# Patient Record
Sex: MALE | Race: WHITE | NOT HISPANIC OR LATINO | Employment: OTHER | URBAN - METROPOLITAN AREA
[De-identification: names, ages, dates, MRNs, and addresses within clinical notes are randomized per-mention and may not be internally consistent; named-entity substitution may affect disease eponyms.]

---

## 2020-02-19 ENCOUNTER — HOSPITAL ENCOUNTER (INPATIENT)
Facility: HOSPITAL | Age: 75
LOS: 3 days | Discharge: HOME | End: 2020-02-22
Attending: EMERGENCY MEDICINE | Admitting: FAMILY MEDICINE

## 2020-02-19 ENCOUNTER — APPOINTMENT (OUTPATIENT)
Dept: CT IMAGING | Facility: HOSPITAL | Age: 75
End: 2020-02-19

## 2020-02-19 ENCOUNTER — APPOINTMENT (OUTPATIENT)
Dept: GENERAL RADIOLOGY | Facility: HOSPITAL | Age: 75
End: 2020-02-19

## 2020-02-19 DIAGNOSIS — R41.0 CONFUSION: ICD-10-CM

## 2020-02-19 DIAGNOSIS — Z79.4 TYPE 2 DIABETES MELLITUS WITH DIABETIC NEUROPATHY, WITH LONG-TERM CURRENT USE OF INSULIN (HCC): ICD-10-CM

## 2020-02-19 DIAGNOSIS — N18.6 STAGE 5 CHRONIC KIDNEY DISEASE ON CHRONIC DIALYSIS (HCC): ICD-10-CM

## 2020-02-19 DIAGNOSIS — E11.40 TYPE 2 DIABETES MELLITUS WITH DIABETIC NEUROPATHY, WITH LONG-TERM CURRENT USE OF INSULIN (HCC): ICD-10-CM

## 2020-02-19 DIAGNOSIS — Z99.2 DIALYSIS PATIENT (HCC): ICD-10-CM

## 2020-02-19 DIAGNOSIS — J96.21 ACUTE ON CHRONIC RESPIRATORY FAILURE WITH HYPOXIA (HCC): Primary | ICD-10-CM

## 2020-02-19 DIAGNOSIS — Z99.2 STAGE 5 CHRONIC KIDNEY DISEASE ON CHRONIC DIALYSIS (HCC): ICD-10-CM

## 2020-02-19 PROBLEM — I50.9 ACUTE ON CHRONIC CONGESTIVE HEART FAILURE: Status: ACTIVE | Noted: 2020-02-19

## 2020-02-19 PROBLEM — R09.02 HYPOXIA: Status: ACTIVE | Noted: 2020-02-19

## 2020-02-19 PROBLEM — G93.40 ENCEPHALOPATHY ACUTE: Status: ACTIVE | Noted: 2020-02-19

## 2020-02-19 LAB
ALBUMIN SERPL-MCNC: 3.2 G/DL (ref 3.5–5.2)
ALBUMIN/GLOB SERPL: 0.6 G/DL
ALP SERPL-CCNC: 151 U/L (ref 39–117)
ALT SERPL W P-5'-P-CCNC: 5 U/L (ref 1–41)
AMMONIA BLD-SCNC: 23 UMOL/L (ref 16–60)
ANION GAP SERPL CALCULATED.3IONS-SCNC: 13 MMOL/L (ref 5–15)
AST SERPL-CCNC: 8 U/L (ref 1–40)
BASOPHILS # BLD AUTO: 0.02 10*3/MM3 (ref 0–0.2)
BASOPHILS NFR BLD AUTO: 0.2 % (ref 0–1.5)
BILIRUB SERPL-MCNC: 0.9 MG/DL (ref 0.2–1.2)
BUN BLD-MCNC: 27 MG/DL (ref 8–23)
BUN/CREAT SERPL: 6.2 (ref 7–25)
CALCIUM SPEC-SCNC: 8.6 MG/DL (ref 8.6–10.5)
CHLORIDE SERPL-SCNC: 98 MMOL/L (ref 98–107)
CO2 SERPL-SCNC: 31 MMOL/L (ref 22–29)
CREAT BLD-MCNC: 4.37 MG/DL (ref 0.76–1.27)
D-LACTATE SERPL-SCNC: 2.2 MMOL/L (ref 0.5–2)
D-LACTATE SERPL-SCNC: 2.7 MMOL/L (ref 0.5–2)
DEPRECATED RDW RBC AUTO: 55.9 FL (ref 37–54)
EOSINOPHIL # BLD AUTO: 0.03 10*3/MM3 (ref 0–0.4)
EOSINOPHIL NFR BLD AUTO: 0.4 % (ref 0.3–6.2)
ERYTHROCYTE [DISTWIDTH] IN BLOOD BY AUTOMATED COUNT: 15.9 % (ref 12.3–15.4)
GFR SERPL CREATININE-BSD FRML MDRD: 13 ML/MIN/1.73
GFR SERPL CREATININE-BSD FRML MDRD: ABNORMAL ML/MIN/{1.73_M2}
GLOBULIN UR ELPH-MCNC: 5 GM/DL
GLUCOSE BLD-MCNC: 99 MG/DL (ref 65–99)
HCT VFR BLD AUTO: 39.4 % (ref 37.5–51)
HGB BLD-MCNC: 11.9 G/DL (ref 13–17.7)
HOLD SPECIMEN: NORMAL
HOLD SPECIMEN: NORMAL
IMM GRANULOCYTES # BLD AUTO: 0.03 10*3/MM3 (ref 0–0.05)
IMM GRANULOCYTES NFR BLD AUTO: 0.4 % (ref 0–0.5)
INR PPP: 3.06 (ref 0.85–1.16)
LACTATE HOLD SPECIMEN: NORMAL
LYMPHOCYTES # BLD AUTO: 1.37 10*3/MM3 (ref 0.7–3.1)
LYMPHOCYTES NFR BLD AUTO: 16.5 % (ref 19.6–45.3)
MCH RBC QN AUTO: 29 PG (ref 26.6–33)
MCHC RBC AUTO-ENTMCNC: 30.2 G/DL (ref 31.5–35.7)
MCV RBC AUTO: 95.9 FL (ref 79–97)
MONOCYTES # BLD AUTO: 0.68 10*3/MM3 (ref 0.1–0.9)
MONOCYTES NFR BLD AUTO: 8.2 % (ref 5–12)
NEUTROPHILS # BLD AUTO: 6.16 10*3/MM3 (ref 1.7–7)
NEUTROPHILS NFR BLD AUTO: 74.3 % (ref 42.7–76)
NRBC BLD AUTO-RTO: 0 /100 WBC (ref 0–0.2)
PLATELET # BLD AUTO: 171 10*3/MM3 (ref 140–450)
PMV BLD AUTO: 10.4 FL (ref 6–12)
POTASSIUM BLD-SCNC: 4.2 MMOL/L (ref 3.5–5.2)
PROT SERPL-MCNC: 8.2 G/DL (ref 6–8.5)
PROTHROMBIN TIME: 30.5 SECONDS (ref 11.2–14.3)
RBC # BLD AUTO: 4.11 10*6/MM3 (ref 4.14–5.8)
SODIUM BLD-SCNC: 142 MMOL/L (ref 136–145)
WBC NRBC COR # BLD: 8.29 10*3/MM3 (ref 3.4–10.8)
WHOLE BLOOD HOLD SPECIMEN: NORMAL
WHOLE BLOOD HOLD SPECIMEN: NORMAL

## 2020-02-19 PROCEDURE — 85610 PROTHROMBIN TIME: CPT | Performed by: EMERGENCY MEDICINE

## 2020-02-19 PROCEDURE — 99223 1ST HOSP IP/OBS HIGH 75: CPT | Performed by: INTERNAL MEDICINE

## 2020-02-19 PROCEDURE — 82140 ASSAY OF AMMONIA: CPT | Performed by: EMERGENCY MEDICINE

## 2020-02-19 PROCEDURE — 70450 CT HEAD/BRAIN W/O DYE: CPT

## 2020-02-19 PROCEDURE — 83605 ASSAY OF LACTIC ACID: CPT | Performed by: EMERGENCY MEDICINE

## 2020-02-19 PROCEDURE — 99285 EMERGENCY DEPT VISIT HI MDM: CPT

## 2020-02-19 PROCEDURE — 80053 COMPREHEN METABOLIC PANEL: CPT | Performed by: EMERGENCY MEDICINE

## 2020-02-19 PROCEDURE — 87040 BLOOD CULTURE FOR BACTERIA: CPT | Performed by: EMERGENCY MEDICINE

## 2020-02-19 PROCEDURE — 71045 X-RAY EXAM CHEST 1 VIEW: CPT

## 2020-02-19 PROCEDURE — 85025 COMPLETE CBC W/AUTO DIFF WBC: CPT | Performed by: EMERGENCY MEDICINE

## 2020-02-19 PROCEDURE — 94640 AIRWAY INHALATION TREATMENT: CPT

## 2020-02-19 RX ORDER — HYDROCODONE BITARTRATE AND ACETAMINOPHEN 10; 325 MG/1; MG/1
1 TABLET ORAL EVERY 6 HOURS PRN
COMMUNITY
End: 2020-04-22 | Stop reason: HOSPADM

## 2020-02-19 RX ORDER — POTASSIUM CHLORIDE 1500 MG/1
20 TABLET, FILM COATED, EXTENDED RELEASE ORAL DAILY
COMMUNITY
End: 2020-04-22 | Stop reason: HOSPADM

## 2020-02-19 RX ORDER — IPRATROPIUM BROMIDE AND ALBUTEROL SULFATE 2.5; .5 MG/3ML; MG/3ML
3 SOLUTION RESPIRATORY (INHALATION) ONCE
Status: COMPLETED | OUTPATIENT
Start: 2020-02-19 | End: 2020-02-19

## 2020-02-19 RX ORDER — RANITIDINE 150 MG/1
150 TABLET ORAL 2 TIMES DAILY
COMMUNITY
End: 2020-02-22 | Stop reason: HOSPADM

## 2020-02-19 RX ORDER — GLIPIZIDE 10 MG/1
10 TABLET ORAL
COMMUNITY
End: 2021-01-01

## 2020-02-19 RX ORDER — MIDODRINE HYDROCHLORIDE 10 MG/1
10 TABLET ORAL 2 TIMES DAILY PRN
COMMUNITY
End: 2021-01-01

## 2020-02-19 RX ORDER — BUMETANIDE 0.25 MG/ML
2 INJECTION INTRAMUSCULAR; INTRAVENOUS ONCE
Status: COMPLETED | OUTPATIENT
Start: 2020-02-19 | End: 2020-02-19

## 2020-02-19 RX ORDER — SODIUM CHLORIDE 0.9 % (FLUSH) 0.9 %
10 SYRINGE (ML) INJECTION AS NEEDED
Status: DISCONTINUED | OUTPATIENT
Start: 2020-02-19 | End: 2020-02-22 | Stop reason: HOSPADM

## 2020-02-19 RX ORDER — BUMETANIDE 2 MG/1
2 TABLET ORAL DAILY
COMMUNITY
End: 2020-04-22 | Stop reason: HOSPADM

## 2020-02-19 RX ADMIN — IPRATROPIUM BROMIDE AND ALBUTEROL SULFATE 3 ML: 2.5; .5 SOLUTION RESPIRATORY (INHALATION) at 17:31

## 2020-02-19 RX ADMIN — BUMETANIDE 2 MG: 0.25 INJECTION INTRAMUSCULAR; INTRAVENOUS at 23:46

## 2020-02-20 ENCOUNTER — APPOINTMENT (OUTPATIENT)
Dept: CARDIOLOGY | Facility: HOSPITAL | Age: 75
End: 2020-02-20

## 2020-02-20 LAB
ANION GAP SERPL CALCULATED.3IONS-SCNC: 14 MMOL/L (ref 5–15)
B PARAPERT DNA SPEC QL NAA+PROBE: NOT DETECTED
B PERT DNA SPEC QL NAA+PROBE: NOT DETECTED
BASOPHILS # BLD AUTO: 0.01 10*3/MM3 (ref 0–0.2)
BASOPHILS NFR BLD AUTO: 0.2 % (ref 0–1.5)
BH CV ECHO MEAS - AO MAX PG (FULL): 6.4 MMHG
BH CV ECHO MEAS - AO MAX PG: 8.9 MMHG
BH CV ECHO MEAS - AO MEAN PG (FULL): 3.2 MMHG
BH CV ECHO MEAS - AO MEAN PG: 4.6 MMHG
BH CV ECHO MEAS - AO ROOT AREA (BSA CORRECTED): 1.2
BH CV ECHO MEAS - AO ROOT AREA: 8.6 CM^2
BH CV ECHO MEAS - AO ROOT DIAM: 3.3 CM
BH CV ECHO MEAS - AO V2 MAX: 149.4 CM/SEC
BH CV ECHO MEAS - AO V2 MEAN: 97.1 CM/SEC
BH CV ECHO MEAS - AO V2 VTI: 34.2 CM
BH CV ECHO MEAS - ASC AORTA: 3.5 CM
BH CV ECHO MEAS - AVA(I,A): 2.2 CM^2
BH CV ECHO MEAS - AVA(I,D): 2.2 CM^2
BH CV ECHO MEAS - AVA(V,A): 2.4 CM^2
BH CV ECHO MEAS - AVA(V,D): 2.4 CM^2
BH CV ECHO MEAS - BSA(HAYCOCK): 3 M^2
BH CV ECHO MEAS - BSA: 2.8 M^2
BH CV ECHO MEAS - BZI_BMI: 42.4 KILOGRAMS/M^2
BH CV ECHO MEAS - BZI_METRIC_HEIGHT: 193 CM
BH CV ECHO MEAS - BZI_METRIC_WEIGHT: 157.9 KG
BH CV ECHO MEAS - EDV(CUBED): 85.1 ML
BH CV ECHO MEAS - EDV(MOD-SP4): 112 ML
BH CV ECHO MEAS - EDV(TEICH): 87.6 ML
BH CV ECHO MEAS - EF(CUBED): 66.2 %
BH CV ECHO MEAS - EF(MOD-SP4): 50 %
BH CV ECHO MEAS - EF(TEICH): 57.9 %
BH CV ECHO MEAS - ESV(CUBED): 28.8 ML
BH CV ECHO MEAS - ESV(MOD-SP4): 56 ML
BH CV ECHO MEAS - ESV(TEICH): 36.9 ML
BH CV ECHO MEAS - FS: 30.3 %
BH CV ECHO MEAS - IVS/LVPW: 0.88
BH CV ECHO MEAS - IVSD: 0.9 CM
BH CV ECHO MEAS - LA DIMENSION: 4.4 CM
BH CV ECHO MEAS - LA/AO: 1.3
BH CV ECHO MEAS - LAD MAJOR: 5.7 CM
BH CV ECHO MEAS - LAT PEAK E' VEL: 8.1 CM/SEC
BH CV ECHO MEAS - LATERAL E/E' RATIO: 14.2
BH CV ECHO MEAS - LV DIASTOLIC VOL/BSA (35-75): 39.9 ML/M^2
BH CV ECHO MEAS - LV MASS(C)D: 141.3 GRAMS
BH CV ECHO MEAS - LV MASS(C)DI: 50.4 GRAMS/M^2
BH CV ECHO MEAS - LV MAX PG: 2.6 MMHG
BH CV ECHO MEAS - LV MEAN PG: 1.4 MMHG
BH CV ECHO MEAS - LV SYSTOLIC VOL/BSA (12-30): 20 ML/M^2
BH CV ECHO MEAS - LV V1 MAX: 80.2 CM/SEC
BH CV ECHO MEAS - LV V1 MEAN: 54 CM/SEC
BH CV ECHO MEAS - LV V1 VTI: 17.5 CM
BH CV ECHO MEAS - LVIDD: 4.4 CM
BH CV ECHO MEAS - LVIDS: 3.1 CM
BH CV ECHO MEAS - LVLD AP4: 8.1 CM
BH CV ECHO MEAS - LVLS AP4: 7.6 CM
BH CV ECHO MEAS - LVOT AREA (M): 4.5 CM^2
BH CV ECHO MEAS - LVOT AREA: 4.4 CM^2
BH CV ECHO MEAS - LVOT DIAM: 2.4 CM
BH CV ECHO MEAS - LVPWD: 1 CM
BH CV ECHO MEAS - MED PEAK E' VEL: 8.8 CM/SEC
BH CV ECHO MEAS - MEDIAL E/E' RATIO: 13.2
BH CV ECHO MEAS - MV A MAX VEL: 126.2 CM/SEC
BH CV ECHO MEAS - MV DEC TIME: 0.12 SEC
BH CV ECHO MEAS - MV E MAX VEL: 117.9 CM/SEC
BH CV ECHO MEAS - MV E/A: 0.93
BH CV ECHO MEAS - MV MAX PG: 3.9 MMHG
BH CV ECHO MEAS - MV MEAN PG: 2.4 MMHG
BH CV ECHO MEAS - MV V2 MAX: 98.8 CM/SEC
BH CV ECHO MEAS - MV V2 MEAN: 75.1 CM/SEC
BH CV ECHO MEAS - MV V2 VTI: 29.9 CM
BH CV ECHO MEAS - MVA(VTI): 2.6 CM^2
BH CV ECHO MEAS - PA ACC SLOPE: 534.5 CM/SEC^2
BH CV ECHO MEAS - PA ACC TIME: 0.15 SEC
BH CV ECHO MEAS - PA MAX PG: 2.8 MMHG
BH CV ECHO MEAS - PA PR(ACCEL): 10.1 MMHG
BH CV ECHO MEAS - PA V2 MAX: 83.1 CM/SEC
BH CV ECHO MEAS - SI(AO): 105.3 ML/M^2
BH CV ECHO MEAS - SI(CUBED): 20.1 ML/M^2
BH CV ECHO MEAS - SI(LVOT): 27.4 ML/M^2
BH CV ECHO MEAS - SI(MOD-SP4): 20 ML/M^2
BH CV ECHO MEAS - SI(TEICH): 18.1 ML/M^2
BH CV ECHO MEAS - SV(AO): 295.2 ML
BH CV ECHO MEAS - SV(CUBED): 56.3 ML
BH CV ECHO MEAS - SV(LVOT): 76.8 ML
BH CV ECHO MEAS - SV(MOD-SP4): 56 ML
BH CV ECHO MEAS - SV(TEICH): 50.7 ML
BH CV ECHO MEAS - TV MAX PG: 1.2 MMHG
BH CV ECHO MEAS - TV V2 MAX: 54.8 CM/SEC
BH CV ECHO MEASUREMENTS AVERAGE E/E' RATIO: 13.95
BH CV VAS BP RIGHT ARM: NORMAL MMHG
BH CV XLRA - RV BASE: 3.6 CM
BH CV XLRA - RV LENGTH: 6.3 CM
BH CV XLRA - RV MID: 3.4 CM
BUN BLD-MCNC: 33 MG/DL (ref 8–23)
BUN/CREAT SERPL: 7 (ref 7–25)
C PNEUM DNA NPH QL NAA+NON-PROBE: NOT DETECTED
CALCIUM SPEC-SCNC: 8.4 MG/DL (ref 8.6–10.5)
CHLORIDE SERPL-SCNC: 99 MMOL/L (ref 98–107)
CHOLEST SERPL-MCNC: 88 MG/DL (ref 0–200)
CLUMPED PLATELETS: PRESENT
CO2 SERPL-SCNC: 26 MMOL/L (ref 22–29)
CREAT BLD-MCNC: 4.7 MG/DL (ref 0.76–1.27)
DEPRECATED RDW RBC AUTO: 55.8 FL (ref 37–54)
EOSINOPHIL # BLD AUTO: 0.08 10*3/MM3 (ref 0–0.4)
EOSINOPHIL NFR BLD AUTO: 1.5 % (ref 0.3–6.2)
ERYTHROCYTE [DISTWIDTH] IN BLOOD BY AUTOMATED COUNT: 15.9 % (ref 12.3–15.4)
FLUAV H1 2009 PAND RNA NPH QL NAA+PROBE: NOT DETECTED
FLUAV H1 HA GENE NPH QL NAA+PROBE: NOT DETECTED
FLUAV H3 RNA NPH QL NAA+PROBE: NOT DETECTED
FLUAV SUBTYP SPEC NAA+PROBE: NOT DETECTED
FLUBV RNA ISLT QL NAA+PROBE: NOT DETECTED
GFR SERPL CREATININE-BSD FRML MDRD: 12 ML/MIN/1.73
GFR SERPL CREATININE-BSD FRML MDRD: ABNORMAL ML/MIN/{1.73_M2}
GLUCOSE BLD-MCNC: 86 MG/DL (ref 65–99)
GLUCOSE BLDC GLUCOMTR-MCNC: 124 MG/DL (ref 70–130)
GLUCOSE BLDC GLUCOMTR-MCNC: 45 MG/DL (ref 70–130)
GLUCOSE BLDC GLUCOMTR-MCNC: 69 MG/DL (ref 70–130)
GLUCOSE BLDC GLUCOMTR-MCNC: 78 MG/DL (ref 70–130)
GLUCOSE BLDC GLUCOMTR-MCNC: 86 MG/DL (ref 70–130)
HADV DNA SPEC NAA+PROBE: NOT DETECTED
HBA1C MFR BLD: 6.4 % (ref 4.8–5.6)
HCOV 229E RNA SPEC QL NAA+PROBE: NOT DETECTED
HCOV HKU1 RNA SPEC QL NAA+PROBE: NOT DETECTED
HCOV NL63 RNA SPEC QL NAA+PROBE: NOT DETECTED
HCOV OC43 RNA SPEC QL NAA+PROBE: NOT DETECTED
HCT VFR BLD AUTO: 36.1 % (ref 37.5–51)
HDLC SERPL-MCNC: 26 MG/DL (ref 40–60)
HGB BLD-MCNC: 10.9 G/DL (ref 13–17.7)
HMPV RNA NPH QL NAA+NON-PROBE: NOT DETECTED
HPIV1 RNA SPEC QL NAA+PROBE: NOT DETECTED
HPIV2 RNA SPEC QL NAA+PROBE: NOT DETECTED
HPIV3 RNA NPH QL NAA+PROBE: NOT DETECTED
HPIV4 P GENE NPH QL NAA+PROBE: DETECTED
IMM GRANULOCYTES # BLD AUTO: 0.06 10*3/MM3 (ref 0–0.05)
IMM GRANULOCYTES NFR BLD AUTO: 1.1 % (ref 0–0.5)
INR PPP: 3.46 (ref 0.85–1.16)
LARGE PLATELETS: NORMAL
LDLC SERPL CALC-MCNC: 40 MG/DL (ref 0–100)
LDLC/HDLC SERPL: 1.53 {RATIO}
LEFT ATRIUM VOLUME INDEX: 21 ML/M^2
LEFT ATRIUM VOLUME: 59 ML
LV EF 2D ECHO EST: 55 %
LYMPHOCYTES # BLD AUTO: 0.87 10*3/MM3 (ref 0.7–3.1)
LYMPHOCYTES NFR BLD AUTO: 16.5 % (ref 19.6–45.3)
M PNEUMO IGG SER IA-ACNC: NOT DETECTED
MCH RBC QN AUTO: 29.1 PG (ref 26.6–33)
MCHC RBC AUTO-ENTMCNC: 30.2 G/DL (ref 31.5–35.7)
MCV RBC AUTO: 96.3 FL (ref 79–97)
MONOCYTES # BLD AUTO: 0.28 10*3/MM3 (ref 0.1–0.9)
MONOCYTES NFR BLD AUTO: 5.3 % (ref 5–12)
NEUTROPHILS # BLD AUTO: 3.97 10*3/MM3 (ref 1.7–7)
NEUTROPHILS NFR BLD AUTO: 75.4 % (ref 42.7–76)
NRBC BLD AUTO-RTO: 0.6 /100 WBC (ref 0–0.2)
PLATELET # BLD AUTO: 151 10*3/MM3 (ref 140–450)
PMV BLD AUTO: 11.3 FL (ref 6–12)
POTASSIUM BLD-SCNC: 3.5 MMOL/L (ref 3.5–5.2)
PROTHROMBIN TIME: 33.5 SECONDS (ref 11.2–14.3)
RBC # BLD AUTO: 3.75 10*6/MM3 (ref 4.14–5.8)
RBC MORPH BLD: NORMAL
RHINOVIRUS RNA SPEC NAA+PROBE: NOT DETECTED
RSV RNA NPH QL NAA+NON-PROBE: NOT DETECTED
SODIUM BLD-SCNC: 139 MMOL/L (ref 136–145)
TRIGL SERPL-MCNC: 111 MG/DL (ref 0–150)
VLDLC SERPL-MCNC: 22.2 MG/DL
WBC MORPH BLD: NORMAL
WBC NRBC COR # BLD: 5.27 10*3/MM3 (ref 3.4–10.8)

## 2020-02-20 PROCEDURE — 82962 GLUCOSE BLOOD TEST: CPT

## 2020-02-20 PROCEDURE — 99232 SBSQ HOSP IP/OBS MODERATE 35: CPT | Performed by: FAMILY MEDICINE

## 2020-02-20 PROCEDURE — 63710000001 INSULIN LISPRO (HUMAN) PER 5 UNITS: Performed by: NURSE PRACTITIONER

## 2020-02-20 PROCEDURE — 83036 HEMOGLOBIN GLYCOSYLATED A1C: CPT | Performed by: NURSE PRACTITIONER

## 2020-02-20 PROCEDURE — 63710000001 INSULIN LISPRO (HUMAN) PER 5 UNITS: Performed by: FAMILY MEDICINE

## 2020-02-20 PROCEDURE — 80061 LIPID PANEL: CPT | Performed by: NURSE PRACTITIONER

## 2020-02-20 PROCEDURE — 85025 COMPLETE CBC W/AUTO DIFF WBC: CPT | Performed by: NURSE PRACTITIONER

## 2020-02-20 PROCEDURE — 85610 PROTHROMBIN TIME: CPT | Performed by: NURSE PRACTITIONER

## 2020-02-20 PROCEDURE — 93306 TTE W/DOPPLER COMPLETE: CPT

## 2020-02-20 PROCEDURE — 80048 BASIC METABOLIC PNL TOTAL CA: CPT | Performed by: NURSE PRACTITIONER

## 2020-02-20 PROCEDURE — 93306 TTE W/DOPPLER COMPLETE: CPT | Performed by: INTERNAL MEDICINE

## 2020-02-20 PROCEDURE — 0100U HC BIOFIRE FILMARRAY RESP PANEL 2: CPT | Performed by: FAMILY MEDICINE

## 2020-02-20 PROCEDURE — 85007 BL SMEAR W/DIFF WBC COUNT: CPT | Performed by: NURSE PRACTITIONER

## 2020-02-20 RX ORDER — ATORVASTATIN CALCIUM 20 MG/1
20 TABLET, FILM COATED ORAL DAILY
Status: DISCONTINUED | OUTPATIENT
Start: 2020-02-20 | End: 2020-02-22 | Stop reason: HOSPADM

## 2020-02-20 RX ORDER — WARFARIN SODIUM 5 MG/1
2.5 TABLET ORAL SEE ADMIN INSTRUCTIONS
COMMUNITY
End: 2020-04-22 | Stop reason: HOSPADM

## 2020-02-20 RX ORDER — HYDROCODONE BITARTRATE AND ACETAMINOPHEN 10; 325 MG/1; MG/1
1 TABLET ORAL EVERY 6 HOURS PRN
Status: DISCONTINUED | OUTPATIENT
Start: 2020-02-20 | End: 2020-02-22 | Stop reason: HOSPADM

## 2020-02-20 RX ORDER — NICOTINE POLACRILEX 4 MG
15 LOZENGE BUCCAL
Status: DISCONTINUED | OUTPATIENT
Start: 2020-02-20 | End: 2020-02-22 | Stop reason: HOSPADM

## 2020-02-20 RX ORDER — ONDANSETRON 2 MG/ML
4 INJECTION INTRAMUSCULAR; INTRAVENOUS EVERY 6 HOURS PRN
Status: DISCONTINUED | OUTPATIENT
Start: 2020-02-20 | End: 2020-02-22 | Stop reason: HOSPADM

## 2020-02-20 RX ORDER — PANTOPRAZOLE SODIUM 40 MG/1
40 TABLET, DELAYED RELEASE ORAL EVERY MORNING
Status: DISCONTINUED | OUTPATIENT
Start: 2020-02-20 | End: 2020-02-22 | Stop reason: HOSPADM

## 2020-02-20 RX ORDER — SODIUM CHLORIDE 0.9 % (FLUSH) 0.9 %
10 SYRINGE (ML) INJECTION AS NEEDED
Status: DISCONTINUED | OUTPATIENT
Start: 2020-02-20 | End: 2020-02-22 | Stop reason: HOSPADM

## 2020-02-20 RX ORDER — GABAPENTIN 300 MG/1
600 CAPSULE ORAL 4 TIMES DAILY
COMMUNITY
End: 2020-02-22 | Stop reason: HOSPADM

## 2020-02-20 RX ORDER — GABAPENTIN 300 MG/1
300 CAPSULE ORAL 3 TIMES DAILY
Status: DISCONTINUED | OUTPATIENT
Start: 2020-02-20 | End: 2020-02-20

## 2020-02-20 RX ORDER — SODIUM CHLORIDE 0.9 % (FLUSH) 0.9 %
10 SYRINGE (ML) INJECTION EVERY 12 HOURS SCHEDULED
Status: DISCONTINUED | OUTPATIENT
Start: 2020-02-20 | End: 2020-02-22 | Stop reason: HOSPADM

## 2020-02-20 RX ORDER — LEVOTHYROXINE SODIUM 0.1 MG/1
200 TABLET ORAL DAILY
Status: DISCONTINUED | OUTPATIENT
Start: 2020-02-20 | End: 2020-02-22 | Stop reason: HOSPADM

## 2020-02-20 RX ORDER — POTASSIUM CHLORIDE 750 MG/1
20 CAPSULE, EXTENDED RELEASE ORAL 2 TIMES DAILY WITH MEALS
Status: DISCONTINUED | OUTPATIENT
Start: 2020-02-20 | End: 2020-02-22 | Stop reason: HOSPADM

## 2020-02-20 RX ORDER — WARFARIN SODIUM 5 MG/1
5 TABLET ORAL SEE ADMIN INSTRUCTIONS
COMMUNITY
End: 2020-04-22 | Stop reason: HOSPADM

## 2020-02-20 RX ORDER — LISINOPRIL 20 MG/1
20 TABLET ORAL DAILY
Status: DISCONTINUED | OUTPATIENT
Start: 2020-02-20 | End: 2020-02-20

## 2020-02-20 RX ORDER — GABAPENTIN 100 MG/1
100 CAPSULE ORAL 3 TIMES DAILY
Status: DISCONTINUED | OUTPATIENT
Start: 2020-02-20 | End: 2020-02-22 | Stop reason: HOSPADM

## 2020-02-20 RX ORDER — ALBUMIN (HUMAN) 12.5 G/50ML
12.5 SOLUTION INTRAVENOUS AS NEEDED
Status: ACTIVE | OUTPATIENT
Start: 2020-02-21 | End: 2020-02-21

## 2020-02-20 RX ORDER — FENOFIBRATE 145 MG/1
145 TABLET, COATED ORAL DAILY
Status: DISCONTINUED | OUTPATIENT
Start: 2020-02-20 | End: 2020-02-20

## 2020-02-20 RX ORDER — BUMETANIDE 1 MG/1
2 TABLET ORAL DAILY
Status: DISCONTINUED | OUTPATIENT
Start: 2020-02-20 | End: 2020-02-22 | Stop reason: HOSPADM

## 2020-02-20 RX ORDER — ONDANSETRON 4 MG/1
4 TABLET, FILM COATED ORAL EVERY 6 HOURS PRN
Status: DISCONTINUED | OUTPATIENT
Start: 2020-02-20 | End: 2020-02-22 | Stop reason: HOSPADM

## 2020-02-20 RX ORDER — TORSEMIDE 20 MG/1
40 TABLET ORAL DAILY
Status: DISCONTINUED | OUTPATIENT
Start: 2020-02-20 | End: 2020-02-20 | Stop reason: SDUPTHER

## 2020-02-20 RX ORDER — FAMOTIDINE 20 MG/1
20 TABLET, FILM COATED ORAL
Status: DISCONTINUED | OUTPATIENT
Start: 2020-02-20 | End: 2020-02-21

## 2020-02-20 RX ORDER — DEXTROSE MONOHYDRATE 25 G/50ML
25 INJECTION, SOLUTION INTRAVENOUS
Status: DISCONTINUED | OUTPATIENT
Start: 2020-02-20 | End: 2020-02-22 | Stop reason: HOSPADM

## 2020-02-20 RX ORDER — MIDODRINE HYDROCHLORIDE 5 MG/1
10 TABLET ORAL
Status: DISCONTINUED | OUTPATIENT
Start: 2020-02-20 | End: 2020-02-22 | Stop reason: HOSPADM

## 2020-02-20 RX ORDER — WARFARIN SODIUM 4 MG/1
4 TABLET ORAL
Status: DISCONTINUED | OUTPATIENT
Start: 2020-02-20 | End: 2020-02-20

## 2020-02-20 RX ADMIN — GABAPENTIN 300 MG: 300 CAPSULE ORAL at 09:14

## 2020-02-20 RX ADMIN — MIDODRINE HYDROCHLORIDE 10 MG: 5 TABLET ORAL at 09:11

## 2020-02-20 RX ADMIN — METOPROLOL TARTRATE 25 MG: 25 TABLET ORAL at 00:59

## 2020-02-20 RX ADMIN — MIDODRINE HYDROCHLORIDE 10 MG: 5 TABLET ORAL at 17:14

## 2020-02-20 RX ADMIN — ATORVASTATIN CALCIUM 20 MG: 20 TABLET, FILM COATED ORAL at 09:11

## 2020-02-20 RX ADMIN — FAMOTIDINE 20 MG: 20 TABLET ORAL at 06:01

## 2020-02-20 RX ADMIN — PANTOPRAZOLE SODIUM 40 MG: 40 TABLET, DELAYED RELEASE ORAL at 06:01

## 2020-02-20 RX ADMIN — GABAPENTIN 100 MG: 100 CAPSULE ORAL at 17:14

## 2020-02-20 RX ADMIN — GABAPENTIN 100 MG: 100 CAPSULE ORAL at 22:41

## 2020-02-20 RX ADMIN — DEXTROSE 50 % IN WATER (D50W) INTRAVENOUS SYRINGE 25 G: at 09:15

## 2020-02-20 RX ADMIN — SODIUM CHLORIDE, PRESERVATIVE FREE 10 ML: 5 INJECTION INTRAVENOUS at 09:11

## 2020-02-20 RX ADMIN — MIDODRINE HYDROCHLORIDE 10 MG: 5 TABLET ORAL at 11:03

## 2020-02-20 RX ADMIN — POTASSIUM CHLORIDE 20 MEQ: 750 CAPSULE, EXTENDED RELEASE ORAL at 09:11

## 2020-02-20 RX ADMIN — HYDROCODONE BITARTRATE AND ACETAMINOPHEN 1 TABLET: 10; 325 TABLET ORAL at 17:15

## 2020-02-20 RX ADMIN — HYDROCODONE BITARTRATE AND ACETAMINOPHEN 1 TABLET: 10; 325 TABLET ORAL at 11:16

## 2020-02-20 RX ADMIN — LEVOTHYROXINE SODIUM 200 MCG: 100 TABLET ORAL at 06:01

## 2020-02-20 RX ADMIN — BUMETANIDE 2 MG: 1 TABLET ORAL at 09:12

## 2020-02-20 RX ADMIN — FAMOTIDINE 20 MG: 20 TABLET ORAL at 17:14

## 2020-02-20 RX ADMIN — POTASSIUM CHLORIDE 20 MEQ: 750 CAPSULE, EXTENDED RELEASE ORAL at 17:14

## 2020-02-20 RX ADMIN — SODIUM CHLORIDE, PRESERVATIVE FREE 10 ML: 5 INJECTION INTRAVENOUS at 22:42

## 2020-02-21 ENCOUNTER — APPOINTMENT (OUTPATIENT)
Dept: NEPHROLOGY | Facility: HOSPITAL | Age: 75
End: 2020-02-21

## 2020-02-21 LAB
ANION GAP SERPL CALCULATED.3IONS-SCNC: 11 MMOL/L (ref 5–15)
BUN BLD-MCNC: 17 MG/DL (ref 8–23)
BUN/CREAT SERPL: 5.5 (ref 7–25)
CALCIUM SPEC-SCNC: 8.8 MG/DL (ref 8.6–10.5)
CHLORIDE SERPL-SCNC: 97 MMOL/L (ref 98–107)
CO2 SERPL-SCNC: 28 MMOL/L (ref 22–29)
CREAT BLD-MCNC: 3.09 MG/DL (ref 0.76–1.27)
DEPRECATED RDW RBC AUTO: 54.7 FL (ref 37–54)
ERYTHROCYTE [DISTWIDTH] IN BLOOD BY AUTOMATED COUNT: 15.6 % (ref 12.3–15.4)
GFR SERPL CREATININE-BSD FRML MDRD: 20 ML/MIN/1.73
GLUCOSE BLD-MCNC: 98 MG/DL (ref 65–99)
GLUCOSE BLDC GLUCOMTR-MCNC: 113 MG/DL (ref 70–130)
GLUCOSE BLDC GLUCOMTR-MCNC: 82 MG/DL (ref 70–130)
GLUCOSE BLDC GLUCOMTR-MCNC: 95 MG/DL (ref 70–130)
GLUCOSE BLDC GLUCOMTR-MCNC: 99 MG/DL (ref 70–130)
HAV IGM SERPL QL IA: NORMAL
HBV CORE IGM SERPL QL IA: NORMAL
HBV SURFACE AG SERPL QL IA: NORMAL
HCT VFR BLD AUTO: 34.4 % (ref 37.5–51)
HCV AB SER DONR QL: NORMAL
HGB BLD-MCNC: 10.7 G/DL (ref 13–17.7)
INR PPP: 3.29 (ref 0.85–1.16)
MCH RBC QN AUTO: 29.8 PG (ref 26.6–33)
MCHC RBC AUTO-ENTMCNC: 31.1 G/DL (ref 31.5–35.7)
MCV RBC AUTO: 95.8 FL (ref 79–97)
PLATELET # BLD AUTO: 183 10*3/MM3 (ref 140–450)
PMV BLD AUTO: 10.1 FL (ref 6–12)
POTASSIUM BLD-SCNC: 3.7 MMOL/L (ref 3.5–5.2)
PROTHROMBIN TIME: 32.2 SECONDS (ref 11.2–14.3)
RBC # BLD AUTO: 3.59 10*6/MM3 (ref 4.14–5.8)
SODIUM BLD-SCNC: 136 MMOL/L (ref 136–145)
WBC NRBC COR # BLD: 5.11 10*3/MM3 (ref 3.4–10.8)

## 2020-02-21 PROCEDURE — 5A1D70Z PERFORMANCE OF URINARY FILTRATION, INTERMITTENT, LESS THAN 6 HOURS PER DAY: ICD-10-PCS | Performed by: INTERNAL MEDICINE

## 2020-02-21 PROCEDURE — 80048 BASIC METABOLIC PNL TOTAL CA: CPT | Performed by: FAMILY MEDICINE

## 2020-02-21 PROCEDURE — P9047 ALBUMIN (HUMAN), 25%, 50ML: HCPCS | Performed by: INTERNAL MEDICINE

## 2020-02-21 PROCEDURE — 99232 SBSQ HOSP IP/OBS MODERATE 35: CPT | Performed by: FAMILY MEDICINE

## 2020-02-21 PROCEDURE — 85610 PROTHROMBIN TIME: CPT | Performed by: NURSE PRACTITIONER

## 2020-02-21 PROCEDURE — 82962 GLUCOSE BLOOD TEST: CPT

## 2020-02-21 PROCEDURE — 25010000002 ALBUMIN HUMAN 25% PER 50 ML: Performed by: INTERNAL MEDICINE

## 2020-02-21 PROCEDURE — 80074 ACUTE HEPATITIS PANEL: CPT | Performed by: INTERNAL MEDICINE

## 2020-02-21 PROCEDURE — 25010000002 EPOETIN ALFA-EPBX 10000 UNIT/ML SOLUTION: Performed by: INTERNAL MEDICINE

## 2020-02-21 PROCEDURE — 63710000001 INSULIN DETEMIR PER 5 UNITS: Performed by: FAMILY MEDICINE

## 2020-02-21 PROCEDURE — 85027 COMPLETE CBC AUTOMATED: CPT | Performed by: FAMILY MEDICINE

## 2020-02-21 RX ORDER — FAMOTIDINE 20 MG/1
20 TABLET, FILM COATED ORAL DAILY
Status: DISCONTINUED | OUTPATIENT
Start: 2020-02-22 | End: 2020-02-22 | Stop reason: HOSPADM

## 2020-02-21 RX ORDER — LIDOCAINE 40 MG/G
CREAM TOPICAL AS NEEDED
Status: DISCONTINUED | OUTPATIENT
Start: 2020-02-21 | End: 2020-02-22 | Stop reason: HOSPADM

## 2020-02-21 RX ADMIN — FAMOTIDINE 20 MG: 20 TABLET ORAL at 10:56

## 2020-02-21 RX ADMIN — MIDODRINE HYDROCHLORIDE 10 MG: 5 TABLET ORAL at 17:45

## 2020-02-21 RX ADMIN — HYDROCODONE BITARTRATE AND ACETAMINOPHEN 1 TABLET: 10; 325 TABLET ORAL at 17:45

## 2020-02-21 RX ADMIN — GABAPENTIN 100 MG: 100 CAPSULE ORAL at 17:44

## 2020-02-21 RX ADMIN — LEVOTHYROXINE SODIUM 200 MCG: 100 TABLET ORAL at 10:56

## 2020-02-21 RX ADMIN — POTASSIUM CHLORIDE 20 MEQ: 750 CAPSULE, EXTENDED RELEASE ORAL at 17:45

## 2020-02-21 RX ADMIN — ALBUMIN HUMAN 50 G: 0.25 SOLUTION INTRAVENOUS at 08:17

## 2020-02-21 RX ADMIN — ATORVASTATIN CALCIUM 20 MG: 20 TABLET, FILM COATED ORAL at 10:56

## 2020-02-21 RX ADMIN — MIDODRINE HYDROCHLORIDE 10 MG: 5 TABLET ORAL at 08:17

## 2020-02-21 RX ADMIN — GABAPENTIN 100 MG: 100 CAPSULE ORAL at 20:52

## 2020-02-21 RX ADMIN — GABAPENTIN 100 MG: 100 CAPSULE ORAL at 10:56

## 2020-02-21 RX ADMIN — PANTOPRAZOLE SODIUM 40 MG: 40 TABLET, DELAYED RELEASE ORAL at 10:56

## 2020-02-21 RX ADMIN — BUMETANIDE 2 MG: 1 TABLET ORAL at 10:55

## 2020-02-21 RX ADMIN — EPOETIN ALFA-EPBX 10000 UNITS: 10000 INJECTION, SOLUTION INTRAVENOUS; SUBCUTANEOUS at 10:59

## 2020-02-21 RX ADMIN — SODIUM CHLORIDE, PRESERVATIVE FREE 10 ML: 5 INJECTION INTRAVENOUS at 20:52

## 2020-02-22 VITALS
OXYGEN SATURATION: 87 % | SYSTOLIC BLOOD PRESSURE: 127 MMHG | BODY MASS INDEX: 28.88 KG/M2 | HEIGHT: 76 IN | RESPIRATION RATE: 16 BRPM | WEIGHT: 237.2 LBS | HEART RATE: 83 BPM | DIASTOLIC BLOOD PRESSURE: 79 MMHG | TEMPERATURE: 97.6 F

## 2020-02-22 PROBLEM — J96.21 ACUTE AND CHRONIC RESPIRATORY FAILURE WITH HYPOXIA (HCC): Status: RESOLVED | Noted: 2020-02-19 | Resolved: 2020-02-22

## 2020-02-22 PROBLEM — I50.9 ACUTE ON CHRONIC CONGESTIVE HEART FAILURE (HCC): Status: RESOLVED | Noted: 2020-02-19 | Resolved: 2020-02-22

## 2020-02-22 PROBLEM — G93.40 ENCEPHALOPATHY ACUTE: Status: RESOLVED | Noted: 2020-02-19 | Resolved: 2020-02-22

## 2020-02-22 LAB
ALBUMIN SERPL-MCNC: 3.7 G/DL (ref 3.5–5.2)
ANION GAP SERPL CALCULATED.3IONS-SCNC: 14 MMOL/L (ref 5–15)
BUN BLD-MCNC: 23 MG/DL (ref 8–23)
BUN/CREAT SERPL: 5.8 (ref 7–25)
CALCIUM SPEC-SCNC: 9 MG/DL (ref 8.6–10.5)
CHLORIDE SERPL-SCNC: 96 MMOL/L (ref 98–107)
CO2 SERPL-SCNC: 28 MMOL/L (ref 22–29)
CREAT BLD-MCNC: 3.96 MG/DL (ref 0.76–1.27)
GFR SERPL CREATININE-BSD FRML MDRD: 15 ML/MIN/1.73
GLUCOSE BLD-MCNC: 102 MG/DL (ref 65–99)
GLUCOSE BLDC GLUCOMTR-MCNC: 105 MG/DL (ref 70–130)
GLUCOSE BLDC GLUCOMTR-MCNC: 156 MG/DL (ref 70–130)
HOLD SPECIMEN: NORMAL
INR PPP: 2.91 (ref 0.85–1.16)
PHOSPHATE SERPL-MCNC: 3.5 MG/DL (ref 2.5–4.5)
POTASSIUM BLD-SCNC: 4 MMOL/L (ref 3.5–5.2)
PROTHROMBIN TIME: 29.2 SECONDS (ref 11.2–14.3)
SODIUM BLD-SCNC: 138 MMOL/L (ref 136–145)

## 2020-02-22 PROCEDURE — 85610 PROTHROMBIN TIME: CPT | Performed by: NURSE PRACTITIONER

## 2020-02-22 PROCEDURE — 82962 GLUCOSE BLOOD TEST: CPT

## 2020-02-22 PROCEDURE — 80069 RENAL FUNCTION PANEL: CPT | Performed by: INTERNAL MEDICINE

## 2020-02-22 PROCEDURE — 99239 HOSP IP/OBS DSCHRG MGMT >30: CPT | Performed by: FAMILY MEDICINE

## 2020-02-22 RX ORDER — GABAPENTIN 100 MG/1
100 CAPSULE ORAL 3 TIMES DAILY
Qty: 9 CAPSULE | Refills: 0 | OUTPATIENT
Start: 2020-02-22 | End: 2022-01-01 | Stop reason: HOSPADM

## 2020-02-22 RX ADMIN — ATORVASTATIN CALCIUM 20 MG: 20 TABLET, FILM COATED ORAL at 10:40

## 2020-02-22 RX ADMIN — MIDODRINE HYDROCHLORIDE 10 MG: 5 TABLET ORAL at 10:40

## 2020-02-22 RX ADMIN — LEVOTHYROXINE SODIUM 200 MCG: 100 TABLET ORAL at 06:55

## 2020-02-22 RX ADMIN — PANTOPRAZOLE SODIUM 40 MG: 40 TABLET, DELAYED RELEASE ORAL at 06:56

## 2020-02-22 RX ADMIN — BUMETANIDE 2 MG: 1 TABLET ORAL at 10:39

## 2020-02-22 RX ADMIN — FAMOTIDINE 20 MG: 20 TABLET ORAL at 10:40

## 2020-02-22 RX ADMIN — SODIUM CHLORIDE, PRESERVATIVE FREE 10 ML: 5 INJECTION INTRAVENOUS at 10:41

## 2020-02-22 RX ADMIN — GABAPENTIN 100 MG: 100 CAPSULE ORAL at 10:40

## 2020-02-22 RX ADMIN — POTASSIUM CHLORIDE 20 MEQ: 750 CAPSULE, EXTENDED RELEASE ORAL at 10:40

## 2020-02-22 RX ADMIN — HYDROCODONE BITARTRATE AND ACETAMINOPHEN 1 TABLET: 10; 325 TABLET ORAL at 10:47

## 2020-02-22 RX ADMIN — MIDODRINE HYDROCHLORIDE 10 MG: 5 TABLET ORAL at 06:56

## 2020-02-23 ENCOUNTER — READMISSION MANAGEMENT (OUTPATIENT)
Dept: CALL CENTER | Facility: HOSPITAL | Age: 75
End: 2020-02-23

## 2020-02-23 NOTE — OUTREACH NOTE
Prep Survey      Responses   Facility patient discharged from?  [place]   Is patient eligible?  Yes   Discharge diagnosis  Acute on chronic HF   Does the patient have one of the following disease processes/diagnoses(primary or secondary)?  CHF   Does the patient have Home health ordered?  No   Is there a DME ordered?  No   Prep survey completed?  Yes          Chelsea Orozco RN

## 2020-02-24 ENCOUNTER — READMISSION MANAGEMENT (OUTPATIENT)
Dept: CALL CENTER | Facility: HOSPITAL | Age: 75
End: 2020-02-24

## 2020-02-24 ENCOUNTER — TRANSITIONAL CARE MANAGEMENT TELEPHONE ENCOUNTER (OUTPATIENT)
Dept: CALL CENTER | Facility: HOSPITAL | Age: 75
End: 2020-02-24

## 2020-02-24 LAB
BACTERIA SPEC AEROBE CULT: NORMAL
BACTERIA SPEC AEROBE CULT: NORMAL

## 2020-02-24 NOTE — OUTREACH NOTE
CHF Week 1 Survey      Responses   Facility patient discharged from?  [place]   Does the patient have one of the following disease processes/diagnoses(primary or secondary)?  CHF   Is there a successful TCM telephone encounter documented?  Yes          Katherine Rangel RN

## 2020-02-24 NOTE — OUTREACH NOTE
TCM call completed.  Spoke with family member Hope, she says that patient is not doing any better and she is on her way to bring him back to the ER.  She states that patient is very weak and she says he should have never been discharged on Saturday.  Family member says that she is going to wait to make a follow up with Dr. Purcell because she is unsure what is going to happen with patient at this time.

## 2020-03-02 ENCOUNTER — READMISSION MANAGEMENT (OUTPATIENT)
Dept: CALL CENTER | Facility: HOSPITAL | Age: 75
End: 2020-03-02

## 2020-03-02 NOTE — OUTREACH NOTE
CHF Week 2 Survey      Responses   Facility patient discharged from?  [place]   Does the patient have one of the following disease processes/diagnoses(primary or secondary)?  CHF   Week 2 attempt successful?  Yes   Call start time  1201   Call end time  1204   General alerts for this patient  Pt has HD on MWF   Discharge diagnosis  Acute on chronic HF   Is patient permission given to speak with other caregiver?  Yes   List who call center can speak with  friend- Rohini   Person spoke with today (if not patient) and relationship  Rohini   Meds reviewed with patient/caregiver?  Yes   Is the patient having any side effects they believe may be caused by any medication additions or changes?  No   Does the patient have all medications ordered at discharge?  Yes   Is the patient taking all medications as directed (includes completed medication regime)?  Yes   Medication comments  PCP added Zantac and readjusted the Gabapentin dose.    Does the patient have a primary care provider?   Yes   Does the patient have an appointment with their PCP within 7 days of discharge?  Yes   Has the patient kept scheduled appointments due by today?  Yes   Psychosocial issues?  No   Comments  Pt still having weakness. SOA is better. Confusion is resolved.    Did the patient receive a copy of their discharge instructions?  Yes   Nursing interventions  Reviewed instructions with patient   What is the patient's perception of their health status since discharge?  Improving   Is the patient weighing daily?  No [Pt gets weighed 3x/wk at HD, does not weigh on off days per Rohini.]   Is the patient able to teach back Heart Failure diet management?  Yes   Is the patient able to teach back Heart Failure Zones?  Yes   Is the patient/caregiver able to teach back the hierarchy of who to call/visit for symptoms/problems? PCP, Specialist, Home health nurse, Urgent Care, ED, 911  Yes   CHF Week 2 call completed?  Yes          Katherine Rangel RN

## 2020-03-11 ENCOUNTER — READMISSION MANAGEMENT (OUTPATIENT)
Dept: CALL CENTER | Facility: HOSPITAL | Age: 75
End: 2020-03-11

## 2020-03-11 NOTE — OUTREACH NOTE
CHF Week 3 Survey      Responses   [facility] patient discharged from?  [place]   Does the patient have one of the following disease processes/diagnoses(primary or secondary)?  CHF   Week 3 attempt successful?  No   Unsuccessful attempts  Attempt 1          Coral Alfonso RN

## 2020-03-16 ENCOUNTER — READMISSION MANAGEMENT (OUTPATIENT)
Dept: CALL CENTER | Facility: HOSPITAL | Age: 75
End: 2020-03-16

## 2020-03-16 NOTE — OUTREACH NOTE
CHF Week 3 Survey      Responses   [facility] patient discharged from?  [place]   Does the patient have one of the following disease processes/diagnoses(primary or secondary)?  CHF   Week 3 attempt successful?  No   Unsuccessful attempts  Attempt 2          Katherine Rangel RN

## 2020-03-31 ENCOUNTER — HOSPITAL ENCOUNTER (INPATIENT)
Facility: HOSPITAL | Age: 75
LOS: 22 days | Discharge: HOME HEALTH | End: 2020-04-22
Attending: EMERGENCY MEDICINE | Admitting: FAMILY MEDICINE

## 2020-03-31 ENCOUNTER — APPOINTMENT (OUTPATIENT)
Dept: CT IMAGING | Facility: HOSPITAL | Age: 75
End: 2020-03-31

## 2020-03-31 DIAGNOSIS — J96.90 RESPIRATORY FAILURE (HCC): ICD-10-CM

## 2020-03-31 DIAGNOSIS — R10.84 DIFFUSE ABDOMINAL PAIN: ICD-10-CM

## 2020-03-31 DIAGNOSIS — K92.1 MELENA: ICD-10-CM

## 2020-03-31 DIAGNOSIS — K92.0 HEMATEMESIS, PRESENCE OF NAUSEA NOT SPECIFIED: ICD-10-CM

## 2020-03-31 DIAGNOSIS — D62 ACUTE BLOOD LOSS ANEMIA: ICD-10-CM

## 2020-03-31 DIAGNOSIS — J96.01 ACUTE HYPOXEMIC RESPIRATORY FAILURE (HCC): ICD-10-CM

## 2020-03-31 DIAGNOSIS — Z03.818 ENCNTR FOR OBS FOR SUSP EXPSR TO OTH BIOLG AGENTS RULED OUT: ICD-10-CM

## 2020-03-31 DIAGNOSIS — R13.13 PHARYNGEAL DYSPHAGIA: ICD-10-CM

## 2020-03-31 DIAGNOSIS — K92.2 UPPER GI BLEED: Primary | ICD-10-CM

## 2020-03-31 DIAGNOSIS — K92.2 GASTROINTESTINAL HEMORRHAGE, UNSPECIFIED GASTROINTESTINAL HEMORRHAGE TYPE: ICD-10-CM

## 2020-03-31 PROBLEM — J18.1 LOBAR PNEUMONIA: Status: ACTIVE | Noted: 2020-03-31

## 2020-03-31 PROBLEM — N18.6 ESRD (END STAGE RENAL DISEASE): Status: ACTIVE | Noted: 2020-03-31

## 2020-03-31 PROBLEM — K59.81 OGILVIE'S SYNDROME: Status: ACTIVE | Noted: 2020-03-31

## 2020-03-31 PROBLEM — A41.9 SEVERE SEPSIS: Status: ACTIVE | Noted: 2020-03-31

## 2020-03-31 PROBLEM — R65.20 SEVERE SEPSIS (HCC): Status: ACTIVE | Noted: 2020-03-31

## 2020-03-31 LAB
ABO GROUP BLD: NORMAL
ABO GROUP BLD: NORMAL
ALBUMIN SERPL-MCNC: 2.9 G/DL (ref 3.5–5.2)
ALBUMIN/GLOB SERPL: 0.6 G/DL
ALP SERPL-CCNC: 135 U/L (ref 39–117)
ALT SERPL W P-5'-P-CCNC: 8 U/L (ref 1–41)
ANION GAP SERPL CALCULATED.3IONS-SCNC: 23 MMOL/L (ref 5–15)
AST SERPL-CCNC: 19 U/L (ref 1–40)
B PARAPERT DNA SPEC QL NAA+PROBE: NOT DETECTED
B PERT DNA SPEC QL NAA+PROBE: NOT DETECTED
BASOPHILS # BLD AUTO: 0.02 10*3/MM3 (ref 0–0.2)
BASOPHILS NFR BLD AUTO: 0.2 % (ref 0–1.5)
BILIRUB SERPL-MCNC: 0.6 MG/DL (ref 0.2–1.2)
BLD GP AB SCN SERPL QL: NEGATIVE
BUN BLD-MCNC: 47 MG/DL (ref 8–23)
BUN/CREAT SERPL: 12.5 (ref 7–25)
C PNEUM DNA NPH QL NAA+NON-PROBE: NOT DETECTED
CALCIUM SPEC-SCNC: 8 MG/DL (ref 8.6–10.5)
CHLORIDE SERPL-SCNC: 92 MMOL/L (ref 98–107)
CO2 SERPL-SCNC: 25 MMOL/L (ref 22–29)
CREAT BLD-MCNC: 3.77 MG/DL (ref 0.76–1.27)
D-LACTATE SERPL-SCNC: 2.9 MMOL/L (ref 0.5–2)
DEPRECATED RDW RBC AUTO: 60.2 FL (ref 37–54)
DEVELOPER EXPIRATION DATE: ABNORMAL
DEVELOPER LOT NUMBER: 7
EOSINOPHIL # BLD AUTO: 0 10*3/MM3 (ref 0–0.4)
EOSINOPHIL NFR BLD AUTO: 0 % (ref 0.3–6.2)
ERYTHROCYTE [DISTWIDTH] IN BLOOD BY AUTOMATED COUNT: 15.6 % (ref 12.3–15.4)
EXPIRATION DATE: ABNORMAL
FECAL OCCULT BLOOD SCREEN, POC: POSITIVE
FLUAV H1 2009 PAND RNA NPH QL NAA+PROBE: NOT DETECTED
FLUAV H1 HA GENE NPH QL NAA+PROBE: NOT DETECTED
FLUAV H3 RNA NPH QL NAA+PROBE: NOT DETECTED
FLUAV SUBTYP SPEC NAA+PROBE: NOT DETECTED
FLUBV RNA ISLT QL NAA+PROBE: NOT DETECTED
GFR SERPL CREATININE-BSD FRML MDRD: 16 ML/MIN/1.73
GLOBULIN UR ELPH-MCNC: 4.5 GM/DL
GLUCOSE BLD-MCNC: 100 MG/DL (ref 65–99)
HADV DNA SPEC NAA+PROBE: NOT DETECTED
HCOV 229E RNA SPEC QL NAA+PROBE: NOT DETECTED
HCOV HKU1 RNA SPEC QL NAA+PROBE: NOT DETECTED
HCOV NL63 RNA SPEC QL NAA+PROBE: NOT DETECTED
HCOV OC43 RNA SPEC QL NAA+PROBE: NOT DETECTED
HCT VFR BLD AUTO: 38.3 % (ref 37.5–51)
HGB BLD-MCNC: 11.5 G/DL (ref 13–17.7)
HMPV RNA NPH QL NAA+NON-PROBE: NOT DETECTED
HOLD SPECIMEN: NORMAL
HOLD SPECIMEN: NORMAL
HPIV1 RNA SPEC QL NAA+PROBE: NOT DETECTED
HPIV2 RNA SPEC QL NAA+PROBE: NOT DETECTED
HPIV3 RNA NPH QL NAA+PROBE: NOT DETECTED
HPIV4 P GENE NPH QL NAA+PROBE: NOT DETECTED
IMM GRANULOCYTES # BLD AUTO: 0.06 10*3/MM3 (ref 0–0.05)
IMM GRANULOCYTES NFR BLD AUTO: 0.6 % (ref 0–0.5)
INR PPP: 2.42 (ref 0.85–1.16)
LYMPHOCYTES # BLD AUTO: 1.57 10*3/MM3 (ref 0.7–3.1)
LYMPHOCYTES NFR BLD AUTO: 17 % (ref 19.6–45.3)
Lab: ABNORMAL
M PNEUMO IGG SER IA-ACNC: NOT DETECTED
MCH RBC QN AUTO: 31.4 PG (ref 26.6–33)
MCHC RBC AUTO-ENTMCNC: 30 G/DL (ref 31.5–35.7)
MCV RBC AUTO: 104.6 FL (ref 79–97)
MONOCYTES # BLD AUTO: 0.49 10*3/MM3 (ref 0.1–0.9)
MONOCYTES NFR BLD AUTO: 5.3 % (ref 5–12)
NEGATIVE CONTROL: NEGATIVE
NEUTROPHILS # BLD AUTO: 7.1 10*3/MM3 (ref 1.7–7)
NEUTROPHILS NFR BLD AUTO: 76.9 % (ref 42.7–76)
NRBC BLD AUTO-RTO: 0 /100 WBC (ref 0–0.2)
NT-PROBNP SERPL-MCNC: 2395 PG/ML (ref 5–900)
PLATELET # BLD AUTO: 211 10*3/MM3 (ref 140–450)
PMV BLD AUTO: 10.6 FL (ref 6–12)
POSITIVE CONTROL: POSITIVE
POTASSIUM BLD-SCNC: 4.8 MMOL/L (ref 3.5–5.2)
PROT SERPL-MCNC: 7.4 G/DL (ref 6–8.5)
PROTHROMBIN TIME: 26 SECONDS (ref 11.5–14)
RBC # BLD AUTO: 3.66 10*6/MM3 (ref 4.14–5.8)
RH BLD: POSITIVE
RH BLD: POSITIVE
RHINOVIRUS RNA SPEC NAA+PROBE: DETECTED
RSV RNA NPH QL NAA+NON-PROBE: NOT DETECTED
SODIUM BLD-SCNC: 140 MMOL/L (ref 136–145)
T&S EXPIRATION DATE: NORMAL
TROPONIN T SERPL-MCNC: 0.1 NG/ML (ref 0–0.03)
WBC NRBC COR # BLD: 9.24 10*3/MM3 (ref 3.4–10.8)
WHOLE BLOOD HOLD SPECIMEN: NORMAL
WHOLE BLOOD HOLD SPECIMEN: NORMAL

## 2020-03-31 PROCEDURE — 82270 OCCULT BLOOD FECES: CPT | Performed by: EMERGENCY MEDICINE

## 2020-03-31 PROCEDURE — 99285 EMERGENCY DEPT VISIT HI MDM: CPT

## 2020-03-31 PROCEDURE — 71250 CT THORAX DX C-: CPT

## 2020-03-31 PROCEDURE — 86900 BLOOD TYPING SEROLOGIC ABO: CPT

## 2020-03-31 PROCEDURE — 86850 RBC ANTIBODY SCREEN: CPT

## 2020-03-31 PROCEDURE — 83880 ASSAY OF NATRIURETIC PEPTIDE: CPT | Performed by: EMERGENCY MEDICINE

## 2020-03-31 PROCEDURE — 84484 ASSAY OF TROPONIN QUANT: CPT | Performed by: EMERGENCY MEDICINE

## 2020-03-31 PROCEDURE — 0D9P7ZZ DRAINAGE OF RECTUM, VIA NATURAL OR ARTIFICIAL OPENING: ICD-10-PCS | Performed by: FAMILY MEDICINE

## 2020-03-31 PROCEDURE — 87150 DNA/RNA AMPLIFIED PROBE: CPT | Performed by: EMERGENCY MEDICINE

## 2020-03-31 PROCEDURE — 0100U HC BIOFIRE FILMARRAY RESP PANEL 2: CPT | Performed by: INTERNAL MEDICINE

## 2020-03-31 PROCEDURE — 86923 COMPATIBILITY TEST ELECTRIC: CPT

## 2020-03-31 PROCEDURE — 25010000002 MEROPENEM: Performed by: EMERGENCY MEDICINE

## 2020-03-31 PROCEDURE — 74176 CT ABD & PELVIS W/O CONTRAST: CPT

## 2020-03-31 PROCEDURE — 80053 COMPREHEN METABOLIC PANEL: CPT

## 2020-03-31 PROCEDURE — 87186 SC STD MICRODIL/AGAR DIL: CPT | Performed by: EMERGENCY MEDICINE

## 2020-03-31 PROCEDURE — 86901 BLOOD TYPING SEROLOGIC RH(D): CPT

## 2020-03-31 PROCEDURE — 83605 ASSAY OF LACTIC ACID: CPT | Performed by: EMERGENCY MEDICINE

## 2020-03-31 PROCEDURE — 25010000002 ONDANSETRON PER 1 MG: Performed by: EMERGENCY MEDICINE

## 2020-03-31 PROCEDURE — 99291 CRITICAL CARE FIRST HOUR: CPT | Performed by: INTERNAL MEDICINE

## 2020-03-31 PROCEDURE — 87147 CULTURE TYPE IMMUNOLOGIC: CPT | Performed by: EMERGENCY MEDICINE

## 2020-03-31 PROCEDURE — 99222 1ST HOSP IP/OBS MODERATE 55: CPT | Performed by: SURGERY

## 2020-03-31 PROCEDURE — 85610 PROTHROMBIN TIME: CPT | Performed by: EMERGENCY MEDICINE

## 2020-03-31 PROCEDURE — 87040 BLOOD CULTURE FOR BACTERIA: CPT | Performed by: EMERGENCY MEDICINE

## 2020-03-31 PROCEDURE — 93005 ELECTROCARDIOGRAM TRACING: CPT | Performed by: EMERGENCY MEDICINE

## 2020-03-31 PROCEDURE — 85025 COMPLETE CBC W/AUTO DIFF WBC: CPT

## 2020-03-31 RX ORDER — PANTOPRAZOLE SODIUM 40 MG/10ML
40 INJECTION, POWDER, LYOPHILIZED, FOR SOLUTION INTRAVENOUS EVERY 12 HOURS SCHEDULED
Status: DISCONTINUED | OUTPATIENT
Start: 2020-04-01 | End: 2020-04-06

## 2020-03-31 RX ORDER — SODIUM CHLORIDE 0.9 % (FLUSH) 0.9 %
10 SYRINGE (ML) INJECTION AS NEEDED
Status: DISCONTINUED | OUTPATIENT
Start: 2020-03-31 | End: 2020-04-02

## 2020-03-31 RX ORDER — PANTOPRAZOLE SODIUM 40 MG/10ML
80 INJECTION, POWDER, LYOPHILIZED, FOR SOLUTION INTRAVENOUS ONCE
Status: COMPLETED | OUTPATIENT
Start: 2020-03-31 | End: 2020-03-31

## 2020-03-31 RX ORDER — ONDANSETRON 2 MG/ML
4 INJECTION INTRAMUSCULAR; INTRAVENOUS ONCE
Status: COMPLETED | OUTPATIENT
Start: 2020-03-31 | End: 2020-03-31

## 2020-03-31 RX ADMIN — MEROPENEM 1 G: 1 INJECTION, POWDER, FOR SOLUTION INTRAVENOUS at 21:40

## 2020-03-31 RX ADMIN — ONDANSETRON 4 MG: 2 INJECTION INTRAMUSCULAR; INTRAVENOUS at 18:43

## 2020-03-31 RX ADMIN — SODIUM CHLORIDE 500 ML: 9 INJECTION, SOLUTION INTRAVENOUS at 18:40

## 2020-03-31 RX ADMIN — PANTOPRAZOLE SODIUM 80 MG: 40 INJECTION, POWDER, FOR SOLUTION INTRAVENOUS at 18:52

## 2020-04-01 ENCOUNTER — APPOINTMENT (OUTPATIENT)
Dept: CT IMAGING | Facility: HOSPITAL | Age: 75
End: 2020-04-01

## 2020-04-01 ENCOUNTER — APPOINTMENT (OUTPATIENT)
Dept: GENERAL RADIOLOGY | Facility: HOSPITAL | Age: 75
End: 2020-04-01

## 2020-04-01 PROBLEM — E72.20 HYPERAMMONEMIA (HCC): Status: ACTIVE | Noted: 2020-04-01

## 2020-04-01 PROBLEM — J18.9 LEFT LOWER LOBE PNEUMONIA: Status: ACTIVE | Noted: 2020-04-01

## 2020-04-01 PROBLEM — R41.89 DECREASED RESPONSIVENESS: Status: ACTIVE | Noted: 2020-04-01

## 2020-04-01 PROBLEM — Z91.199 PERSONAL HISTORY OF NONCOMPLIANCE WITH MEDICAL TREATMENT: Status: ACTIVE | Noted: 2020-04-01

## 2020-04-01 PROBLEM — Z20.822 SUSPECTED COVID-19 VIRUS INFECTION: Status: ACTIVE | Noted: 2020-04-01

## 2020-04-01 PROBLEM — D62 ACUTE BLOOD LOSS ANEMIA: Status: ACTIVE | Noted: 2020-04-01

## 2020-04-01 PROBLEM — I50.32 CHRONIC DIASTOLIC CHF (CONGESTIVE HEART FAILURE): Status: ACTIVE | Noted: 2020-04-01

## 2020-04-01 PROBLEM — B34.8 RHINOVIRUS INFECTION: Status: ACTIVE | Noted: 2020-04-01

## 2020-04-01 LAB
ALBUMIN SERPL-MCNC: 2.5 G/DL (ref 3.5–5.2)
ALBUMIN SERPL-MCNC: 3.2 G/DL (ref 3.5–5.2)
ALBUMIN/GLOB SERPL: 0.7 G/DL
ALBUMIN/GLOB SERPL: 0.7 G/DL
ALP SERPL-CCNC: 122 U/L (ref 39–117)
ALP SERPL-CCNC: 91 U/L (ref 39–117)
ALT SERPL W P-5'-P-CCNC: 6 U/L (ref 1–41)
ALT SERPL W P-5'-P-CCNC: 9 U/L (ref 1–41)
AMMONIA BLD-SCNC: 277 UMOL/L (ref 16–60)
ANION GAP SERPL CALCULATED.3IONS-SCNC: 21 MMOL/L (ref 5–15)
ANION GAP SERPL CALCULATED.3IONS-SCNC: 26 MMOL/L (ref 5–15)
APTT PPP: 32.7 SECONDS (ref 24–37)
ARTERIAL PATENCY WRIST A: ABNORMAL
AST SERPL-CCNC: 16 U/L (ref 1–40)
AST SERPL-CCNC: 17 U/L (ref 1–40)
ATMOSPHERIC PRESS: ABNORMAL MM[HG]
BACTERIA BLD CULT: ABNORMAL
BASE EXCESS BLDA CALC-SCNC: -6.5 MMOL/L (ref 0–2)
BASOPHILS # BLD AUTO: 0.02 10*3/MM3 (ref 0–0.2)
BASOPHILS NFR BLD AUTO: 0.2 % (ref 0–1.5)
BDY SITE: ABNORMAL
BH BB BLOOD EXPIRATION DATE: NORMAL
BH BB BLOOD EXPIRATION DATE: NORMAL
BH BB BLOOD TYPE BARCODE: 7300
BH BB BLOOD TYPE BARCODE: 7300
BH BB DISPENSE STATUS: NORMAL
BH BB DISPENSE STATUS: NORMAL
BH BB PRODUCT CODE: NORMAL
BH BB PRODUCT CODE: NORMAL
BH BB UNIT NUMBER: NORMAL
BH BB UNIT NUMBER: NORMAL
BILIRUB SERPL-MCNC: 0.6 MG/DL (ref 0.2–1.2)
BILIRUB SERPL-MCNC: 0.6 MG/DL (ref 0.2–1.2)
BODY TEMPERATURE: 37 C
BUN BLD-MCNC: 102 MG/DL (ref 8–23)
BUN BLD-MCNC: 71 MG/DL (ref 8–23)
BUN/CREAT SERPL: 16.8 (ref 7–25)
BUN/CREAT SERPL: 22.4 (ref 7–25)
CALCIUM SPEC-SCNC: 7.6 MG/DL (ref 8.6–10.5)
CALCIUM SPEC-SCNC: 8.2 MG/DL (ref 8.6–10.5)
CHLORIDE SERPL-SCNC: 95 MMOL/L (ref 98–107)
CHLORIDE SERPL-SCNC: 96 MMOL/L (ref 98–107)
CO2 BLDA-SCNC: 18.4 MMOL/L (ref 22–33)
CO2 SERPL-SCNC: 16 MMOL/L (ref 22–29)
CO2 SERPL-SCNC: 23 MMOL/L (ref 22–29)
COHGB MFR BLD: 2 % (ref 0–2)
CREAT BLD-MCNC: 4.23 MG/DL (ref 0.76–1.27)
CREAT BLD-MCNC: 4.56 MG/DL (ref 0.76–1.27)
CROSSMATCH INTERPRETATION: NORMAL
CROSSMATCH INTERPRETATION: NORMAL
D-LACTATE SERPL-SCNC: 2.4 MMOL/L (ref 0.5–2)
D-LACTATE SERPL-SCNC: 6.3 MMOL/L (ref 0.5–2)
DEPRECATED RDW RBC AUTO: 56.1 FL (ref 37–54)
EOSINOPHIL # BLD AUTO: 0 10*3/MM3 (ref 0–0.4)
EOSINOPHIL NFR BLD AUTO: 0 % (ref 0.3–6.2)
EPAP: 0
ERYTHROCYTE [DISTWIDTH] IN BLOOD BY AUTOMATED COUNT: 15.6 % (ref 12.3–15.4)
GFR SERPL CREATININE-BSD FRML MDRD: 13 ML/MIN/1.73
GFR SERPL CREATININE-BSD FRML MDRD: 14 ML/MIN/1.73
GFR SERPL CREATININE-BSD FRML MDRD: ABNORMAL ML/MIN/{1.73_M2}
GFR SERPL CREATININE-BSD FRML MDRD: ABNORMAL ML/MIN/{1.73_M2}
GLOBULIN UR ELPH-MCNC: 3.4 GM/DL
GLOBULIN UR ELPH-MCNC: 4.4 GM/DL
GLUCOSE BLD-MCNC: 166 MG/DL (ref 65–99)
GLUCOSE BLD-MCNC: 275 MG/DL (ref 65–99)
GLUCOSE BLDC GLUCOMTR-MCNC: 119 MG/DL (ref 70–130)
GLUCOSE BLDC GLUCOMTR-MCNC: 162 MG/DL (ref 70–130)
GLUCOSE BLDC GLUCOMTR-MCNC: 248 MG/DL (ref 70–130)
GLUCOSE BLDC GLUCOMTR-MCNC: 295 MG/DL (ref 70–130)
HCO3 BLDA-SCNC: 17.6 MMOL/L (ref 20–26)
HCT VFR BLD AUTO: 26.1 % (ref 37.5–51)
HCT VFR BLD AUTO: 26.3 % (ref 37.5–51)
HCT VFR BLD AUTO: 27.8 % (ref 37.5–51)
HCT VFR BLD AUTO: 28.1 % (ref 37.5–51)
HCT VFR BLD AUTO: 28.4 % (ref 37.5–51)
HCT VFR BLD AUTO: 30.2 % (ref 37.5–51)
HCT VFR BLD AUTO: 31.2 % (ref 37.5–51)
HCT VFR BLD AUTO: 31.2 % (ref 37.5–51)
HCT VFR BLD CALC: 17.8 %
HGB BLD-MCNC: 7.5 G/DL (ref 13–17.7)
HGB BLD-MCNC: 8.1 G/DL (ref 13–17.7)
HGB BLD-MCNC: 8.3 G/DL (ref 13–17.7)
HGB BLD-MCNC: 8.4 G/DL (ref 13–17.7)
HGB BLD-MCNC: 8.6 G/DL (ref 13–17.7)
HGB BLD-MCNC: 9.3 G/DL (ref 13–17.7)
HGB BLD-MCNC: 9.7 G/DL (ref 13–17.7)
HGB BLD-MCNC: 9.7 G/DL (ref 13–17.7)
HGB BLDA-MCNC: 5.8 G/DL (ref 13.5–17.5)
HOROWITZ INDEX BLD+IHG-RTO: 21 %
IMM GRANULOCYTES # BLD AUTO: 0.06 10*3/MM3 (ref 0–0.05)
IMM GRANULOCYTES NFR BLD AUTO: 0.6 % (ref 0–0.5)
INR PPP: 1.51 (ref 0.85–1.16)
INR PPP: 1.91 (ref 0.85–1.16)
IPAP: 0
LACTATE HOLD SPECIMEN: NORMAL
LYMPHOCYTES # BLD AUTO: 1.7 10*3/MM3 (ref 0.7–3.1)
LYMPHOCYTES NFR BLD AUTO: 17.6 % (ref 19.6–45.3)
Lab: ABNORMAL
MCH RBC QN AUTO: 31.1 PG (ref 26.6–33)
MCHC RBC AUTO-ENTMCNC: 31.1 G/DL (ref 31.5–35.7)
MCV RBC AUTO: 100 FL (ref 79–97)
METHGB BLD QL: 1 % (ref 0–1.5)
MODALITY: ABNORMAL
MONOCYTES # BLD AUTO: 0.77 10*3/MM3 (ref 0.1–0.9)
MONOCYTES NFR BLD AUTO: 8 % (ref 5–12)
NEUTROPHILS # BLD AUTO: 7.1 10*3/MM3 (ref 1.7–7)
NEUTROPHILS NFR BLD AUTO: 73.6 % (ref 42.7–76)
NOTE: ABNORMAL
NOTIFIED BY: ABNORMAL
NOTIFIED WHO: ABNORMAL
NRBC BLD AUTO-RTO: 0 /100 WBC (ref 0–0.2)
OXYHGB MFR BLDV: 92.7 % (ref 94–99)
PAW @ PEAK INSP FLOW SETTING VENT: 0 CMH2O
PCO2 BLDA: 27.7 MM HG (ref 35–45)
PCO2 TEMP ADJ BLD: 27.7 MM HG (ref 35–48)
PH BLDA: 7.41 PH UNITS (ref 7.35–7.45)
PH, TEMP CORRECTED: 7.41 PH UNITS
PLATELET # BLD AUTO: 229 10*3/MM3 (ref 140–450)
PMV BLD AUTO: 10.6 FL (ref 6–12)
PO2 BLDA: 75.4 MM HG (ref 83–108)
PO2 TEMP ADJ BLD: 75.4 MM HG (ref 83–108)
POTASSIUM BLD-SCNC: 3.3 MMOL/L (ref 3.5–5.2)
POTASSIUM BLD-SCNC: 3.6 MMOL/L (ref 3.5–5.2)
PROCALCITONIN SERPL-MCNC: 0.63 NG/ML (ref 0.1–0.25)
PROT SERPL-MCNC: 5.9 G/DL (ref 6–8.5)
PROT SERPL-MCNC: 7.6 G/DL (ref 6–8.5)
PROTHROMBIN TIME: 17.9 SECONDS (ref 11.5–14)
PROTHROMBIN TIME: 21.5 SECONDS (ref 11.5–14)
RBC # BLD AUTO: 3.12 10*6/MM3 (ref 4.14–5.8)
REF LAB TEST METHOD: NORMAL
SARS-COV-2 RNA RESP QL NAA+PROBE: NOT DETECTED
SODIUM BLD-SCNC: 138 MMOL/L (ref 136–145)
SODIUM BLD-SCNC: 139 MMOL/L (ref 136–145)
T4 FREE SERPL-MCNC: 1.03 NG/DL (ref 0.93–1.7)
TOTAL RATE: 0 BREATHS/MINUTE
TROPONIN T SERPL-MCNC: 0.11 NG/ML (ref 0–0.03)
TSH SERPL DL<=0.05 MIU/L-ACNC: 0.28 UIU/ML (ref 0.27–4.2)
UNIT  ABO: NORMAL
UNIT  ABO: NORMAL
UNIT  RH: NORMAL
UNIT  RH: NORMAL
WBC NRBC COR # BLD: 9.65 10*3/MM3 (ref 3.4–10.8)

## 2020-04-01 PROCEDURE — 99291 CRITICAL CARE FIRST HOUR: CPT | Performed by: INTERNAL MEDICINE

## 2020-04-01 PROCEDURE — U0001 2019-NCOV DIAGNOSTIC P: HCPCS | Performed by: INTERNAL MEDICINE

## 2020-04-01 PROCEDURE — 93005 ELECTROCARDIOGRAM TRACING: CPT | Performed by: INTERNAL MEDICINE

## 2020-04-01 PROCEDURE — 82805 BLOOD GASES W/O2 SATURATION: CPT

## 2020-04-01 PROCEDURE — 84484 ASSAY OF TROPONIN QUANT: CPT | Performed by: INTERNAL MEDICINE

## 2020-04-01 PROCEDURE — 82962 GLUCOSE BLOOD TEST: CPT

## 2020-04-01 PROCEDURE — 36556 INSERT NON-TUNNEL CV CATH: CPT | Performed by: NURSE PRACTITIONER

## 2020-04-01 PROCEDURE — 85018 HEMOGLOBIN: CPT | Performed by: INTERNAL MEDICINE

## 2020-04-01 PROCEDURE — 05HM33Z INSERTION OF INFUSION DEVICE INTO RIGHT INTERNAL JUGULAR VEIN, PERCUTANEOUS APPROACH: ICD-10-PCS | Performed by: NURSE PRACTITIONER

## 2020-04-01 PROCEDURE — 70450 CT HEAD/BRAIN W/O DYE: CPT

## 2020-04-01 PROCEDURE — 25010000002 THIAMINE PER 100 MG: Performed by: NURSE PRACTITIONER

## 2020-04-01 PROCEDURE — 25010000002 NEOSTIGMINE 10 MG/10ML SOLUTION 10 ML VIAL: Performed by: PHYSICIAN ASSISTANT

## 2020-04-01 PROCEDURE — P9016 RBC LEUKOCYTES REDUCED: HCPCS

## 2020-04-01 PROCEDURE — 85014 HEMATOCRIT: CPT | Performed by: INTERNAL MEDICINE

## 2020-04-01 PROCEDURE — 36430 TRANSFUSION BLD/BLD COMPNT: CPT

## 2020-04-01 PROCEDURE — P9035 PLATELET PHERES LEUKOREDUCED: HCPCS

## 2020-04-01 PROCEDURE — 85610 PROTHROMBIN TIME: CPT | Performed by: INTERNAL MEDICINE

## 2020-04-01 PROCEDURE — 84439 ASSAY OF FREE THYROXINE: CPT | Performed by: NURSE PRACTITIONER

## 2020-04-01 PROCEDURE — 86900 BLOOD TYPING SEROLOGIC ABO: CPT

## 2020-04-01 PROCEDURE — 25010000002 VANCOMYCIN 10 G RECONSTITUTED SOLUTION: Performed by: EMERGENCY MEDICINE

## 2020-04-01 PROCEDURE — 83605 ASSAY OF LACTIC ACID: CPT | Performed by: EMERGENCY MEDICINE

## 2020-04-01 PROCEDURE — 25010000002 VITAMIN K1 PER 1 MG: Performed by: INTERNAL MEDICINE

## 2020-04-01 PROCEDURE — 93010 ELECTROCARDIOGRAM REPORT: CPT | Performed by: INTERNAL MEDICINE

## 2020-04-01 PROCEDURE — 82140 ASSAY OF AMMONIA: CPT | Performed by: INTERNAL MEDICINE

## 2020-04-01 PROCEDURE — B543ZZA ULTRASONOGRAPHY OF RIGHT JUGULAR VEINS, GUIDANCE: ICD-10-PCS | Performed by: NURSE PRACTITIONER

## 2020-04-01 PROCEDURE — 84443 ASSAY THYROID STIM HORMONE: CPT | Performed by: NURSE PRACTITIONER

## 2020-04-01 PROCEDURE — C1751 CATH, INF, PER/CENT/MIDLINE: HCPCS

## 2020-04-01 PROCEDURE — 99221 1ST HOSP IP/OBS SF/LOW 40: CPT | Performed by: PHYSICIAN ASSISTANT

## 2020-04-01 PROCEDURE — 99233 SBSQ HOSP IP/OBS HIGH 50: CPT | Performed by: INTERNAL MEDICINE

## 2020-04-01 PROCEDURE — 36600 WITHDRAWAL OF ARTERIAL BLOOD: CPT

## 2020-04-01 PROCEDURE — 74176 CT ABD & PELVIS W/O CONTRAST: CPT

## 2020-04-01 PROCEDURE — 71045 X-RAY EXAM CHEST 1 VIEW: CPT

## 2020-04-01 PROCEDURE — 25010000003 ATROPINE SULFATE

## 2020-04-01 PROCEDURE — 80053 COMPREHEN METABOLIC PANEL: CPT | Performed by: INTERNAL MEDICINE

## 2020-04-01 PROCEDURE — 85025 COMPLETE CBC W/AUTO DIFF WBC: CPT | Performed by: INTERNAL MEDICINE

## 2020-04-01 PROCEDURE — 85730 THROMBOPLASTIN TIME PARTIAL: CPT | Performed by: INTERNAL MEDICINE

## 2020-04-01 PROCEDURE — 25010000002 ONDANSETRON PER 1 MG: Performed by: INTERNAL MEDICINE

## 2020-04-01 PROCEDURE — 83605 ASSAY OF LACTIC ACID: CPT | Performed by: INTERNAL MEDICINE

## 2020-04-01 PROCEDURE — 25010000002 MEROPENEM: Performed by: INTERNAL MEDICINE

## 2020-04-01 PROCEDURE — 84145 PROCALCITONIN (PCT): CPT

## 2020-04-01 RX ORDER — ACETAMINOPHEN 325 MG/1
650 TABLET ORAL EVERY 4 HOURS PRN
Status: DISCONTINUED | OUTPATIENT
Start: 2020-04-01 | End: 2020-04-22 | Stop reason: HOSPADM

## 2020-04-01 RX ORDER — NICOTINE POLACRILEX 4 MG
15 LOZENGE BUCCAL
Status: DISCONTINUED | OUTPATIENT
Start: 2020-04-01 | End: 2020-04-02

## 2020-04-01 RX ORDER — HYDROCODONE BITARTRATE AND ACETAMINOPHEN 10; 325 MG/1; MG/1
1 TABLET ORAL EVERY 6 HOURS PRN
Status: DISCONTINUED | OUTPATIENT
Start: 2020-04-01 | End: 2020-04-01

## 2020-04-01 RX ORDER — GABAPENTIN 400 MG/1
400 CAPSULE ORAL 2 TIMES DAILY
COMMUNITY
End: 2020-04-22 | Stop reason: HOSPADM

## 2020-04-01 RX ORDER — SODIUM CHLORIDE 9 MG/ML
50 INJECTION, SOLUTION INTRAVENOUS CONTINUOUS
Status: DISCONTINUED | OUTPATIENT
Start: 2020-04-01 | End: 2020-04-01

## 2020-04-01 RX ORDER — MIDODRINE HYDROCHLORIDE 5 MG/1
10 TABLET ORAL
Status: DISCONTINUED | OUTPATIENT
Start: 2020-04-01 | End: 2020-04-19

## 2020-04-01 RX ORDER — BUMETANIDE 1 MG/1
2 TABLET ORAL DAILY
Status: DISCONTINUED | OUTPATIENT
Start: 2020-04-01 | End: 2020-04-01

## 2020-04-01 RX ORDER — ACETAMINOPHEN 650 MG/1
650 SUPPOSITORY RECTAL EVERY 4 HOURS PRN
Status: DISCONTINUED | OUTPATIENT
Start: 2020-04-01 | End: 2020-04-02

## 2020-04-01 RX ORDER — LEVOTHYROXINE SODIUM ANHYDROUS 100 UG/5ML
150 INJECTION, POWDER, LYOPHILIZED, FOR SOLUTION INTRAVENOUS
Status: DISCONTINUED | OUTPATIENT
Start: 2020-04-02 | End: 2020-04-06

## 2020-04-01 RX ORDER — ATORVASTATIN CALCIUM 20 MG/1
20 TABLET, FILM COATED ORAL NIGHTLY
Status: DISCONTINUED | OUTPATIENT
Start: 2020-04-01 | End: 2020-04-02

## 2020-04-01 RX ORDER — ACETAMINOPHEN 160 MG/5ML
650 SOLUTION ORAL EVERY 4 HOURS PRN
Status: DISCONTINUED | OUTPATIENT
Start: 2020-04-01 | End: 2020-04-02

## 2020-04-01 RX ORDER — LACTULOSE 10 G/15ML
30 SOLUTION ORAL 3 TIMES DAILY
Status: DISCONTINUED | OUTPATIENT
Start: 2020-04-01 | End: 2020-04-06

## 2020-04-01 RX ORDER — ONDANSETRON 2 MG/ML
4 INJECTION INTRAMUSCULAR; INTRAVENOUS EVERY 6 HOURS PRN
Status: DISCONTINUED | OUTPATIENT
Start: 2020-04-01 | End: 2020-04-06

## 2020-04-01 RX ORDER — ASPIRIN 81 MG/1
81 TABLET, CHEWABLE ORAL DAILY
COMMUNITY

## 2020-04-01 RX ORDER — SODIUM CHLORIDE 0.9 % (FLUSH) 0.9 %
10 SYRINGE (ML) INJECTION EVERY 12 HOURS SCHEDULED
Status: DISCONTINUED | OUTPATIENT
Start: 2020-04-01 | End: 2020-04-22 | Stop reason: HOSPADM

## 2020-04-01 RX ORDER — LEVOTHYROXINE SODIUM 0.1 MG/1
200 TABLET ORAL DAILY
Status: DISCONTINUED | OUTPATIENT
Start: 2020-04-01 | End: 2020-04-01

## 2020-04-01 RX ORDER — DEXTROSE MONOHYDRATE 25 G/50ML
25 INJECTION, SOLUTION INTRAVENOUS
Status: DISCONTINUED | OUTPATIENT
Start: 2020-04-01 | End: 2020-04-02

## 2020-04-01 RX ORDER — SODIUM CHLORIDE, SODIUM LACTATE, POTASSIUM CHLORIDE, CALCIUM CHLORIDE 600; 310; 30; 20 MG/100ML; MG/100ML; MG/100ML; MG/100ML
75 INJECTION, SOLUTION INTRAVENOUS CONTINUOUS
Status: DISCONTINUED | OUTPATIENT
Start: 2020-04-01 | End: 2020-04-01

## 2020-04-01 RX ORDER — ONDANSETRON 2 MG/ML
4 INJECTION INTRAMUSCULAR; INTRAVENOUS ONCE
Status: DISCONTINUED | OUTPATIENT
Start: 2020-04-01 | End: 2020-04-02

## 2020-04-01 RX ORDER — NICOTINE 21 MG/24HR
1 PATCH, TRANSDERMAL 24 HOURS TRANSDERMAL
Status: DISCONTINUED | OUTPATIENT
Start: 2020-04-01 | End: 2020-04-06

## 2020-04-01 RX ORDER — SODIUM CHLORIDE 0.9 % (FLUSH) 0.9 %
10 SYRINGE (ML) INJECTION AS NEEDED
Status: DISCONTINUED | OUTPATIENT
Start: 2020-04-01 | End: 2020-04-22 | Stop reason: HOSPADM

## 2020-04-01 RX ADMIN — VANCOMYCIN HYDROCHLORIDE 2250 MG: 10 INJECTION, POWDER, LYOPHILIZED, FOR SOLUTION INTRAVENOUS at 02:29

## 2020-04-01 RX ADMIN — PANTOPRAZOLE SODIUM 40 MG: 40 INJECTION, POWDER, FOR SOLUTION INTRAVENOUS at 06:49

## 2020-04-01 RX ADMIN — ONDANSETRON 4 MG: 2 INJECTION INTRAMUSCULAR; INTRAVENOUS at 03:34

## 2020-04-01 RX ADMIN — MIDODRINE HYDROCHLORIDE 10 MG: 5 TABLET ORAL at 02:44

## 2020-04-01 RX ADMIN — SODIUM CHLORIDE, PRESERVATIVE FREE 10 ML: 5 INJECTION INTRAVENOUS at 08:38

## 2020-04-01 RX ADMIN — ATORVASTATIN CALCIUM 20 MG: 20 TABLET, FILM COATED ORAL at 02:44

## 2020-04-01 RX ADMIN — PHYTONADIONE 5 MG: 10 INJECTION, EMULSION INTRAMUSCULAR; INTRAVENOUS; SUBCUTANEOUS at 01:05

## 2020-04-01 RX ADMIN — SODIUM CHLORIDE, POTASSIUM CHLORIDE, SODIUM LACTATE AND CALCIUM CHLORIDE 75 ML/HR: 600; 310; 30; 20 INJECTION, SOLUTION INTRAVENOUS at 06:45

## 2020-04-01 RX ADMIN — MEROPENEM 500 MG: 500 INJECTION, POWDER, FOR SOLUTION INTRAVENOUS at 12:35

## 2020-04-01 RX ADMIN — NICOTINE 1 PATCH: 21 PATCH, EXTENDED RELEASE TRANSDERMAL at 08:27

## 2020-04-01 RX ADMIN — ATROPINE SULFATE: 0.1 INJECTION, SOLUTION ENDOTRACHEAL; INTRAMUSCULAR; INTRAVENOUS; SUBCUTANEOUS at 22:15

## 2020-04-01 RX ADMIN — NEOSTIGMINE METHYLSULFATE: 1 INJECTION, SOLUTION INTRAVENOUS at 14:55

## 2020-04-01 RX ADMIN — SODIUM CHLORIDE 50 ML/HR: 9 INJECTION, SOLUTION INTRAVENOUS at 11:37

## 2020-04-01 RX ADMIN — THIAMINE HYDROCHLORIDE 500 MG: 100 INJECTION, SOLUTION INTRAMUSCULAR; INTRAVENOUS at 23:31

## 2020-04-01 RX ADMIN — PHYTONADIONE 10 MG: 10 INJECTION, EMULSION INTRAMUSCULAR; INTRAVENOUS; SUBCUTANEOUS at 11:37

## 2020-04-02 ENCOUNTER — APPOINTMENT (OUTPATIENT)
Dept: NEPHROLOGY | Facility: HOSPITAL | Age: 75
End: 2020-04-02

## 2020-04-02 LAB
ALBUMIN SERPL-MCNC: 2.4 G/DL (ref 3.5–5.2)
ALBUMIN/GLOB SERPL: 0.8 G/DL
ALP SERPL-CCNC: 85 U/L (ref 39–117)
ALT SERPL W P-5'-P-CCNC: 11 U/L (ref 1–41)
ANION GAP SERPL CALCULATED.3IONS-SCNC: 22 MMOL/L (ref 5–15)
ANION GAP SERPL CALCULATED.3IONS-SCNC: 25 MMOL/L (ref 5–15)
AST SERPL-CCNC: 19 U/L (ref 1–40)
BASOPHILS # BLD AUTO: 0.01 10*3/MM3 (ref 0–0.2)
BASOPHILS NFR BLD AUTO: 0.1 % (ref 0–1.5)
BH BB BLOOD EXPIRATION DATE: NORMAL
BH BB BLOOD TYPE BARCODE: 7300
BH BB DISPENSE STATUS: NORMAL
BH BB PRODUCT CODE: NORMAL
BH BB UNIT NUMBER: NORMAL
BILIRUB SERPL-MCNC: 1 MG/DL (ref 0.2–1.2)
BUN BLD-MCNC: 108 MG/DL (ref 8–23)
BUN BLD-MCNC: 114 MG/DL (ref 8–23)
BUN/CREAT SERPL: 22 (ref 7–25)
BUN/CREAT SERPL: 22.5 (ref 7–25)
CA-I SERPL ISE-MCNC: 1.08 MMOL/L (ref 1.12–1.32)
CALCIUM SPEC-SCNC: 7.6 MG/DL (ref 8.6–10.5)
CALCIUM SPEC-SCNC: 7.8 MG/DL (ref 8.6–10.5)
CHLORIDE SERPL-SCNC: 97 MMOL/L (ref 98–107)
CHLORIDE SERPL-SCNC: 98 MMOL/L (ref 98–107)
CO2 SERPL-SCNC: 18 MMOL/L (ref 22–29)
CO2 SERPL-SCNC: 18 MMOL/L (ref 22–29)
CREAT BLD-MCNC: 4.91 MG/DL (ref 0.76–1.27)
CREAT BLD-MCNC: 5.06 MG/DL (ref 0.76–1.27)
CROSSMATCH INTERPRETATION: NORMAL
D-LACTATE SERPL-SCNC: 2.7 MMOL/L (ref 0.5–2)
D-LACTATE SERPL-SCNC: 7.1 MMOL/L (ref 0.5–2)
DEPRECATED RDW RBC AUTO: 53.4 FL (ref 37–54)
DEPRECATED RDW RBC AUTO: 64.1 FL (ref 37–54)
EOSINOPHIL # BLD AUTO: 0 10*3/MM3 (ref 0–0.4)
EOSINOPHIL NFR BLD AUTO: 0 % (ref 0.3–6.2)
ERYTHROCYTE [DISTWIDTH] IN BLOOD BY AUTOMATED COUNT: 16.3 % (ref 12.3–15.4)
ERYTHROCYTE [DISTWIDTH] IN BLOOD BY AUTOMATED COUNT: 17.8 % (ref 12.3–15.4)
GFR SERPL CREATININE-BSD FRML MDRD: 11 ML/MIN/1.73
GFR SERPL CREATININE-BSD FRML MDRD: 12 ML/MIN/1.73
GFR SERPL CREATININE-BSD FRML MDRD: ABNORMAL ML/MIN/{1.73_M2}
GFR SERPL CREATININE-BSD FRML MDRD: ABNORMAL ML/MIN/{1.73_M2}
GLOBULIN UR ELPH-MCNC: 3 GM/DL
GLUCOSE BLD-MCNC: 308 MG/DL (ref 65–99)
GLUCOSE BLD-MCNC: 310 MG/DL (ref 65–99)
GLUCOSE BLDC GLUCOMTR-MCNC: 190 MG/DL (ref 70–130)
GLUCOSE BLDC GLUCOMTR-MCNC: 191 MG/DL (ref 70–130)
GLUCOSE BLDC GLUCOMTR-MCNC: 270 MG/DL (ref 70–130)
GLUCOSE BLDC GLUCOMTR-MCNC: 296 MG/DL (ref 70–130)
GLUCOSE BLDC GLUCOMTR-MCNC: 313 MG/DL (ref 70–130)
HCT VFR BLD AUTO: 18.8 % (ref 37.5–51)
HCT VFR BLD AUTO: 26.1 % (ref 37.5–51)
HCT VFR BLD AUTO: 27 % (ref 37.5–51)
HCT VFR BLD AUTO: 28.7 % (ref 37.5–51)
HGB BLD-MCNC: 6.4 G/DL (ref 13–17.7)
HGB BLD-MCNC: 8.6 G/DL (ref 13–17.7)
HGB BLD-MCNC: 8.8 G/DL (ref 13–17.7)
HGB BLD-MCNC: 8.9 G/DL (ref 13–17.7)
IMM GRANULOCYTES # BLD AUTO: 0.11 10*3/MM3 (ref 0–0.05)
IMM GRANULOCYTES NFR BLD AUTO: 0.9 % (ref 0–0.5)
LYMPHOCYTES # BLD AUTO: 1.2 10*3/MM3 (ref 0.7–3.1)
LYMPHOCYTES NFR BLD AUTO: 9.7 % (ref 19.6–45.3)
MAGNESIUM SERPL-MCNC: 1.8 MG/DL (ref 1.6–2.4)
MCH RBC QN AUTO: 30.2 PG (ref 26.6–33)
MCH RBC QN AUTO: 30.3 PG (ref 26.6–33)
MCHC RBC AUTO-ENTMCNC: 30.7 G/DL (ref 31.5–35.7)
MCHC RBC AUTO-ENTMCNC: 33 G/DL (ref 31.5–35.7)
MCV RBC AUTO: 91.5 FL (ref 79–97)
MCV RBC AUTO: 99 FL (ref 79–97)
MONOCYTES # BLD AUTO: 0.93 10*3/MM3 (ref 0.1–0.9)
MONOCYTES NFR BLD AUTO: 7.5 % (ref 5–12)
MRSA DNA SPEC QL NAA+PROBE: NEGATIVE
NEUTROPHILS # BLD AUTO: 10.14 10*3/MM3 (ref 1.7–7)
NEUTROPHILS NFR BLD AUTO: 81.8 % (ref 42.7–76)
NRBC BLD AUTO-RTO: 0.2 /100 WBC (ref 0–0.2)
PHOSPHATE SERPL-MCNC: 4.9 MG/DL (ref 2.5–4.5)
PLATELET # BLD AUTO: 241 10*3/MM3 (ref 140–450)
PLATELET # BLD AUTO: 249 10*3/MM3 (ref 140–450)
PMV BLD AUTO: 10.9 FL (ref 6–12)
PMV BLD AUTO: 10.9 FL (ref 6–12)
POTASSIUM BLD-SCNC: 3.5 MMOL/L (ref 3.5–5.2)
POTASSIUM BLD-SCNC: 3.9 MMOL/L (ref 3.5–5.2)
PROT SERPL-MCNC: 5.4 G/DL (ref 6–8.5)
RBC # BLD AUTO: 2.9 10*6/MM3 (ref 4.14–5.8)
RBC # BLD AUTO: 2.95 10*6/MM3 (ref 4.14–5.8)
SODIUM BLD-SCNC: 138 MMOL/L (ref 136–145)
SODIUM BLD-SCNC: 140 MMOL/L (ref 136–145)
UNIT  ABO: NORMAL
UNIT  RH: NORMAL
WBC NRBC COR # BLD: 12.39 10*3/MM3 (ref 3.4–10.8)
WBC NRBC COR # BLD: 13.88 10*3/MM3 (ref 3.4–10.8)

## 2020-04-02 PROCEDURE — 86900 BLOOD TYPING SEROLOGIC ABO: CPT

## 2020-04-02 PROCEDURE — 25010000002 THIAMINE PER 100 MG: Performed by: NURSE PRACTITIONER

## 2020-04-02 PROCEDURE — 25010000002 THIAMINE PER 100 MG: Performed by: INTERNAL MEDICINE

## 2020-04-02 PROCEDURE — 80053 COMPREHEN METABOLIC PANEL: CPT | Performed by: INTERNAL MEDICINE

## 2020-04-02 PROCEDURE — 25010000002 MEROPENEM: Performed by: INTERNAL MEDICINE

## 2020-04-02 PROCEDURE — 85018 HEMOGLOBIN: CPT | Performed by: NURSE PRACTITIONER

## 2020-04-02 PROCEDURE — 85025 COMPLETE CBC W/AUTO DIFF WBC: CPT | Performed by: INTERNAL MEDICINE

## 2020-04-02 PROCEDURE — 85014 HEMATOCRIT: CPT | Performed by: NURSE PRACTITIONER

## 2020-04-02 PROCEDURE — 99233 SBSQ HOSP IP/OBS HIGH 50: CPT | Performed by: SURGERY

## 2020-04-02 PROCEDURE — 82962 GLUCOSE BLOOD TEST: CPT

## 2020-04-02 PROCEDURE — 25010000002 MIDAZOLAM PER 1 MG

## 2020-04-02 PROCEDURE — 83605 ASSAY OF LACTIC ACID: CPT | Performed by: NURSE PRACTITIONER

## 2020-04-02 PROCEDURE — 25010000002 NEOSTIGMINE 10 MG/10ML SOLUTION 10 ML VIAL: Performed by: INTERNAL MEDICINE

## 2020-04-02 PROCEDURE — 25010000002 ALBUMIN HUMAN 25% PER 50 ML: Performed by: INTERNAL MEDICINE

## 2020-04-02 PROCEDURE — 25010000002 MAGNESIUM SULFATE 2 GM/50ML SOLUTION: Performed by: NURSE PRACTITIONER

## 2020-04-02 PROCEDURE — 93010 ELECTROCARDIOGRAM REPORT: CPT | Performed by: INTERNAL MEDICINE

## 2020-04-02 PROCEDURE — 87641 MR-STAPH DNA AMP PROBE: CPT | Performed by: NURSE PRACTITIONER

## 2020-04-02 PROCEDURE — 99232 SBSQ HOSP IP/OBS MODERATE 35: CPT | Performed by: INTERNAL MEDICINE

## 2020-04-02 PROCEDURE — P9016 RBC LEUKOCYTES REDUCED: HCPCS

## 2020-04-02 PROCEDURE — P9047 ALBUMIN (HUMAN), 25%, 50ML: HCPCS | Performed by: INTERNAL MEDICINE

## 2020-04-02 PROCEDURE — 63710000001 INSULIN REGULAR HUMAN PER 5 UNITS: Performed by: NURSE PRACTITIONER

## 2020-04-02 PROCEDURE — 93005 ELECTROCARDIOGRAM TRACING: CPT | Performed by: INTERNAL MEDICINE

## 2020-04-02 PROCEDURE — 25010000002 FENTANYL CITRATE (PF) 100 MCG/2ML SOLUTION

## 2020-04-02 PROCEDURE — 84100 ASSAY OF PHOSPHORUS: CPT | Performed by: NURSE PRACTITIONER

## 2020-04-02 PROCEDURE — 25010000002 METOCLOPRAMIDE PER 10 MG: Performed by: INTERNAL MEDICINE

## 2020-04-02 PROCEDURE — 0DJ08ZZ INSPECTION OF UPPER INTESTINAL TRACT, VIA NATURAL OR ARTIFICIAL OPENING ENDOSCOPIC: ICD-10-PCS | Performed by: INTERNAL MEDICINE

## 2020-04-02 PROCEDURE — 25010000002 NEOSTIGMINE 10 MG/10ML SOLUTION 10 ML VIAL: Performed by: PHYSICIAN ASSISTANT

## 2020-04-02 PROCEDURE — 82330 ASSAY OF CALCIUM: CPT | Performed by: INTERNAL MEDICINE

## 2020-04-02 PROCEDURE — 83735 ASSAY OF MAGNESIUM: CPT | Performed by: NURSE PRACTITIONER

## 2020-04-02 PROCEDURE — 85027 COMPLETE CBC AUTOMATED: CPT | Performed by: NURSE PRACTITIONER

## 2020-04-02 PROCEDURE — 5A1D70Z PERFORMANCE OF URINARY FILTRATION, INTERMITTENT, LESS THAN 6 HOURS PER DAY: ICD-10-PCS | Performed by: FAMILY MEDICINE

## 2020-04-02 PROCEDURE — 83605 ASSAY OF LACTIC ACID: CPT | Performed by: INTERNAL MEDICINE

## 2020-04-02 DEVICE — DEV CLIP ENDO RESOLUTION360 CONTRL ROT 235CM: Type: IMPLANTABLE DEVICE | Site: STOMACH | Status: FUNCTIONAL

## 2020-04-02 RX ORDER — ATROPINE SULFATE 0.4 MG/ML
0.4 AMPUL (ML) INJECTION ONCE
Status: DISCONTINUED | OUTPATIENT
Start: 2020-04-01 | End: 2020-04-02

## 2020-04-02 RX ORDER — FENTANYL CITRATE 50 UG/ML
INJECTION, SOLUTION INTRAMUSCULAR; INTRAVENOUS
Status: COMPLETED
Start: 2020-04-02 | End: 2020-04-02

## 2020-04-02 RX ORDER — MAGNESIUM SULFATE HEPTAHYDRATE 40 MG/ML
2 INJECTION, SOLUTION INTRAVENOUS ONCE
Status: COMPLETED | OUTPATIENT
Start: 2020-04-02 | End: 2020-04-02

## 2020-04-02 RX ORDER — METOCLOPRAMIDE HYDROCHLORIDE 5 MG/ML
5 INJECTION INTRAMUSCULAR; INTRAVENOUS EVERY 6 HOURS
Status: DISCONTINUED | OUTPATIENT
Start: 2020-04-02 | End: 2020-04-04

## 2020-04-02 RX ORDER — MIDAZOLAM HYDROCHLORIDE 1 MG/ML
INJECTION INTRAMUSCULAR; INTRAVENOUS
Status: DISCONTINUED
Start: 2020-04-02 | End: 2020-04-02 | Stop reason: WASHOUT

## 2020-04-02 RX ORDER — ALBUMIN (HUMAN) 12.5 G/50ML
25 SOLUTION INTRAVENOUS AS NEEDED
Status: DISPENSED | OUTPATIENT
Start: 2020-04-02 | End: 2020-04-02

## 2020-04-02 RX ORDER — MIDAZOLAM HYDROCHLORIDE 1 MG/ML
INJECTION INTRAMUSCULAR; INTRAVENOUS
Status: COMPLETED
Start: 2020-04-02 | End: 2020-04-02

## 2020-04-02 RX ADMIN — INSULIN HUMAN 2 UNITS: 100 INJECTION, SOLUTION PARENTERAL at 12:13

## 2020-04-02 RX ADMIN — METOCLOPRAMIDE 5 MG: 5 INJECTION, SOLUTION INTRAMUSCULAR; INTRAVENOUS at 18:15

## 2020-04-02 RX ADMIN — LACTULOSE 30 G: 10 SOLUTION ORAL at 03:30

## 2020-04-02 RX ADMIN — FENTANYL CITRATE 50 MCG: 50 INJECTION INTRAMUSCULAR; INTRAVENOUS at 12:55

## 2020-04-02 RX ADMIN — MEROPENEM 500 MG: 500 INJECTION, POWDER, FOR SOLUTION INTRAVENOUS at 12:13

## 2020-04-02 RX ADMIN — LACTULOSE 30 G: 10 SOLUTION ORAL at 22:23

## 2020-04-02 RX ADMIN — MIDODRINE HYDROCHLORIDE 10 MG: 5 TABLET ORAL at 18:15

## 2020-04-02 RX ADMIN — ALBUMIN HUMAN 50 G: 0.25 SOLUTION INTRAVENOUS at 09:04

## 2020-04-02 RX ADMIN — MAGNESIUM SULFATE 2 G: 2 INJECTION INTRAVENOUS at 03:59

## 2020-04-02 RX ADMIN — PANTOPRAZOLE SODIUM 40 MG: 40 INJECTION, POWDER, FOR SOLUTION INTRAVENOUS at 22:23

## 2020-04-02 RX ADMIN — NEOSTIGMINE METHYLSULFATE: 1 INJECTION, SOLUTION INTRAVENOUS at 15:44

## 2020-04-02 RX ADMIN — LEVOTHYROXINE SODIUM 150 MCG: 20 INJECTION, SOLUTION INTRAVENOUS at 12:13

## 2020-04-02 RX ADMIN — SODIUM CHLORIDE, PRESERVATIVE FREE 10 ML: 5 INJECTION INTRAVENOUS at 08:30

## 2020-04-02 RX ADMIN — THIAMINE HYDROCHLORIDE 500 MG: 100 INJECTION, SOLUTION INTRAMUSCULAR; INTRAVENOUS at 10:44

## 2020-04-02 RX ADMIN — NICOTINE 1 PATCH: 21 PATCH, EXTENDED RELEASE TRANSDERMAL at 10:43

## 2020-04-02 RX ADMIN — INSULIN HUMAN 4 UNITS: 100 INJECTION, SOLUTION PARENTERAL at 08:28

## 2020-04-02 RX ADMIN — INSULIN HUMAN 2 UNITS: 100 INJECTION, SOLUTION PARENTERAL at 18:14

## 2020-04-02 RX ADMIN — THIAMINE HYDROCHLORIDE 500 MG: 100 INJECTION, SOLUTION INTRAMUSCULAR; INTRAVENOUS at 22:23

## 2020-04-02 RX ADMIN — PANTOPRAZOLE SODIUM 40 MG: 40 INJECTION, POWDER, FOR SOLUTION INTRAVENOUS at 10:44

## 2020-04-02 RX ADMIN — NEOSTIGMINE METHYLSULFATE: 1 INJECTION, SOLUTION INTRAVENOUS at 20:52

## 2020-04-02 RX ADMIN — LACTULOSE 30 G: 10 SOLUTION ORAL at 16:07

## 2020-04-02 RX ADMIN — INSULIN HUMAN 4 UNITS: 100 INJECTION, SOLUTION PARENTERAL at 01:55

## 2020-04-02 RX ADMIN — MIDAZOLAM 1 MG: 1 INJECTION INTRAMUSCULAR; INTRAVENOUS at 12:55

## 2020-04-02 RX ADMIN — ALBUMIN HUMAN 50 G: 0.25 SOLUTION INTRAVENOUS at 08:02

## 2020-04-03 ENCOUNTER — ANESTHESIA (OUTPATIENT)
Dept: ICU | Facility: HOSPITAL | Age: 75
End: 2020-04-03

## 2020-04-03 ENCOUNTER — ANESTHESIA EVENT (OUTPATIENT)
Dept: ICU | Facility: HOSPITAL | Age: 75
End: 2020-04-03

## 2020-04-03 ENCOUNTER — APPOINTMENT (OUTPATIENT)
Dept: GENERAL RADIOLOGY | Facility: HOSPITAL | Age: 75
End: 2020-04-03

## 2020-04-03 PROBLEM — K92.1 MELENA: Status: ACTIVE | Noted: 2020-03-31

## 2020-04-03 LAB
ALBUMIN SERPL-MCNC: 3.3 G/DL (ref 3.5–5.2)
ALBUMIN/GLOB SERPL: 1.6 G/DL
ALP SERPL-CCNC: 55 U/L (ref 39–117)
ALT SERPL W P-5'-P-CCNC: 7 U/L (ref 1–41)
ANION GAP SERPL CALCULATED.3IONS-SCNC: 16 MMOL/L (ref 5–15)
ANION GAP SERPL CALCULATED.3IONS-SCNC: 17 MMOL/L (ref 5–15)
AST SERPL-CCNC: 17 U/L (ref 1–40)
BASOPHILS # BLD AUTO: 0.01 10*3/MM3 (ref 0–0.2)
BASOPHILS NFR BLD AUTO: 0.1 % (ref 0–1.5)
BH BB BLOOD EXPIRATION DATE: NORMAL
BH BB BLOOD EXPIRATION DATE: NORMAL
BH BB BLOOD TYPE BARCODE: 7300
BH BB BLOOD TYPE BARCODE: 7300
BH BB DISPENSE STATUS: NORMAL
BH BB DISPENSE STATUS: NORMAL
BH BB PRODUCT CODE: NORMAL
BH BB PRODUCT CODE: NORMAL
BH BB UNIT NUMBER: NORMAL
BH BB UNIT NUMBER: NORMAL
BILIRUB SERPL-MCNC: 0.8 MG/DL (ref 0.2–1.2)
BUN BLD-MCNC: 77 MG/DL (ref 8–23)
BUN BLD-MCNC: 86 MG/DL (ref 8–23)
BUN/CREAT SERPL: 20.9 (ref 7–25)
BUN/CREAT SERPL: 22.2 (ref 7–25)
CALCIUM SPEC-SCNC: 8.2 MG/DL (ref 8.6–10.5)
CALCIUM SPEC-SCNC: 8.2 MG/DL (ref 8.6–10.5)
CHLORIDE SERPL-SCNC: 104 MMOL/L (ref 98–107)
CHLORIDE SERPL-SCNC: 105 MMOL/L (ref 98–107)
CO2 SERPL-SCNC: 22 MMOL/L (ref 22–29)
CO2 SERPL-SCNC: 24 MMOL/L (ref 22–29)
CREAT BLD-MCNC: 3.68 MG/DL (ref 0.76–1.27)
CREAT BLD-MCNC: 3.87 MG/DL (ref 0.76–1.27)
CROSSMATCH INTERPRETATION: NORMAL
CROSSMATCH INTERPRETATION: NORMAL
DEPRECATED RDW RBC AUTO: 53 FL (ref 37–54)
EOSINOPHIL # BLD AUTO: 0 10*3/MM3 (ref 0–0.4)
EOSINOPHIL NFR BLD AUTO: 0 % (ref 0.3–6.2)
ERYTHROCYTE [DISTWIDTH] IN BLOOD BY AUTOMATED COUNT: 16.3 % (ref 12.3–15.4)
GFR SERPL CREATININE-BSD FRML MDRD: 15 ML/MIN/1.73
GFR SERPL CREATININE-BSD FRML MDRD: 16 ML/MIN/1.73
GLOBULIN UR ELPH-MCNC: 2.1 GM/DL
GLUCOSE BLD-MCNC: 164 MG/DL (ref 65–99)
GLUCOSE BLD-MCNC: 173 MG/DL (ref 65–99)
GLUCOSE BLDC GLUCOMTR-MCNC: 144 MG/DL (ref 70–130)
GLUCOSE BLDC GLUCOMTR-MCNC: 180 MG/DL (ref 70–130)
GLUCOSE BLDC GLUCOMTR-MCNC: 182 MG/DL (ref 70–130)
GLUCOSE BLDC GLUCOMTR-MCNC: 258 MG/DL (ref 70–130)
HCT VFR BLD AUTO: 22.1 % (ref 37.5–51)
HCT VFR BLD AUTO: 24.1 % (ref 37.5–51)
HCT VFR BLD AUTO: 27 % (ref 37.5–51)
HGB BLD-MCNC: 7.3 G/DL (ref 13–17.7)
HGB BLD-MCNC: 8.2 G/DL (ref 13–17.7)
HGB BLD-MCNC: 9.4 G/DL (ref 13–17.7)
IMM GRANULOCYTES # BLD AUTO: 0.08 10*3/MM3 (ref 0–0.05)
IMM GRANULOCYTES NFR BLD AUTO: 0.8 % (ref 0–0.5)
LYMPHOCYTES # BLD AUTO: 0.92 10*3/MM3 (ref 0.7–3.1)
LYMPHOCYTES NFR BLD AUTO: 9.4 % (ref 19.6–45.3)
MAGNESIUM SERPL-MCNC: 2.2 MG/DL (ref 1.6–2.4)
MCH RBC QN AUTO: 30.4 PG (ref 26.6–33)
MCHC RBC AUTO-ENTMCNC: 33 G/DL (ref 31.5–35.7)
MCV RBC AUTO: 92.1 FL (ref 79–97)
MONOCYTES # BLD AUTO: 0.87 10*3/MM3 (ref 0.1–0.9)
MONOCYTES NFR BLD AUTO: 8.9 % (ref 5–12)
NEUTROPHILS # BLD AUTO: 7.95 10*3/MM3 (ref 1.7–7)
NEUTROPHILS NFR BLD AUTO: 80.8 % (ref 42.7–76)
NRBC BLD AUTO-RTO: 0.4 /100 WBC (ref 0–0.2)
PHOSPHATE SERPL-MCNC: 3.2 MG/DL (ref 2.5–4.5)
PLAT MORPH BLD: NORMAL
PLATELET # BLD AUTO: 129 10*3/MM3 (ref 140–450)
PMV BLD AUTO: 11.2 FL (ref 6–12)
POTASSIUM BLD-SCNC: 2.4 MMOL/L (ref 3.5–5.2)
POTASSIUM BLD-SCNC: 2.7 MMOL/L (ref 3.5–5.2)
POTASSIUM BLD-SCNC: 2.8 MMOL/L (ref 3.5–5.2)
POTASSIUM BLD-SCNC: 3.3 MMOL/L (ref 3.5–5.2)
PROCALCITONIN SERPL-MCNC: 0.85 NG/ML (ref 0.1–0.25)
PROT SERPL-MCNC: 5.4 G/DL (ref 6–8.5)
RBC # BLD AUTO: 2.4 10*6/MM3 (ref 4.14–5.8)
RBC MORPH BLD: NORMAL
SODIUM BLD-SCNC: 143 MMOL/L (ref 136–145)
SODIUM BLD-SCNC: 145 MMOL/L (ref 136–145)
UNIT  ABO: NORMAL
UNIT  ABO: NORMAL
UNIT  RH: NORMAL
UNIT  RH: NORMAL
WBC MORPH BLD: NORMAL
WBC NRBC COR # BLD: 9.83 10*3/MM3 (ref 3.4–10.8)

## 2020-04-03 PROCEDURE — 63710000001 INSULIN REGULAR HUMAN PER 5 UNITS: Performed by: INTERNAL MEDICINE

## 2020-04-03 PROCEDURE — 25010000003 POTASSIUM CHLORIDE PER 2 MEQ: Performed by: NURSE PRACTITIONER

## 2020-04-03 PROCEDURE — P9016 RBC LEUKOCYTES REDUCED: HCPCS

## 2020-04-03 PROCEDURE — 85018 HEMOGLOBIN: CPT | Performed by: INTERNAL MEDICINE

## 2020-04-03 PROCEDURE — 25010000002 METOCLOPRAMIDE PER 10 MG: Performed by: INTERNAL MEDICINE

## 2020-04-03 PROCEDURE — 99232 SBSQ HOSP IP/OBS MODERATE 35: CPT | Performed by: INTERNAL MEDICINE

## 2020-04-03 PROCEDURE — 85014 HEMATOCRIT: CPT | Performed by: INTERNAL MEDICINE

## 2020-04-03 PROCEDURE — 25010000002 MEROPENEM: Performed by: INTERNAL MEDICINE

## 2020-04-03 PROCEDURE — 85007 BL SMEAR W/DIFF WBC COUNT: CPT | Performed by: INTERNAL MEDICINE

## 2020-04-03 PROCEDURE — 80053 COMPREHEN METABOLIC PANEL: CPT | Performed by: INTERNAL MEDICINE

## 2020-04-03 PROCEDURE — 63710000001 INSULIN LISPRO (HUMAN) PER 5 UNITS

## 2020-04-03 PROCEDURE — 84132 ASSAY OF SERUM POTASSIUM: CPT | Performed by: INTERNAL MEDICINE

## 2020-04-03 PROCEDURE — 84100 ASSAY OF PHOSPHORUS: CPT | Performed by: INTERNAL MEDICINE

## 2020-04-03 PROCEDURE — 25010000002 THIAMINE PER 100 MG: Performed by: INTERNAL MEDICINE

## 2020-04-03 PROCEDURE — 86900 BLOOD TYPING SEROLOGIC ABO: CPT

## 2020-04-03 PROCEDURE — 25010000002 NEOSTIGMINE 10 MG/10ML SOLUTION 10 ML VIAL: Performed by: INTERNAL MEDICINE

## 2020-04-03 PROCEDURE — 71045 X-RAY EXAM CHEST 1 VIEW: CPT

## 2020-04-03 PROCEDURE — 36430 TRANSFUSION BLD/BLD COMPNT: CPT

## 2020-04-03 PROCEDURE — 82962 GLUCOSE BLOOD TEST: CPT

## 2020-04-03 PROCEDURE — 25010000003 POTASSIUM CHLORIDE PER 2 MEQ: Performed by: INTERNAL MEDICINE

## 2020-04-03 PROCEDURE — 85025 COMPLETE CBC W/AUTO DIFF WBC: CPT | Performed by: INTERNAL MEDICINE

## 2020-04-03 PROCEDURE — 84145 PROCALCITONIN (PCT): CPT | Performed by: INTERNAL MEDICINE

## 2020-04-03 RX ORDER — POTASSIUM CHLORIDE 29.8 MG/ML
20 INJECTION INTRAVENOUS ONCE
Status: COMPLETED | OUTPATIENT
Start: 2020-04-03 | End: 2020-04-03

## 2020-04-03 RX ORDER — POTASSIUM CHLORIDE 29.8 MG/ML
20 INJECTION INTRAVENOUS ONCE
Status: DISCONTINUED | OUTPATIENT
Start: 2020-04-03 | End: 2020-04-03

## 2020-04-03 RX ORDER — LIDOCAINE HYDROCHLORIDE 10 MG/ML
0.5 INJECTION, SOLUTION EPIDURAL; INFILTRATION; INTRACAUDAL; PERINEURAL ONCE AS NEEDED
Status: CANCELLED | OUTPATIENT
Start: 2020-04-03

## 2020-04-03 RX ORDER — POTASSIUM CHLORIDE 29.8 MG/ML
20 INJECTION INTRAVENOUS
Status: COMPLETED | OUTPATIENT
Start: 2020-04-03 | End: 2020-04-03

## 2020-04-03 RX ORDER — SODIUM CHLORIDE 0.9 % (FLUSH) 0.9 %
10 SYRINGE (ML) INJECTION EVERY 12 HOURS SCHEDULED
Status: CANCELLED | OUTPATIENT
Start: 2020-04-03

## 2020-04-03 RX ORDER — POTASSIUM CHLORIDE 29.8 MG/ML
20 INJECTION INTRAVENOUS
Status: DISCONTINUED | OUTPATIENT
Start: 2020-04-03 | End: 2020-04-17

## 2020-04-03 RX ORDER — SODIUM CHLORIDE, SODIUM LACTATE, POTASSIUM CHLORIDE, CALCIUM CHLORIDE 600; 310; 30; 20 MG/100ML; MG/100ML; MG/100ML; MG/100ML
9 INJECTION, SOLUTION INTRAVENOUS CONTINUOUS
Status: CANCELLED | OUTPATIENT
Start: 2020-04-03

## 2020-04-03 RX ORDER — SODIUM CHLORIDE 0.9 % (FLUSH) 0.9 %
10 SYRINGE (ML) INJECTION AS NEEDED
Status: CANCELLED | OUTPATIENT
Start: 2020-04-03

## 2020-04-03 RX ORDER — POTASSIUM CHLORIDE 7.45 MG/ML
10 INJECTION INTRAVENOUS
Status: DISCONTINUED | OUTPATIENT
Start: 2020-04-03 | End: 2020-04-03

## 2020-04-03 RX ADMIN — LACTULOSE 30 G: 10 SOLUTION ORAL at 22:14

## 2020-04-03 RX ADMIN — METOCLOPRAMIDE 5 MG: 5 INJECTION, SOLUTION INTRAMUSCULAR; INTRAVENOUS at 07:09

## 2020-04-03 RX ADMIN — POTASSIUM CHLORIDE 20 MEQ: 29.8 INJECTION, SOLUTION INTRAVENOUS at 15:12

## 2020-04-03 RX ADMIN — PANTOPRAZOLE SODIUM 40 MG: 40 INJECTION, POWDER, FOR SOLUTION INTRAVENOUS at 09:23

## 2020-04-03 RX ADMIN — METOCLOPRAMIDE 5 MG: 5 INJECTION, SOLUTION INTRAMUSCULAR; INTRAVENOUS at 13:04

## 2020-04-03 RX ADMIN — INSULIN LISPRO 4 UNITS: 100 INJECTION, SOLUTION INTRAVENOUS; SUBCUTANEOUS at 21:57

## 2020-04-03 RX ADMIN — SODIUM CHLORIDE, PRESERVATIVE FREE 10 ML: 5 INJECTION INTRAVENOUS at 22:15

## 2020-04-03 RX ADMIN — POTASSIUM CHLORIDE 20 MEQ: 29.8 INJECTION, SOLUTION INTRAVENOUS at 11:32

## 2020-04-03 RX ADMIN — POTASSIUM CHLORIDE 20 MEQ: 29.8 INJECTION, SOLUTION INTRAVENOUS at 01:38

## 2020-04-03 RX ADMIN — POTASSIUM CHLORIDE 20 MEQ: 29.8 INJECTION, SOLUTION INTRAVENOUS at 09:23

## 2020-04-03 RX ADMIN — POTASSIUM CHLORIDE 20 MEQ: 29.8 INJECTION, SOLUTION INTRAVENOUS at 07:07

## 2020-04-03 RX ADMIN — METOCLOPRAMIDE 5 MG: 5 INJECTION, SOLUTION INTRAMUSCULAR; INTRAVENOUS at 17:52

## 2020-04-03 RX ADMIN — MIDODRINE HYDROCHLORIDE 10 MG: 5 TABLET ORAL at 10:21

## 2020-04-03 RX ADMIN — MIDODRINE HYDROCHLORIDE 10 MG: 5 TABLET ORAL at 17:53

## 2020-04-03 RX ADMIN — THIAMINE HYDROCHLORIDE 500 MG: 100 INJECTION, SOLUTION INTRAMUSCULAR; INTRAVENOUS at 22:20

## 2020-04-03 RX ADMIN — THIAMINE HYDROCHLORIDE 500 MG: 100 INJECTION, SOLUTION INTRAMUSCULAR; INTRAVENOUS at 12:52

## 2020-04-03 RX ADMIN — NEOSTIGMINE METHYLSULFATE: 1 INJECTION, SOLUTION INTRAVENOUS at 13:00

## 2020-04-03 RX ADMIN — METOCLOPRAMIDE 5 MG: 5 INJECTION, SOLUTION INTRAMUSCULAR; INTRAVENOUS at 00:33

## 2020-04-03 RX ADMIN — POTASSIUM CHLORIDE 20 MEQ: 29.8 INJECTION, SOLUTION INTRAVENOUS at 22:59

## 2020-04-03 RX ADMIN — NEOSTIGMINE METHYLSULFATE: 1 INJECTION, SOLUTION INTRAVENOUS at 01:37

## 2020-04-03 RX ADMIN — INSULIN HUMAN 2 UNITS: 100 INJECTION, SOLUTION PARENTERAL at 06:58

## 2020-04-03 RX ADMIN — NEOSTIGMINE METHYLSULFATE: 1 INJECTION, SOLUTION INTRAVENOUS at 18:46

## 2020-04-03 RX ADMIN — NEOSTIGMINE METHYLSULFATE: 1 INJECTION, SOLUTION INTRAVENOUS at 06:45

## 2020-04-03 RX ADMIN — POTASSIUM CHLORIDE 20 MEQ: 29.8 INJECTION, SOLUTION INTRAVENOUS at 17:51

## 2020-04-03 RX ADMIN — POTASSIUM CHLORIDE 20 MEQ: 29.8 INJECTION, SOLUTION INTRAVENOUS at 17:08

## 2020-04-03 RX ADMIN — PANTOPRAZOLE SODIUM 40 MG: 40 INJECTION, POWDER, FOR SOLUTION INTRAVENOUS at 21:57

## 2020-04-03 RX ADMIN — POTASSIUM CHLORIDE 20 MEQ: 29.8 INJECTION, SOLUTION INTRAVENOUS at 00:32

## 2020-04-03 RX ADMIN — MEROPENEM 500 MG: 500 INJECTION, POWDER, FOR SOLUTION INTRAVENOUS at 13:33

## 2020-04-03 RX ADMIN — LEVOTHYROXINE SODIUM 150 MCG: 20 INJECTION, SOLUTION INTRAVENOUS at 13:33

## 2020-04-03 RX ADMIN — INSULIN HUMAN 2 UNITS: 100 INJECTION, SOLUTION PARENTERAL at 00:32

## 2020-04-04 ENCOUNTER — APPOINTMENT (OUTPATIENT)
Dept: GENERAL RADIOLOGY | Facility: HOSPITAL | Age: 75
End: 2020-04-04

## 2020-04-04 ENCOUNTER — APPOINTMENT (OUTPATIENT)
Dept: NEPHROLOGY | Facility: HOSPITAL | Age: 75
End: 2020-04-04

## 2020-04-04 LAB
ANION GAP SERPL CALCULATED.3IONS-SCNC: 15 MMOL/L (ref 5–15)
BACTERIA SPEC AEROBE CULT: ABNORMAL
BACTERIA SPEC AEROBE CULT: ABNORMAL
BASOPHILS # BLD AUTO: 0.01 10*3/MM3 (ref 0–0.2)
BASOPHILS NFR BLD AUTO: 0.1 % (ref 0–1.5)
BH BB BLOOD EXPIRATION DATE: NORMAL
BH BB BLOOD TYPE BARCODE: 7300
BH BB DISPENSE STATUS: NORMAL
BH BB PRODUCT CODE: NORMAL
BH BB UNIT NUMBER: NORMAL
BUN BLD-MCNC: 87 MG/DL (ref 8–23)
BUN/CREAT SERPL: 17.9 (ref 7–25)
CALCIUM SPEC-SCNC: 8.2 MG/DL (ref 8.6–10.5)
CHLORIDE SERPL-SCNC: 111 MMOL/L (ref 98–107)
CO2 SERPL-SCNC: 20 MMOL/L (ref 22–29)
CREAT BLD-MCNC: 4.85 MG/DL (ref 0.76–1.27)
CROSSMATCH INTERPRETATION: NORMAL
DEPRECATED RDW RBC AUTO: 55.9 FL (ref 37–54)
EOSINOPHIL # BLD AUTO: 0.01 10*3/MM3 (ref 0–0.4)
EOSINOPHIL NFR BLD AUTO: 0.1 % (ref 0.3–6.2)
ERYTHROCYTE [DISTWIDTH] IN BLOOD BY AUTOMATED COUNT: 17 % (ref 12.3–15.4)
GFR SERPL CREATININE-BSD FRML MDRD: 12 ML/MIN/1.73
GFR SERPL CREATININE-BSD FRML MDRD: ABNORMAL ML/MIN/{1.73_M2}
GLUCOSE BLD-MCNC: 217 MG/DL (ref 65–99)
GLUCOSE BLDC GLUCOMTR-MCNC: 153 MG/DL (ref 70–130)
GLUCOSE BLDC GLUCOMTR-MCNC: 202 MG/DL (ref 70–130)
GLUCOSE BLDC GLUCOMTR-MCNC: 206 MG/DL (ref 70–130)
GRAM STN SPEC: ABNORMAL
HCT VFR BLD AUTO: 27.8 % (ref 37.5–51)
HGB BLD-MCNC: 9 G/DL (ref 13–17.7)
IMM GRANULOCYTES # BLD AUTO: 0.17 10*3/MM3 (ref 0–0.05)
IMM GRANULOCYTES NFR BLD AUTO: 1.4 % (ref 0–0.5)
ISOLATED FROM: ABNORMAL
ISOLATED FROM: ABNORMAL
LYMPHOCYTES # BLD AUTO: 0.85 10*3/MM3 (ref 0.7–3.1)
LYMPHOCYTES NFR BLD AUTO: 6.9 % (ref 19.6–45.3)
MAGNESIUM SERPL-MCNC: 2.2 MG/DL (ref 1.6–2.4)
MCH RBC QN AUTO: 30.5 PG (ref 26.6–33)
MCHC RBC AUTO-ENTMCNC: 32.4 G/DL (ref 31.5–35.7)
MCV RBC AUTO: 94.2 FL (ref 79–97)
MONOCYTES # BLD AUTO: 0.78 10*3/MM3 (ref 0.1–0.9)
MONOCYTES NFR BLD AUTO: 6.4 % (ref 5–12)
NEUTROPHILS # BLD AUTO: 10.45 10*3/MM3 (ref 1.7–7)
NEUTROPHILS NFR BLD AUTO: 85.1 % (ref 42.7–76)
NRBC BLD AUTO-RTO: 0.8 /100 WBC (ref 0–0.2)
PLATELET # BLD AUTO: 149 10*3/MM3 (ref 140–450)
PMV BLD AUTO: 11.2 FL (ref 6–12)
POTASSIUM BLD-SCNC: 2.9 MMOL/L (ref 3.5–5.2)
RBC # BLD AUTO: 2.95 10*6/MM3 (ref 4.14–5.8)
SODIUM BLD-SCNC: 146 MMOL/L (ref 136–145)
UNIT  ABO: NORMAL
UNIT  RH: NORMAL
WBC NRBC COR # BLD: 12.27 10*3/MM3 (ref 3.4–10.8)

## 2020-04-04 PROCEDURE — 99232 SBSQ HOSP IP/OBS MODERATE 35: CPT | Performed by: INTERNAL MEDICINE

## 2020-04-04 PROCEDURE — 83735 ASSAY OF MAGNESIUM: CPT | Performed by: INTERNAL MEDICINE

## 2020-04-04 PROCEDURE — 63710000001 INSULIN DETEMIR PER 5 UNITS: Performed by: INTERNAL MEDICINE

## 2020-04-04 PROCEDURE — 85025 COMPLETE CBC W/AUTO DIFF WBC: CPT | Performed by: INTERNAL MEDICINE

## 2020-04-04 PROCEDURE — 25010000002 METOCLOPRAMIDE PER 10 MG: Performed by: INTERNAL MEDICINE

## 2020-04-04 PROCEDURE — 82962 GLUCOSE BLOOD TEST: CPT

## 2020-04-04 PROCEDURE — 25010000002 NEOSTIGMINE 10 MG/10ML SOLUTION 10 ML VIAL: Performed by: INTERNAL MEDICINE

## 2020-04-04 PROCEDURE — 25010000002 MEROPENEM PER 100 MG: Performed by: INTERNAL MEDICINE

## 2020-04-04 PROCEDURE — 63710000001 INSULIN LISPRO (HUMAN) PER 5 UNITS

## 2020-04-04 PROCEDURE — 25010000003 POTASSIUM CHLORIDE PER 2 MEQ: Performed by: INTERNAL MEDICINE

## 2020-04-04 PROCEDURE — 25010000002 THIAMINE PER 100 MG: Performed by: INTERNAL MEDICINE

## 2020-04-04 PROCEDURE — 5A1D70Z PERFORMANCE OF URINARY FILTRATION, INTERMITTENT, LESS THAN 6 HOURS PER DAY: ICD-10-PCS | Performed by: FAMILY MEDICINE

## 2020-04-04 PROCEDURE — 74018 RADEX ABDOMEN 1 VIEW: CPT

## 2020-04-04 PROCEDURE — 80048 BASIC METABOLIC PNL TOTAL CA: CPT | Performed by: INTERNAL MEDICINE

## 2020-04-04 PROCEDURE — 71045 X-RAY EXAM CHEST 1 VIEW: CPT

## 2020-04-04 PROCEDURE — 92610 EVALUATE SWALLOWING FUNCTION: CPT

## 2020-04-04 RX ORDER — LIDOCAINE 40 MG/G
CREAM TOPICAL AS NEEDED
Status: DISCONTINUED | OUTPATIENT
Start: 2020-04-04 | End: 2020-04-22 | Stop reason: HOSPADM

## 2020-04-04 RX ADMIN — LIDOCAINE 4% 1 APPLICATION: 4 CREAM TOPICAL at 10:38

## 2020-04-04 RX ADMIN — INSULIN LISPRO 3 UNITS: 100 INJECTION, SOLUTION INTRAVENOUS; SUBCUTANEOUS at 17:12

## 2020-04-04 RX ADMIN — PANTOPRAZOLE SODIUM 40 MG: 40 INJECTION, POWDER, FOR SOLUTION INTRAVENOUS at 08:09

## 2020-04-04 RX ADMIN — LACTULOSE 30 G: 10 SOLUTION ORAL at 17:13

## 2020-04-04 RX ADMIN — NEOSTIGMINE METHYLSULFATE: 1 INJECTION, SOLUTION INTRAVENOUS at 01:40

## 2020-04-04 RX ADMIN — LEVOTHYROXINE SODIUM 150 MCG: 20 INJECTION, SOLUTION INTRAVENOUS at 14:23

## 2020-04-04 RX ADMIN — INSULIN LISPRO 3 UNITS: 100 INJECTION, SOLUTION INTRAVENOUS; SUBCUTANEOUS at 08:09

## 2020-04-04 RX ADMIN — MEROPENEM 500 MG: 500 INJECTION, POWDER, FOR SOLUTION INTRAVENOUS at 14:27

## 2020-04-04 RX ADMIN — THIAMINE HYDROCHLORIDE 500 MG: 100 INJECTION, SOLUTION INTRAMUSCULAR; INTRAVENOUS at 08:09

## 2020-04-04 RX ADMIN — METOCLOPRAMIDE 5 MG: 5 INJECTION, SOLUTION INTRAMUSCULAR; INTRAVENOUS at 00:00

## 2020-04-04 RX ADMIN — INSULIN LISPRO 2 UNITS: 100 INJECTION, SOLUTION INTRAVENOUS; SUBCUTANEOUS at 21:23

## 2020-04-04 RX ADMIN — POTASSIUM CHLORIDE 20 MEQ: 29.8 INJECTION, SOLUTION INTRAVENOUS at 00:00

## 2020-04-04 RX ADMIN — NEOSTIGMINE METHYLSULFATE: 1 INJECTION, SOLUTION INTRAVENOUS at 08:09

## 2020-04-04 RX ADMIN — POTASSIUM CHLORIDE 20 MEQ: 29.8 INJECTION, SOLUTION INTRAVENOUS at 01:40

## 2020-04-04 RX ADMIN — POTASSIUM CHLORIDE 20 MEQ: 29.8 INJECTION, SOLUTION INTRAVENOUS at 08:54

## 2020-04-04 RX ADMIN — PANTOPRAZOLE SODIUM 40 MG: 40 INJECTION, POWDER, FOR SOLUTION INTRAVENOUS at 20:01

## 2020-04-04 RX ADMIN — FOLIC ACID 1 MG: 5 INJECTION, SOLUTION INTRAMUSCULAR; INTRAVENOUS; SUBCUTANEOUS at 14:24

## 2020-04-04 RX ADMIN — METOCLOPRAMIDE 5 MG: 5 INJECTION, SOLUTION INTRAMUSCULAR; INTRAVENOUS at 05:45

## 2020-04-04 RX ADMIN — MIDODRINE HYDROCHLORIDE 10 MG: 5 TABLET ORAL at 17:13

## 2020-04-04 RX ADMIN — THIAMINE HYDROCHLORIDE 200 MG: 100 INJECTION, SOLUTION INTRAMUSCULAR; INTRAVENOUS at 20:01

## 2020-04-04 RX ADMIN — LACTULOSE 30 G: 10 SOLUTION ORAL at 20:01

## 2020-04-04 RX ADMIN — INSULIN DETEMIR 12 UNITS: 100 INJECTION, SOLUTION SUBCUTANEOUS at 14:27

## 2020-04-04 RX ADMIN — SODIUM CHLORIDE, PRESERVATIVE FREE 10 ML: 5 INJECTION INTRAVENOUS at 20:01

## 2020-04-05 ENCOUNTER — APPOINTMENT (OUTPATIENT)
Dept: GENERAL RADIOLOGY | Facility: HOSPITAL | Age: 75
End: 2020-04-05

## 2020-04-05 LAB
ANION GAP SERPL CALCULATED.3IONS-SCNC: 14 MMOL/L (ref 5–15)
BASOPHILS # BLD AUTO: 0.01 10*3/MM3 (ref 0–0.2)
BASOPHILS NFR BLD AUTO: 0.1 % (ref 0–1.5)
BUN BLD-MCNC: 46 MG/DL (ref 8–23)
BUN/CREAT SERPL: 13.7 (ref 7–25)
CALCIUM SPEC-SCNC: 8.1 MG/DL (ref 8.6–10.5)
CHLORIDE SERPL-SCNC: 107 MMOL/L (ref 98–107)
CO2 SERPL-SCNC: 24 MMOL/L (ref 22–29)
CREAT BLD-MCNC: 3.36 MG/DL (ref 0.76–1.27)
DEPRECATED RDW RBC AUTO: 54.3 FL (ref 37–54)
EOSINOPHIL # BLD AUTO: 0.05 10*3/MM3 (ref 0–0.4)
EOSINOPHIL NFR BLD AUTO: 0.5 % (ref 0.3–6.2)
ERYTHROCYTE [DISTWIDTH] IN BLOOD BY AUTOMATED COUNT: 16.4 % (ref 12.3–15.4)
GFR SERPL CREATININE-BSD FRML MDRD: 18 ML/MIN/1.73
GLUCOSE BLD-MCNC: 169 MG/DL (ref 65–99)
GLUCOSE BLDC GLUCOMTR-MCNC: 123 MG/DL (ref 70–130)
GLUCOSE BLDC GLUCOMTR-MCNC: 162 MG/DL (ref 70–130)
GLUCOSE BLDC GLUCOMTR-MCNC: 173 MG/DL (ref 70–130)
GLUCOSE BLDC GLUCOMTR-MCNC: 179 MG/DL (ref 70–130)
HCT VFR BLD AUTO: 27 % (ref 37.5–51)
HGB BLD-MCNC: 8.9 G/DL (ref 13–17.7)
IMM GRANULOCYTES # BLD AUTO: 0.16 10*3/MM3 (ref 0–0.05)
IMM GRANULOCYTES NFR BLD AUTO: 1.4 % (ref 0–0.5)
LYMPHOCYTES # BLD AUTO: 0.79 10*3/MM3 (ref 0.7–3.1)
LYMPHOCYTES NFR BLD AUTO: 7.1 % (ref 19.6–45.3)
MAGNESIUM SERPL-MCNC: 2 MG/DL (ref 1.6–2.4)
MCH RBC QN AUTO: 30.4 PG (ref 26.6–33)
MCHC RBC AUTO-ENTMCNC: 33 G/DL (ref 31.5–35.7)
MCV RBC AUTO: 92.2 FL (ref 79–97)
MONOCYTES # BLD AUTO: 0.81 10*3/MM3 (ref 0.1–0.9)
MONOCYTES NFR BLD AUTO: 7.3 % (ref 5–12)
NEUTROPHILS # BLD AUTO: 9.27 10*3/MM3 (ref 1.7–7)
NEUTROPHILS NFR BLD AUTO: 83.6 % (ref 42.7–76)
NRBC BLD AUTO-RTO: 1.3 /100 WBC (ref 0–0.2)
PHOSPHATE SERPL-MCNC: 2.4 MG/DL (ref 2.5–4.5)
PLATELET # BLD AUTO: 151 10*3/MM3 (ref 140–450)
PMV BLD AUTO: 10.8 FL (ref 6–12)
POTASSIUM BLD-SCNC: 2.9 MMOL/L (ref 3.5–5.2)
POTASSIUM BLD-SCNC: 3.5 MMOL/L (ref 3.5–5.2)
PROCALCITONIN SERPL-MCNC: 0.34 NG/ML (ref 0.1–0.25)
RBC # BLD AUTO: 2.93 10*6/MM3 (ref 4.14–5.8)
SODIUM BLD-SCNC: 145 MMOL/L (ref 136–145)
WBC NRBC COR # BLD: 11.09 10*3/MM3 (ref 3.4–10.8)

## 2020-04-05 PROCEDURE — 25010000002 MEROPENEM PER 100 MG: Performed by: INTERNAL MEDICINE

## 2020-04-05 PROCEDURE — 84145 PROCALCITONIN (PCT): CPT

## 2020-04-05 PROCEDURE — 84132 ASSAY OF SERUM POTASSIUM: CPT | Performed by: INTERNAL MEDICINE

## 2020-04-05 PROCEDURE — 83735 ASSAY OF MAGNESIUM: CPT | Performed by: INTERNAL MEDICINE

## 2020-04-05 PROCEDURE — 99232 SBSQ HOSP IP/OBS MODERATE 35: CPT | Performed by: INTERNAL MEDICINE

## 2020-04-05 PROCEDURE — 63710000001 INSULIN LISPRO (HUMAN) PER 5 UNITS

## 2020-04-05 PROCEDURE — 84100 ASSAY OF PHOSPHORUS: CPT | Performed by: INTERNAL MEDICINE

## 2020-04-05 PROCEDURE — 25010000003 POTASSIUM CHLORIDE PER 2 MEQ: Performed by: INTERNAL MEDICINE

## 2020-04-05 PROCEDURE — 25010000002 THIAMINE PER 100 MG: Performed by: INTERNAL MEDICINE

## 2020-04-05 PROCEDURE — 71045 X-RAY EXAM CHEST 1 VIEW: CPT

## 2020-04-05 PROCEDURE — 63710000001 INSULIN DETEMIR PER 5 UNITS: Performed by: INTERNAL MEDICINE

## 2020-04-05 PROCEDURE — 82962 GLUCOSE BLOOD TEST: CPT

## 2020-04-05 PROCEDURE — 85025 COMPLETE CBC W/AUTO DIFF WBC: CPT | Performed by: INTERNAL MEDICINE

## 2020-04-05 PROCEDURE — 80048 BASIC METABOLIC PNL TOTAL CA: CPT | Performed by: INTERNAL MEDICINE

## 2020-04-05 RX ORDER — HYDROCODONE BITARTRATE AND ACETAMINOPHEN 5; 325 MG/1; MG/1
1 TABLET ORAL EVERY 6 HOURS PRN
Status: DISCONTINUED | OUTPATIENT
Start: 2020-04-05 | End: 2020-04-06

## 2020-04-05 RX ORDER — HYDROCODONE BITARTRATE AND ACETAMINOPHEN 10; 325 MG/1; MG/1
1 TABLET ORAL EVERY 6 HOURS PRN
Status: DISCONTINUED | OUTPATIENT
Start: 2020-04-05 | End: 2020-04-05

## 2020-04-05 RX ADMIN — MIDODRINE HYDROCHLORIDE 10 MG: 5 TABLET ORAL at 08:13

## 2020-04-05 RX ADMIN — LACTULOSE 30 G: 10 SOLUTION ORAL at 20:20

## 2020-04-05 RX ADMIN — PANTOPRAZOLE SODIUM 40 MG: 40 INJECTION, POWDER, FOR SOLUTION INTRAVENOUS at 20:20

## 2020-04-05 RX ADMIN — INSULIN LISPRO 2 UNITS: 100 INJECTION, SOLUTION INTRAVENOUS; SUBCUTANEOUS at 08:13

## 2020-04-05 RX ADMIN — INSULIN LISPRO 2 UNITS: 100 INJECTION, SOLUTION INTRAVENOUS; SUBCUTANEOUS at 20:42

## 2020-04-05 RX ADMIN — PANTOPRAZOLE SODIUM 40 MG: 40 INJECTION, POWDER, FOR SOLUTION INTRAVENOUS at 09:00

## 2020-04-05 RX ADMIN — POTASSIUM CHLORIDE 20 MEQ: 29.8 INJECTION, SOLUTION INTRAVENOUS at 10:53

## 2020-04-05 RX ADMIN — MIDODRINE HYDROCHLORIDE 10 MG: 5 TABLET ORAL at 17:24

## 2020-04-05 RX ADMIN — HYDROCODONE BITARTRATE AND ACETAMINOPHEN 1 TABLET: 10; 325 TABLET ORAL at 10:33

## 2020-04-05 RX ADMIN — THIAMINE HYDROCHLORIDE 200 MG: 100 INJECTION, SOLUTION INTRAMUSCULAR; INTRAVENOUS at 20:20

## 2020-04-05 RX ADMIN — LACTULOSE 30 G: 10 SOLUTION ORAL at 17:25

## 2020-04-05 RX ADMIN — MEROPENEM 500 MG: 500 INJECTION, POWDER, FOR SOLUTION INTRAVENOUS at 13:11

## 2020-04-05 RX ADMIN — LEVOTHYROXINE SODIUM 150 MCG: 20 INJECTION, SOLUTION INTRAVENOUS at 12:01

## 2020-04-05 RX ADMIN — POTASSIUM CHLORIDE 20 MEQ: 29.8 INJECTION, SOLUTION INTRAVENOUS at 09:41

## 2020-04-05 RX ADMIN — LACTULOSE 30 G: 10 SOLUTION ORAL at 08:13

## 2020-04-05 RX ADMIN — INSULIN LISPRO 2 UNITS: 100 INJECTION, SOLUTION INTRAVENOUS; SUBCUTANEOUS at 12:01

## 2020-04-05 RX ADMIN — FOLIC ACID 1 MG: 5 INJECTION, SOLUTION INTRAMUSCULAR; INTRAVENOUS; SUBCUTANEOUS at 08:14

## 2020-04-05 RX ADMIN — INSULIN DETEMIR 12 UNITS: 100 INJECTION, SOLUTION SUBCUTANEOUS at 08:13

## 2020-04-05 RX ADMIN — POTASSIUM CHLORIDE 20 MEQ: 29.8 INJECTION, SOLUTION INTRAVENOUS at 12:02

## 2020-04-05 RX ADMIN — THIAMINE HYDROCHLORIDE 200 MG: 100 INJECTION, SOLUTION INTRAMUSCULAR; INTRAVENOUS at 08:14

## 2020-04-05 RX ADMIN — SODIUM CHLORIDE, PRESERVATIVE FREE 10 ML: 5 INJECTION INTRAVENOUS at 20:20

## 2020-04-06 ENCOUNTER — APPOINTMENT (OUTPATIENT)
Dept: NEPHROLOGY | Facility: HOSPITAL | Age: 75
End: 2020-04-06

## 2020-04-06 LAB
ALBUMIN SERPL-MCNC: 3 G/DL (ref 3.5–5.2)
ALBUMIN/GLOB SERPL: 1.1 G/DL
ALP SERPL-CCNC: 80 U/L (ref 39–117)
ALT SERPL W P-5'-P-CCNC: 9 U/L (ref 1–41)
ANION GAP SERPL CALCULATED.3IONS-SCNC: 12 MMOL/L (ref 5–15)
ANION GAP SERPL CALCULATED.3IONS-SCNC: 13 MMOL/L (ref 5–15)
AST SERPL-CCNC: 19 U/L (ref 1–40)
BASOPHILS # BLD AUTO: 0.02 10*3/MM3 (ref 0–0.2)
BASOPHILS NFR BLD AUTO: 0.2 % (ref 0–1.5)
BILIRUB SERPL-MCNC: 0.5 MG/DL (ref 0.2–1.2)
BUN BLD-MCNC: 34 MG/DL (ref 8–23)
BUN BLD-MCNC: 50 MG/DL (ref 8–23)
BUN/CREAT SERPL: 11.7 (ref 7–25)
BUN/CREAT SERPL: 9.7 (ref 7–25)
CALCIUM SPEC-SCNC: 8.1 MG/DL (ref 8.6–10.5)
CALCIUM SPEC-SCNC: 8.5 MG/DL (ref 8.6–10.5)
CHLORIDE SERPL-SCNC: 105 MMOL/L (ref 98–107)
CHLORIDE SERPL-SCNC: 108 MMOL/L (ref 98–107)
CO2 SERPL-SCNC: 22 MMOL/L (ref 22–29)
CO2 SERPL-SCNC: 22 MMOL/L (ref 22–29)
CREAT BLD-MCNC: 3.5 MG/DL (ref 0.76–1.27)
CREAT BLD-MCNC: 4.28 MG/DL (ref 0.76–1.27)
DEPRECATED RDW RBC AUTO: 55.5 FL (ref 37–54)
EOSINOPHIL # BLD AUTO: 0.22 10*3/MM3 (ref 0–0.4)
EOSINOPHIL NFR BLD AUTO: 2.2 % (ref 0.3–6.2)
ERYTHROCYTE [DISTWIDTH] IN BLOOD BY AUTOMATED COUNT: 16.4 % (ref 12.3–15.4)
GFR SERPL CREATININE-BSD FRML MDRD: 14 ML/MIN/1.73
GFR SERPL CREATININE-BSD FRML MDRD: 17 ML/MIN/1.73
GFR SERPL CREATININE-BSD FRML MDRD: ABNORMAL ML/MIN/{1.73_M2}
GLOBULIN UR ELPH-MCNC: 2.7 GM/DL
GLUCOSE BLD-MCNC: 117 MG/DL (ref 65–99)
GLUCOSE BLD-MCNC: 132 MG/DL (ref 65–99)
GLUCOSE BLDC GLUCOMTR-MCNC: 114 MG/DL (ref 70–130)
GLUCOSE BLDC GLUCOMTR-MCNC: 128 MG/DL (ref 70–130)
GLUCOSE BLDC GLUCOMTR-MCNC: 140 MG/DL (ref 70–130)
GLUCOSE BLDC GLUCOMTR-MCNC: 170 MG/DL (ref 70–130)
HCT VFR BLD AUTO: 26.8 % (ref 37.5–51)
HGB BLD-MCNC: 8.5 G/DL (ref 13–17.7)
IMM GRANULOCYTES # BLD AUTO: 0.17 10*3/MM3 (ref 0–0.05)
IMM GRANULOCYTES NFR BLD AUTO: 1.7 % (ref 0–0.5)
LYMPHOCYTES # BLD AUTO: 1.35 10*3/MM3 (ref 0.7–3.1)
LYMPHOCYTES NFR BLD AUTO: 13.7 % (ref 19.6–45.3)
MAGNESIUM SERPL-MCNC: 1.9 MG/DL (ref 1.6–2.4)
MAGNESIUM SERPL-MCNC: 2 MG/DL (ref 1.6–2.4)
MCH RBC QN AUTO: 30.6 PG (ref 26.6–33)
MCHC RBC AUTO-ENTMCNC: 31.7 G/DL (ref 31.5–35.7)
MCV RBC AUTO: 96.4 FL (ref 79–97)
MONOCYTES # BLD AUTO: 0.76 10*3/MM3 (ref 0.1–0.9)
MONOCYTES NFR BLD AUTO: 7.7 % (ref 5–12)
NEUTROPHILS # BLD AUTO: 7.36 10*3/MM3 (ref 1.7–7)
NEUTROPHILS NFR BLD AUTO: 74.5 % (ref 42.7–76)
NRBC BLD AUTO-RTO: 1.6 /100 WBC (ref 0–0.2)
PHOSPHATE SERPL-MCNC: 2.3 MG/DL (ref 2.5–4.5)
PLATELET # BLD AUTO: 147 10*3/MM3 (ref 140–450)
PMV BLD AUTO: 10.6 FL (ref 6–12)
POTASSIUM BLD-SCNC: 2.9 MMOL/L (ref 3.5–5.2)
POTASSIUM BLD-SCNC: 3.2 MMOL/L (ref 3.5–5.2)
PROT SERPL-MCNC: 5.7 G/DL (ref 6–8.5)
RBC # BLD AUTO: 2.78 10*6/MM3 (ref 4.14–5.8)
SODIUM BLD-SCNC: 140 MMOL/L (ref 136–145)
SODIUM BLD-SCNC: 142 MMOL/L (ref 136–145)
WBC NRBC COR # BLD: 9.88 10*3/MM3 (ref 3.4–10.8)

## 2020-04-06 PROCEDURE — 84100 ASSAY OF PHOSPHORUS: CPT | Performed by: INTERNAL MEDICINE

## 2020-04-06 PROCEDURE — 25010000002 MEROPENEM: Performed by: INTERNAL MEDICINE

## 2020-04-06 PROCEDURE — P9047 ALBUMIN (HUMAN), 25%, 50ML: HCPCS | Performed by: INTERNAL MEDICINE

## 2020-04-06 PROCEDURE — 5A1D70Z PERFORMANCE OF URINARY FILTRATION, INTERMITTENT, LESS THAN 6 HOURS PER DAY: ICD-10-PCS | Performed by: FAMILY MEDICINE

## 2020-04-06 PROCEDURE — 25010000002 ALBUMIN HUMAN 25% PER 50 ML: Performed by: INTERNAL MEDICINE

## 2020-04-06 PROCEDURE — 97162 PT EVAL MOD COMPLEX 30 MIN: CPT

## 2020-04-06 PROCEDURE — 99233 SBSQ HOSP IP/OBS HIGH 50: CPT | Performed by: INTERNAL MEDICINE

## 2020-04-06 PROCEDURE — 25010000002 THIAMINE PER 100 MG: Performed by: INTERNAL MEDICINE

## 2020-04-06 PROCEDURE — 83735 ASSAY OF MAGNESIUM: CPT | Performed by: NURSE PRACTITIONER

## 2020-04-06 PROCEDURE — 99231 SBSQ HOSP IP/OBS SF/LOW 25: CPT | Performed by: INTERNAL MEDICINE

## 2020-04-06 PROCEDURE — 85025 COMPLETE CBC W/AUTO DIFF WBC: CPT | Performed by: INTERNAL MEDICINE

## 2020-04-06 PROCEDURE — 92526 ORAL FUNCTION THERAPY: CPT

## 2020-04-06 PROCEDURE — 63710000001 INSULIN DETEMIR PER 5 UNITS: Performed by: INTERNAL MEDICINE

## 2020-04-06 PROCEDURE — 83735 ASSAY OF MAGNESIUM: CPT | Performed by: INTERNAL MEDICINE

## 2020-04-06 PROCEDURE — 80053 COMPREHEN METABOLIC PANEL: CPT | Performed by: INTERNAL MEDICINE

## 2020-04-06 PROCEDURE — 82962 GLUCOSE BLOOD TEST: CPT

## 2020-04-06 PROCEDURE — 25010000002 MAGNESIUM SULFATE 2 GM/50ML SOLUTION: Performed by: NURSE PRACTITIONER

## 2020-04-06 RX ORDER — PANTOPRAZOLE SODIUM 40 MG/1
40 TABLET, DELAYED RELEASE ORAL
Status: DISCONTINUED | OUTPATIENT
Start: 2020-04-06 | End: 2020-04-09

## 2020-04-06 RX ORDER — GABAPENTIN 400 MG/1
400 CAPSULE ORAL EVERY 12 HOURS SCHEDULED
Status: DISCONTINUED | OUTPATIENT
Start: 2020-04-06 | End: 2020-04-06

## 2020-04-06 RX ORDER — ALBUMIN (HUMAN) 12.5 G/50ML
50 SOLUTION INTRAVENOUS AS NEEDED
Status: DISPENSED | OUTPATIENT
Start: 2020-04-06 | End: 2020-04-07

## 2020-04-06 RX ORDER — MAGNESIUM SULFATE HEPTAHYDRATE 40 MG/ML
2 INJECTION, SOLUTION INTRAVENOUS ONCE
Status: COMPLETED | OUTPATIENT
Start: 2020-04-06 | End: 2020-04-06

## 2020-04-06 RX ORDER — THIAMINE MONONITRATE (VIT B1) 100 MG
200 TABLET ORAL 2 TIMES DAILY
Status: DISCONTINUED | OUTPATIENT
Start: 2020-04-06 | End: 2020-04-09

## 2020-04-06 RX ORDER — LEVOTHYROXINE SODIUM 0.1 MG/1
200 TABLET ORAL
Status: DISCONTINUED | OUTPATIENT
Start: 2020-04-07 | End: 2020-04-09

## 2020-04-06 RX ORDER — ONDANSETRON 4 MG/1
4 TABLET, FILM COATED ORAL EVERY 6 HOURS PRN
Status: DISCONTINUED | OUTPATIENT
Start: 2020-04-06 | End: 2020-04-11 | Stop reason: ALTCHOICE

## 2020-04-06 RX ORDER — HYDROCODONE BITARTRATE AND ACETAMINOPHEN 10; 325 MG/1; MG/1
1 TABLET ORAL EVERY 6 HOURS PRN
Status: DISCONTINUED | OUTPATIENT
Start: 2020-04-06 | End: 2020-04-22 | Stop reason: HOSPADM

## 2020-04-06 RX ORDER — GABAPENTIN 400 MG/1
400 CAPSULE ORAL EVERY 12 HOURS SCHEDULED
Status: DISCONTINUED | OUTPATIENT
Start: 2020-04-06 | End: 2020-04-22 | Stop reason: HOSPADM

## 2020-04-06 RX ORDER — POTASSIUM CHLORIDE 750 MG/1
30 CAPSULE, EXTENDED RELEASE ORAL EVERY 4 HOURS
Status: COMPLETED | OUTPATIENT
Start: 2020-04-06 | End: 2020-04-07

## 2020-04-06 RX ORDER — FOLIC ACID 1 MG/1
1 TABLET ORAL DAILY
Status: DISCONTINUED | OUTPATIENT
Start: 2020-04-07 | End: 2020-04-22 | Stop reason: HOSPADM

## 2020-04-06 RX ADMIN — ALBUMIN HUMAN 50 G: 0.25 SOLUTION INTRAVENOUS at 08:26

## 2020-04-06 RX ADMIN — MIDODRINE HYDROCHLORIDE 10 MG: 5 TABLET ORAL at 17:45

## 2020-04-06 RX ADMIN — FOLIC ACID 1 MG: 5 INJECTION, SOLUTION INTRAMUSCULAR; INTRAVENOUS; SUBCUTANEOUS at 14:02

## 2020-04-06 RX ADMIN — MEROPENEM 500 MG: 500 INJECTION, POWDER, FOR SOLUTION INTRAVENOUS at 14:06

## 2020-04-06 RX ADMIN — HYDROCODONE BITARTRATE AND ACETAMINOPHEN 1 TABLET: 10; 325 TABLET ORAL at 15:34

## 2020-04-06 RX ADMIN — POTASSIUM PHOSPHATE, MONOBASIC 500 MG: 500 TABLET, SOLUBLE ORAL at 14:06

## 2020-04-06 RX ADMIN — INSULIN DETEMIR 12 UNITS: 100 INJECTION, SOLUTION SUBCUTANEOUS at 14:15

## 2020-04-06 RX ADMIN — MIDODRINE HYDROCHLORIDE 10 MG: 5 TABLET ORAL at 08:25

## 2020-04-06 RX ADMIN — LEVOTHYROXINE SODIUM 150 MCG: 20 INJECTION, SOLUTION INTRAVENOUS at 14:05

## 2020-04-06 RX ADMIN — PANTOPRAZOLE SODIUM 40 MG: 40 TABLET, DELAYED RELEASE ORAL at 17:45

## 2020-04-06 RX ADMIN — SODIUM CHLORIDE, PRESERVATIVE FREE 10 ML: 5 INJECTION INTRAVENOUS at 21:43

## 2020-04-06 RX ADMIN — LIDOCAINE 4%: 4 CREAM TOPICAL at 08:27

## 2020-04-06 RX ADMIN — Medication 200 MG: at 20:06

## 2020-04-06 RX ADMIN — THIAMINE HYDROCHLORIDE 200 MG: 100 INJECTION, SOLUTION INTRAMUSCULAR; INTRAVENOUS at 14:04

## 2020-04-06 RX ADMIN — MAGNESIUM SULFATE 2 G: 2 INJECTION INTRAVENOUS at 21:41

## 2020-04-06 RX ADMIN — HYDROCODONE BITARTRATE AND ACETAMINOPHEN 1 TABLET: 5; 325 TABLET ORAL at 09:59

## 2020-04-06 RX ADMIN — GABAPENTIN 400 MG: 400 CAPSULE ORAL at 15:36

## 2020-04-06 RX ADMIN — POTASSIUM CHLORIDE 30 MEQ: 10 CAPSULE, COATED, EXTENDED RELEASE ORAL at 21:42

## 2020-04-06 RX ADMIN — HYDROCODONE BITARTRATE AND ACETAMINOPHEN 1 TABLET: 10; 325 TABLET ORAL at 21:42

## 2020-04-07 LAB
GLUCOSE BLDC GLUCOMTR-MCNC: 111 MG/DL (ref 70–130)
GLUCOSE BLDC GLUCOMTR-MCNC: 130 MG/DL (ref 70–130)
GLUCOSE BLDC GLUCOMTR-MCNC: 190 MG/DL (ref 70–130)
GLUCOSE BLDC GLUCOMTR-MCNC: 197 MG/DL (ref 70–130)
MAGNESIUM SERPL-MCNC: 2.4 MG/DL (ref 1.6–2.4)
POTASSIUM BLD-SCNC: 3.2 MMOL/L (ref 3.5–5.2)
POTASSIUM BLD-SCNC: 4 MMOL/L (ref 3.5–5.2)

## 2020-04-07 PROCEDURE — 99232 SBSQ HOSP IP/OBS MODERATE 35: CPT | Performed by: INTERNAL MEDICINE

## 2020-04-07 PROCEDURE — 25010000003 POTASSIUM CHLORIDE PER 2 MEQ: Performed by: INTERNAL MEDICINE

## 2020-04-07 PROCEDURE — 97110 THERAPEUTIC EXERCISES: CPT

## 2020-04-07 PROCEDURE — 25010000002 MEROPENEM: Performed by: INTERNAL MEDICINE

## 2020-04-07 PROCEDURE — 84132 ASSAY OF SERUM POTASSIUM: CPT | Performed by: INTERNAL MEDICINE

## 2020-04-07 PROCEDURE — 63710000001 INSULIN DETEMIR PER 5 UNITS: Performed by: INTERNAL MEDICINE

## 2020-04-07 PROCEDURE — 82962 GLUCOSE BLOOD TEST: CPT

## 2020-04-07 PROCEDURE — 63710000001 INSULIN LISPRO (HUMAN) PER 5 UNITS

## 2020-04-07 PROCEDURE — 97530 THERAPEUTIC ACTIVITIES: CPT

## 2020-04-07 PROCEDURE — 84132 ASSAY OF SERUM POTASSIUM: CPT | Performed by: NURSE PRACTITIONER

## 2020-04-07 PROCEDURE — 83735 ASSAY OF MAGNESIUM: CPT | Performed by: NURSE PRACTITIONER

## 2020-04-07 RX ADMIN — GABAPENTIN 400 MG: 400 CAPSULE ORAL at 09:30

## 2020-04-07 RX ADMIN — MIDODRINE HYDROCHLORIDE 10 MG: 5 TABLET ORAL at 17:04

## 2020-04-07 RX ADMIN — INSULIN LISPRO 2 UNITS: 100 INJECTION, SOLUTION INTRAVENOUS; SUBCUTANEOUS at 17:05

## 2020-04-07 RX ADMIN — MIDODRINE HYDROCHLORIDE 10 MG: 5 TABLET ORAL at 06:43

## 2020-04-07 RX ADMIN — POTASSIUM CHLORIDE 20 MEQ: 29.8 INJECTION, SOLUTION INTRAVENOUS at 14:04

## 2020-04-07 RX ADMIN — Medication 200 MG: at 09:30

## 2020-04-07 RX ADMIN — INSULIN LISPRO 2 UNITS: 100 INJECTION, SOLUTION INTRAVENOUS; SUBCUTANEOUS at 12:54

## 2020-04-07 RX ADMIN — POTASSIUM PHOSPHATE, MONOBASIC 500 MG: 500 TABLET, SOLUBLE ORAL at 09:31

## 2020-04-07 RX ADMIN — POTASSIUM CHLORIDE 30 MEQ: 10 CAPSULE, COATED, EXTENDED RELEASE ORAL at 01:59

## 2020-04-07 RX ADMIN — PANTOPRAZOLE SODIUM 40 MG: 40 TABLET, DELAYED RELEASE ORAL at 06:43

## 2020-04-07 RX ADMIN — GABAPENTIN 400 MG: 400 CAPSULE ORAL at 20:51

## 2020-04-07 RX ADMIN — Medication 200 MG: at 20:51

## 2020-04-07 RX ADMIN — FOLIC ACID 1 MG: 1 TABLET ORAL at 09:30

## 2020-04-07 RX ADMIN — HYDROCODONE BITARTRATE AND ACETAMINOPHEN 1 TABLET: 10; 325 TABLET ORAL at 06:43

## 2020-04-07 RX ADMIN — HYDROCODONE BITARTRATE AND ACETAMINOPHEN 1 TABLET: 10; 325 TABLET ORAL at 13:00

## 2020-04-07 RX ADMIN — PANTOPRAZOLE SODIUM 40 MG: 40 TABLET, DELAYED RELEASE ORAL at 17:04

## 2020-04-07 RX ADMIN — MEROPENEM 500 MG: 500 INJECTION, POWDER, FOR SOLUTION INTRAVENOUS at 12:54

## 2020-04-07 RX ADMIN — INSULIN DETEMIR 12 UNITS: 100 INJECTION, SOLUTION SUBCUTANEOUS at 09:36

## 2020-04-07 RX ADMIN — SODIUM CHLORIDE, PRESERVATIVE FREE 10 ML: 5 INJECTION INTRAVENOUS at 09:38

## 2020-04-07 RX ADMIN — LEVOTHYROXINE SODIUM 200 MCG: 100 TABLET ORAL at 06:43

## 2020-04-07 RX ADMIN — POTASSIUM CHLORIDE 20 MEQ: 29.8 INJECTION, SOLUTION INTRAVENOUS at 17:03

## 2020-04-08 ENCOUNTER — APPOINTMENT (OUTPATIENT)
Dept: NEPHROLOGY | Facility: HOSPITAL | Age: 75
End: 2020-04-08

## 2020-04-08 ENCOUNTER — APPOINTMENT (OUTPATIENT)
Dept: GENERAL RADIOLOGY | Facility: HOSPITAL | Age: 75
End: 2020-04-08

## 2020-04-08 LAB
ABO GROUP BLD: NORMAL
ALBUMIN SERPL-MCNC: 3.8 G/DL (ref 3.5–5.2)
ALBUMIN/GLOB SERPL: 2.5 G/DL
ALP SERPL-CCNC: 51 U/L (ref 39–117)
ALT SERPL W P-5'-P-CCNC: 6 U/L (ref 1–41)
ANION GAP SERPL CALCULATED.3IONS-SCNC: 13 MMOL/L (ref 5–15)
ARTERIAL PATENCY WRIST A: ABNORMAL
AST SERPL-CCNC: 24 U/L (ref 1–40)
ATMOSPHERIC PRESS: ABNORMAL MM[HG]
BASE EXCESS BLDA CALC-SCNC: -4.2 MMOL/L (ref 0–2)
BASOPHILS # BLD AUTO: 0.01 10*3/MM3 (ref 0–0.2)
BASOPHILS NFR BLD AUTO: 0.1 % (ref 0–1.5)
BDY SITE: ABNORMAL
BILIRUB SERPL-MCNC: 0.7 MG/DL (ref 0.2–1.2)
BLD GP AB SCN SERPL QL: NEGATIVE
BODY TEMPERATURE: 37 C
BUN BLD-MCNC: 17 MG/DL (ref 8–23)
BUN/CREAT SERPL: 8.7 (ref 7–25)
CALCIUM SPEC-SCNC: 7.9 MG/DL (ref 8.6–10.5)
CHLORIDE SERPL-SCNC: 102 MMOL/L (ref 98–107)
CO2 BLDA-SCNC: 22.3 MMOL/L (ref 22–33)
CO2 SERPL-SCNC: 26 MMOL/L (ref 22–29)
COHGB MFR BLD: 1.8 % (ref 0–2)
CREAT BLD-MCNC: 1.96 MG/DL (ref 0.76–1.27)
D-LACTATE SERPL-SCNC: 3.2 MMOL/L (ref 0.5–2)
D-LACTATE SERPL-SCNC: 9.1 MMOL/L (ref 0.5–2)
DEPRECATED RDW RBC AUTO: 50.8 FL (ref 37–54)
EOSINOPHIL # BLD AUTO: 0.27 10*3/MM3 (ref 0–0.4)
EOSINOPHIL NFR BLD AUTO: 2.2 % (ref 0.3–6.2)
ERYTHROCYTE [DISTWIDTH] IN BLOOD BY AUTOMATED COUNT: 16 % (ref 12.3–15.4)
GFR SERPL CREATININE-BSD FRML MDRD: 34 ML/MIN/1.73
GLOBULIN UR ELPH-MCNC: 1.5 GM/DL
GLUCOSE BLD-MCNC: 165 MG/DL (ref 65–99)
GLUCOSE BLDC GLUCOMTR-MCNC: 141 MG/DL (ref 70–130)
GLUCOSE BLDC GLUCOMTR-MCNC: 206 MG/DL (ref 70–130)
GLUCOSE BLDC GLUCOMTR-MCNC: 277 MG/DL (ref 70–130)
HCO3 BLDA-SCNC: 21.1 MMOL/L (ref 20–26)
HCT VFR BLD AUTO: 13.3 % (ref 37.5–51)
HCT VFR BLD AUTO: 28.4 % (ref 37.5–51)
HCT VFR BLD CALC: 26.6 %
HGB BLD-MCNC: 4.2 G/DL (ref 13–17.7)
HGB BLD-MCNC: 9.4 G/DL (ref 13–17.7)
HGB BLDA-MCNC: 8.7 G/DL (ref 13.5–17.5)
HOROWITZ INDEX BLD+IHG-RTO: 40 %
IMM GRANULOCYTES # BLD AUTO: 0.63 10*3/MM3 (ref 0–0.05)
IMM GRANULOCYTES NFR BLD AUTO: 5.2 % (ref 0–0.5)
LYMPHOCYTES # BLD AUTO: 2.3 10*3/MM3 (ref 0.7–3.1)
LYMPHOCYTES NFR BLD AUTO: 19.1 % (ref 19.6–45.3)
MAGNESIUM SERPL-MCNC: 1.8 MG/DL (ref 1.6–2.4)
MCH RBC QN AUTO: 30 PG (ref 26.6–33)
MCHC RBC AUTO-ENTMCNC: 31.6 G/DL (ref 31.5–35.7)
MCV RBC AUTO: 95 FL (ref 79–97)
METHGB BLD QL: 0.8 % (ref 0–1.5)
MODALITY: ABNORMAL
MONOCYTES # BLD AUTO: 0.76 10*3/MM3 (ref 0.1–0.9)
MONOCYTES NFR BLD AUTO: 6.3 % (ref 5–12)
NEUTROPHILS # BLD AUTO: 8.09 10*3/MM3 (ref 1.7–7)
NEUTROPHILS NFR BLD AUTO: 67.1 % (ref 42.7–76)
NOTE: ABNORMAL
NRBC BLD AUTO-RTO: 1.1 /100 WBC (ref 0–0.2)
NT-PROBNP SERPL-MCNC: ABNORMAL PG/ML (ref 5–900)
OXYHGB MFR BLDV: 96.3 % (ref 94–99)
PCO2 BLDA: 39 MM HG (ref 35–45)
PCO2 TEMP ADJ BLD: 39 MM HG (ref 35–48)
PEEP RESPIRATORY: 5 CM[H2O]
PH BLDA: 7.34 PH UNITS (ref 7.35–7.45)
PH, TEMP CORRECTED: 7.34 PH UNITS
PHOSPHATE SERPL-MCNC: 1.3 MG/DL (ref 2.5–4.5)
PLATELET # BLD AUTO: 118 10*3/MM3 (ref 140–450)
PMV BLD AUTO: 11.3 FL (ref 6–12)
PO2 BLDA: 97 MM HG (ref 83–108)
PO2 TEMP ADJ BLD: 97 MM HG (ref 83–108)
POTASSIUM BLD-SCNC: 3.8 MMOL/L (ref 3.5–5.2)
PROCALCITONIN SERPL-MCNC: 0.17 NG/ML (ref 0.1–0.25)
PROT SERPL-MCNC: 5.3 G/DL (ref 6–8.5)
RBC # BLD AUTO: 1.4 10*6/MM3 (ref 4.14–5.8)
RH BLD: POSITIVE
SET MECH RESP RATE: 14
SODIUM BLD-SCNC: 141 MMOL/L (ref 136–145)
T&S EXPIRATION DATE: NORMAL
TOTAL RATE: 14 BREATHS/MINUTE
TROPONIN T SERPL-MCNC: 0.14 NG/ML (ref 0–0.03)
VENTILATOR MODE: ABNORMAL
VT ON VENT VENT: 0.61 ML
WBC NRBC COR # BLD: 12.06 10*3/MM3 (ref 3.4–10.8)

## 2020-04-08 PROCEDURE — 99291 CRITICAL CARE FIRST HOUR: CPT | Performed by: INTERNAL MEDICINE

## 2020-04-08 PROCEDURE — 36556 INSERT NON-TUNNEL CV CATH: CPT | Performed by: INTERNAL MEDICINE

## 2020-04-08 PROCEDURE — 83880 ASSAY OF NATRIURETIC PEPTIDE: CPT | Performed by: INTERNAL MEDICINE

## 2020-04-08 PROCEDURE — 86900 BLOOD TYPING SEROLOGIC ABO: CPT

## 2020-04-08 PROCEDURE — 86900 BLOOD TYPING SEROLOGIC ABO: CPT | Performed by: INTERNAL MEDICINE

## 2020-04-08 PROCEDURE — 80053 COMPREHEN METABOLIC PANEL: CPT | Performed by: INTERNAL MEDICINE

## 2020-04-08 PROCEDURE — 25010000002 METOCLOPRAMIDE PER 10 MG: Performed by: INTERNAL MEDICINE

## 2020-04-08 PROCEDURE — 71045 X-RAY EXAM CHEST 1 VIEW: CPT

## 2020-04-08 PROCEDURE — 0DJ08ZZ INSPECTION OF UPPER INTESTINAL TRACT, VIA NATURAL OR ARTIFICIAL OPENING ENDOSCOPIC: ICD-10-PCS | Performed by: INTERNAL MEDICINE

## 2020-04-08 PROCEDURE — 84100 ASSAY OF PHOSPHORUS: CPT | Performed by: INTERNAL MEDICINE

## 2020-04-08 PROCEDURE — 85025 COMPLETE CBC W/AUTO DIFF WBC: CPT | Performed by: INTERNAL MEDICINE

## 2020-04-08 PROCEDURE — P9016 RBC LEUKOCYTES REDUCED: HCPCS

## 2020-04-08 PROCEDURE — 94799 UNLISTED PULMONARY SVC/PX: CPT

## 2020-04-08 PROCEDURE — P9047 ALBUMIN (HUMAN), 25%, 50ML: HCPCS | Performed by: INTERNAL MEDICINE

## 2020-04-08 PROCEDURE — 83735 ASSAY OF MAGNESIUM: CPT | Performed by: INTERNAL MEDICINE

## 2020-04-08 PROCEDURE — 82962 GLUCOSE BLOOD TEST: CPT

## 2020-04-08 PROCEDURE — 86920 COMPATIBILITY TEST SPIN: CPT

## 2020-04-08 PROCEDURE — 25010000002 MIDAZOLAM PER 1 MG

## 2020-04-08 PROCEDURE — 0BH17EZ INSERTION OF ENDOTRACHEAL AIRWAY INTO TRACHEA, VIA NATURAL OR ARTIFICIAL OPENING: ICD-10-PCS | Performed by: FAMILY MEDICINE

## 2020-04-08 PROCEDURE — 92950 HEART/LUNG RESUSCITATION CPR: CPT

## 2020-04-08 PROCEDURE — 63710000001 INSULIN LISPRO (HUMAN) PER 5 UNITS: Performed by: INTERNAL MEDICINE

## 2020-04-08 PROCEDURE — 25010000002 HYDROMORPHONE HCL PF 500 MG/50ML SOLUTION: Performed by: INTERNAL MEDICINE

## 2020-04-08 PROCEDURE — 82805 BLOOD GASES W/O2 SATURATION: CPT

## 2020-04-08 PROCEDURE — 86923 COMPATIBILITY TEST ELECTRIC: CPT

## 2020-04-08 PROCEDURE — 5A1955Z RESPIRATORY VENTILATION, GREATER THAN 96 CONSECUTIVE HOURS: ICD-10-PCS | Performed by: FAMILY MEDICINE

## 2020-04-08 PROCEDURE — 25010000002 ALBUMIN HUMAN 25% PER 50 ML: Performed by: INTERNAL MEDICINE

## 2020-04-08 PROCEDURE — 36430 TRANSFUSION BLD/BLD COMPNT: CPT

## 2020-04-08 PROCEDURE — 94770: CPT

## 2020-04-08 PROCEDURE — C1751 CATH, INF, PER/CENT/MIDLINE: HCPCS

## 2020-04-08 PROCEDURE — 99292 CRITICAL CARE ADDL 30 MIN: CPT | Performed by: INTERNAL MEDICINE

## 2020-04-08 PROCEDURE — 84484 ASSAY OF TROPONIN QUANT: CPT | Performed by: INTERNAL MEDICINE

## 2020-04-08 PROCEDURE — 85014 HEMATOCRIT: CPT | Performed by: INTERNAL MEDICINE

## 2020-04-08 PROCEDURE — 86901 BLOOD TYPING SEROLOGIC RH(D): CPT | Performed by: INTERNAL MEDICINE

## 2020-04-08 PROCEDURE — 83605 ASSAY OF LACTIC ACID: CPT | Performed by: INTERNAL MEDICINE

## 2020-04-08 PROCEDURE — 36600 WITHDRAWAL OF ARTERIAL BLOOD: CPT

## 2020-04-08 PROCEDURE — 74018 RADEX ABDOMEN 1 VIEW: CPT

## 2020-04-08 PROCEDURE — 25010000002 EPOETIN ALFA-EPBX 10000 UNIT/ML SOLUTION: Performed by: INTERNAL MEDICINE

## 2020-04-08 PROCEDURE — 3E1G88Z IRRIGATION OF UPPER GI USING IRRIGATING SUBSTANCE, VIA NATURAL OR ARTIFICIAL OPENING ENDOSCOPIC: ICD-10-PCS | Performed by: INTERNAL MEDICINE

## 2020-04-08 PROCEDURE — 85018 HEMOGLOBIN: CPT | Performed by: INTERNAL MEDICINE

## 2020-04-08 PROCEDURE — 94002 VENT MGMT INPAT INIT DAY: CPT

## 2020-04-08 PROCEDURE — 05H633Z INSERTION OF INFUSION DEVICE INTO LEFT SUBCLAVIAN VEIN, PERCUTANEOUS APPROACH: ICD-10-PCS | Performed by: INTERNAL MEDICINE

## 2020-04-08 PROCEDURE — 86850 RBC ANTIBODY SCREEN: CPT | Performed by: INTERNAL MEDICINE

## 2020-04-08 PROCEDURE — 84145 PROCALCITONIN (PCT): CPT | Performed by: INTERNAL MEDICINE

## 2020-04-08 RX ORDER — MIDAZOLAM HYDROCHLORIDE 1 MG/ML
2 INJECTION INTRAMUSCULAR; INTRAVENOUS ONCE
Status: COMPLETED | OUTPATIENT
Start: 2020-04-08 | End: 2020-04-08

## 2020-04-08 RX ORDER — NOREPINEPHRINE BIT/0.9 % NACL 8 MG/250ML
.02-.3 INFUSION BOTTLE (ML) INTRAVENOUS
Status: DISCONTINUED | OUTPATIENT
Start: 2020-04-08 | End: 2020-04-14

## 2020-04-08 RX ORDER — METOCLOPRAMIDE HYDROCHLORIDE 5 MG/ML
10 INJECTION INTRAMUSCULAR; INTRAVENOUS ONCE
Status: COMPLETED | OUTPATIENT
Start: 2020-04-08 | End: 2020-04-08

## 2020-04-08 RX ORDER — ALBUMIN (HUMAN) 12.5 G/50ML
25 SOLUTION INTRAVENOUS AS NEEDED
Status: DISPENSED | OUTPATIENT
Start: 2020-04-08 | End: 2020-04-09

## 2020-04-08 RX ORDER — MIDAZOLAM HYDROCHLORIDE 1 MG/ML
INJECTION INTRAMUSCULAR; INTRAVENOUS
Status: COMPLETED
Start: 2020-04-08 | End: 2020-04-08

## 2020-04-08 RX ORDER — METOCLOPRAMIDE HYDROCHLORIDE 5 MG/ML
10 INJECTION INTRAMUSCULAR; INTRAVENOUS EVERY 6 HOURS
Status: COMPLETED | OUTPATIENT
Start: 2020-04-08 | End: 2020-04-09

## 2020-04-08 RX ORDER — ETOMIDATE 2 MG/ML
INJECTION INTRAVENOUS
Status: COMPLETED
Start: 2020-04-08 | End: 2020-04-08

## 2020-04-08 RX ORDER — ALBUMIN (HUMAN) 12.5 G/50ML
25 SOLUTION INTRAVENOUS ONCE
Status: COMPLETED | OUTPATIENT
Start: 2020-04-08 | End: 2020-04-08

## 2020-04-08 RX ADMIN — METOCLOPRAMIDE 10 MG: 5 INJECTION, SOLUTION INTRAMUSCULAR; INTRAVENOUS at 14:45

## 2020-04-08 RX ADMIN — PANTOPRAZOLE SODIUM 40 MG: 40 TABLET, DELAYED RELEASE ORAL at 21:34

## 2020-04-08 RX ADMIN — HYDROCODONE BITARTRATE AND ACETAMINOPHEN 1 TABLET: 10; 325 TABLET ORAL at 21:35

## 2020-04-08 RX ADMIN — MIDAZOLAM HYDROCHLORIDE 2 MG: 2 INJECTION, SOLUTION INTRAMUSCULAR; INTRAVENOUS at 12:23

## 2020-04-08 RX ADMIN — MIDODRINE HYDROCHLORIDE 10 MG: 5 TABLET ORAL at 08:27

## 2020-04-08 RX ADMIN — INSULIN LISPRO 4 UNITS: 100 INJECTION, SOLUTION INTRAVENOUS; SUBCUTANEOUS at 20:55

## 2020-04-08 RX ADMIN — GABAPENTIN 400 MG: 400 CAPSULE ORAL at 21:35

## 2020-04-08 RX ADMIN — LIDOCAINE 4%: 4 CREAM TOPICAL at 08:41

## 2020-04-08 RX ADMIN — Medication 200 MG: at 21:35

## 2020-04-08 RX ADMIN — MIDAZOLAM HYDROCHLORIDE 2 MG: 2 INJECTION, SOLUTION INTRAMUSCULAR; INTRAVENOUS at 14:16

## 2020-04-08 RX ADMIN — EPOETIN ALFA-EPBX 10000 UNITS: 10000 INJECTION, SOLUTION INTRAVENOUS; SUBCUTANEOUS at 08:27

## 2020-04-08 RX ADMIN — MIDAZOLAM HYDROCHLORIDE 2 MG: 2 INJECTION, SOLUTION INTRAMUSCULAR; INTRAVENOUS at 17:03

## 2020-04-08 RX ADMIN — SODIUM CHLORIDE 1000 ML: 9 INJECTION, SOLUTION INTRAVENOUS at 11:46

## 2020-04-08 RX ADMIN — ALBUMIN HUMAN 25 G: 0.25 SOLUTION INTRAVENOUS at 10:04

## 2020-04-08 RX ADMIN — METOCLOPRAMIDE 10 MG: 5 INJECTION, SOLUTION INTRAMUSCULAR; INTRAVENOUS at 21:34

## 2020-04-08 RX ADMIN — HYDROCODONE BITARTRATE AND ACETAMINOPHEN 1 TABLET: 10; 325 TABLET ORAL at 08:41

## 2020-04-08 RX ADMIN — ALBUMIN HUMAN 50 G: 0.25 SOLUTION INTRAVENOUS at 07:52

## 2020-04-08 RX ADMIN — MIDAZOLAM HYDROCHLORIDE 2 MG: 1 INJECTION INTRAMUSCULAR; INTRAVENOUS at 17:03

## 2020-04-08 RX ADMIN — METOCLOPRAMIDE 10 MG: 5 INJECTION, SOLUTION INTRAMUSCULAR; INTRAVENOUS at 11:43

## 2020-04-08 RX ADMIN — ALBUMIN HUMAN 50 G: 0.25 SOLUTION INTRAVENOUS at 08:38

## 2020-04-08 RX ADMIN — Medication 0.16 MCG/KG/MIN: at 17:55

## 2020-04-08 RX ADMIN — Medication 0.1 MCG/KG/MIN: at 11:52

## 2020-04-08 RX ADMIN — SODIUM CHLORIDE 1000 ML: 9 INJECTION, SOLUTION INTRAVENOUS at 10:46

## 2020-04-08 RX ADMIN — MIDODRINE HYDROCHLORIDE 10 MG: 5 TABLET ORAL at 21:35

## 2020-04-08 RX ADMIN — MIDAZOLAM HYDROCHLORIDE 4 MG: 2 INJECTION, SOLUTION INTRAMUSCULAR; INTRAVENOUS at 13:50

## 2020-04-08 RX ADMIN — HYDROMORPHONE HYDROCHLORIDE: 10 INJECTION INTRAMUSCULAR; INTRAVENOUS; SUBCUTANEOUS at 15:03

## 2020-04-08 RX ADMIN — ETOMIDATE 40 MG: 2 INJECTION, SOLUTION INTRAVENOUS at 12:23

## 2020-04-08 RX ADMIN — SODIUM CHLORIDE, PRESERVATIVE FREE 10 ML: 5 INJECTION INTRAVENOUS at 20:57

## 2020-04-08 RX ADMIN — MIDAZOLAM HYDROCHLORIDE 2 MG: 1 INJECTION INTRAMUSCULAR; INTRAVENOUS at 14:16

## 2020-04-09 LAB
ALBUMIN SERPL-MCNC: 3.3 G/DL (ref 3.5–5.2)
ALBUMIN/GLOB SERPL: 1.8 G/DL
ALP SERPL-CCNC: 66 U/L (ref 39–117)
ALT SERPL W P-5'-P-CCNC: 11 U/L (ref 1–41)
ANION GAP SERPL CALCULATED.3IONS-SCNC: 13 MMOL/L (ref 5–15)
AST SERPL-CCNC: 27 U/L (ref 1–40)
BASOPHILS # BLD AUTO: 0.11 10*3/MM3 (ref 0–0.2)
BASOPHILS NFR BLD AUTO: 0.7 % (ref 0–1.5)
BH BB BLOOD EXPIRATION DATE: NORMAL
BH BB BLOOD TYPE BARCODE: 7300
BH BB BLOOD TYPE BARCODE: 7300
BH BB BLOOD TYPE BARCODE: 9500
BH BB BLOOD TYPE BARCODE: 9500
BH BB DISPENSE STATUS: NORMAL
BH BB PRODUCT CODE: NORMAL
BH BB UNIT NUMBER: NORMAL
BILIRUB SERPL-MCNC: 1.1 MG/DL (ref 0.2–1.2)
BUN BLD-MCNC: 50 MG/DL (ref 8–23)
BUN/CREAT SERPL: 18.2 (ref 7–25)
CALCIUM SPEC-SCNC: 7.7 MG/DL (ref 8.6–10.5)
CHLORIDE SERPL-SCNC: 113 MMOL/L (ref 98–107)
CO2 SERPL-SCNC: 19 MMOL/L (ref 22–29)
CREAT BLD-MCNC: 2.75 MG/DL (ref 0.76–1.27)
CROSSMATCH INTERPRETATION: NORMAL
D-LACTATE SERPL-SCNC: 0.9 MMOL/L (ref 0.5–2)
D-LACTATE SERPL-SCNC: 1.1 MMOL/L (ref 0.5–2)
D-LACTATE SERPL-SCNC: 1.1 MMOL/L (ref 0.5–2)
D-LACTATE SERPL-SCNC: 1.3 MMOL/L (ref 0.5–2)
DEPRECATED RDW RBC AUTO: 50.1 FL (ref 37–54)
EOSINOPHIL # BLD AUTO: 0.1 10*3/MM3 (ref 0–0.4)
EOSINOPHIL NFR BLD AUTO: 0.6 % (ref 0.3–6.2)
ERYTHROCYTE [DISTWIDTH] IN BLOOD BY AUTOMATED COUNT: 16.2 % (ref 12.3–15.4)
GFR SERPL CREATININE-BSD FRML MDRD: 23 ML/MIN/1.73
GLOBULIN UR ELPH-MCNC: 1.8 GM/DL
GLUCOSE BLD-MCNC: 181 MG/DL (ref 65–99)
GLUCOSE BLDC GLUCOMTR-MCNC: 158 MG/DL (ref 70–130)
GLUCOSE BLDC GLUCOMTR-MCNC: 160 MG/DL (ref 70–130)
GLUCOSE BLDC GLUCOMTR-MCNC: 183 MG/DL (ref 70–130)
GLUCOSE BLDC GLUCOMTR-MCNC: 193 MG/DL (ref 70–130)
HCT VFR BLD AUTO: 22.4 % (ref 37.5–51)
HCT VFR BLD AUTO: 24 % (ref 37.5–51)
HCT VFR BLD AUTO: 26.5 % (ref 37.5–51)
HCT VFR BLD AUTO: 26.5 % (ref 37.5–51)
HCT VFR BLD AUTO: 27.5 % (ref 37.5–51)
HGB BLD-MCNC: 7.3 G/DL (ref 13–17.7)
HGB BLD-MCNC: 8 G/DL (ref 13–17.7)
HGB BLD-MCNC: 8.7 G/DL (ref 13–17.7)
HGB BLD-MCNC: 9 G/DL (ref 13–17.7)
HGB BLD-MCNC: 9 G/DL (ref 13–17.7)
IMM GRANULOCYTES # BLD AUTO: 1.28 10*3/MM3 (ref 0–0.05)
IMM GRANULOCYTES NFR BLD AUTO: 8.3 % (ref 0–0.5)
LYMPHOCYTES # BLD AUTO: 2.68 10*3/MM3 (ref 0.7–3.1)
LYMPHOCYTES NFR BLD AUTO: 17.3 % (ref 19.6–45.3)
MAGNESIUM SERPL-MCNC: 1.9 MG/DL (ref 1.6–2.4)
MCH RBC QN AUTO: 31 PG (ref 26.6–33)
MCHC RBC AUTO-ENTMCNC: 34 G/DL (ref 31.5–35.7)
MCV RBC AUTO: 91.4 FL (ref 79–97)
MONOCYTES # BLD AUTO: 1.37 10*3/MM3 (ref 0.1–0.9)
MONOCYTES NFR BLD AUTO: 8.8 % (ref 5–12)
NEUTROPHILS # BLD AUTO: 9.96 10*3/MM3 (ref 1.7–7)
NEUTROPHILS NFR BLD AUTO: 64.3 % (ref 42.7–76)
NRBC BLD AUTO-RTO: 2.4 /100 WBC (ref 0–0.2)
PHOSPHATE SERPL-MCNC: 1.7 MG/DL (ref 2.5–4.5)
PLAT MORPH BLD: NORMAL
PLATELET # BLD AUTO: 139 10*3/MM3 (ref 140–450)
PMV BLD AUTO: 11.3 FL (ref 6–12)
POTASSIUM BLD-SCNC: 3.5 MMOL/L (ref 3.5–5.2)
PROT SERPL-MCNC: 5.1 G/DL (ref 6–8.5)
RBC # BLD AUTO: 2.9 10*6/MM3 (ref 4.14–5.8)
RBC MORPH BLD: NORMAL
SODIUM BLD-SCNC: 145 MMOL/L (ref 136–145)
UNIT  ABO: NORMAL
UNIT  RH: NORMAL
WBC MORPH BLD: NORMAL
WBC NRBC COR # BLD: 15.5 10*3/MM3 (ref 3.4–10.8)

## 2020-04-09 PROCEDURE — 25010000002 MIDAZOLAM PER 1 MG: Performed by: INTERNAL MEDICINE

## 2020-04-09 PROCEDURE — 0DJ08ZZ INSPECTION OF UPPER INTESTINAL TRACT, VIA NATURAL OR ARTIFICIAL OPENING ENDOSCOPIC: ICD-10-PCS | Performed by: INTERNAL MEDICINE

## 2020-04-09 PROCEDURE — 85018 HEMOGLOBIN: CPT | Performed by: INTERNAL MEDICINE

## 2020-04-09 PROCEDURE — 25010000002 MAGNESIUM SULFATE 2 GM/50ML SOLUTION: Performed by: NURSE PRACTITIONER

## 2020-04-09 PROCEDURE — 82962 GLUCOSE BLOOD TEST: CPT

## 2020-04-09 PROCEDURE — 94770: CPT

## 2020-04-09 PROCEDURE — 85014 HEMATOCRIT: CPT | Performed by: INTERNAL MEDICINE

## 2020-04-09 PROCEDURE — 85025 COMPLETE CBC W/AUTO DIFF WBC: CPT | Performed by: INTERNAL MEDICINE

## 2020-04-09 PROCEDURE — 83605 ASSAY OF LACTIC ACID: CPT | Performed by: INTERNAL MEDICINE

## 2020-04-09 PROCEDURE — 63710000001 INSULIN DETEMIR PER 5 UNITS: Performed by: INTERNAL MEDICINE

## 2020-04-09 PROCEDURE — 25010000002 METOCLOPRAMIDE PER 10 MG: Performed by: INTERNAL MEDICINE

## 2020-04-09 PROCEDURE — 94799 UNLISTED PULMONARY SVC/PX: CPT

## 2020-04-09 PROCEDURE — 85007 BL SMEAR W/DIFF WBC COUNT: CPT | Performed by: INTERNAL MEDICINE

## 2020-04-09 PROCEDURE — 63710000001 INSULIN LISPRO (HUMAN) PER 5 UNITS: Performed by: INTERNAL MEDICINE

## 2020-04-09 PROCEDURE — 84100 ASSAY OF PHOSPHORUS: CPT | Performed by: INTERNAL MEDICINE

## 2020-04-09 PROCEDURE — 25010000002 THIAMINE PER 100 MG: Performed by: INTERNAL MEDICINE

## 2020-04-09 PROCEDURE — 83735 ASSAY OF MAGNESIUM: CPT | Performed by: INTERNAL MEDICINE

## 2020-04-09 PROCEDURE — 99291 CRITICAL CARE FIRST HOUR: CPT | Performed by: INTERNAL MEDICINE

## 2020-04-09 PROCEDURE — 25010000002 MIDAZOLAM PER 1 MG

## 2020-04-09 PROCEDURE — 80053 COMPREHEN METABOLIC PANEL: CPT | Performed by: INTERNAL MEDICINE

## 2020-04-09 RX ORDER — MIDAZOLAM HYDROCHLORIDE 1 MG/ML
INJECTION INTRAMUSCULAR; INTRAVENOUS
Status: COMPLETED
Start: 2020-04-09 | End: 2020-04-09

## 2020-04-09 RX ORDER — MIDAZOLAM HYDROCHLORIDE 1 MG/ML
2 INJECTION INTRAMUSCULAR; INTRAVENOUS ONCE
Status: COMPLETED | OUTPATIENT
Start: 2020-04-09 | End: 2020-04-09

## 2020-04-09 RX ORDER — CHLORHEXIDINE GLUCONATE 0.12 MG/ML
15 RINSE ORAL EVERY 12 HOURS SCHEDULED
Status: DISCONTINUED | OUTPATIENT
Start: 2020-04-09 | End: 2020-04-14

## 2020-04-09 RX ORDER — MAGNESIUM SULFATE HEPTAHYDRATE 40 MG/ML
2 INJECTION, SOLUTION INTRAVENOUS ONCE
Status: COMPLETED | OUTPATIENT
Start: 2020-04-09 | End: 2020-04-09

## 2020-04-09 RX ORDER — FENTANYL CITRATE 50 UG/ML
INJECTION, SOLUTION INTRAMUSCULAR; INTRAVENOUS
Status: DISCONTINUED
Start: 2020-04-09 | End: 2020-04-09 | Stop reason: WASHOUT

## 2020-04-09 RX ORDER — LEVOTHYROXINE SODIUM 20 UG/ML
150 INJECTION, SOLUTION INTRAVENOUS
Status: DISCONTINUED | OUTPATIENT
Start: 2020-04-10 | End: 2020-04-22

## 2020-04-09 RX ORDER — METOCLOPRAMIDE HYDROCHLORIDE 5 MG/ML
10 INJECTION INTRAMUSCULAR; INTRAVENOUS ONCE
Status: COMPLETED | OUTPATIENT
Start: 2020-04-09 | End: 2020-04-09

## 2020-04-09 RX ORDER — PANTOPRAZOLE SODIUM 40 MG/10ML
40 INJECTION, POWDER, LYOPHILIZED, FOR SOLUTION INTRAVENOUS
Status: DISCONTINUED | OUTPATIENT
Start: 2020-04-09 | End: 2020-04-22

## 2020-04-09 RX ORDER — LEVOTHYROXINE SODIUM 20 UG/ML
150 INJECTION, SOLUTION INTRAVENOUS
Status: DISCONTINUED | OUTPATIENT
Start: 2020-04-10 | End: 2020-04-09

## 2020-04-09 RX ORDER — PANTOPRAZOLE SODIUM 40 MG/10ML
40 INJECTION, POWDER, LYOPHILIZED, FOR SOLUTION INTRAVENOUS
Status: DISCONTINUED | OUTPATIENT
Start: 2020-04-10 | End: 2020-04-09

## 2020-04-09 RX ADMIN — MIDAZOLAM HYDROCHLORIDE 2 MG: 2 INJECTION, SOLUTION INTRAMUSCULAR; INTRAVENOUS at 08:37

## 2020-04-09 RX ADMIN — MIDAZOLAM HYDROCHLORIDE 2 MG: 2 INJECTION, SOLUTION INTRAMUSCULAR; INTRAVENOUS at 15:43

## 2020-04-09 RX ADMIN — SODIUM PHOSPHATE, MONOBASIC, MONOHYDRATE 20 MMOL: 276; 142 INJECTION, SOLUTION INTRAVENOUS at 11:09

## 2020-04-09 RX ADMIN — PANTOPRAZOLE SODIUM 40 MG: 40 INJECTION, POWDER, FOR SOLUTION INTRAVENOUS at 17:57

## 2020-04-09 RX ADMIN — Medication 0.16 MCG/KG/MIN: at 06:56

## 2020-04-09 RX ADMIN — PANTOPRAZOLE SODIUM 40 MG: 40 TABLET, DELAYED RELEASE ORAL at 06:51

## 2020-04-09 RX ADMIN — THIAMINE HYDROCHLORIDE 200 MG: 100 INJECTION, SOLUTION INTRAMUSCULAR; INTRAVENOUS at 21:43

## 2020-04-09 RX ADMIN — INSULIN LISPRO 2 UNITS: 100 INJECTION, SOLUTION INTRAVENOUS; SUBCUTANEOUS at 12:12

## 2020-04-09 RX ADMIN — LEVOTHYROXINE SODIUM 200 MCG: 100 TABLET ORAL at 05:05

## 2020-04-09 RX ADMIN — SODIUM CHLORIDE, PRESERVATIVE FREE 10 ML: 5 INJECTION INTRAVENOUS at 10:31

## 2020-04-09 RX ADMIN — MIDAZOLAM 2 MG: 1 INJECTION INTRAMUSCULAR; INTRAVENOUS at 15:54

## 2020-04-09 RX ADMIN — MIDAZOLAM HYDROCHLORIDE 2 MG: 2 INJECTION, SOLUTION INTRAMUSCULAR; INTRAVENOUS at 08:20

## 2020-04-09 RX ADMIN — Medication 0.14 MCG/KG/MIN: at 07:21

## 2020-04-09 RX ADMIN — SODIUM CHLORIDE, PRESERVATIVE FREE 10 ML: 5 INJECTION INTRAVENOUS at 20:29

## 2020-04-09 RX ADMIN — Medication 0.14 MCG/KG/MIN: at 00:12

## 2020-04-09 RX ADMIN — METOCLOPRAMIDE 10 MG: 5 INJECTION, SOLUTION INTRAMUSCULAR; INTRAVENOUS at 05:04

## 2020-04-09 RX ADMIN — MIDAZOLAM HYDROCHLORIDE 2 MG: 2 INJECTION, SOLUTION INTRAMUSCULAR; INTRAVENOUS at 16:32

## 2020-04-09 RX ADMIN — MIDODRINE HYDROCHLORIDE 10 MG: 5 TABLET ORAL at 06:51

## 2020-04-09 RX ADMIN — METOCLOPRAMIDE 10 MG: 5 INJECTION, SOLUTION INTRAMUSCULAR; INTRAVENOUS at 11:09

## 2020-04-09 RX ADMIN — CHLORHEXIDINE GLUCONATE 0.12% ORAL RINSE 15 ML: 1.2 LIQUID ORAL at 20:29

## 2020-04-09 RX ADMIN — MAGNESIUM SULFATE 2 G: 2 INJECTION INTRAVENOUS at 02:35

## 2020-04-09 RX ADMIN — HYDROCODONE BITARTRATE AND ACETAMINOPHEN 1 TABLET: 10; 325 TABLET ORAL at 05:05

## 2020-04-09 RX ADMIN — POTASSIUM PHOSPHATE, MONOBASIC AND POTASSIUM PHOSPHATE, DIBASIC 10 MMOL: 224; 236 INJECTION, SOLUTION INTRAVENOUS at 02:52

## 2020-04-09 RX ADMIN — MIDAZOLAM 2 MG: 1 INJECTION INTRAMUSCULAR; INTRAVENOUS at 16:14

## 2020-04-09 RX ADMIN — INSULIN DETEMIR 12 UNITS: 100 INJECTION, SOLUTION SUBCUTANEOUS at 10:30

## 2020-04-10 ENCOUNTER — APPOINTMENT (OUTPATIENT)
Dept: NEPHROLOGY | Facility: HOSPITAL | Age: 75
End: 2020-04-10

## 2020-04-10 ENCOUNTER — APPOINTMENT (OUTPATIENT)
Dept: GENERAL RADIOLOGY | Facility: HOSPITAL | Age: 75
End: 2020-04-10

## 2020-04-10 ENCOUNTER — APPOINTMENT (OUTPATIENT)
Dept: INTERVENTIONAL RADIOLOGY/VASCULAR | Facility: HOSPITAL | Age: 75
End: 2020-04-10

## 2020-04-10 LAB
ALBUMIN SERPL-MCNC: 2.7 G/DL (ref 3.5–5.2)
ALBUMIN/GLOB SERPL: 1.3 G/DL
ALP SERPL-CCNC: 69 U/L (ref 39–117)
ALT SERPL W P-5'-P-CCNC: 6 U/L (ref 1–41)
ANION GAP SERPL CALCULATED.3IONS-SCNC: 14 MMOL/L (ref 5–15)
ARTERIAL PATENCY WRIST A: ABNORMAL
AST SERPL-CCNC: 10 U/L (ref 1–40)
ATMOSPHERIC PRESS: ABNORMAL MM[HG]
BASE EXCESS BLDA CALC-SCNC: -4.6 MMOL/L (ref 0–2)
BASOPHILS # BLD AUTO: 0.02 10*3/MM3 (ref 0–0.2)
BASOPHILS NFR BLD AUTO: 0.3 % (ref 0–1.5)
BDY SITE: ABNORMAL
BILIRUB SERPL-MCNC: 0.4 MG/DL (ref 0.2–1.2)
BODY TEMPERATURE: 37 C
BUN BLD-MCNC: 90 MG/DL (ref 8–23)
BUN/CREAT SERPL: 24.8 (ref 7–25)
CALCIUM SPEC-SCNC: 7.6 MG/DL (ref 8.6–10.5)
CHLORIDE SERPL-SCNC: 111 MMOL/L (ref 98–107)
CO2 BLDA-SCNC: 20.4 MMOL/L (ref 22–33)
CO2 SERPL-SCNC: 18 MMOL/L (ref 22–29)
COHGB MFR BLD: 1.8 % (ref 0–2)
CREAT BLD-MCNC: 3.63 MG/DL (ref 0.76–1.27)
D-LACTATE SERPL-SCNC: 0.9 MMOL/L (ref 0.5–2)
D-LACTATE SERPL-SCNC: 0.9 MMOL/L (ref 0.5–2)
D-LACTATE SERPL-SCNC: 1.4 MMOL/L (ref 0.5–2)
DEPRECATED RDW RBC AUTO: 55.8 FL (ref 37–54)
EOSINOPHIL # BLD AUTO: 0.18 10*3/MM3 (ref 0–0.4)
EOSINOPHIL NFR BLD AUTO: 2.3 % (ref 0.3–6.2)
ERYTHROCYTE [DISTWIDTH] IN BLOOD BY AUTOMATED COUNT: 17.3 % (ref 12.3–15.4)
GFR SERPL CREATININE-BSD FRML MDRD: 17 ML/MIN/1.73
GLOBULIN UR ELPH-MCNC: 2.1 GM/DL
GLUCOSE BLD-MCNC: 121 MG/DL (ref 65–99)
GLUCOSE BLDC GLUCOMTR-MCNC: 108 MG/DL (ref 70–130)
GLUCOSE BLDC GLUCOMTR-MCNC: 123 MG/DL (ref 70–130)
GLUCOSE BLDC GLUCOMTR-MCNC: 124 MG/DL (ref 70–130)
GLUCOSE BLDC GLUCOMTR-MCNC: 95 MG/DL (ref 70–130)
HCO3 BLDA-SCNC: 19.4 MMOL/L (ref 20–26)
HCT VFR BLD AUTO: 21.4 % (ref 37.5–51)
HCT VFR BLD AUTO: 21.4 % (ref 37.5–51)
HCT VFR BLD AUTO: 22.9 % (ref 37.5–51)
HCT VFR BLD AUTO: 23.9 % (ref 37.5–51)
HCT VFR BLD CALC: 20.7 %
HGB BLD-MCNC: 7 G/DL (ref 13–17.7)
HGB BLD-MCNC: 7 G/DL (ref 13–17.7)
HGB BLD-MCNC: 7.5 G/DL (ref 13–17.7)
HGB BLD-MCNC: 7.5 G/DL (ref 13–17.7)
HGB BLDA-MCNC: 6.7 G/DL (ref 13.5–17.5)
HOROWITZ INDEX BLD+IHG-RTO: 35 %
IMM GRANULOCYTES # BLD AUTO: 0.18 10*3/MM3 (ref 0–0.05)
IMM GRANULOCYTES NFR BLD AUTO: 2.3 % (ref 0–0.5)
LYMPHOCYTES # BLD AUTO: 1.4 10*3/MM3 (ref 0.7–3.1)
LYMPHOCYTES NFR BLD AUTO: 18.2 % (ref 19.6–45.3)
MAGNESIUM SERPL-MCNC: 2 MG/DL (ref 1.6–2.4)
MCH RBC QN AUTO: 30.8 PG (ref 26.6–33)
MCHC RBC AUTO-ENTMCNC: 32.7 G/DL (ref 31.5–35.7)
MCV RBC AUTO: 94.3 FL (ref 79–97)
METHGB BLD QL: 1 % (ref 0–1.5)
MODALITY: ABNORMAL
MONOCYTES # BLD AUTO: 0.56 10*3/MM3 (ref 0.1–0.9)
MONOCYTES NFR BLD AUTO: 7.3 % (ref 5–12)
NEUTROPHILS # BLD AUTO: 5.34 10*3/MM3 (ref 1.7–7)
NEUTROPHILS NFR BLD AUTO: 69.6 % (ref 42.7–76)
NOTE: ABNORMAL
NRBC BLD AUTO-RTO: 1.2 /100 WBC (ref 0–0.2)
OXYHGB MFR BLDV: 96.4 % (ref 94–99)
PCO2 BLDA: 30.1 MM HG (ref 35–45)
PCO2 TEMP ADJ BLD: 30.1 MM HG (ref 35–48)
PEEP RESPIRATORY: 5 CM[H2O]
PH BLDA: 7.42 PH UNITS (ref 7.35–7.45)
PH, TEMP CORRECTED: 7.42 PH UNITS
PHOSPHATE SERPL-MCNC: 2.5 MG/DL (ref 2.5–4.5)
PLATELET # BLD AUTO: 114 10*3/MM3 (ref 140–450)
PMV BLD AUTO: 11.6 FL (ref 6–12)
PO2 BLDA: 100 MM HG (ref 83–108)
PO2 TEMP ADJ BLD: 100 MM HG (ref 83–108)
POTASSIUM BLD-SCNC: 3.9 MMOL/L (ref 3.5–5.2)
PROT SERPL-MCNC: 4.8 G/DL (ref 6–8.5)
RBC # BLD AUTO: 2.27 10*6/MM3 (ref 4.14–5.8)
SODIUM BLD-SCNC: 143 MMOL/L (ref 136–145)
VENTILATOR MODE: ABNORMAL
WBC NRBC COR # BLD: 7.68 10*3/MM3 (ref 3.4–10.8)

## 2020-04-10 PROCEDURE — 80053 COMPREHEN METABOLIC PANEL: CPT | Performed by: INTERNAL MEDICINE

## 2020-04-10 PROCEDURE — 83605 ASSAY OF LACTIC ACID: CPT | Performed by: INTERNAL MEDICINE

## 2020-04-10 PROCEDURE — 75726 ARTERY X-RAYS ABDOMEN: CPT

## 2020-04-10 PROCEDURE — 25010000002 IOPAMIDOL 61 % SOLUTION

## 2020-04-10 PROCEDURE — C1887 CATHETER, GUIDING: HCPCS

## 2020-04-10 PROCEDURE — C1769 GUIDE WIRE: HCPCS

## 2020-04-10 PROCEDURE — 25010000002 THIAMINE PER 100 MG: Performed by: INTERNAL MEDICINE

## 2020-04-10 PROCEDURE — 83735 ASSAY OF MAGNESIUM: CPT | Performed by: INTERNAL MEDICINE

## 2020-04-10 PROCEDURE — 94799 UNLISTED PULMONARY SVC/PX: CPT

## 2020-04-10 PROCEDURE — B41B1ZZ FLUOROSCOPY OF OTHER INTRA-ABDOMINAL ARTERIES USING LOW OSMOLAR CONTRAST: ICD-10-PCS | Performed by: RADIOLOGY

## 2020-04-10 PROCEDURE — 25010000002 MIDAZOLAM PER 1 MG: Performed by: RADIOLOGY

## 2020-04-10 PROCEDURE — 71045 X-RAY EXAM CHEST 1 VIEW: CPT

## 2020-04-10 PROCEDURE — 84100 ASSAY OF PHOSPHORUS: CPT | Performed by: INTERNAL MEDICINE

## 2020-04-10 PROCEDURE — 63710000001 INSULIN DETEMIR PER 5 UNITS: Performed by: INTERNAL MEDICINE

## 2020-04-10 PROCEDURE — 36600 WITHDRAWAL OF ARTERIAL BLOOD: CPT

## 2020-04-10 PROCEDURE — P9016 RBC LEUKOCYTES REDUCED: HCPCS

## 2020-04-10 PROCEDURE — 25010000002 FENTANYL CITRATE (PF) 100 MCG/2ML SOLUTION: Performed by: RADIOLOGY

## 2020-04-10 PROCEDURE — 85025 COMPLETE CBC W/AUTO DIFF WBC: CPT | Performed by: INTERNAL MEDICINE

## 2020-04-10 PROCEDURE — 63710000001 INSULIN LISPRO (HUMAN) PER 5 UNITS: Performed by: INTERNAL MEDICINE

## 2020-04-10 PROCEDURE — 75774 ARTERY X-RAY EACH VESSEL: CPT

## 2020-04-10 PROCEDURE — 82805 BLOOD GASES W/O2 SATURATION: CPT

## 2020-04-10 PROCEDURE — 25010000002 MIDAZOLAM PER 1 MG

## 2020-04-10 PROCEDURE — 04L23DZ OCCLUSION OF GASTRIC ARTERY WITH INTRALUMINAL DEVICE, PERCUTANEOUS APPROACH: ICD-10-PCS | Performed by: RADIOLOGY

## 2020-04-10 PROCEDURE — 85018 HEMOGLOBIN: CPT | Performed by: INTERNAL MEDICINE

## 2020-04-10 PROCEDURE — 94003 VENT MGMT INPAT SUBQ DAY: CPT

## 2020-04-10 PROCEDURE — C1894 INTRO/SHEATH, NON-LASER: HCPCS

## 2020-04-10 PROCEDURE — 25010000002 FENTANYL CITRATE (PF) 100 MCG/2ML SOLUTION

## 2020-04-10 PROCEDURE — 99291 CRITICAL CARE FIRST HOUR: CPT | Performed by: INTERNAL MEDICINE

## 2020-04-10 PROCEDURE — 85014 HEMATOCRIT: CPT | Performed by: INTERNAL MEDICINE

## 2020-04-10 PROCEDURE — 86900 BLOOD TYPING SEROLOGIC ABO: CPT

## 2020-04-10 PROCEDURE — B4141ZZ FLUOROSCOPY OF SUPERIOR MESENTERIC ARTERY USING LOW OSMOLAR CONTRAST: ICD-10-PCS | Performed by: RADIOLOGY

## 2020-04-10 PROCEDURE — 25010000002 ALBUMIN HUMAN 25% PER 50 ML: Performed by: INTERNAL MEDICINE

## 2020-04-10 PROCEDURE — 5A1D70Z PERFORMANCE OF URINARY FILTRATION, INTERMITTENT, LESS THAN 6 HOURS PER DAY: ICD-10-PCS | Performed by: INTERNAL MEDICINE

## 2020-04-10 PROCEDURE — 25010000002 HYDROMORPHONE HCL PF 500 MG/50ML SOLUTION: Performed by: INTERNAL MEDICINE

## 2020-04-10 PROCEDURE — P9047 ALBUMIN (HUMAN), 25%, 50ML: HCPCS | Performed by: INTERNAL MEDICINE

## 2020-04-10 PROCEDURE — 94770: CPT

## 2020-04-10 PROCEDURE — 82962 GLUCOSE BLOOD TEST: CPT

## 2020-04-10 RX ORDER — ALBUMIN (HUMAN) 12.5 G/50ML
25 SOLUTION INTRAVENOUS AS NEEDED
Status: DISPENSED | OUTPATIENT
Start: 2020-04-10 | End: 2020-04-10

## 2020-04-10 RX ORDER — MIDAZOLAM HYDROCHLORIDE 1 MG/ML
INJECTION INTRAMUSCULAR; INTRAVENOUS
Status: COMPLETED | OUTPATIENT
Start: 2020-04-10 | End: 2020-04-10

## 2020-04-10 RX ORDER — FENTANYL CITRATE 50 UG/ML
INJECTION, SOLUTION INTRAMUSCULAR; INTRAVENOUS
Status: COMPLETED | OUTPATIENT
Start: 2020-04-10 | End: 2020-04-10

## 2020-04-10 RX ORDER — MIDAZOLAM HYDROCHLORIDE 1 MG/ML
INJECTION INTRAMUSCULAR; INTRAVENOUS
Status: COMPLETED
Start: 2020-04-10 | End: 2020-04-10

## 2020-04-10 RX ORDER — LIDOCAINE HYDROCHLORIDE 10 MG/ML
INJECTION, SOLUTION EPIDURAL; INFILTRATION; INTRACAUDAL; PERINEURAL
Status: COMPLETED
Start: 2020-04-10 | End: 2020-04-10

## 2020-04-10 RX ORDER — MIDAZOLAM HYDROCHLORIDE 1 MG/ML
INJECTION INTRAMUSCULAR; INTRAVENOUS
Status: DISPENSED
Start: 2020-04-10 | End: 2020-04-11

## 2020-04-10 RX ORDER — FENTANYL CITRATE 50 UG/ML
INJECTION, SOLUTION INTRAMUSCULAR; INTRAVENOUS
Status: COMPLETED
Start: 2020-04-10 | End: 2020-04-10

## 2020-04-10 RX ADMIN — INSULIN DETEMIR 12 UNITS: 100 INJECTION, SOLUTION SUBCUTANEOUS at 08:34

## 2020-04-10 RX ADMIN — SODIUM CHLORIDE, PRESERVATIVE FREE 10 ML: 5 INJECTION INTRAVENOUS at 08:43

## 2020-04-10 RX ADMIN — PANTOPRAZOLE SODIUM 40 MG: 40 INJECTION, POWDER, FOR SOLUTION INTRAVENOUS at 06:51

## 2020-04-10 RX ADMIN — MIDAZOLAM 1 MG: 1 INJECTION INTRAMUSCULAR; INTRAVENOUS at 15:19

## 2020-04-10 RX ADMIN — MIDAZOLAM 1 MG: 1 INJECTION INTRAMUSCULAR; INTRAVENOUS at 14:16

## 2020-04-10 RX ADMIN — SODIUM CHLORIDE, PRESERVATIVE FREE 10 ML: 5 INJECTION INTRAVENOUS at 21:30

## 2020-04-10 RX ADMIN — CHLORHEXIDINE GLUCONATE 0.12% ORAL RINSE 15 ML: 1.2 LIQUID ORAL at 21:31

## 2020-04-10 RX ADMIN — FENTANYL CITRATE 25 MCG: 50 INJECTION, SOLUTION INTRAMUSCULAR; INTRAVENOUS at 16:33

## 2020-04-10 RX ADMIN — MIDAZOLAM 1 MG: 1 INJECTION INTRAMUSCULAR; INTRAVENOUS at 15:03

## 2020-04-10 RX ADMIN — IOPAMIDOL 20 ML: 612 INJECTION, SOLUTION INTRAVENOUS at 15:40

## 2020-04-10 RX ADMIN — CHLORHEXIDINE GLUCONATE 0.12% ORAL RINSE 15 ML: 1.2 LIQUID ORAL at 08:43

## 2020-04-10 RX ADMIN — FENTANYL CITRATE 25 MCG: 50 INJECTION, SOLUTION INTRAMUSCULAR; INTRAVENOUS at 15:34

## 2020-04-10 RX ADMIN — FENTANYL CITRATE 25 MCG: 50 INJECTION, SOLUTION INTRAMUSCULAR; INTRAVENOUS at 15:26

## 2020-04-10 RX ADMIN — MIDAZOLAM 1 MG: 1 INJECTION INTRAMUSCULAR; INTRAVENOUS at 13:52

## 2020-04-10 RX ADMIN — LIDOCAINE HYDROCHLORIDE 4 ML: 10 INJECTION, SOLUTION EPIDURAL; INFILTRATION; INTRACAUDAL; PERINEURAL at 12:45

## 2020-04-10 RX ADMIN — MIDAZOLAM 1 MG: 1 INJECTION INTRAMUSCULAR; INTRAVENOUS at 15:33

## 2020-04-10 RX ADMIN — HYDROMORPHONE HYDROCHLORIDE: 10 INJECTION INTRAMUSCULAR; INTRAVENOUS; SUBCUTANEOUS at 08:34

## 2020-04-10 RX ADMIN — THIAMINE HYDROCHLORIDE 200 MG: 100 INJECTION, SOLUTION INTRAMUSCULAR; INTRAVENOUS at 22:30

## 2020-04-10 RX ADMIN — MIDAZOLAM 1 MG: 1 INJECTION INTRAMUSCULAR; INTRAVENOUS at 15:54

## 2020-04-10 RX ADMIN — THIAMINE HYDROCHLORIDE 200 MG: 100 INJECTION, SOLUTION INTRAMUSCULAR; INTRAVENOUS at 08:43

## 2020-04-10 RX ADMIN — MIDAZOLAM 1 MG: 1 INJECTION INTRAMUSCULAR; INTRAVENOUS at 16:33

## 2020-04-10 RX ADMIN — IOPAMIDOL 100 ML: 612 INJECTION, SOLUTION INTRAVENOUS at 13:30

## 2020-04-10 RX ADMIN — ALBUMIN HUMAN 25 G: 0.25 SOLUTION INTRAVENOUS at 18:00

## 2020-04-10 RX ADMIN — ALBUMIN HUMAN 25 G: 0.25 SOLUTION INTRAVENOUS at 18:01

## 2020-04-10 RX ADMIN — PANTOPRAZOLE SODIUM 40 MG: 40 INJECTION, POWDER, FOR SOLUTION INTRAVENOUS at 21:30

## 2020-04-10 RX ADMIN — INSULIN LISPRO 2 UNITS: 100 INJECTION, SOLUTION INTRAVENOUS; SUBCUTANEOUS at 00:48

## 2020-04-10 RX ADMIN — FENTANYL CITRATE 25 MCG: 50 INJECTION, SOLUTION INTRAMUSCULAR; INTRAVENOUS at 15:55

## 2020-04-11 ENCOUNTER — APPOINTMENT (OUTPATIENT)
Dept: GENERAL RADIOLOGY | Facility: HOSPITAL | Age: 75
End: 2020-04-11

## 2020-04-11 LAB
ALBUMIN SERPL-MCNC: 3 G/DL (ref 3.5–5.2)
ALBUMIN/GLOB SERPL: 1.5 G/DL
ALP SERPL-CCNC: 65 U/L (ref 39–117)
ALT SERPL W P-5'-P-CCNC: <5 U/L (ref 1–41)
ANION GAP SERPL CALCULATED.3IONS-SCNC: 12 MMOL/L (ref 5–15)
ARTERIAL PATENCY WRIST A: ABNORMAL
AST SERPL-CCNC: 10 U/L (ref 1–40)
ATMOSPHERIC PRESS: ABNORMAL MM[HG]
BASE EXCESS BLDA CALC-SCNC: -0.1 MMOL/L (ref 0–2)
BASE EXCESS BLDA CALC-SCNC: 1.5 MMOL/L (ref 0–2)
BASE EXCESS BLDA CALC-SCNC: 2.9 MMOL/L (ref 0–2)
BASOPHILS # BLD AUTO: 0.02 10*3/MM3 (ref 0–0.2)
BASOPHILS NFR BLD AUTO: 0.3 % (ref 0–1.5)
BDY SITE: ABNORMAL
BDY SITE: ABNORMAL
BH BB BLOOD EXPIRATION DATE: NORMAL
BH BB BLOOD TYPE BARCODE: 1700
BH BB DISPENSE STATUS: NORMAL
BH BB PRODUCT CODE: NORMAL
BH BB UNIT NUMBER: NORMAL
BILIRUB SERPL-MCNC: 1.1 MG/DL (ref 0.2–1.2)
BODY TEMPERATURE: 37 C
BUN BLD-MCNC: 64 MG/DL (ref 8–23)
BUN/CREAT SERPL: 21.9 (ref 7–25)
CALCIUM SPEC-SCNC: 7.8 MG/DL (ref 8.6–10.5)
CHLORIDE SERPL-SCNC: 110 MMOL/L (ref 98–107)
CO2 BLDA-SCNC: 24.9 MMOL/L (ref 22–33)
CO2 BLDA-SCNC: 27.3 MMOL/L (ref 22–33)
CO2 BLDA-SCNC: 29.1 MMOL/L (ref 22–33)
CO2 SERPL-SCNC: 20 MMOL/L (ref 22–29)
COHGB MFR BLD: 1.8 % (ref 0–2)
COHGB MFR BLD: 1.8 % (ref 0–2)
COHGB MFR BLD: 2 % (ref 0–2)
CREAT BLD-MCNC: 2.92 MG/DL (ref 0.76–1.27)
CROSSMATCH INTERPRETATION: NORMAL
D-LACTATE SERPL-SCNC: 0.9 MMOL/L (ref 0.5–2)
D-LACTATE SERPL-SCNC: 1 MMOL/L (ref 0.5–2)
D-LACTATE SERPL-SCNC: 1 MMOL/L (ref 0.5–2)
D-LACTATE SERPL-SCNC: 1.2 MMOL/L (ref 0.5–2)
DEPRECATED RDW RBC AUTO: 57.1 FL (ref 37–54)
EOSINOPHIL # BLD AUTO: 0.16 10*3/MM3 (ref 0–0.4)
EOSINOPHIL NFR BLD AUTO: 2.7 % (ref 0.3–6.2)
EPAP: 0
ERYTHROCYTE [DISTWIDTH] IN BLOOD BY AUTOMATED COUNT: 17.7 % (ref 12.3–15.4)
GFR SERPL CREATININE-BSD FRML MDRD: 21 ML/MIN/1.73
GLOBULIN UR ELPH-MCNC: 2 GM/DL
GLUCOSE BLD-MCNC: 91 MG/DL (ref 65–99)
GLUCOSE BLDC GLUCOMTR-MCNC: 113 MG/DL (ref 70–130)
GLUCOSE BLDC GLUCOMTR-MCNC: 150 MG/DL (ref 70–130)
GLUCOSE BLDC GLUCOMTR-MCNC: 87 MG/DL (ref 70–130)
GLUCOSE BLDC GLUCOMTR-MCNC: 90 MG/DL (ref 70–130)
HCO3 BLDA-SCNC: 23.8 MMOL/L (ref 20–26)
HCO3 BLDA-SCNC: 26.1 MMOL/L (ref 20–26)
HCO3 BLDA-SCNC: 27.8 MMOL/L (ref 20–26)
HCT VFR BLD AUTO: 21.3 % (ref 37.5–51)
HCT VFR BLD AUTO: 23.5 % (ref 37.5–51)
HCT VFR BLD AUTO: 24.6 % (ref 37.5–51)
HCT VFR BLD AUTO: 31.4 % (ref 37.5–51)
HCT VFR BLD CALC: 21.5 %
HCT VFR BLD CALC: 23.1 %
HCT VFR BLD CALC: 27.5 %
HGB BLD-MCNC: 6.5 G/DL (ref 13–17.7)
HGB BLD-MCNC: 7.2 G/DL (ref 13–17.7)
HGB BLD-MCNC: 8 G/DL (ref 13–17.7)
HGB BLD-MCNC: 9.9 G/DL (ref 13–17.7)
HGB BLDA-MCNC: 7 G/DL (ref 13.5–17.5)
HGB BLDA-MCNC: 7.5 G/DL (ref 13.5–17.5)
HGB BLDA-MCNC: 9 G/DL (ref 13.5–17.5)
HOROWITZ INDEX BLD+IHG-RTO: 100 %
HOROWITZ INDEX BLD+IHG-RTO: 35 %
HOROWITZ INDEX BLD+IHG-RTO: 35 %
IMM GRANULOCYTES # BLD AUTO: 0.06 10*3/MM3 (ref 0–0.05)
IMM GRANULOCYTES NFR BLD AUTO: 1 % (ref 0–0.5)
IPAP: 0
LYMPHOCYTES # BLD AUTO: 0.79 10*3/MM3 (ref 0.7–3.1)
LYMPHOCYTES NFR BLD AUTO: 13.1 % (ref 19.6–45.3)
MAGNESIUM SERPL-MCNC: 2 MG/DL (ref 1.6–2.4)
MCH RBC QN AUTO: 30 PG (ref 26.6–33)
MCHC RBC AUTO-ENTMCNC: 30.6 G/DL (ref 31.5–35.7)
MCV RBC AUTO: 97.9 FL (ref 79–97)
METHGB BLD QL: 0.2 % (ref 0–1.5)
METHGB BLD QL: 1 % (ref 0–1.5)
METHGB BLD QL: 1 % (ref 0–1.5)
MODALITY: ABNORMAL
MONOCYTES # BLD AUTO: 0.44 10*3/MM3 (ref 0.1–0.9)
MONOCYTES NFR BLD AUTO: 7.3 % (ref 5–12)
NEUTROPHILS # BLD AUTO: 4.56 10*3/MM3 (ref 1.7–7)
NEUTROPHILS NFR BLD AUTO: 75.6 % (ref 42.7–76)
NOTE: ABNORMAL
NRBC BLD AUTO-RTO: 0.7 /100 WBC (ref 0–0.2)
OXYHGB MFR BLDV: 92.5 % (ref 94–99)
OXYHGB MFR BLDV: 94.6 % (ref 94–99)
OXYHGB MFR BLDV: 98.6 % (ref 94–99)
PAW @ PEAK INSP FLOW SETTING VENT: 0 CMH2O
PCO2 BLDA: 34.5 MM HG (ref 35–45)
PCO2 BLDA: 40.2 MM HG (ref 35–45)
PCO2 BLDA: 43.7 MM HG (ref 35–45)
PCO2 TEMP ADJ BLD: 34.5 MM HG (ref 35–48)
PCO2 TEMP ADJ BLD: 40.2 MM HG (ref 35–48)
PCO2 TEMP ADJ BLD: 43.7 MM HG (ref 35–48)
PEEP RESPIRATORY: 5 CM[H2O]
PH BLDA: 7.41 PH UNITS (ref 7.35–7.45)
PH BLDA: 7.42 PH UNITS (ref 7.35–7.45)
PH BLDA: 7.45 PH UNITS (ref 7.35–7.45)
PH, TEMP CORRECTED: 7.41 PH UNITS
PH, TEMP CORRECTED: 7.42 PH UNITS
PH, TEMP CORRECTED: 7.45 PH UNITS
PHOSPHATE SERPL-MCNC: 2.3 MG/DL (ref 2.5–4.5)
PLATELET # BLD AUTO: 103 10*3/MM3 (ref 140–450)
PMV BLD AUTO: 11.3 FL (ref 6–12)
PO2 BLDA: 203 MM HG (ref 83–108)
PO2 BLDA: 65.2 MM HG (ref 83–108)
PO2 BLDA: 81.5 MM HG (ref 83–108)
PO2 TEMP ADJ BLD: 203 MM HG (ref 83–108)
PO2 TEMP ADJ BLD: 65.2 MM HG (ref 83–108)
PO2 TEMP ADJ BLD: 81.5 MM HG (ref 83–108)
POTASSIUM BLD-SCNC: 3.7 MMOL/L (ref 3.5–5.2)
PROT SERPL-MCNC: 5 G/DL (ref 6–8.5)
RBC # BLD AUTO: 2.4 10*6/MM3 (ref 4.14–5.8)
SODIUM BLD-SCNC: 142 MMOL/L (ref 136–145)
TOTAL RATE: 0 BREATHS/MINUTE
UNIT  ABO: NORMAL
UNIT  RH: NORMAL
VENTILATOR MODE: ABNORMAL
VT ON VENT VENT: 0.61 ML
WBC NRBC COR # BLD: 6.03 10*3/MM3 (ref 3.4–10.8)

## 2020-04-11 PROCEDURE — 74018 RADEX ABDOMEN 1 VIEW: CPT

## 2020-04-11 PROCEDURE — 86900 BLOOD TYPING SEROLOGIC ABO: CPT

## 2020-04-11 PROCEDURE — 84100 ASSAY OF PHOSPHORUS: CPT | Performed by: INTERNAL MEDICINE

## 2020-04-11 PROCEDURE — 94003 VENT MGMT INPAT SUBQ DAY: CPT

## 2020-04-11 PROCEDURE — 94799 UNLISTED PULMONARY SVC/PX: CPT

## 2020-04-11 PROCEDURE — 85025 COMPLETE CBC W/AUTO DIFF WBC: CPT | Performed by: INTERNAL MEDICINE

## 2020-04-11 PROCEDURE — 82962 GLUCOSE BLOOD TEST: CPT

## 2020-04-11 PROCEDURE — 31500 INSERT EMERGENCY AIRWAY: CPT | Performed by: INTERNAL MEDICINE

## 2020-04-11 PROCEDURE — 36600 WITHDRAWAL OF ARTERIAL BLOOD: CPT

## 2020-04-11 PROCEDURE — 99232 SBSQ HOSP IP/OBS MODERATE 35: CPT | Performed by: INTERNAL MEDICINE

## 2020-04-11 PROCEDURE — 85018 HEMOGLOBIN: CPT | Performed by: INTERNAL MEDICINE

## 2020-04-11 PROCEDURE — 99291 CRITICAL CARE FIRST HOUR: CPT | Performed by: INTERNAL MEDICINE

## 2020-04-11 PROCEDURE — 83735 ASSAY OF MAGNESIUM: CPT | Performed by: INTERNAL MEDICINE

## 2020-04-11 PROCEDURE — 94002 VENT MGMT INPAT INIT DAY: CPT

## 2020-04-11 PROCEDURE — P9016 RBC LEUKOCYTES REDUCED: HCPCS

## 2020-04-11 PROCEDURE — 80053 COMPREHEN METABOLIC PANEL: CPT | Performed by: INTERNAL MEDICINE

## 2020-04-11 PROCEDURE — 83605 ASSAY OF LACTIC ACID: CPT | Performed by: INTERNAL MEDICINE

## 2020-04-11 PROCEDURE — 71045 X-RAY EXAM CHEST 1 VIEW: CPT

## 2020-04-11 PROCEDURE — 5A1D70Z PERFORMANCE OF URINARY FILTRATION, INTERMITTENT, LESS THAN 6 HOURS PER DAY: ICD-10-PCS | Performed by: INTERNAL MEDICINE

## 2020-04-11 PROCEDURE — 25010000002 THIAMINE PER 100 MG: Performed by: INTERNAL MEDICINE

## 2020-04-11 PROCEDURE — 25010000002 HYDROMORPHONE HCL PF 500 MG/50ML SOLUTION: Performed by: INTERNAL MEDICINE

## 2020-04-11 PROCEDURE — 25010000002 MIDAZOLAM PER 1MG: Performed by: INTERNAL MEDICINE

## 2020-04-11 PROCEDURE — 85014 HEMATOCRIT: CPT | Performed by: INTERNAL MEDICINE

## 2020-04-11 PROCEDURE — 0DH67UZ INSERTION OF FEEDING DEVICE INTO STOMACH, VIA NATURAL OR ARTIFICIAL OPENING: ICD-10-PCS | Performed by: FAMILY MEDICINE

## 2020-04-11 PROCEDURE — 82805 BLOOD GASES W/O2 SATURATION: CPT

## 2020-04-11 PROCEDURE — 63710000001 INSULIN DETEMIR PER 5 UNITS: Performed by: INTERNAL MEDICINE

## 2020-04-11 PROCEDURE — 3E0G76Z INTRODUCTION OF NUTRITIONAL SUBSTANCE INTO UPPER GI, VIA NATURAL OR ARTIFICIAL OPENING: ICD-10-PCS | Performed by: FAMILY MEDICINE

## 2020-04-11 RX ORDER — MIDAZOLAM HYDROCHLORIDE 1 MG/ML
2 INJECTION INTRAMUSCULAR; INTRAVENOUS ONCE
Status: COMPLETED | OUTPATIENT
Start: 2020-04-11 | End: 2020-04-12

## 2020-04-11 RX ORDER — ETOMIDATE 2 MG/ML
0.3 INJECTION INTRAVENOUS ONCE
Status: COMPLETED | OUTPATIENT
Start: 2020-04-11 | End: 2020-04-11

## 2020-04-11 RX ORDER — ALBUMIN (HUMAN) 12.5 G/50ML
25 SOLUTION INTRAVENOUS AS NEEDED
Status: DISCONTINUED | OUTPATIENT
Start: 2020-04-11 | End: 2020-04-17

## 2020-04-11 RX ORDER — ONDANSETRON 2 MG/ML
4 INJECTION INTRAMUSCULAR; INTRAVENOUS EVERY 6 HOURS PRN
Status: DISCONTINUED | OUTPATIENT
Start: 2020-04-11 | End: 2020-04-22 | Stop reason: HOSPADM

## 2020-04-11 RX ADMIN — HYDROMORPHONE HYDROCHLORIDE: 10 INJECTION INTRAMUSCULAR; INTRAVENOUS; SUBCUTANEOUS at 10:44

## 2020-04-11 RX ADMIN — INSULIN DETEMIR 12 UNITS: 100 INJECTION, SOLUTION SUBCUTANEOUS at 09:38

## 2020-04-11 RX ADMIN — SODIUM CHLORIDE, PRESERVATIVE FREE 10 ML: 5 INJECTION INTRAVENOUS at 20:53

## 2020-04-11 RX ADMIN — HYDROMORPHONE HYDROCHLORIDE: 10 INJECTION INTRAMUSCULAR; INTRAVENOUS; SUBCUTANEOUS at 19:35

## 2020-04-11 RX ADMIN — CHLORHEXIDINE GLUCONATE 0.12% ORAL RINSE 15 ML: 1.2 LIQUID ORAL at 09:38

## 2020-04-11 RX ADMIN — Medication 0.02 MCG/KG/MIN: at 19:25

## 2020-04-11 RX ADMIN — LEVOTHYROXINE SODIUM 150 MCG: 20 INJECTION, SOLUTION INTRAVENOUS at 10:44

## 2020-04-11 RX ADMIN — ETOMIDATE 40 MG: 2 INJECTION, SOLUTION INTRAVENOUS at 19:05

## 2020-04-11 RX ADMIN — CHLORHEXIDINE GLUCONATE 0.12% ORAL RINSE 15 ML: 1.2 LIQUID ORAL at 20:53

## 2020-04-11 RX ADMIN — PANTOPRAZOLE SODIUM 40 MG: 40 INJECTION, POWDER, FOR SOLUTION INTRAVENOUS at 09:38

## 2020-04-11 RX ADMIN — THIAMINE HYDROCHLORIDE 200 MG: 100 INJECTION, SOLUTION INTRAMUSCULAR; INTRAVENOUS at 09:38

## 2020-04-11 RX ADMIN — MIDAZOLAM HYDROCHLORIDE 2 MG: 1 INJECTION, SOLUTION INTRAMUSCULAR; INTRAVENOUS at 19:06

## 2020-04-11 RX ADMIN — PANTOPRAZOLE SODIUM 40 MG: 40 INJECTION, POWDER, FOR SOLUTION INTRAVENOUS at 18:05

## 2020-04-11 RX ADMIN — SODIUM CHLORIDE, PRESERVATIVE FREE 10 ML: 5 INJECTION INTRAVENOUS at 10:21

## 2020-04-11 RX ADMIN — THIAMINE HYDROCHLORIDE 200 MG: 100 INJECTION, SOLUTION INTRAMUSCULAR; INTRAVENOUS at 20:53

## 2020-04-12 ENCOUNTER — APPOINTMENT (OUTPATIENT)
Dept: GENERAL RADIOLOGY | Facility: HOSPITAL | Age: 75
End: 2020-04-12

## 2020-04-12 ENCOUNTER — APPOINTMENT (OUTPATIENT)
Dept: CT IMAGING | Facility: HOSPITAL | Age: 75
End: 2020-04-12

## 2020-04-12 LAB
ALBUMIN SERPL-MCNC: 2.8 G/DL (ref 3.5–5.2)
ALBUMIN SERPL-MCNC: 3 G/DL (ref 3.5–5.2)
ALBUMIN/GLOB SERPL: 1.4 G/DL
ALP SERPL-CCNC: 75 U/L (ref 39–117)
ALP SERPL-CCNC: 76 U/L (ref 39–117)
ALT SERPL W P-5'-P-CCNC: <5 U/L (ref 1–41)
ALT SERPL W P-5'-P-CCNC: <5 U/L (ref 1–41)
ANION GAP SERPL CALCULATED.3IONS-SCNC: 13 MMOL/L (ref 5–15)
ANION GAP SERPL CALCULATED.3IONS-SCNC: 13 MMOL/L (ref 5–15)
ARTERIAL PATENCY WRIST A: ABNORMAL
AST SERPL-CCNC: 11 U/L (ref 1–40)
AST SERPL-CCNC: 11 U/L (ref 1–40)
ATMOSPHERIC PRESS: ABNORMAL MM[HG]
BASE EXCESS BLDA CALC-SCNC: 3 MMOL/L (ref 0–2)
BASOPHILS # BLD AUTO: 0.03 10*3/MM3 (ref 0–0.2)
BASOPHILS NFR BLD AUTO: 0.4 % (ref 0–1.5)
BDY SITE: ABNORMAL
BH BB BLOOD EXPIRATION DATE: NORMAL
BH BB BLOOD EXPIRATION DATE: NORMAL
BH BB BLOOD TYPE BARCODE: 7300
BH BB BLOOD TYPE BARCODE: 7300
BH BB DISPENSE STATUS: NORMAL
BH BB DISPENSE STATUS: NORMAL
BH BB PRODUCT CODE: NORMAL
BH BB PRODUCT CODE: NORMAL
BH BB UNIT NUMBER: NORMAL
BH BB UNIT NUMBER: NORMAL
BILIRUB SERPL-MCNC: 0.9 MG/DL (ref 0.2–1.2)
BILIRUB SERPL-MCNC: 0.9 MG/DL (ref 0.2–1.2)
BODY TEMPERATURE: 37 C
BUN BLD-MCNC: 41 MG/DL (ref 8–23)
BUN BLD-MCNC: 43 MG/DL (ref 8–23)
BUN/CREAT SERPL: 15.5 (ref 7–25)
CALCIUM SPEC-SCNC: 7.9 MG/DL (ref 8.6–10.5)
CALCIUM SPEC-SCNC: 8 MG/DL (ref 8.6–10.5)
CHLORIDE SERPL-SCNC: 111 MMOL/L (ref 98–107)
CHLORIDE SERPL-SCNC: 112 MMOL/L (ref 98–107)
CHOLEST SERPL-MCNC: 71 MG/DL (ref 0–200)
CO2 BLDA-SCNC: 26.6 MMOL/L (ref 22–33)
CO2 SERPL-SCNC: 22 MMOL/L (ref 22–29)
CO2 SERPL-SCNC: 23 MMOL/L (ref 22–29)
COHGB MFR BLD: 1.6 % (ref 0–2)
CREAT BLD-MCNC: 2.77 MG/DL (ref 0.76–1.27)
CREAT BLD-MCNC: 2.83 MG/DL (ref 0.76–1.27)
CROSSMATCH INTERPRETATION: NORMAL
CROSSMATCH INTERPRETATION: NORMAL
CRP SERPL-MCNC: 5.66 MG/DL (ref 0–0.5)
D-LACTATE SERPL-SCNC: 1 MMOL/L (ref 0.5–2)
D-LACTATE SERPL-SCNC: 1.1 MMOL/L (ref 0.5–2)
DEPRECATED RDW RBC AUTO: 59 FL (ref 37–54)
EOSINOPHIL # BLD AUTO: 0.16 10*3/MM3 (ref 0–0.4)
EOSINOPHIL NFR BLD AUTO: 2 % (ref 0.3–6.2)
EPAP: 0
ERYTHROCYTE [DISTWIDTH] IN BLOOD BY AUTOMATED COUNT: 19.2 % (ref 12.3–15.4)
GFR SERPL CREATININE-BSD FRML MDRD: 23 ML/MIN/1.73
GLOBULIN UR ELPH-MCNC: 2.2 GM/DL
GLUCOSE BLD-MCNC: 78 MG/DL (ref 65–99)
GLUCOSE BLD-MCNC: 79 MG/DL (ref 65–99)
GLUCOSE BLDC GLUCOMTR-MCNC: 79 MG/DL (ref 70–130)
GLUCOSE BLDC GLUCOMTR-MCNC: 82 MG/DL (ref 70–130)
GLUCOSE BLDC GLUCOMTR-MCNC: 88 MG/DL (ref 70–130)
GLUCOSE BLDC GLUCOMTR-MCNC: 91 MG/DL (ref 70–130)
HCO3 BLDA-SCNC: 25.7 MMOL/L (ref 20–26)
HCT VFR BLD AUTO: 24.9 % (ref 37.5–51)
HCT VFR BLD AUTO: 27.5 % (ref 37.5–51)
HCT VFR BLD AUTO: 29.4 % (ref 37.5–51)
HCT VFR BLD CALC: 24.3 %
HGB BLD-MCNC: 7.9 G/DL (ref 13–17.7)
HGB BLD-MCNC: 8.4 G/DL (ref 13–17.7)
HGB BLD-MCNC: 8.7 G/DL (ref 13–17.7)
HGB BLDA-MCNC: 7.9 G/DL (ref 13.5–17.5)
HOROWITZ INDEX BLD+IHG-RTO: 65 %
IMM GRANULOCYTES # BLD AUTO: 0.05 10*3/MM3 (ref 0–0.05)
IMM GRANULOCYTES NFR BLD AUTO: 0.6 % (ref 0–0.5)
IPAP: 0
LYMPHOCYTES # BLD AUTO: 0.82 10*3/MM3 (ref 0.7–3.1)
LYMPHOCYTES NFR BLD AUTO: 10 % (ref 19.6–45.3)
MAGNESIUM SERPL-MCNC: 1.9 MG/DL (ref 1.6–2.4)
MAGNESIUM SERPL-MCNC: 2 MG/DL (ref 1.6–2.4)
MCH RBC QN AUTO: 30.9 PG (ref 26.6–33)
MCHC RBC AUTO-ENTMCNC: 31.7 G/DL (ref 31.5–35.7)
MCV RBC AUTO: 97.3 FL (ref 79–97)
METHGB BLD QL: 0.9 % (ref 0–1.5)
MODALITY: ABNORMAL
MONOCYTES # BLD AUTO: 0.56 10*3/MM3 (ref 0.1–0.9)
MONOCYTES NFR BLD AUTO: 6.9 % (ref 5–12)
NEUTROPHILS # BLD AUTO: 6.54 10*3/MM3 (ref 1.7–7)
NEUTROPHILS NFR BLD AUTO: 80.1 % (ref 42.7–76)
NOTE: ABNORMAL
NRBC BLD AUTO-RTO: 0 /100 WBC (ref 0–0.2)
OXYHGB MFR BLDV: 98 % (ref 94–99)
PAW @ PEAK INSP FLOW SETTING VENT: 0 CMH2O
PCO2 BLDA: 30.4 MM HG (ref 35–45)
PCO2 TEMP ADJ BLD: 30.4 MM HG (ref 35–48)
PEEP RESPIRATORY: 5 CM[H2O]
PH BLDA: 7.54 PH UNITS (ref 7.35–7.45)
PH, TEMP CORRECTED: 7.54 PH UNITS
PHOSPHATE SERPL-MCNC: 2.1 MG/DL (ref 2.5–4.5)
PHOSPHATE SERPL-MCNC: 2.1 MG/DL (ref 2.5–4.5)
PLATELET # BLD AUTO: 152 10*3/MM3 (ref 140–450)
PMV BLD AUTO: 11.5 FL (ref 6–12)
PO2 BLDA: 158 MM HG (ref 83–108)
PO2 TEMP ADJ BLD: 158 MM HG (ref 83–108)
POTASSIUM BLD-SCNC: 3.4 MMOL/L (ref 3.5–5.2)
POTASSIUM BLD-SCNC: 3.5 MMOL/L (ref 3.5–5.2)
PROT SERPL-MCNC: 5 G/DL (ref 6–8.5)
PROT SERPL-MCNC: 5.2 G/DL (ref 6–8.5)
RBC # BLD AUTO: 2.56 10*6/MM3 (ref 4.14–5.8)
SODIUM BLD-SCNC: 147 MMOL/L (ref 136–145)
SODIUM BLD-SCNC: 147 MMOL/L (ref 136–145)
TOTAL RATE: 0 BREATHS/MINUTE
TRIGL SERPL-MCNC: 119 MG/DL (ref 0–150)
UNIT  ABO: NORMAL
UNIT  ABO: NORMAL
UNIT  RH: NORMAL
UNIT  RH: NORMAL
VENTILATOR MODE: ABNORMAL
VT ON VENT VENT: 0.61 ML
WBC NRBC COR # BLD: 8.16 10*3/MM3 (ref 3.4–10.8)

## 2020-04-12 PROCEDURE — 25010000002 MIDAZOLAM PER 1 MG

## 2020-04-12 PROCEDURE — 25010000002 MIDAZOLAM PER 1 MG: Performed by: INTERNAL MEDICINE

## 2020-04-12 PROCEDURE — 94799 UNLISTED PULMONARY SVC/PX: CPT

## 2020-04-12 PROCEDURE — 31624 DX BRONCHOSCOPE/LAVAGE: CPT | Performed by: INTERNAL MEDICINE

## 2020-04-12 PROCEDURE — 87070 CULTURE OTHR SPECIMN AEROBIC: CPT | Performed by: NURSE PRACTITIONER

## 2020-04-12 PROCEDURE — 83605 ASSAY OF LACTIC ACID: CPT | Performed by: INTERNAL MEDICINE

## 2020-04-12 PROCEDURE — 82962 GLUCOSE BLOOD TEST: CPT

## 2020-04-12 PROCEDURE — 84478 ASSAY OF TRIGLYCERIDES: CPT | Performed by: INTERNAL MEDICINE

## 2020-04-12 PROCEDURE — 36600 WITHDRAWAL OF ARTERIAL BLOOD: CPT

## 2020-04-12 PROCEDURE — 25010000002 FENTANYL CITRATE (PF) 100 MCG/2ML SOLUTION: Performed by: INTERNAL MEDICINE

## 2020-04-12 PROCEDURE — 85018 HEMOGLOBIN: CPT | Performed by: INTERNAL MEDICINE

## 2020-04-12 PROCEDURE — 84100 ASSAY OF PHOSPHORUS: CPT | Performed by: INTERNAL MEDICINE

## 2020-04-12 PROCEDURE — 87116 MYCOBACTERIA CULTURE: CPT | Performed by: INTERNAL MEDICINE

## 2020-04-12 PROCEDURE — 86140 C-REACTIVE PROTEIN: CPT | Performed by: INTERNAL MEDICINE

## 2020-04-12 PROCEDURE — 87206 SMEAR FLUORESCENT/ACID STAI: CPT | Performed by: INTERNAL MEDICINE

## 2020-04-12 PROCEDURE — 94770: CPT

## 2020-04-12 PROCEDURE — 88112 CYTOPATH CELL ENHANCE TECH: CPT | Performed by: INTERNAL MEDICINE

## 2020-04-12 PROCEDURE — 94003 VENT MGMT INPAT SUBQ DAY: CPT

## 2020-04-12 PROCEDURE — 25010000002 THIAMINE PER 100 MG: Performed by: INTERNAL MEDICINE

## 2020-04-12 PROCEDURE — 71045 X-RAY EXAM CHEST 1 VIEW: CPT

## 2020-04-12 PROCEDURE — 85014 HEMATOCRIT: CPT | Performed by: INTERNAL MEDICINE

## 2020-04-12 PROCEDURE — 84134 ASSAY OF PREALBUMIN: CPT | Performed by: INTERNAL MEDICINE

## 2020-04-12 PROCEDURE — 99291 CRITICAL CARE FIRST HOUR: CPT | Performed by: INTERNAL MEDICINE

## 2020-04-12 PROCEDURE — 25010000003 POTASSIUM CHLORIDE PER 2 MEQ: Performed by: INTERNAL MEDICINE

## 2020-04-12 PROCEDURE — 82465 ASSAY BLD/SERUM CHOLESTEROL: CPT | Performed by: INTERNAL MEDICINE

## 2020-04-12 PROCEDURE — 25010000002 FENTANYL CITRATE (PF) 100 MCG/2ML SOLUTION

## 2020-04-12 PROCEDURE — 87102 FUNGUS ISOLATION CULTURE: CPT | Performed by: INTERNAL MEDICINE

## 2020-04-12 PROCEDURE — 70490 CT SOFT TISSUE NECK W/O DYE: CPT

## 2020-04-12 PROCEDURE — 80053 COMPREHEN METABOLIC PANEL: CPT | Performed by: INTERNAL MEDICINE

## 2020-04-12 PROCEDURE — 83735 ASSAY OF MAGNESIUM: CPT | Performed by: INTERNAL MEDICINE

## 2020-04-12 PROCEDURE — 82805 BLOOD GASES W/O2 SATURATION: CPT

## 2020-04-12 PROCEDURE — 85025 COMPLETE CBC W/AUTO DIFF WBC: CPT | Performed by: INTERNAL MEDICINE

## 2020-04-12 PROCEDURE — 87205 SMEAR GRAM STAIN: CPT | Performed by: NURSE PRACTITIONER

## 2020-04-12 PROCEDURE — 63710000001 INSULIN DETEMIR PER 5 UNITS: Performed by: INTERNAL MEDICINE

## 2020-04-12 PROCEDURE — 0BD78ZX EXTRACTION OF LEFT MAIN BRONCHUS, VIA NATURAL OR ARTIFICIAL OPENING ENDOSCOPIC, DIAGNOSTIC: ICD-10-PCS | Performed by: INTERNAL MEDICINE

## 2020-04-12 PROCEDURE — 88305 TISSUE EXAM BY PATHOLOGIST: CPT | Performed by: INTERNAL MEDICINE

## 2020-04-12 PROCEDURE — 63710000001 INSULIN LISPRO (HUMAN) PER 5 UNITS: Performed by: INTERNAL MEDICINE

## 2020-04-12 PROCEDURE — 0BH17EZ INSERTION OF ENDOTRACHEAL AIRWAY INTO TRACHEA, VIA NATURAL OR ARTIFICIAL OPENING: ICD-10-PCS | Performed by: INTERNAL MEDICINE

## 2020-04-12 RX ORDER — MIDAZOLAM HYDROCHLORIDE 1 MG/ML
INJECTION INTRAMUSCULAR; INTRAVENOUS
Status: COMPLETED
Start: 2020-04-12 | End: 2020-04-12

## 2020-04-12 RX ORDER — FENTANYL CITRATE 50 UG/ML
100 INJECTION, SOLUTION INTRAMUSCULAR; INTRAVENOUS ONCE
Status: COMPLETED | OUTPATIENT
Start: 2020-04-12 | End: 2020-04-12

## 2020-04-12 RX ORDER — FENTANYL CITRATE 50 UG/ML
INJECTION, SOLUTION INTRAMUSCULAR; INTRAVENOUS
Status: DISCONTINUED
Start: 2020-04-12 | End: 2020-04-12 | Stop reason: WASHOUT

## 2020-04-12 RX ORDER — FENTANYL CITRATE 50 UG/ML
100 INJECTION, SOLUTION INTRAMUSCULAR; INTRAVENOUS
Status: DISCONTINUED | OUTPATIENT
Start: 2020-04-12 | End: 2020-04-12

## 2020-04-12 RX ORDER — MIDAZOLAM HYDROCHLORIDE 1 MG/ML
2 INJECTION INTRAMUSCULAR; INTRAVENOUS ONCE
Status: COMPLETED | OUTPATIENT
Start: 2020-04-12 | End: 2020-04-12

## 2020-04-12 RX ORDER — FENTANYL CITRATE 50 UG/ML
INJECTION, SOLUTION INTRAMUSCULAR; INTRAVENOUS
Status: COMPLETED
Start: 2020-04-12 | End: 2020-04-12

## 2020-04-12 RX ADMIN — THIAMINE HYDROCHLORIDE 200 MG: 100 INJECTION, SOLUTION INTRAMUSCULAR; INTRAVENOUS at 08:34

## 2020-04-12 RX ADMIN — GABAPENTIN 400 MG: 400 CAPSULE ORAL at 08:36

## 2020-04-12 RX ADMIN — MIDAZOLAM HYDROCHLORIDE 2 MG: 1 INJECTION, SOLUTION INTRAMUSCULAR; INTRAVENOUS at 14:27

## 2020-04-12 RX ADMIN — FENTANYL CITRATE 100 MCG: 50 INJECTION INTRAMUSCULAR; INTRAVENOUS at 14:27

## 2020-04-12 RX ADMIN — FENTANYL CITRATE 100 MCG: 50 INJECTION INTRAMUSCULAR; INTRAVENOUS at 14:41

## 2020-04-12 RX ADMIN — MIDAZOLAM HYDROCHLORIDE 2 MG: 2 INJECTION, SOLUTION INTRAMUSCULAR; INTRAVENOUS at 14:31

## 2020-04-12 RX ADMIN — MIDAZOLAM HYDROCHLORIDE 2 MG: 2 INJECTION, SOLUTION INTRAMUSCULAR; INTRAVENOUS at 14:28

## 2020-04-12 RX ADMIN — PANTOPRAZOLE SODIUM 40 MG: 40 INJECTION, POWDER, FOR SOLUTION INTRAVENOUS at 17:17

## 2020-04-12 RX ADMIN — MIDAZOLAM 2 MG: 1 INJECTION INTRAMUSCULAR; INTRAVENOUS at 14:27

## 2020-04-12 RX ADMIN — GABAPENTIN 400 MG: 400 CAPSULE ORAL at 20:49

## 2020-04-12 RX ADMIN — FENTANYL CITRATE 100 MCG: 50 INJECTION INTRAMUSCULAR; INTRAVENOUS at 14:29

## 2020-04-12 RX ADMIN — MIDAZOLAM 2 MG: 1 INJECTION INTRAMUSCULAR; INTRAVENOUS at 14:47

## 2020-04-12 RX ADMIN — MIDAZOLAM HYDROCHLORIDE 2 MG: 1 INJECTION, SOLUTION INTRAMUSCULAR; INTRAVENOUS at 14:28

## 2020-04-12 RX ADMIN — HYDROCODONE BITARTRATE AND ACETAMINOPHEN 1 TABLET: 10; 325 TABLET ORAL at 08:35

## 2020-04-12 RX ADMIN — LEVOTHYROXINE SODIUM 150 MCG: 20 INJECTION, SOLUTION INTRAVENOUS at 10:04

## 2020-04-12 RX ADMIN — MIDAZOLAM HYDROCHLORIDE 2 MG: 2 INJECTION, SOLUTION INTRAMUSCULAR; INTRAVENOUS at 14:41

## 2020-04-12 RX ADMIN — Medication 0.02 MCG/KG/MIN: at 20:19

## 2020-04-12 RX ADMIN — MIDAZOLAM HYDROCHLORIDE 2 MG: 1 INJECTION, SOLUTION INTRAMUSCULAR; INTRAVENOUS at 14:31

## 2020-04-12 RX ADMIN — FOLIC ACID 1 MG: 1 TABLET ORAL at 08:35

## 2020-04-12 RX ADMIN — THIAMINE HYDROCHLORIDE 200 MG: 100 INJECTION, SOLUTION INTRAMUSCULAR; INTRAVENOUS at 20:48

## 2020-04-12 RX ADMIN — MIDODRINE HYDROCHLORIDE 10 MG: 5 TABLET ORAL at 08:34

## 2020-04-12 RX ADMIN — SODIUM CHLORIDE, PRESERVATIVE FREE 10 ML: 5 INJECTION INTRAVENOUS at 20:49

## 2020-04-12 RX ADMIN — MIDODRINE HYDROCHLORIDE 10 MG: 5 TABLET ORAL at 17:17

## 2020-04-12 RX ADMIN — CHLORHEXIDINE GLUCONATE 0.12% ORAL RINSE 15 ML: 1.2 LIQUID ORAL at 08:34

## 2020-04-12 RX ADMIN — POTASSIUM CHLORIDE 20 MEQ: 29.8 INJECTION, SOLUTION INTRAVENOUS at 16:08

## 2020-04-12 RX ADMIN — INSULIN LISPRO 2 UNITS: 100 INJECTION, SOLUTION INTRAVENOUS; SUBCUTANEOUS at 01:15

## 2020-04-12 RX ADMIN — PANTOPRAZOLE SODIUM 40 MG: 40 INJECTION, POWDER, FOR SOLUTION INTRAVENOUS at 08:34

## 2020-04-12 RX ADMIN — SODIUM CHLORIDE, PRESERVATIVE FREE 10 ML: 5 INJECTION INTRAVENOUS at 08:35

## 2020-04-12 RX ADMIN — INSULIN DETEMIR 12 UNITS: 100 INJECTION, SOLUTION SUBCUTANEOUS at 08:36

## 2020-04-12 RX ADMIN — MIDAZOLAM 2 MG: 1 INJECTION INTRAMUSCULAR; INTRAVENOUS at 14:46

## 2020-04-12 RX ADMIN — CHLORHEXIDINE GLUCONATE 0.12% ORAL RINSE 15 ML: 1.2 LIQUID ORAL at 20:48

## 2020-04-12 RX ADMIN — POTASSIUM CHLORIDE 20 MEQ: 29.8 INJECTION, SOLUTION INTRAVENOUS at 17:17

## 2020-04-13 LAB
ALBUMIN SERPL-MCNC: 2.9 G/DL (ref 3.5–5.2)
ALBUMIN/GLOB SERPL: 1.3 G/DL
ALP SERPL-CCNC: 81 U/L (ref 39–117)
ALT SERPL W P-5'-P-CCNC: <5 U/L (ref 1–41)
ANION GAP SERPL CALCULATED.3IONS-SCNC: 11 MMOL/L (ref 5–15)
AST SERPL-CCNC: 13 U/L (ref 1–40)
BASOPHILS # BLD AUTO: 0.04 10*3/MM3 (ref 0–0.2)
BASOPHILS NFR BLD AUTO: 0.4 % (ref 0–1.5)
BILIRUB SERPL-MCNC: 0.9 MG/DL (ref 0.2–1.2)
BUN BLD-MCNC: 45 MG/DL (ref 8–23)
BUN/CREAT SERPL: 14.3 (ref 7–25)
CALCIUM SPEC-SCNC: 8 MG/DL (ref 8.6–10.5)
CHLORIDE SERPL-SCNC: 113 MMOL/L (ref 98–107)
CO2 SERPL-SCNC: 22 MMOL/L (ref 22–29)
CREAT BLD-MCNC: 3.15 MG/DL (ref 0.76–1.27)
D-LACTATE SERPL-SCNC: 0.7 MMOL/L (ref 0.5–2)
D-LACTATE SERPL-SCNC: 0.8 MMOL/L (ref 0.5–2)
D-LACTATE SERPL-SCNC: 0.9 MMOL/L (ref 0.5–2)
D-LACTATE SERPL-SCNC: 1.4 MMOL/L (ref 0.5–2)
DEPRECATED RDW RBC AUTO: 62.3 FL (ref 37–54)
EOSINOPHIL # BLD AUTO: 0.19 10*3/MM3 (ref 0–0.4)
EOSINOPHIL NFR BLD AUTO: 1.9 % (ref 0.3–6.2)
ERYTHROCYTE [DISTWIDTH] IN BLOOD BY AUTOMATED COUNT: 19 % (ref 12.3–15.4)
GFR SERPL CREATININE-BSD FRML MDRD: 19 ML/MIN/1.73
GLOBULIN UR ELPH-MCNC: 2.3 GM/DL
GLUCOSE BLD-MCNC: 97 MG/DL (ref 65–99)
GLUCOSE BLDC GLUCOMTR-MCNC: 102 MG/DL (ref 70–130)
GLUCOSE BLDC GLUCOMTR-MCNC: 119 MG/DL (ref 70–130)
GLUCOSE BLDC GLUCOMTR-MCNC: 90 MG/DL (ref 70–130)
GLUCOSE BLDC GLUCOMTR-MCNC: 98 MG/DL (ref 70–130)
HCT VFR BLD AUTO: 24.6 % (ref 37.5–51)
HCT VFR BLD AUTO: 24.6 % (ref 37.5–51)
HCT VFR BLD AUTO: 27.1 % (ref 37.5–51)
HCT VFR BLD AUTO: 27.4 % (ref 37.5–51)
HCT VFR BLD AUTO: 28.9 % (ref 37.5–51)
HGB BLD-MCNC: 7.8 G/DL (ref 13–17.7)
HGB BLD-MCNC: 7.8 G/DL (ref 13–17.7)
HGB BLD-MCNC: 8.1 G/DL (ref 13–17.7)
HGB BLD-MCNC: 8.3 G/DL (ref 13–17.7)
HGB BLD-MCNC: 8.7 G/DL (ref 13–17.7)
IMM GRANULOCYTES # BLD AUTO: 0.05 10*3/MM3 (ref 0–0.05)
IMM GRANULOCYTES NFR BLD AUTO: 0.5 % (ref 0–0.5)
LYMPHOCYTES # BLD AUTO: 0.94 10*3/MM3 (ref 0.7–3.1)
LYMPHOCYTES NFR BLD AUTO: 9.4 % (ref 19.6–45.3)
MAGNESIUM SERPL-MCNC: 2 MG/DL (ref 1.6–2.4)
MCH RBC QN AUTO: 31 PG (ref 26.6–33)
MCHC RBC AUTO-ENTMCNC: 31.7 G/DL (ref 31.5–35.7)
MCV RBC AUTO: 97.6 FL (ref 79–97)
MONOCYTES # BLD AUTO: 0.68 10*3/MM3 (ref 0.1–0.9)
MONOCYTES NFR BLD AUTO: 6.8 % (ref 5–12)
NEUTROPHILS # BLD AUTO: 8.05 10*3/MM3 (ref 1.7–7)
NEUTROPHILS NFR BLD AUTO: 81 % (ref 42.7–76)
NRBC BLD AUTO-RTO: 0 /100 WBC (ref 0–0.2)
PHOSPHATE SERPL-MCNC: 2.4 MG/DL (ref 2.5–4.5)
PLATELET # BLD AUTO: 186 10*3/MM3 (ref 140–450)
PMV BLD AUTO: 11.5 FL (ref 6–12)
POTASSIUM BLD-SCNC: 4.2 MMOL/L (ref 3.5–5.2)
POTASSIUM BLD-SCNC: 4.2 MMOL/L (ref 3.5–5.2)
PREALB SERPL-MCNC: 9.6 MG/DL (ref 20–40)
PROT SERPL-MCNC: 5.2 G/DL (ref 6–8.5)
RBC # BLD AUTO: 2.52 10*6/MM3 (ref 4.14–5.8)
SODIUM BLD-SCNC: 146 MMOL/L (ref 136–145)
WBC NRBC COR # BLD: 9.95 10*3/MM3 (ref 3.4–10.8)

## 2020-04-13 PROCEDURE — 25010000002 EPOETIN ALFA-EPBX 10000 UNIT/ML SOLUTION: Performed by: INTERNAL MEDICINE

## 2020-04-13 PROCEDURE — 83735 ASSAY OF MAGNESIUM: CPT | Performed by: INTERNAL MEDICINE

## 2020-04-13 PROCEDURE — 99233 SBSQ HOSP IP/OBS HIGH 50: CPT | Performed by: INTERNAL MEDICINE

## 2020-04-13 PROCEDURE — 85025 COMPLETE CBC W/AUTO DIFF WBC: CPT | Performed by: INTERNAL MEDICINE

## 2020-04-13 PROCEDURE — 94003 VENT MGMT INPAT SUBQ DAY: CPT

## 2020-04-13 PROCEDURE — 85018 HEMOGLOBIN: CPT | Performed by: INTERNAL MEDICINE

## 2020-04-13 PROCEDURE — 85014 HEMATOCRIT: CPT | Performed by: INTERNAL MEDICINE

## 2020-04-13 PROCEDURE — 94799 UNLISTED PULMONARY SVC/PX: CPT

## 2020-04-13 PROCEDURE — 84132 ASSAY OF SERUM POTASSIUM: CPT | Performed by: INTERNAL MEDICINE

## 2020-04-13 PROCEDURE — 84100 ASSAY OF PHOSPHORUS: CPT | Performed by: INTERNAL MEDICINE

## 2020-04-13 PROCEDURE — 80053 COMPREHEN METABOLIC PANEL: CPT | Performed by: INTERNAL MEDICINE

## 2020-04-13 PROCEDURE — 83605 ASSAY OF LACTIC ACID: CPT | Performed by: INTERNAL MEDICINE

## 2020-04-13 PROCEDURE — 25010000002 THIAMINE PER 100 MG: Performed by: INTERNAL MEDICINE

## 2020-04-13 PROCEDURE — 82962 GLUCOSE BLOOD TEST: CPT

## 2020-04-13 RX ORDER — IPRATROPIUM BROMIDE AND ALBUTEROL SULFATE 2.5; .5 MG/3ML; MG/3ML
3 SOLUTION RESPIRATORY (INHALATION)
Status: DISCONTINUED | OUTPATIENT
Start: 2020-04-13 | End: 2020-04-14

## 2020-04-13 RX ORDER — ACETYLCYSTEINE 200 MG/ML
3 SOLUTION ORAL; RESPIRATORY (INHALATION)
Status: DISCONTINUED | OUTPATIENT
Start: 2020-04-13 | End: 2020-04-14

## 2020-04-13 RX ADMIN — MIDODRINE HYDROCHLORIDE 10 MG: 5 TABLET ORAL at 08:36

## 2020-04-13 RX ADMIN — GABAPENTIN 400 MG: 400 CAPSULE ORAL at 20:12

## 2020-04-13 RX ADMIN — GABAPENTIN 400 MG: 400 CAPSULE ORAL at 08:37

## 2020-04-13 RX ADMIN — ACETYLCYSTEINE 3 ML: 200 SOLUTION ORAL; RESPIRATORY (INHALATION) at 23:31

## 2020-04-13 RX ADMIN — EPOETIN ALFA-EPBX 20000 UNITS: 10000 INJECTION, SOLUTION INTRAVENOUS; SUBCUTANEOUS at 12:15

## 2020-04-13 RX ADMIN — THIAMINE HYDROCHLORIDE 200 MG: 100 INJECTION, SOLUTION INTRAMUSCULAR; INTRAVENOUS at 08:38

## 2020-04-13 RX ADMIN — FOLIC ACID 1 MG: 1 TABLET ORAL at 08:37

## 2020-04-13 RX ADMIN — GUAIFENESIN 400 MG: 200 SOLUTION ORAL at 21:58

## 2020-04-13 RX ADMIN — PANTOPRAZOLE SODIUM 40 MG: 40 INJECTION, POWDER, FOR SOLUTION INTRAVENOUS at 08:37

## 2020-04-13 RX ADMIN — CHLORHEXIDINE GLUCONATE 0.12% ORAL RINSE 15 ML: 1.2 LIQUID ORAL at 20:12

## 2020-04-13 RX ADMIN — PANTOPRAZOLE SODIUM 40 MG: 40 INJECTION, POWDER, FOR SOLUTION INTRAVENOUS at 18:04

## 2020-04-13 RX ADMIN — THIAMINE HYDROCHLORIDE 200 MG: 100 INJECTION, SOLUTION INTRAMUSCULAR; INTRAVENOUS at 20:12

## 2020-04-13 RX ADMIN — MIDODRINE HYDROCHLORIDE 10 MG: 5 TABLET ORAL at 18:04

## 2020-04-13 RX ADMIN — LEVOTHYROXINE SODIUM 150 MCG: 20 INJECTION, SOLUTION INTRAVENOUS at 10:03

## 2020-04-13 RX ADMIN — CHLORHEXIDINE GLUCONATE 0.12% ORAL RINSE 15 ML: 1.2 LIQUID ORAL at 08:37

## 2020-04-13 RX ADMIN — SODIUM CHLORIDE, PRESERVATIVE FREE 10 ML: 5 INJECTION INTRAVENOUS at 20:12

## 2020-04-13 RX ADMIN — SODIUM CHLORIDE, PRESERVATIVE FREE 10 ML: 5 INJECTION INTRAVENOUS at 08:41

## 2020-04-13 RX ADMIN — IPRATROPIUM BROMIDE AND ALBUTEROL SULFATE 3 ML: 2.5; .5 SOLUTION RESPIRATORY (INHALATION) at 23:31

## 2020-04-13 RX ADMIN — HYDROCODONE BITARTRATE AND ACETAMINOPHEN 1 TABLET: 10; 325 TABLET ORAL at 20:12

## 2020-04-14 ENCOUNTER — APPOINTMENT (OUTPATIENT)
Dept: NEPHROLOGY | Facility: HOSPITAL | Age: 75
End: 2020-04-14

## 2020-04-14 LAB
ALBUMIN SERPL-MCNC: 2.8 G/DL (ref 3.5–5.2)
ALBUMIN/GLOB SERPL: 1 G/DL
ALP SERPL-CCNC: 93 U/L (ref 39–117)
ALT SERPL W P-5'-P-CCNC: 5 U/L (ref 1–41)
ANION GAP SERPL CALCULATED.3IONS-SCNC: 10 MMOL/L (ref 5–15)
AST SERPL-CCNC: 13 U/L (ref 1–40)
BACTERIA SPEC RESP CULT: NORMAL
BASOPHILS # BLD AUTO: 0.03 10*3/MM3 (ref 0–0.2)
BASOPHILS NFR BLD AUTO: 0.4 % (ref 0–1.5)
BILIRUB SERPL-MCNC: 0.6 MG/DL (ref 0.2–1.2)
BUN BLD-MCNC: 48 MG/DL (ref 8–23)
BUN/CREAT SERPL: 15.8 (ref 7–25)
CALCIUM SPEC-SCNC: 8 MG/DL (ref 8.6–10.5)
CHLORIDE SERPL-SCNC: 115 MMOL/L (ref 98–107)
CO2 SERPL-SCNC: 22 MMOL/L (ref 22–29)
CREAT BLD-MCNC: 3.04 MG/DL (ref 0.76–1.27)
D-LACTATE SERPL-SCNC: 0.7 MMOL/L (ref 0.5–2)
D-LACTATE SERPL-SCNC: 0.8 MMOL/L (ref 0.5–2)
DEPRECATED RDW RBC AUTO: 68.2 FL (ref 37–54)
EOSINOPHIL # BLD AUTO: 0.11 10*3/MM3 (ref 0–0.4)
EOSINOPHIL NFR BLD AUTO: 1.4 % (ref 0.3–6.2)
ERYTHROCYTE [DISTWIDTH] IN BLOOD BY AUTOMATED COUNT: 18.7 % (ref 12.3–15.4)
GFR SERPL CREATININE-BSD FRML MDRD: 20 ML/MIN/1.73
GLOBULIN UR ELPH-MCNC: 2.7 GM/DL
GLUCOSE BLD-MCNC: 141 MG/DL (ref 65–99)
GLUCOSE BLDC GLUCOMTR-MCNC: 126 MG/DL (ref 70–130)
GLUCOSE BLDC GLUCOMTR-MCNC: 130 MG/DL (ref 70–130)
GLUCOSE BLDC GLUCOMTR-MCNC: 135 MG/DL (ref 70–130)
GLUCOSE BLDC GLUCOMTR-MCNC: 150 MG/DL (ref 70–130)
GRAM STN SPEC: NORMAL
HCT VFR BLD AUTO: 25.8 % (ref 37.5–51)
HCT VFR BLD AUTO: 25.8 % (ref 37.5–51)
HCT VFR BLD AUTO: 27.4 % (ref 37.5–51)
HGB BLD-MCNC: 7.4 G/DL (ref 13–17.7)
HGB BLD-MCNC: 7.4 G/DL (ref 13–17.7)
HGB BLD-MCNC: 7.9 G/DL (ref 13–17.7)
IMM GRANULOCYTES # BLD AUTO: 0.04 10*3/MM3 (ref 0–0.05)
IMM GRANULOCYTES NFR BLD AUTO: 0.5 % (ref 0–0.5)
LYMPHOCYTES # BLD AUTO: 0.74 10*3/MM3 (ref 0.7–3.1)
LYMPHOCYTES NFR BLD AUTO: 9.3 % (ref 19.6–45.3)
MAGNESIUM SERPL-MCNC: 2.1 MG/DL (ref 1.6–2.4)
MCH RBC QN AUTO: 30.1 PG (ref 26.6–33)
MCHC RBC AUTO-ENTMCNC: 28.7 G/DL (ref 31.5–35.7)
MCV RBC AUTO: 104.9 FL (ref 79–97)
MONOCYTES # BLD AUTO: 0.56 10*3/MM3 (ref 0.1–0.9)
MONOCYTES NFR BLD AUTO: 7 % (ref 5–12)
NEUTROPHILS # BLD AUTO: 6.51 10*3/MM3 (ref 1.7–7)
NEUTROPHILS NFR BLD AUTO: 81.4 % (ref 42.7–76)
NRBC BLD AUTO-RTO: 0 /100 WBC (ref 0–0.2)
PHOSPHATE SERPL-MCNC: 3.7 MG/DL (ref 2.5–4.5)
PLATELET # BLD AUTO: 162 10*3/MM3 (ref 140–450)
PMV BLD AUTO: 11.9 FL (ref 6–12)
POTASSIUM BLD-SCNC: 4.5 MMOL/L (ref 3.5–5.2)
PROT SERPL-MCNC: 5.5 G/DL (ref 6–8.5)
RBC # BLD AUTO: 2.46 10*6/MM3 (ref 4.14–5.8)
SODIUM BLD-SCNC: 147 MMOL/L (ref 136–145)
WBC NRBC COR # BLD: 7.99 10*3/MM3 (ref 3.4–10.8)

## 2020-04-14 PROCEDURE — 5A1D70Z PERFORMANCE OF URINARY FILTRATION, INTERMITTENT, LESS THAN 6 HOURS PER DAY: ICD-10-PCS | Performed by: INTERNAL MEDICINE

## 2020-04-14 PROCEDURE — 97530 THERAPEUTIC ACTIVITIES: CPT

## 2020-04-14 PROCEDURE — 80053 COMPREHEN METABOLIC PANEL: CPT | Performed by: INTERNAL MEDICINE

## 2020-04-14 PROCEDURE — 25010000002 THIAMINE PER 100 MG: Performed by: INTERNAL MEDICINE

## 2020-04-14 PROCEDURE — 97164 PT RE-EVAL EST PLAN CARE: CPT

## 2020-04-14 PROCEDURE — 84100 ASSAY OF PHOSPHORUS: CPT | Performed by: INTERNAL MEDICINE

## 2020-04-14 PROCEDURE — 83605 ASSAY OF LACTIC ACID: CPT | Performed by: INTERNAL MEDICINE

## 2020-04-14 PROCEDURE — 92610 EVALUATE SWALLOWING FUNCTION: CPT

## 2020-04-14 PROCEDURE — 99232 SBSQ HOSP IP/OBS MODERATE 35: CPT | Performed by: INTERNAL MEDICINE

## 2020-04-14 PROCEDURE — 85018 HEMOGLOBIN: CPT | Performed by: INTERNAL MEDICINE

## 2020-04-14 PROCEDURE — 83735 ASSAY OF MAGNESIUM: CPT | Performed by: INTERNAL MEDICINE

## 2020-04-14 PROCEDURE — 82962 GLUCOSE BLOOD TEST: CPT

## 2020-04-14 PROCEDURE — 97165 OT EVAL LOW COMPLEX 30 MIN: CPT

## 2020-04-14 PROCEDURE — 85014 HEMATOCRIT: CPT | Performed by: INTERNAL MEDICINE

## 2020-04-14 PROCEDURE — 85025 COMPLETE CBC W/AUTO DIFF WBC: CPT | Performed by: INTERNAL MEDICINE

## 2020-04-14 PROCEDURE — 63710000001 INSULIN LISPRO (HUMAN) PER 5 UNITS: Performed by: INTERNAL MEDICINE

## 2020-04-14 RX ORDER — AMINO ACIDS/PROTEIN HYDROLYS 15G-100/30
30 LIQUID (ML) ORAL 2 TIMES DAILY
Status: DISCONTINUED | OUTPATIENT
Start: 2020-04-14 | End: 2020-04-22 | Stop reason: HOSPADM

## 2020-04-14 RX ADMIN — HYDROCODONE BITARTRATE AND ACETAMINOPHEN 1 TABLET: 10; 325 TABLET ORAL at 22:08

## 2020-04-14 RX ADMIN — GABAPENTIN 400 MG: 400 CAPSULE ORAL at 21:38

## 2020-04-14 RX ADMIN — GABAPENTIN 400 MG: 400 CAPSULE ORAL at 08:21

## 2020-04-14 RX ADMIN — PANTOPRAZOLE SODIUM 40 MG: 40 INJECTION, POWDER, FOR SOLUTION INTRAVENOUS at 18:01

## 2020-04-14 RX ADMIN — FOLIC ACID 1 MG: 1 TABLET ORAL at 08:22

## 2020-04-14 RX ADMIN — GUAIFENESIN 400 MG: 200 SOLUTION ORAL at 06:57

## 2020-04-14 RX ADMIN — MIDODRINE HYDROCHLORIDE 10 MG: 5 TABLET ORAL at 08:22

## 2020-04-14 RX ADMIN — GUAIFENESIN 400 MG: 200 SOLUTION ORAL at 18:01

## 2020-04-14 RX ADMIN — INSULIN LISPRO 2 UNITS: 100 INJECTION, SOLUTION INTRAVENOUS; SUBCUTANEOUS at 06:57

## 2020-04-14 RX ADMIN — GUAIFENESIN 400 MG: 200 SOLUTION ORAL at 15:14

## 2020-04-14 RX ADMIN — CHLORHEXIDINE GLUCONATE 0.12% ORAL RINSE 15 ML: 1.2 LIQUID ORAL at 08:21

## 2020-04-14 RX ADMIN — Medication 30 ML: at 21:38

## 2020-04-14 RX ADMIN — LIDOCAINE 4%: 4 CREAM TOPICAL at 15:11

## 2020-04-14 RX ADMIN — GUAIFENESIN 400 MG: 200 SOLUTION ORAL at 10:24

## 2020-04-14 RX ADMIN — THIAMINE HYDROCHLORIDE 200 MG: 100 INJECTION, SOLUTION INTRAMUSCULAR; INTRAVENOUS at 21:38

## 2020-04-14 RX ADMIN — GUAIFENESIN 400 MG: 200 SOLUTION ORAL at 02:32

## 2020-04-14 RX ADMIN — MIDODRINE HYDROCHLORIDE 10 MG: 5 TABLET ORAL at 18:01

## 2020-04-14 RX ADMIN — SODIUM CHLORIDE, PRESERVATIVE FREE 10 ML: 5 INJECTION INTRAVENOUS at 22:00

## 2020-04-14 RX ADMIN — GUAIFENESIN 400 MG: 200 SOLUTION ORAL at 21:38

## 2020-04-14 RX ADMIN — PANTOPRAZOLE SODIUM 40 MG: 40 INJECTION, POWDER, FOR SOLUTION INTRAVENOUS at 08:21

## 2020-04-14 RX ADMIN — THIAMINE HYDROCHLORIDE 200 MG: 100 INJECTION, SOLUTION INTRAMUSCULAR; INTRAVENOUS at 08:21

## 2020-04-14 RX ADMIN — LEVOTHYROXINE SODIUM 150 MCG: 20 INJECTION, SOLUTION INTRAVENOUS at 10:24

## 2020-04-14 RX ADMIN — SODIUM CHLORIDE, PRESERVATIVE FREE 10 ML: 5 INJECTION INTRAVENOUS at 08:30

## 2020-04-14 RX ADMIN — POLYETHYLENE GLYCOL (3350) 17 G: 17 POWDER, FOR SOLUTION ORAL at 18:02

## 2020-04-15 LAB
ALBUMIN SERPL-MCNC: 2.9 G/DL (ref 3.5–5.2)
ALBUMIN/GLOB SERPL: 1 G/DL
ALP SERPL-CCNC: 98 U/L (ref 39–117)
ALT SERPL W P-5'-P-CCNC: 5 U/L (ref 1–41)
ANION GAP SERPL CALCULATED.3IONS-SCNC: 11 MMOL/L (ref 5–15)
AST SERPL-CCNC: 10 U/L (ref 1–40)
BASOPHILS # BLD AUTO: 0.05 10*3/MM3 (ref 0–0.2)
BASOPHILS NFR BLD AUTO: 0.6 % (ref 0–1.5)
BILIRUB SERPL-MCNC: 0.5 MG/DL (ref 0.2–1.2)
BUN BLD-MCNC: 30 MG/DL (ref 8–23)
BUN/CREAT SERPL: 12.9 (ref 7–25)
CALCIUM SPEC-SCNC: 8 MG/DL (ref 8.6–10.5)
CHLORIDE SERPL-SCNC: 107 MMOL/L (ref 98–107)
CO2 SERPL-SCNC: 26 MMOL/L (ref 22–29)
CREAT BLD-MCNC: 2.32 MG/DL (ref 0.76–1.27)
D-LACTATE SERPL-SCNC: 0.9 MMOL/L (ref 0.5–2)
D-LACTATE SERPL-SCNC: 0.9 MMOL/L (ref 0.5–2)
D-LACTATE SERPL-SCNC: 1 MMOL/L (ref 0.5–2)
DEPRECATED RDW RBC AUTO: 63.9 FL (ref 37–54)
EOSINOPHIL # BLD AUTO: 0.1 10*3/MM3 (ref 0–0.4)
EOSINOPHIL NFR BLD AUTO: 1.3 % (ref 0.3–6.2)
ERYTHROCYTE [DISTWIDTH] IN BLOOD BY AUTOMATED COUNT: 17.9 % (ref 12.3–15.4)
GFR SERPL CREATININE-BSD FRML MDRD: 28 ML/MIN/1.73
GIE STN SPEC: NORMAL
GIE STN SPEC: NORMAL
GLOBULIN UR ELPH-MCNC: 2.8 GM/DL
GLUCOSE BLD-MCNC: 147 MG/DL (ref 65–99)
GLUCOSE BLDC GLUCOMTR-MCNC: 150 MG/DL (ref 70–130)
GLUCOSE BLDC GLUCOMTR-MCNC: 159 MG/DL (ref 70–130)
GLUCOSE BLDC GLUCOMTR-MCNC: 161 MG/DL (ref 70–130)
GLUCOSE BLDC GLUCOMTR-MCNC: 164 MG/DL (ref 70–130)
GLUCOSE BLDC GLUCOMTR-MCNC: 174 MG/DL (ref 70–130)
HCT VFR BLD AUTO: 26 % (ref 37.5–51)
HGB BLD-MCNC: 7.8 G/DL (ref 13–17.7)
IMM GRANULOCYTES # BLD AUTO: 0.04 10*3/MM3 (ref 0–0.05)
IMM GRANULOCYTES NFR BLD AUTO: 0.5 % (ref 0–0.5)
LAB AP CASE REPORT: NORMAL
LYMPHOCYTES # BLD AUTO: 0.99 10*3/MM3 (ref 0.7–3.1)
LYMPHOCYTES NFR BLD AUTO: 12.6 % (ref 19.6–45.3)
MAGNESIUM SERPL-MCNC: 1.9 MG/DL (ref 1.6–2.4)
MCH RBC QN AUTO: 30.2 PG (ref 26.6–33)
MCHC RBC AUTO-ENTMCNC: 30 G/DL (ref 31.5–35.7)
MCV RBC AUTO: 100.8 FL (ref 79–97)
MONOCYTES # BLD AUTO: 0.61 10*3/MM3 (ref 0.1–0.9)
MONOCYTES NFR BLD AUTO: 7.8 % (ref 5–12)
NEUTROPHILS # BLD AUTO: 6.04 10*3/MM3 (ref 1.7–7)
NEUTROPHILS NFR BLD AUTO: 77.2 % (ref 42.7–76)
NRBC BLD AUTO-RTO: 0 /100 WBC (ref 0–0.2)
PATH REPORT.FINAL DX SPEC: NORMAL
PHOSPHATE SERPL-MCNC: 2.1 MG/DL (ref 2.5–4.5)
PLATELET # BLD AUTO: 198 10*3/MM3 (ref 140–450)
PMV BLD AUTO: 11.5 FL (ref 6–12)
POTASSIUM BLD-SCNC: 3.5 MMOL/L (ref 3.5–5.2)
PROT SERPL-MCNC: 5.7 G/DL (ref 6–8.5)
RBC # BLD AUTO: 2.58 10*6/MM3 (ref 4.14–5.8)
SODIUM BLD-SCNC: 144 MMOL/L (ref 136–145)
WBC NRBC COR # BLD: 7.83 10*3/MM3 (ref 3.4–10.8)

## 2020-04-15 PROCEDURE — 83735 ASSAY OF MAGNESIUM: CPT | Performed by: INTERNAL MEDICINE

## 2020-04-15 PROCEDURE — 80053 COMPREHEN METABOLIC PANEL: CPT | Performed by: INTERNAL MEDICINE

## 2020-04-15 PROCEDURE — 25010000002 THIAMINE PER 100 MG: Performed by: INTERNAL MEDICINE

## 2020-04-15 PROCEDURE — 82962 GLUCOSE BLOOD TEST: CPT

## 2020-04-15 PROCEDURE — 25010000002 EPOETIN ALFA-EPBX 10000 UNIT/ML SOLUTION: Performed by: INTERNAL MEDICINE

## 2020-04-15 PROCEDURE — 92610 EVALUATE SWALLOWING FUNCTION: CPT

## 2020-04-15 PROCEDURE — 99233 SBSQ HOSP IP/OBS HIGH 50: CPT | Performed by: HOSPITALIST

## 2020-04-15 PROCEDURE — 97530 THERAPEUTIC ACTIVITIES: CPT

## 2020-04-15 PROCEDURE — 84100 ASSAY OF PHOSPHORUS: CPT | Performed by: INTERNAL MEDICINE

## 2020-04-15 PROCEDURE — 63710000001 INSULIN LISPRO (HUMAN) PER 5 UNITS: Performed by: INTERNAL MEDICINE

## 2020-04-15 PROCEDURE — 85025 COMPLETE CBC W/AUTO DIFF WBC: CPT | Performed by: INTERNAL MEDICINE

## 2020-04-15 PROCEDURE — 83605 ASSAY OF LACTIC ACID: CPT | Performed by: INTERNAL MEDICINE

## 2020-04-15 PROCEDURE — 63710000001 INSULIN DETEMIR PER 5 UNITS: Performed by: INTERNAL MEDICINE

## 2020-04-15 RX ORDER — DOCUSATE SODIUM 50 MG/5 ML
100 LIQUID (ML) ORAL 2 TIMES DAILY
Status: DISCONTINUED | OUTPATIENT
Start: 2020-04-15 | End: 2020-04-21

## 2020-04-15 RX ORDER — DOCUSATE SODIUM 100 MG/1
100 CAPSULE, LIQUID FILLED ORAL 2 TIMES DAILY
Status: DISCONTINUED | OUTPATIENT
Start: 2020-04-15 | End: 2020-04-15

## 2020-04-15 RX ORDER — ALBUMIN (HUMAN) 12.5 G/50ML
12.5 SOLUTION INTRAVENOUS AS NEEDED
Status: ACTIVE | OUTPATIENT
Start: 2020-04-15 | End: 2020-04-15

## 2020-04-15 RX ADMIN — INSULIN LISPRO 2 UNITS: 100 INJECTION, SOLUTION INTRAVENOUS; SUBCUTANEOUS at 11:37

## 2020-04-15 RX ADMIN — LEVOTHYROXINE SODIUM 150 MCG: 20 INJECTION, SOLUTION INTRAVENOUS at 11:14

## 2020-04-15 RX ADMIN — INSULIN DETEMIR 5 UNITS: 100 INJECTION, SOLUTION SUBCUTANEOUS at 11:38

## 2020-04-15 RX ADMIN — INSULIN LISPRO 2 UNITS: 100 INJECTION, SOLUTION INTRAVENOUS; SUBCUTANEOUS at 01:15

## 2020-04-15 RX ADMIN — EPOETIN ALFA-EPBX 20000 UNITS: 10000 INJECTION, SOLUTION INTRAVENOUS; SUBCUTANEOUS at 11:12

## 2020-04-15 RX ADMIN — MIDODRINE HYDROCHLORIDE 10 MG: 5 TABLET ORAL at 18:31

## 2020-04-15 RX ADMIN — POLYETHYLENE GLYCOL (3350) 17 G: 17 POWDER, FOR SOLUTION ORAL at 11:15

## 2020-04-15 RX ADMIN — Medication 30 ML: at 21:09

## 2020-04-15 RX ADMIN — GUAIFENESIN 400 MG: 200 SOLUTION ORAL at 18:31

## 2020-04-15 RX ADMIN — THIAMINE HYDROCHLORIDE 200 MG: 100 INJECTION, SOLUTION INTRAMUSCULAR; INTRAVENOUS at 11:14

## 2020-04-15 RX ADMIN — GUAIFENESIN 400 MG: 200 SOLUTION ORAL at 06:08

## 2020-04-15 RX ADMIN — GABAPENTIN 400 MG: 400 CAPSULE ORAL at 11:13

## 2020-04-15 RX ADMIN — MIDODRINE HYDROCHLORIDE 10 MG: 5 TABLET ORAL at 11:13

## 2020-04-15 RX ADMIN — PANTOPRAZOLE SODIUM 40 MG: 40 INJECTION, POWDER, FOR SOLUTION INTRAVENOUS at 18:31

## 2020-04-15 RX ADMIN — HYDROCODONE BITARTRATE AND ACETAMINOPHEN 1 TABLET: 10; 325 TABLET ORAL at 18:31

## 2020-04-15 RX ADMIN — DOCUSATE SODIUM 100 MG: 50 LIQUID ORAL at 11:14

## 2020-04-15 RX ADMIN — FOLIC ACID 1 MG: 1 TABLET ORAL at 11:13

## 2020-04-15 RX ADMIN — PANTOPRAZOLE SODIUM 40 MG: 40 INJECTION, POWDER, FOR SOLUTION INTRAVENOUS at 11:14

## 2020-04-15 RX ADMIN — GUAIFENESIN 400 MG: 200 SOLUTION ORAL at 11:14

## 2020-04-15 RX ADMIN — GABAPENTIN 400 MG: 400 CAPSULE ORAL at 21:08

## 2020-04-15 RX ADMIN — SODIUM CHLORIDE, PRESERVATIVE FREE 10 ML: 5 INJECTION INTRAVENOUS at 21:09

## 2020-04-15 RX ADMIN — SODIUM CHLORIDE, PRESERVATIVE FREE 10 ML: 5 INJECTION INTRAVENOUS at 11:15

## 2020-04-15 RX ADMIN — DOCUSATE SODIUM 100 MG: 50 LIQUID ORAL at 21:08

## 2020-04-16 ENCOUNTER — APPOINTMENT (OUTPATIENT)
Dept: NEPHROLOGY | Facility: HOSPITAL | Age: 75
End: 2020-04-16

## 2020-04-16 ENCOUNTER — APPOINTMENT (OUTPATIENT)
Dept: GENERAL RADIOLOGY | Facility: HOSPITAL | Age: 75
End: 2020-04-16

## 2020-04-16 LAB
ANION GAP SERPL CALCULATED.3IONS-SCNC: 10 MMOL/L (ref 5–15)
BUN BLD-MCNC: 42 MG/DL (ref 8–23)
BUN/CREAT SERPL: 15.3 (ref 7–25)
CALCIUM SPEC-SCNC: 7.9 MG/DL (ref 8.6–10.5)
CHLORIDE SERPL-SCNC: 106 MMOL/L (ref 98–107)
CO2 SERPL-SCNC: 26 MMOL/L (ref 22–29)
CREAT BLD-MCNC: 2.75 MG/DL (ref 0.76–1.27)
DEPRECATED RDW RBC AUTO: 63.3 FL (ref 37–54)
ERYTHROCYTE [DISTWIDTH] IN BLOOD BY AUTOMATED COUNT: 17.5 % (ref 12.3–15.4)
GFR SERPL CREATININE-BSD FRML MDRD: 23 ML/MIN/1.73
GLUCOSE BLD-MCNC: 159 MG/DL (ref 65–99)
GLUCOSE BLDC GLUCOMTR-MCNC: 119 MG/DL (ref 70–130)
GLUCOSE BLDC GLUCOMTR-MCNC: 120 MG/DL (ref 70–130)
GLUCOSE BLDC GLUCOMTR-MCNC: 126 MG/DL (ref 70–130)
GLUCOSE BLDC GLUCOMTR-MCNC: 146 MG/DL (ref 70–130)
HCT VFR BLD AUTO: 25.5 % (ref 37.5–51)
HGB BLD-MCNC: 7.9 G/DL (ref 13–17.7)
MCH RBC QN AUTO: 31 PG (ref 26.6–33)
MCHC RBC AUTO-ENTMCNC: 31 G/DL (ref 31.5–35.7)
MCV RBC AUTO: 100 FL (ref 79–97)
PLATELET # BLD AUTO: 192 10*3/MM3 (ref 140–450)
PMV BLD AUTO: 11.8 FL (ref 6–12)
POTASSIUM BLD-SCNC: 3.5 MMOL/L (ref 3.5–5.2)
RBC # BLD AUTO: 2.55 10*6/MM3 (ref 4.14–5.8)
SODIUM BLD-SCNC: 142 MMOL/L (ref 136–145)
WBC NRBC COR # BLD: 7.78 10*3/MM3 (ref 3.4–10.8)

## 2020-04-16 PROCEDURE — 99232 SBSQ HOSP IP/OBS MODERATE 35: CPT | Performed by: HOSPITALIST

## 2020-04-16 PROCEDURE — 80048 BASIC METABOLIC PNL TOTAL CA: CPT | Performed by: HOSPITALIST

## 2020-04-16 PROCEDURE — 92611 MOTION FLUOROSCOPY/SWALLOW: CPT

## 2020-04-16 PROCEDURE — 85027 COMPLETE CBC AUTOMATED: CPT | Performed by: HOSPITALIST

## 2020-04-16 PROCEDURE — 63710000001 INSULIN DETEMIR PER 5 UNITS: Performed by: INTERNAL MEDICINE

## 2020-04-16 PROCEDURE — 5A1D70Z PERFORMANCE OF URINARY FILTRATION, INTERMITTENT, LESS THAN 6 HOURS PER DAY: ICD-10-PCS | Performed by: INTERNAL MEDICINE

## 2020-04-16 PROCEDURE — 97530 THERAPEUTIC ACTIVITIES: CPT

## 2020-04-16 PROCEDURE — 74230 X-RAY XM SWLNG FUNCJ C+: CPT

## 2020-04-16 PROCEDURE — 92610 EVALUATE SWALLOWING FUNCTION: CPT

## 2020-04-16 PROCEDURE — 82962 GLUCOSE BLOOD TEST: CPT

## 2020-04-16 RX ORDER — TETRAHYDROZOLINE HCL 0.05 %
2 DROPS OPHTHALMIC (EYE) 4 TIMES DAILY PRN
Status: DISCONTINUED | OUTPATIENT
Start: 2020-04-16 | End: 2020-04-22 | Stop reason: HOSPADM

## 2020-04-16 RX ORDER — BISACODYL 10 MG
10 SUPPOSITORY, RECTAL RECTAL DAILY PRN
Status: DISCONTINUED | OUTPATIENT
Start: 2020-04-16 | End: 2020-04-22 | Stop reason: HOSPADM

## 2020-04-16 RX ADMIN — Medication 30 ML: at 09:50

## 2020-04-16 RX ADMIN — SODIUM CHLORIDE, PRESERVATIVE FREE 10 ML: 5 INJECTION INTRAVENOUS at 09:50

## 2020-04-16 RX ADMIN — MIDODRINE HYDROCHLORIDE 10 MG: 5 TABLET ORAL at 17:04

## 2020-04-16 RX ADMIN — INSULIN DETEMIR 5 UNITS: 100 INJECTION, SOLUTION SUBCUTANEOUS at 09:50

## 2020-04-16 RX ADMIN — Medication 30 ML: at 21:11

## 2020-04-16 RX ADMIN — HYDROCODONE BITARTRATE AND ACETAMINOPHEN 1 TABLET: 10; 325 TABLET ORAL at 03:10

## 2020-04-16 RX ADMIN — LIDOCAINE 4%: 4 CREAM TOPICAL at 09:49

## 2020-04-16 RX ADMIN — FOLIC ACID 1 MG: 1 TABLET ORAL at 09:49

## 2020-04-16 RX ADMIN — SODIUM CHLORIDE, PRESERVATIVE FREE 10 ML: 5 INJECTION INTRAVENOUS at 21:11

## 2020-04-16 RX ADMIN — POLYETHYLENE GLYCOL (3350) 17 G: 17 POWDER, FOR SOLUTION ORAL at 09:50

## 2020-04-16 RX ADMIN — PANTOPRAZOLE SODIUM 40 MG: 40 INJECTION, POWDER, FOR SOLUTION INTRAVENOUS at 17:04

## 2020-04-16 RX ADMIN — GABAPENTIN 400 MG: 400 CAPSULE ORAL at 21:11

## 2020-04-16 RX ADMIN — DOCUSATE SODIUM 100 MG: 50 LIQUID ORAL at 10:12

## 2020-04-16 RX ADMIN — PANTOPRAZOLE SODIUM 40 MG: 40 INJECTION, POWDER, FOR SOLUTION INTRAVENOUS at 09:50

## 2020-04-16 RX ADMIN — MIDODRINE HYDROCHLORIDE 10 MG: 5 TABLET ORAL at 09:49

## 2020-04-16 RX ADMIN — GABAPENTIN 400 MG: 400 CAPSULE ORAL at 09:49

## 2020-04-16 RX ADMIN — LEVOTHYROXINE SODIUM 150 MCG: 20 INJECTION, SOLUTION INTRAVENOUS at 11:19

## 2020-04-16 RX ADMIN — DOCUSATE SODIUM 100 MG: 50 LIQUID ORAL at 21:11

## 2020-04-17 ENCOUNTER — APPOINTMENT (OUTPATIENT)
Dept: GENERAL RADIOLOGY | Facility: HOSPITAL | Age: 75
End: 2020-04-17

## 2020-04-17 LAB
ANION GAP SERPL CALCULATED.3IONS-SCNC: 9 MMOL/L (ref 5–15)
BUN BLD-MCNC: 27 MG/DL (ref 8–23)
BUN/CREAT SERPL: 12.6 (ref 7–25)
CALCIUM SPEC-SCNC: 8.6 MG/DL (ref 8.6–10.5)
CHLORIDE SERPL-SCNC: 108 MMOL/L (ref 98–107)
CO2 SERPL-SCNC: 28 MMOL/L (ref 22–29)
CREAT BLD-MCNC: 2.14 MG/DL (ref 0.76–1.27)
DEPRECATED RDW RBC AUTO: 65.4 FL (ref 37–54)
ERYTHROCYTE [DISTWIDTH] IN BLOOD BY AUTOMATED COUNT: 18.1 % (ref 12.3–15.4)
GFR SERPL CREATININE-BSD FRML MDRD: 30 ML/MIN/1.73
GLUCOSE BLD-MCNC: 117 MG/DL (ref 65–99)
GLUCOSE BLDC GLUCOMTR-MCNC: 115 MG/DL (ref 70–130)
GLUCOSE BLDC GLUCOMTR-MCNC: 139 MG/DL (ref 70–130)
GLUCOSE BLDC GLUCOMTR-MCNC: 169 MG/DL (ref 70–130)
GLUCOSE BLDC GLUCOMTR-MCNC: 181 MG/DL (ref 70–130)
HCT VFR BLD AUTO: 30.6 % (ref 37.5–51)
HGB BLD-MCNC: 9 G/DL (ref 13–17.7)
MCH RBC QN AUTO: 30.2 PG (ref 26.6–33)
MCHC RBC AUTO-ENTMCNC: 29.4 G/DL (ref 31.5–35.7)
MCV RBC AUTO: 102.7 FL (ref 79–97)
PLATELET # BLD AUTO: 234 10*3/MM3 (ref 140–450)
PMV BLD AUTO: 11.5 FL (ref 6–12)
POTASSIUM BLD-SCNC: 3 MMOL/L (ref 3.5–5.2)
RBC # BLD AUTO: 2.98 10*6/MM3 (ref 4.14–5.8)
SODIUM BLD-SCNC: 145 MMOL/L (ref 136–145)
WBC NRBC COR # BLD: 6.54 10*3/MM3 (ref 3.4–10.8)

## 2020-04-17 PROCEDURE — 25010000003 POTASSIUM CHLORIDE 10 MEQ/100ML SOLUTION: Performed by: HOSPITALIST

## 2020-04-17 PROCEDURE — 80048 BASIC METABOLIC PNL TOTAL CA: CPT | Performed by: HOSPITALIST

## 2020-04-17 PROCEDURE — 99233 SBSQ HOSP IP/OBS HIGH 50: CPT | Performed by: HOSPITALIST

## 2020-04-17 PROCEDURE — 71045 X-RAY EXAM CHEST 1 VIEW: CPT

## 2020-04-17 PROCEDURE — 63710000001 INSULIN LISPRO (HUMAN) PER 5 UNITS: Performed by: INTERNAL MEDICINE

## 2020-04-17 PROCEDURE — 92526 ORAL FUNCTION THERAPY: CPT

## 2020-04-17 PROCEDURE — 85027 COMPLETE CBC AUTOMATED: CPT | Performed by: HOSPITALIST

## 2020-04-17 PROCEDURE — 97535 SELF CARE MNGMENT TRAINING: CPT

## 2020-04-17 PROCEDURE — 82962 GLUCOSE BLOOD TEST: CPT

## 2020-04-17 RX ORDER — POTASSIUM CHLORIDE 7.45 MG/ML
10 INJECTION INTRAVENOUS
Status: DISCONTINUED | OUTPATIENT
Start: 2020-04-17 | End: 2020-04-17

## 2020-04-17 RX ORDER — GUAIFENESIN 600 MG/1
600 TABLET, EXTENDED RELEASE ORAL EVERY 12 HOURS SCHEDULED
Status: DISCONTINUED | OUTPATIENT
Start: 2020-04-17 | End: 2020-04-19

## 2020-04-17 RX ADMIN — Medication 30 ML: at 20:03

## 2020-04-17 RX ADMIN — PANTOPRAZOLE SODIUM 40 MG: 40 INJECTION, POWDER, FOR SOLUTION INTRAVENOUS at 17:27

## 2020-04-17 RX ADMIN — DOCUSATE SODIUM 100 MG: 50 LIQUID ORAL at 09:09

## 2020-04-17 RX ADMIN — INSULIN LISPRO 2 UNITS: 100 INJECTION, SOLUTION INTRAVENOUS; SUBCUTANEOUS at 17:52

## 2020-04-17 RX ADMIN — GUAIFENESIN 600 MG: 600 TABLET, EXTENDED RELEASE ORAL at 14:05

## 2020-04-17 RX ADMIN — MIDODRINE HYDROCHLORIDE 10 MG: 5 TABLET ORAL at 09:09

## 2020-04-17 RX ADMIN — GABAPENTIN 400 MG: 400 CAPSULE ORAL at 20:03

## 2020-04-17 RX ADMIN — FOLIC ACID 1 MG: 1 TABLET ORAL at 09:09

## 2020-04-17 RX ADMIN — DOCUSATE SODIUM 100 MG: 50 LIQUID ORAL at 20:03

## 2020-04-17 RX ADMIN — MIDODRINE HYDROCHLORIDE 10 MG: 5 TABLET ORAL at 17:27

## 2020-04-17 RX ADMIN — TETRAHYDROZOLINE HYDROCHLORIDE 2 DROP: 0.5 SOLUTION/ DROPS OPHTHALMIC at 09:13

## 2020-04-17 RX ADMIN — Medication 30 ML: at 09:12

## 2020-04-17 RX ADMIN — PANTOPRAZOLE SODIUM 40 MG: 40 INJECTION, POWDER, FOR SOLUTION INTRAVENOUS at 09:09

## 2020-04-17 RX ADMIN — GABAPENTIN 400 MG: 400 CAPSULE ORAL at 09:10

## 2020-04-17 RX ADMIN — LEVOTHYROXINE SODIUM 150 MCG: 20 INJECTION, SOLUTION INTRAVENOUS at 10:21

## 2020-04-17 RX ADMIN — POTASSIUM PHOSPHATE, MONOBASIC 1000 MG: 500 TABLET, SOLUBLE ORAL at 10:21

## 2020-04-17 RX ADMIN — HYDROCODONE BITARTRATE AND ACETAMINOPHEN 1 TABLET: 10; 325 TABLET ORAL at 09:14

## 2020-04-17 RX ADMIN — GUAIFENESIN 600 MG: 600 TABLET, EXTENDED RELEASE ORAL at 20:03

## 2020-04-17 RX ADMIN — SODIUM CHLORIDE, PRESERVATIVE FREE 10 ML: 5 INJECTION INTRAVENOUS at 09:09

## 2020-04-18 ENCOUNTER — APPOINTMENT (OUTPATIENT)
Dept: NEPHROLOGY | Facility: HOSPITAL | Age: 75
End: 2020-04-18

## 2020-04-18 LAB
ANION GAP SERPL CALCULATED.3IONS-SCNC: 11 MMOL/L (ref 5–15)
BUN BLD-MCNC: 40 MG/DL (ref 8–23)
BUN/CREAT SERPL: 15.8 (ref 7–25)
CALCIUM SPEC-SCNC: 8.4 MG/DL (ref 8.6–10.5)
CHLORIDE SERPL-SCNC: 106 MMOL/L (ref 98–107)
CO2 SERPL-SCNC: 24 MMOL/L (ref 22–29)
CREAT BLD-MCNC: 2.53 MG/DL (ref 0.76–1.27)
DEPRECATED RDW RBC AUTO: 69.4 FL (ref 37–54)
ERYTHROCYTE [DISTWIDTH] IN BLOOD BY AUTOMATED COUNT: 18.4 % (ref 12.3–15.4)
GFR SERPL CREATININE-BSD FRML MDRD: 25 ML/MIN/1.73
GLUCOSE BLD-MCNC: 170 MG/DL (ref 65–99)
GLUCOSE BLDC GLUCOMTR-MCNC: 134 MG/DL (ref 70–130)
GLUCOSE BLDC GLUCOMTR-MCNC: 138 MG/DL (ref 70–130)
GLUCOSE BLDC GLUCOMTR-MCNC: 162 MG/DL (ref 70–130)
GLUCOSE BLDC GLUCOMTR-MCNC: 174 MG/DL (ref 70–130)
HCT VFR BLD AUTO: 32.2 % (ref 37.5–51)
HGB BLD-MCNC: 9.3 G/DL (ref 13–17.7)
MCH RBC QN AUTO: 30.7 PG (ref 26.6–33)
MCHC RBC AUTO-ENTMCNC: 28.9 G/DL (ref 31.5–35.7)
MCV RBC AUTO: 106.3 FL (ref 79–97)
PLATELET # BLD AUTO: 184 10*3/MM3 (ref 140–450)
PMV BLD AUTO: 11.9 FL (ref 6–12)
POTASSIUM BLD-SCNC: 4 MMOL/L (ref 3.5–5.2)
RBC # BLD AUTO: 3.03 10*6/MM3 (ref 4.14–5.8)
SODIUM BLD-SCNC: 141 MMOL/L (ref 136–145)
WBC NRBC COR # BLD: 6.35 10*3/MM3 (ref 3.4–10.8)

## 2020-04-18 PROCEDURE — 5A1D70Z PERFORMANCE OF URINARY FILTRATION, INTERMITTENT, LESS THAN 6 HOURS PER DAY: ICD-10-PCS | Performed by: INTERNAL MEDICINE

## 2020-04-18 PROCEDURE — 82962 GLUCOSE BLOOD TEST: CPT

## 2020-04-18 PROCEDURE — 80048 BASIC METABOLIC PNL TOTAL CA: CPT | Performed by: HOSPITALIST

## 2020-04-18 PROCEDURE — 85027 COMPLETE CBC AUTOMATED: CPT | Performed by: HOSPITALIST

## 2020-04-18 PROCEDURE — 63710000001 INSULIN LISPRO (HUMAN) PER 5 UNITS: Performed by: INTERNAL MEDICINE

## 2020-04-18 PROCEDURE — 99232 SBSQ HOSP IP/OBS MODERATE 35: CPT | Performed by: HOSPITALIST

## 2020-04-18 PROCEDURE — 25010000002 EPOETIN ALFA-EPBX 10000 UNIT/ML SOLUTION: Performed by: INTERNAL MEDICINE

## 2020-04-18 PROCEDURE — 63710000001 INSULIN DETEMIR PER 5 UNITS: Performed by: INTERNAL MEDICINE

## 2020-04-18 PROCEDURE — 92526 ORAL FUNCTION THERAPY: CPT

## 2020-04-18 PROCEDURE — 25010000002 ONDANSETRON PER 1 MG: Performed by: INTERNAL MEDICINE

## 2020-04-18 RX ORDER — ALBUMIN (HUMAN) 12.5 G/50ML
12.5 SOLUTION INTRAVENOUS AS NEEDED
Status: ACTIVE | OUTPATIENT
Start: 2020-04-18 | End: 2020-04-18

## 2020-04-18 RX ADMIN — ONDANSETRON 4 MG: 2 INJECTION INTRAMUSCULAR; INTRAVENOUS at 12:29

## 2020-04-18 RX ADMIN — FOLIC ACID 1 MG: 1 TABLET ORAL at 08:23

## 2020-04-18 RX ADMIN — GUAIFENESIN 600 MG: 600 TABLET, EXTENDED RELEASE ORAL at 20:25

## 2020-04-18 RX ADMIN — INSULIN LISPRO 2 UNITS: 100 INJECTION, SOLUTION INTRAVENOUS; SUBCUTANEOUS at 12:24

## 2020-04-18 RX ADMIN — Medication 30 ML: at 20:25

## 2020-04-18 RX ADMIN — GABAPENTIN 400 MG: 400 CAPSULE ORAL at 08:23

## 2020-04-18 RX ADMIN — MIDODRINE HYDROCHLORIDE 10 MG: 5 TABLET ORAL at 08:23

## 2020-04-18 RX ADMIN — PANTOPRAZOLE SODIUM 40 MG: 40 INJECTION, POWDER, FOR SOLUTION INTRAVENOUS at 08:23

## 2020-04-18 RX ADMIN — DOCUSATE SODIUM 100 MG: 50 LIQUID ORAL at 20:25

## 2020-04-18 RX ADMIN — PANTOPRAZOLE SODIUM 40 MG: 40 INJECTION, POWDER, FOR SOLUTION INTRAVENOUS at 17:34

## 2020-04-18 RX ADMIN — HYDROCODONE BITARTRATE AND ACETAMINOPHEN 1 TABLET: 10; 325 TABLET ORAL at 08:41

## 2020-04-18 RX ADMIN — INSULIN DETEMIR 5 UNITS: 100 INJECTION, SOLUTION SUBCUTANEOUS at 09:46

## 2020-04-18 RX ADMIN — MIDODRINE HYDROCHLORIDE 10 MG: 5 TABLET ORAL at 17:34

## 2020-04-18 RX ADMIN — EPOETIN ALFA-EPBX 20000 UNITS: 10000 INJECTION, SOLUTION INTRAVENOUS; SUBCUTANEOUS at 08:23

## 2020-04-18 RX ADMIN — LEVOTHYROXINE SODIUM 150 MCG: 20 INJECTION, SOLUTION INTRAVENOUS at 12:24

## 2020-04-18 RX ADMIN — HYDROCODONE BITARTRATE AND ACETAMINOPHEN 1 TABLET: 10; 325 TABLET ORAL at 15:27

## 2020-04-18 RX ADMIN — SODIUM CHLORIDE, PRESERVATIVE FREE 10 ML: 5 INJECTION INTRAVENOUS at 09:48

## 2020-04-18 RX ADMIN — Medication 30 ML: at 09:47

## 2020-04-18 RX ADMIN — GUAIFENESIN 600 MG: 600 TABLET, EXTENDED RELEASE ORAL at 08:23

## 2020-04-18 RX ADMIN — GABAPENTIN 400 MG: 400 CAPSULE ORAL at 20:25

## 2020-04-19 LAB
GLUCOSE BLDC GLUCOMTR-MCNC: 184 MG/DL (ref 70–130)
GLUCOSE BLDC GLUCOMTR-MCNC: 185 MG/DL (ref 70–130)
GLUCOSE BLDC GLUCOMTR-MCNC: 206 MG/DL (ref 70–130)
GLUCOSE BLDC GLUCOMTR-MCNC: 226 MG/DL (ref 70–130)
GLUCOSE BLDC GLUCOMTR-MCNC: 78 MG/DL (ref 70–130)

## 2020-04-19 PROCEDURE — 99232 SBSQ HOSP IP/OBS MODERATE 35: CPT | Performed by: HOSPITALIST

## 2020-04-19 PROCEDURE — 82962 GLUCOSE BLOOD TEST: CPT

## 2020-04-19 PROCEDURE — 92526 ORAL FUNCTION THERAPY: CPT

## 2020-04-19 PROCEDURE — 63710000001 INSULIN LISPRO (HUMAN) PER 5 UNITS: Performed by: INTERNAL MEDICINE

## 2020-04-19 PROCEDURE — 63710000001 INSULIN DETEMIR PER 5 UNITS: Performed by: INTERNAL MEDICINE

## 2020-04-19 RX ORDER — MIDODRINE HYDROCHLORIDE 5 MG/1
5 TABLET ORAL
Status: DISCONTINUED | OUTPATIENT
Start: 2020-04-19 | End: 2020-04-21

## 2020-04-19 RX ADMIN — GUAIFENESIN 400 MG: 100 SOLUTION ORAL at 20:08

## 2020-04-19 RX ADMIN — INSULIN DETEMIR 5 UNITS: 100 INJECTION, SOLUTION SUBCUTANEOUS at 09:17

## 2020-04-19 RX ADMIN — FOLIC ACID 1 MG: 1 TABLET ORAL at 09:16

## 2020-04-19 RX ADMIN — POLYETHYLENE GLYCOL (3350) 17 G: 17 POWDER, FOR SOLUTION ORAL at 09:17

## 2020-04-19 RX ADMIN — MIDODRINE HYDROCHLORIDE 5 MG: 5 TABLET ORAL at 16:56

## 2020-04-19 RX ADMIN — GABAPENTIN 400 MG: 400 CAPSULE ORAL at 20:08

## 2020-04-19 RX ADMIN — GABAPENTIN 400 MG: 400 CAPSULE ORAL at 09:16

## 2020-04-19 RX ADMIN — MIDODRINE HYDROCHLORIDE 10 MG: 5 TABLET ORAL at 09:16

## 2020-04-19 RX ADMIN — INSULIN LISPRO 2 UNITS: 100 INJECTION, SOLUTION INTRAVENOUS; SUBCUTANEOUS at 16:55

## 2020-04-19 RX ADMIN — DOCUSATE SODIUM 100 MG: 50 LIQUID ORAL at 20:08

## 2020-04-19 RX ADMIN — INSULIN LISPRO 3 UNITS: 100 INJECTION, SOLUTION INTRAVENOUS; SUBCUTANEOUS at 06:28

## 2020-04-19 RX ADMIN — INSULIN LISPRO 3 UNITS: 100 INJECTION, SOLUTION INTRAVENOUS; SUBCUTANEOUS at 11:24

## 2020-04-19 RX ADMIN — PANTOPRAZOLE SODIUM 40 MG: 40 INJECTION, POWDER, FOR SOLUTION INTRAVENOUS at 09:16

## 2020-04-19 RX ADMIN — GUAIFENESIN 400 MG: 100 SOLUTION ORAL at 16:55

## 2020-04-19 RX ADMIN — PANTOPRAZOLE SODIUM 40 MG: 40 INJECTION, POWDER, FOR SOLUTION INTRAVENOUS at 16:55

## 2020-04-19 RX ADMIN — GUAIFENESIN 400 MG: 100 SOLUTION ORAL at 11:12

## 2020-04-19 RX ADMIN — SODIUM CHLORIDE, PRESERVATIVE FREE 10 ML: 5 INJECTION INTRAVENOUS at 20:10

## 2020-04-19 RX ADMIN — LEVOTHYROXINE SODIUM 150 MCG: 20 INJECTION, SOLUTION INTRAVENOUS at 11:12

## 2020-04-19 RX ADMIN — DOCUSATE SODIUM 100 MG: 50 LIQUID ORAL at 09:17

## 2020-04-19 RX ADMIN — Medication 30 ML: at 09:15

## 2020-04-20 ENCOUNTER — APPOINTMENT (OUTPATIENT)
Dept: NEPHROLOGY | Facility: HOSPITAL | Age: 75
End: 2020-04-20

## 2020-04-20 LAB
ANION GAP SERPL CALCULATED.3IONS-SCNC: 12 MMOL/L (ref 5–15)
BUN BLD-MCNC: 48 MG/DL (ref 8–23)
BUN/CREAT SERPL: 17.8 (ref 7–25)
CALCIUM SPEC-SCNC: 8.5 MG/DL (ref 8.6–10.5)
CHLORIDE SERPL-SCNC: 98 MMOL/L (ref 98–107)
CO2 SERPL-SCNC: 26 MMOL/L (ref 22–29)
CREAT BLD-MCNC: 2.69 MG/DL (ref 0.76–1.27)
DEPRECATED RDW RBC AUTO: 68.8 FL (ref 37–54)
ERYTHROCYTE [DISTWIDTH] IN BLOOD BY AUTOMATED COUNT: 18.1 % (ref 12.3–15.4)
GFR SERPL CREATININE-BSD FRML MDRD: 23 ML/MIN/1.73
GLUCOSE BLD-MCNC: 198 MG/DL (ref 65–99)
GLUCOSE BLDC GLUCOMTR-MCNC: 139 MG/DL (ref 70–130)
GLUCOSE BLDC GLUCOMTR-MCNC: 153 MG/DL (ref 70–130)
GLUCOSE BLDC GLUCOMTR-MCNC: 177 MG/DL (ref 70–130)
GLUCOSE BLDC GLUCOMTR-MCNC: 177 MG/DL (ref 70–130)
HCT VFR BLD AUTO: 31.1 % (ref 37.5–51)
HGB BLD-MCNC: 9 G/DL (ref 13–17.7)
MCH RBC QN AUTO: 30.4 PG (ref 26.6–33)
MCHC RBC AUTO-ENTMCNC: 28.9 G/DL (ref 31.5–35.7)
MCV RBC AUTO: 105.1 FL (ref 79–97)
PLATELET # BLD AUTO: 229 10*3/MM3 (ref 140–450)
PMV BLD AUTO: 12 FL (ref 6–12)
POTASSIUM BLD-SCNC: 3.8 MMOL/L (ref 3.5–5.2)
RBC # BLD AUTO: 2.96 10*6/MM3 (ref 4.14–5.8)
SODIUM BLD-SCNC: 136 MMOL/L (ref 136–145)
WBC NRBC COR # BLD: 7.6 10*3/MM3 (ref 3.4–10.8)

## 2020-04-20 PROCEDURE — 85027 COMPLETE CBC AUTOMATED: CPT | Performed by: HOSPITALIST

## 2020-04-20 PROCEDURE — 80048 BASIC METABOLIC PNL TOTAL CA: CPT | Performed by: HOSPITALIST

## 2020-04-20 PROCEDURE — 82962 GLUCOSE BLOOD TEST: CPT

## 2020-04-20 PROCEDURE — 63710000001 INSULIN DETEMIR PER 5 UNITS: Performed by: INTERNAL MEDICINE

## 2020-04-20 PROCEDURE — 5A1D70Z PERFORMANCE OF URINARY FILTRATION, INTERMITTENT, LESS THAN 6 HOURS PER DAY: ICD-10-PCS | Performed by: INTERNAL MEDICINE

## 2020-04-20 PROCEDURE — 63710000001 INSULIN LISPRO (HUMAN) PER 5 UNITS: Performed by: INTERNAL MEDICINE

## 2020-04-20 PROCEDURE — 97530 THERAPEUTIC ACTIVITIES: CPT

## 2020-04-20 PROCEDURE — 92526 ORAL FUNCTION THERAPY: CPT

## 2020-04-20 PROCEDURE — 99233 SBSQ HOSP IP/OBS HIGH 50: CPT | Performed by: FAMILY MEDICINE

## 2020-04-20 RX ADMIN — DOCUSATE SODIUM 100 MG: 50 LIQUID ORAL at 09:28

## 2020-04-20 RX ADMIN — PANTOPRAZOLE SODIUM 40 MG: 40 INJECTION, POWDER, FOR SOLUTION INTRAVENOUS at 09:28

## 2020-04-20 RX ADMIN — SODIUM CHLORIDE, PRESERVATIVE FREE 10 ML: 5 INJECTION INTRAVENOUS at 20:32

## 2020-04-20 RX ADMIN — Medication 30 ML: at 20:33

## 2020-04-20 RX ADMIN — INSULIN LISPRO 2 UNITS: 100 INJECTION, SOLUTION INTRAVENOUS; SUBCUTANEOUS at 00:13

## 2020-04-20 RX ADMIN — POLYETHYLENE GLYCOL (3350) 17 G: 17 POWDER, FOR SOLUTION ORAL at 09:28

## 2020-04-20 RX ADMIN — GABAPENTIN 400 MG: 400 CAPSULE ORAL at 09:29

## 2020-04-20 RX ADMIN — GABAPENTIN 400 MG: 400 CAPSULE ORAL at 20:32

## 2020-04-20 RX ADMIN — LIDOCAINE 4%: 4 CREAM TOPICAL at 09:29

## 2020-04-20 RX ADMIN — PANTOPRAZOLE SODIUM 40 MG: 40 INJECTION, POWDER, FOR SOLUTION INTRAVENOUS at 16:25

## 2020-04-20 RX ADMIN — GUAIFENESIN 400 MG: 100 SOLUTION ORAL at 09:28

## 2020-04-20 RX ADMIN — INSULIN LISPRO 2 UNITS: 100 INJECTION, SOLUTION INTRAVENOUS; SUBCUTANEOUS at 16:27

## 2020-04-20 RX ADMIN — GUAIFENESIN 400 MG: 100 SOLUTION ORAL at 20:32

## 2020-04-20 RX ADMIN — HYDROCODONE BITARTRATE AND ACETAMINOPHEN 1 TABLET: 10; 325 TABLET ORAL at 07:37

## 2020-04-20 RX ADMIN — LEVOTHYROXINE SODIUM 150 MCG: 20 INJECTION, SOLUTION INTRAVENOUS at 11:58

## 2020-04-20 RX ADMIN — DOCUSATE SODIUM 100 MG: 50 LIQUID ORAL at 20:32

## 2020-04-20 RX ADMIN — HYDROCODONE BITARTRATE AND ACETAMINOPHEN 1 TABLET: 10; 325 TABLET ORAL at 16:26

## 2020-04-20 RX ADMIN — FOLIC ACID 1 MG: 1 TABLET ORAL at 09:28

## 2020-04-20 RX ADMIN — Medication 30 ML: at 09:28

## 2020-04-20 RX ADMIN — MIDODRINE HYDROCHLORIDE 5 MG: 5 TABLET ORAL at 16:25

## 2020-04-20 RX ADMIN — INSULIN DETEMIR 5 UNITS: 100 INJECTION, SOLUTION SUBCUTANEOUS at 12:00

## 2020-04-20 RX ADMIN — MIDODRINE HYDROCHLORIDE 5 MG: 5 TABLET ORAL at 09:29

## 2020-04-20 RX ADMIN — GUAIFENESIN 400 MG: 100 SOLUTION ORAL at 16:26

## 2020-04-21 ENCOUNTER — APPOINTMENT (OUTPATIENT)
Dept: GENERAL RADIOLOGY | Facility: HOSPITAL | Age: 75
End: 2020-04-21

## 2020-04-21 LAB
ANION GAP SERPL CALCULATED.3IONS-SCNC: 12 MMOL/L (ref 5–15)
BASOPHILS # BLD AUTO: 0.06 10*3/MM3 (ref 0–0.2)
BASOPHILS NFR BLD AUTO: 0.6 % (ref 0–1.5)
BUN BLD-MCNC: 35 MG/DL (ref 8–23)
BUN/CREAT SERPL: 16.8 (ref 7–25)
CALCIUM SPEC-SCNC: 9.2 MG/DL (ref 8.6–10.5)
CHLORIDE SERPL-SCNC: 104 MMOL/L (ref 98–107)
CO2 SERPL-SCNC: 26 MMOL/L (ref 22–29)
CREAT BLD-MCNC: 2.08 MG/DL (ref 0.76–1.27)
DEPRECATED RDW RBC AUTO: 68.7 FL (ref 37–54)
EOSINOPHIL # BLD AUTO: 0.14 10*3/MM3 (ref 0–0.4)
EOSINOPHIL NFR BLD AUTO: 1.5 % (ref 0.3–6.2)
ERYTHROCYTE [DISTWIDTH] IN BLOOD BY AUTOMATED COUNT: 18.9 % (ref 12.3–15.4)
GFR SERPL CREATININE-BSD FRML MDRD: 31 ML/MIN/1.73
GLUCOSE BLD-MCNC: 134 MG/DL (ref 65–99)
GLUCOSE BLDC GLUCOMTR-MCNC: 133 MG/DL (ref 70–130)
GLUCOSE BLDC GLUCOMTR-MCNC: 136 MG/DL (ref 70–130)
GLUCOSE BLDC GLUCOMTR-MCNC: 144 MG/DL (ref 70–130)
GLUCOSE BLDC GLUCOMTR-MCNC: 183 MG/DL (ref 70–130)
GLUCOSE BLDC GLUCOMTR-MCNC: 198 MG/DL (ref 70–130)
HCT VFR BLD AUTO: 35.8 % (ref 37.5–51)
HGB BLD-MCNC: 10.5 G/DL (ref 13–17.7)
IMM GRANULOCYTES # BLD AUTO: 0.06 10*3/MM3 (ref 0–0.05)
IMM GRANULOCYTES NFR BLD AUTO: 0.6 % (ref 0–0.5)
LYMPHOCYTES # BLD AUTO: 1.99 10*3/MM3 (ref 0.7–3.1)
LYMPHOCYTES NFR BLD AUTO: 20.9 % (ref 19.6–45.3)
MCH RBC QN AUTO: 30.5 PG (ref 26.6–33)
MCHC RBC AUTO-ENTMCNC: 29.3 G/DL (ref 31.5–35.7)
MCV RBC AUTO: 104.1 FL (ref 79–97)
MONOCYTES # BLD AUTO: 0.65 10*3/MM3 (ref 0.1–0.9)
MONOCYTES NFR BLD AUTO: 6.8 % (ref 5–12)
NEUTROPHILS # BLD AUTO: 6.61 10*3/MM3 (ref 1.7–7)
NEUTROPHILS NFR BLD AUTO: 69.6 % (ref 42.7–76)
NRBC BLD AUTO-RTO: 0 /100 WBC (ref 0–0.2)
PLATELET # BLD AUTO: 300 10*3/MM3 (ref 140–450)
PMV BLD AUTO: 12 FL (ref 6–12)
POTASSIUM BLD-SCNC: 4 MMOL/L (ref 3.5–5.2)
RBC # BLD AUTO: 3.44 10*6/MM3 (ref 4.14–5.8)
SODIUM BLD-SCNC: 142 MMOL/L (ref 136–145)
URATE SERPL-MCNC: 3.2 MG/DL (ref 3.4–7)
WBC NRBC COR # BLD: 9.51 10*3/MM3 (ref 3.4–10.8)

## 2020-04-21 PROCEDURE — 63710000001 PREDNISONE PER 1 MG: Performed by: INTERNAL MEDICINE

## 2020-04-21 PROCEDURE — 97530 THERAPEUTIC ACTIVITIES: CPT

## 2020-04-21 PROCEDURE — 74230 X-RAY XM SWLNG FUNCJ C+: CPT

## 2020-04-21 PROCEDURE — 92611 MOTION FLUOROSCOPY/SWALLOW: CPT

## 2020-04-21 PROCEDURE — 99232 SBSQ HOSP IP/OBS MODERATE 35: CPT | Performed by: FAMILY MEDICINE

## 2020-04-21 PROCEDURE — 85025 COMPLETE CBC W/AUTO DIFF WBC: CPT | Performed by: FAMILY MEDICINE

## 2020-04-21 PROCEDURE — 97110 THERAPEUTIC EXERCISES: CPT

## 2020-04-21 PROCEDURE — 84550 ASSAY OF BLOOD/URIC ACID: CPT | Performed by: INTERNAL MEDICINE

## 2020-04-21 PROCEDURE — 63710000001 INSULIN DETEMIR PER 5 UNITS: Performed by: INTERNAL MEDICINE

## 2020-04-21 PROCEDURE — 80048 BASIC METABOLIC PNL TOTAL CA: CPT | Performed by: FAMILY MEDICINE

## 2020-04-21 PROCEDURE — 82962 GLUCOSE BLOOD TEST: CPT

## 2020-04-21 RX ORDER — GUAIFENESIN 200 MG/1
400 TABLET ORAL 3 TIMES DAILY
Status: DISCONTINUED | OUTPATIENT
Start: 2020-04-21 | End: 2020-04-22 | Stop reason: HOSPADM

## 2020-04-21 RX ORDER — DOCUSATE SODIUM 100 MG/1
100 CAPSULE, LIQUID FILLED ORAL 2 TIMES DAILY
Status: DISCONTINUED | OUTPATIENT
Start: 2020-04-21 | End: 2020-04-22 | Stop reason: HOSPADM

## 2020-04-21 RX ORDER — ALLOPURINOL 100 MG/1
100 TABLET ORAL DAILY
Status: DISCONTINUED | OUTPATIENT
Start: 2020-04-21 | End: 2020-04-22

## 2020-04-21 RX ORDER — PREDNISONE 20 MG/1
20 TABLET ORAL DAILY
Status: DISCONTINUED | OUTPATIENT
Start: 2020-04-21 | End: 2020-04-22

## 2020-04-21 RX ADMIN — FOLIC ACID 1 MG: 1 TABLET ORAL at 09:29

## 2020-04-21 RX ADMIN — Medication 30 ML: at 09:39

## 2020-04-21 RX ADMIN — GABAPENTIN 400 MG: 400 CAPSULE ORAL at 09:29

## 2020-04-21 RX ADMIN — INSULIN LISPRO 2 UNITS: 100 INJECTION, SOLUTION INTRAVENOUS; SUBCUTANEOUS at 18:24

## 2020-04-21 RX ADMIN — HYDROCODONE BITARTRATE AND ACETAMINOPHEN 1 TABLET: 10; 325 TABLET ORAL at 05:09

## 2020-04-21 RX ADMIN — HYDROCODONE BITARTRATE AND ACETAMINOPHEN 1 TABLET: 10; 325 TABLET ORAL at 16:55

## 2020-04-21 RX ADMIN — MIDODRINE HYDROCHLORIDE 5 MG: 5 TABLET ORAL at 09:29

## 2020-04-21 RX ADMIN — INSULIN LISPRO 2 UNITS: 100 INJECTION, SOLUTION INTRAVENOUS; SUBCUTANEOUS at 21:46

## 2020-04-21 RX ADMIN — MIDODRINE HYDROCHLORIDE 5 MG: 5 TABLET ORAL at 16:56

## 2020-04-21 RX ADMIN — DOCUSATE SODIUM 100 MG: 50 LIQUID ORAL at 09:29

## 2020-04-21 RX ADMIN — PANTOPRAZOLE SODIUM 40 MG: 40 INJECTION, POWDER, FOR SOLUTION INTRAVENOUS at 16:50

## 2020-04-21 RX ADMIN — BARIUM SULFATE 20 ML: 400 PASTE ORAL at 14:45

## 2020-04-21 RX ADMIN — GUAIFENESIN 400 MG: 100 SOLUTION ORAL at 09:29

## 2020-04-21 RX ADMIN — SODIUM CHLORIDE, PRESERVATIVE FREE 10 ML: 5 INJECTION INTRAVENOUS at 09:29

## 2020-04-21 RX ADMIN — GUAIFENESIN 400 MG: 200 TABLET ORAL at 16:50

## 2020-04-21 RX ADMIN — BARIUM SULFATE 100 ML: 0.81 POWDER, FOR SUSPENSION ORAL at 14:45

## 2020-04-21 RX ADMIN — PANTOPRAZOLE SODIUM 40 MG: 40 INJECTION, POWDER, FOR SOLUTION INTRAVENOUS at 09:28

## 2020-04-21 RX ADMIN — PREDNISONE 20 MG: 20 TABLET ORAL at 14:47

## 2020-04-21 RX ADMIN — POLYETHYLENE GLYCOL (3350) 17 G: 17 POWDER, FOR SOLUTION ORAL at 09:29

## 2020-04-21 RX ADMIN — LEVOTHYROXINE SODIUM 150 MCG: 20 INJECTION, SOLUTION INTRAVENOUS at 11:41

## 2020-04-21 RX ADMIN — ALLOPURINOL 100 MG: 100 TABLET ORAL at 14:46

## 2020-04-21 RX ADMIN — INSULIN DETEMIR 5 UNITS: 100 INJECTION, SOLUTION SUBCUTANEOUS at 09:35

## 2020-04-22 ENCOUNTER — APPOINTMENT (OUTPATIENT)
Dept: NEPHROLOGY | Facility: HOSPITAL | Age: 75
End: 2020-04-22

## 2020-04-22 ENCOUNTER — READMISSION MANAGEMENT (OUTPATIENT)
Dept: CALL CENTER | Facility: HOSPITAL | Age: 75
End: 2020-04-22

## 2020-04-22 VITALS
DIASTOLIC BLOOD PRESSURE: 66 MMHG | RESPIRATION RATE: 16 BRPM | HEART RATE: 76 BPM | SYSTOLIC BLOOD PRESSURE: 125 MMHG | WEIGHT: 200.1 LBS | BODY MASS INDEX: 24.37 KG/M2 | TEMPERATURE: 97.6 F | HEIGHT: 76 IN | OXYGEN SATURATION: 98 %

## 2020-04-22 PROBLEM — G93.40 ENCEPHALOPATHY ACUTE: Status: RESOLVED | Noted: 2020-02-19 | Resolved: 2020-04-22

## 2020-04-22 PROBLEM — R10.84 DIFFUSE ABDOMINAL PAIN: Status: RESOLVED | Noted: 2020-03-31 | Resolved: 2020-04-22

## 2020-04-22 PROBLEM — R65.20 SEVERE SEPSIS: Status: RESOLVED | Noted: 2020-03-31 | Resolved: 2020-04-22

## 2020-04-22 PROBLEM — Z20.822 SUSPECTED COVID-19 VIRUS INFECTION: Status: RESOLVED | Noted: 2020-04-01 | Resolved: 2020-04-22

## 2020-04-22 PROBLEM — K92.2 UPPER GI BLEED: Status: RESOLVED | Noted: 2020-03-31 | Resolved: 2020-04-22

## 2020-04-22 PROBLEM — E72.20 HYPERAMMONEMIA: Status: RESOLVED | Noted: 2020-04-01 | Resolved: 2020-04-22

## 2020-04-22 PROBLEM — J18.9 LEFT LOWER LOBE PNEUMONIA: Status: RESOLVED | Noted: 2020-04-01 | Resolved: 2020-04-22

## 2020-04-22 PROBLEM — B34.8 RHINOVIRUS INFECTION: Status: RESOLVED | Noted: 2020-04-01 | Resolved: 2020-04-22

## 2020-04-22 PROBLEM — A41.9 SEVERE SEPSIS (HCC): Status: RESOLVED | Noted: 2020-03-31 | Resolved: 2020-04-22

## 2020-04-22 PROBLEM — R41.89 DECREASED RESPONSIVENESS: Status: RESOLVED | Noted: 2020-04-01 | Resolved: 2020-04-22

## 2020-04-22 PROBLEM — K92.1 MELENA: Status: RESOLVED | Noted: 2020-03-31 | Resolved: 2020-04-22

## 2020-04-22 PROBLEM — R09.02 HYPOXIA: Status: RESOLVED | Noted: 2020-02-19 | Resolved: 2020-04-22

## 2020-04-22 LAB
BASOPHILS # BLD AUTO: 0.03 10*3/MM3 (ref 0–0.2)
BASOPHILS NFR BLD AUTO: 0.4 % (ref 0–1.5)
DEPRECATED RDW RBC AUTO: 71.3 FL (ref 37–54)
EOSINOPHIL # BLD AUTO: 0 10*3/MM3 (ref 0–0.4)
EOSINOPHIL NFR BLD AUTO: 0 % (ref 0.3–6.2)
ERYTHROCYTE [DISTWIDTH] IN BLOOD BY AUTOMATED COUNT: 18.8 % (ref 12.3–15.4)
GLUCOSE BLDC GLUCOMTR-MCNC: 151 MG/DL (ref 70–130)
GLUCOSE BLDC GLUCOMTR-MCNC: 157 MG/DL (ref 70–130)
HCT VFR BLD AUTO: 32.8 % (ref 37.5–51)
HGB BLD-MCNC: 9.7 G/DL (ref 13–17.7)
IMM GRANULOCYTES # BLD AUTO: 0.04 10*3/MM3 (ref 0–0.05)
IMM GRANULOCYTES NFR BLD AUTO: 0.5 % (ref 0–0.5)
LYMPHOCYTES # BLD AUTO: 0.95 10*3/MM3 (ref 0.7–3.1)
LYMPHOCYTES NFR BLD AUTO: 11.3 % (ref 19.6–45.3)
MCH RBC QN AUTO: 31.1 PG (ref 26.6–33)
MCHC RBC AUTO-ENTMCNC: 29.6 G/DL (ref 31.5–35.7)
MCV RBC AUTO: 105.1 FL (ref 79–97)
MONOCYTES # BLD AUTO: 0.44 10*3/MM3 (ref 0.1–0.9)
MONOCYTES NFR BLD AUTO: 5.2 % (ref 5–12)
NEUTROPHILS # BLD AUTO: 6.97 10*3/MM3 (ref 1.7–7)
NEUTROPHILS NFR BLD AUTO: 82.6 % (ref 42.7–76)
NRBC BLD AUTO-RTO: 0 /100 WBC (ref 0–0.2)
PLAT MORPH BLD: NORMAL
PLATELET # BLD AUTO: 239 10*3/MM3 (ref 140–450)
PMV BLD AUTO: 12.2 FL (ref 6–12)
POLYCHROMASIA BLD QL SMEAR: NORMAL
RBC # BLD AUTO: 3.12 10*6/MM3 (ref 4.14–5.8)
WBC MORPH BLD: NORMAL
WBC NRBC COR # BLD: 8.43 10*3/MM3 (ref 3.4–10.8)

## 2020-04-22 PROCEDURE — 63710000001 PREDNISONE PER 1 MG: Performed by: INTERNAL MEDICINE

## 2020-04-22 PROCEDURE — 25010000002 EPOETIN ALFA-EPBX 10000 UNIT/ML SOLUTION: Performed by: INTERNAL MEDICINE

## 2020-04-22 PROCEDURE — 63710000001 INSULIN LISPRO (HUMAN) PER 5 UNITS: Performed by: FAMILY MEDICINE

## 2020-04-22 PROCEDURE — 5A1D70Z PERFORMANCE OF URINARY FILTRATION, INTERMITTENT, LESS THAN 6 HOURS PER DAY: ICD-10-PCS | Performed by: INTERNAL MEDICINE

## 2020-04-22 PROCEDURE — 97110 THERAPEUTIC EXERCISES: CPT

## 2020-04-22 PROCEDURE — 85007 BL SMEAR W/DIFF WBC COUNT: CPT | Performed by: FAMILY MEDICINE

## 2020-04-22 PROCEDURE — 92526 ORAL FUNCTION THERAPY: CPT

## 2020-04-22 PROCEDURE — 99239 HOSP IP/OBS DSCHRG MGMT >30: CPT | Performed by: PHYSICIAN ASSISTANT

## 2020-04-22 PROCEDURE — 85025 COMPLETE CBC W/AUTO DIFF WBC: CPT | Performed by: FAMILY MEDICINE

## 2020-04-22 PROCEDURE — 82962 GLUCOSE BLOOD TEST: CPT

## 2020-04-22 RX ORDER — GABAPENTIN 400 MG/1
400 CAPSULE ORAL EVERY 12 HOURS SCHEDULED
Qty: 6 CAPSULE | Refills: 0 | OUTPATIENT
Start: 2020-04-22 | End: 2022-01-01 | Stop reason: HOSPADM

## 2020-04-22 RX ORDER — PANTOPRAZOLE SODIUM 40 MG/1
40 TABLET, DELAYED RELEASE ORAL
Status: DISCONTINUED | OUTPATIENT
Start: 2020-04-22 | End: 2020-04-22 | Stop reason: HOSPADM

## 2020-04-22 RX ORDER — LEVOTHYROXINE SODIUM 0.1 MG/1
200 TABLET ORAL
Status: DISCONTINUED | OUTPATIENT
Start: 2020-04-22 | End: 2020-04-22 | Stop reason: HOSPADM

## 2020-04-22 RX ORDER — PANTOPRAZOLE SODIUM 40 MG/1
40 TABLET, DELAYED RELEASE ORAL
Qty: 60 TABLET | Refills: 0 | Status: SHIPPED | OUTPATIENT
Start: 2020-04-22 | End: 2020-05-22

## 2020-04-22 RX ORDER — ALBUMIN (HUMAN) 12.5 G/50ML
12.5 SOLUTION INTRAVENOUS AS NEEDED
Status: DISCONTINUED | OUTPATIENT
Start: 2020-04-22 | End: 2020-04-22 | Stop reason: HOSPADM

## 2020-04-22 RX ORDER — BISACODYL 10 MG
10 SUPPOSITORY, RECTAL RECTAL DAILY PRN
Start: 2020-04-22

## 2020-04-22 RX ORDER — ACETAMINOPHEN 325 MG/1
650 TABLET ORAL EVERY 4 HOURS PRN
Start: 2020-04-22

## 2020-04-22 RX ORDER — ALBUMIN (HUMAN) 12.5 G/50ML
25 SOLUTION INTRAVENOUS AS NEEDED
Status: DISCONTINUED | OUTPATIENT
Start: 2020-04-22 | End: 2020-04-22 | Stop reason: HOSPADM

## 2020-04-22 RX ORDER — GUAIFENESIN 200 MG/1
400 TABLET ORAL EVERY 8 HOURS PRN
Qty: 30 TABLET | Refills: 0 | Status: SHIPPED | OUTPATIENT
Start: 2020-04-22 | End: 2020-04-27

## 2020-04-22 RX ORDER — PSEUDOEPHEDRINE HCL 30 MG
100 TABLET ORAL 2 TIMES DAILY
Start: 2020-04-22

## 2020-04-22 RX ORDER — FOLIC ACID 1 MG/1
1 TABLET ORAL DAILY
Qty: 30 TABLET | Refills: 0 | Status: SHIPPED | OUTPATIENT
Start: 2020-04-23 | End: 2020-05-23

## 2020-04-22 RX ORDER — HYDROCODONE BITARTRATE AND ACETAMINOPHEN 10; 325 MG/1; MG/1
1 TABLET ORAL EVERY 8 HOURS PRN
Qty: 9 TABLET | Refills: 0 | Status: SHIPPED | OUTPATIENT
Start: 2020-04-22 | End: 2020-04-25

## 2020-04-22 RX ADMIN — GUAIFENESIN 400 MG: 200 TABLET ORAL at 08:48

## 2020-04-22 RX ADMIN — PREDNISONE 20 MG: 20 TABLET ORAL at 08:48

## 2020-04-22 RX ADMIN — FOLIC ACID 1 MG: 1 TABLET ORAL at 08:48

## 2020-04-22 RX ADMIN — INSULIN LISPRO 2 UNITS: 100 INJECTION, SOLUTION INTRAVENOUS; SUBCUTANEOUS at 08:57

## 2020-04-22 RX ADMIN — HYDROCODONE BITARTRATE AND ACETAMINOPHEN 1 TABLET: 10; 325 TABLET ORAL at 05:52

## 2020-04-22 RX ADMIN — ALLOPURINOL 100 MG: 100 TABLET ORAL at 08:49

## 2020-04-22 RX ADMIN — GABAPENTIN 400 MG: 400 CAPSULE ORAL at 08:48

## 2020-04-22 RX ADMIN — DOCUSATE SODIUM 100 MG: 100 CAPSULE, LIQUID FILLED ORAL at 08:48

## 2020-04-22 RX ADMIN — EPOETIN ALFA-EPBX 20000 UNITS: 10000 INJECTION, SOLUTION INTRAVENOUS; SUBCUTANEOUS at 08:57

## 2020-04-22 RX ADMIN — PANTOPRAZOLE SODIUM 40 MG: 40 INJECTION, POWDER, FOR SOLUTION INTRAVENOUS at 08:48

## 2020-04-22 RX ADMIN — LIDOCAINE 4%: 4 CREAM TOPICAL at 08:54

## 2020-04-23 ENCOUNTER — READMISSION MANAGEMENT (OUTPATIENT)
Dept: CALL CENTER | Facility: HOSPITAL | Age: 75
End: 2020-04-23

## 2020-04-23 NOTE — OUTREACH NOTE
Prep Survey      Responses   [facility] facility patient discharged from?  [place]   Is LACE score < 7 ?  No   Eligibility  Readm Mgmt   Discharge diagnosis  Upper GI bleed Left lower lobe pneumonia    COVID-19 Test Status  Rule out   Does the patient have one of the following disease processes/diagnoses(primary or secondary)?  COPD/Pneumonia   Does the patient have Home health ordered?  Yes   What is the [facility] health agency?   [facility]    Prep survey completed?  Yes          Sonali Sin RN

## 2020-04-24 ENCOUNTER — READMISSION MANAGEMENT (OUTPATIENT)
Dept: CALL CENTER | Facility: HOSPITAL | Age: 75
End: 2020-04-24

## 2020-04-24 NOTE — OUTREACH NOTE
COPD/PN Week 1 Survey      Responses   [facility] patient discharged from?  [place]   COVID-19 Test Status  Negative   Does the patient have one of the following disease processes/diagnoses(primary or secondary)?  COPD/Pneumonia   Is there a successful TCM telephone encounter documented?  No   Was the primary reason for admission:  Pneumonia   Week 1 attempt successful?  Yes   Call start time  1500   Call end time  1509   Discharge diagnosis  Upper GI bleed Left lower lobe pneumonia    Is patient permission given to speak with other caregiver?  Yes   List who call center can speak with  Rohini, sig other, friend   Person spoke with today (if not patient) and relationship  Rohini   Meds reviewed with patient/caregiver?  Yes   Does the patient have all medications ordered at discharge?  No   What is keeping the patient from filling the prescriptions?  -- [Friend states that she is picking up prescriptions today. ]   Nursing Interventions  No intervention needed   Medication comments  Friend states that she has contacted physician regarding the stopped med, viberzi. She states that  has resumed that medication.    Does the patient have a primary care provider?   Yes   Does the patient have an appointment with their PCP or pulmonologist within 7 days of discharge?  Yes   Comments regarding PCP  EBONY MADDEN PCP. Appt scheduled for 4/29/20 1015am   Has the patient kept scheduled appointments due by today?  N/A   What is the Home health agency?   [facility]    Has home health visited the patient within 72 hours of discharge?  Call prior to 72 hours   Home health comments  HH scheduled to come tomorrow 4/25/20   DME comments  Friend states that they have all needed equipment except in need of a shower chair. I advised her to check with HH tomorrow regarding a shower chair.    Psychosocial issues?  No   Did the patient receive a copy of their discharge instructions?  Yes   Nursing interventions  Reviewed  instructions with patient [sig other, Rohini]   What is the patient's perception of their health status since discharge?  Improving   Is the patient/caregiver able to teach back the hierarchy of who to call/visit for symptoms/problems? PCP, Specialist, Home health nurse, Urgent Care, ED, 911  Yes   Is the patient/caregiver able to teach back signs and symptoms of worsening condition:  Fever/chills, Shortness of breath, Chest pain   Is the patient/caregiver able to teach back importance of completing antibiotic course of treatment?  Yes   Week 1 call completed?  Yes          Laura Chi RN

## 2020-04-25 ENCOUNTER — READMISSION MANAGEMENT (OUTPATIENT)
Dept: CALL CENTER | Facility: HOSPITAL | Age: 75
End: 2020-04-25

## 2020-04-25 NOTE — OUTREACH NOTE
COPD/PN Week 1 Survey      Responses   [facility] patient discharged from?  [place]   COVID-19 Test Status  Negative   Does the patient have one of the following disease processes/diagnoses(primary or secondary)?  COPD/Pneumonia   Is there a successful TCM telephone encounter documented?  No   Was the primary reason for admission:  Pneumonia   Week 1 attempt successful?  Yes   Call start time  1329   Call end time  1334   Discharge diagnosis  Upper GI bleed Left lower lobe pneumonia    Is patient permission given to speak with other caregiver?  Yes   List who call center can speak with  Rohini, sig other, friend   Person spoke with today (if not patient) and relationship  Rohini   Meds reviewed with patient/caregiver?  Yes   Is the patient having any side effects they believe may be caused by any medication additions or changes?  No   Is the patient taking all medications as directed (includes completed medication regime)?  Yes   Medication comments  Friend states that she has contacted physician regarding the stopped med, viberzi. She states that  has resumed that medication.    Does the patient have a primary care provider?   Yes   Does the patient have an appointment with their PCP or pulmonologist within 7 days of discharge?  Yes   Comments regarding PCP  EBONY MADDEN PCP. Appt scheduled for 4/29/20 1015am   Has the patient kept scheduled appointments due by today?  N/A   What is the Home health agency?   [facility]    Has home health visited the patient within 72 hours of discharge?  No   Home health comments   has contacted and will start visits on Monday 4/27/20   Psychosocial issues?  No   Did the patient receive a copy of their discharge instructions?  Yes   Nursing interventions  Reviewed instructions with patient [sig other, Rohini]   What is the patient's perception of their health status since discharge?  Improving   Is the patient/caregiver able to teach back the hierarchy of who to  call/visit for symptoms/problems? PCP, Specialist, Home health nurse, Urgent Care, ED, 911  Yes   Is the patient/caregiver able to teach back signs and symptoms of worsening condition:  Fever/chills, Shortness of breath, Chest pain   Is the patient/caregiver able to teach back importance of completing antibiotic course of treatment?  Yes   Week 1 call completed?  Yes          Laura Chi RN

## 2020-04-28 ENCOUNTER — READMISSION MANAGEMENT (OUTPATIENT)
Dept: CALL CENTER | Facility: HOSPITAL | Age: 75
End: 2020-04-28

## 2020-04-28 NOTE — OUTREACH NOTE
COPD/PN Week 1 Survey      Responses   [facility] patient discharged from?  [place]   COVID-19 Test Status  Negative   Does the patient have one of the following disease processes/diagnoses(primary or secondary)?  COPD/Pneumonia   Is there a successful TCM telephone encounter documented?  No   Was the primary reason for admission:  Pneumonia   Week 1 attempt successful?  Yes   Call start time  1502   Call end time  1508   General alerts for this patient  HD on MWF   Discharge diagnosis  Upper GI bleed Left lower lobe pneumonia    List who call center can speak with  Rohini, mani other, friend   Person spoke with today (if not patient) and relationship  Rohini Oliva reviewed with patient/caregiver?  Yes   Is the patient having any side effects they believe may be caused by any medication additions or changes?  No   Is the patient taking all medications as directed (includes completed medication regime)?  Yes   Does the patient have a primary care provider?   Yes   Does the patient have an appointment with their PCP or pulmonologist within 7 days of discharge?  Yes   Comments regarding PCP  EBONY MADDEN PCP. Appt scheduled for 4/29/20 1015am   Has the patient kept scheduled appointments due by today?  N/A   What is the [facility] health agency[facility]   Psychosocial issues?  No   Comments  Rohini reports pt's shingles are spreading.  Pt left hospital with shingles and they have worsened.  PCP notified and his recommendation was to go to ED if pt unable to tolerate pain.     Did the patient receive a copy of their discharge instructions?  Yes   Nursing interventions  Reviewed instructions with patient   What is the patient's perception of their health status since discharge?  Improving   Is the patient/caregiver able to teach back the hierarchy of who to call/visit for symptoms/problems? PCP, Specialist, Home health nurse, Urgent Care, ED, 911  Yes   Week 1 call completed?  Yes   Wrap up additional comments   Rohini reports pt is having trouble with shingles.  She denies any other needs during this call.          Autumn Garcia RN

## 2020-05-01 ENCOUNTER — READMISSION MANAGEMENT (OUTPATIENT)
Dept: CALL CENTER | Facility: HOSPITAL | Age: 75
End: 2020-05-01

## 2020-05-01 NOTE — OUTREACH NOTE
COPD/PN Week 2 Survey      Responses   [facility] patient discharged from?  [place]   COVID-19 Test Status  Negative   Does the patient have one of the following disease processes/diagnoses(primary or secondary)?  COPD/Pneumonia   Was the primary reason for admission:  Pneumonia   Week 2 attempt successful?  Yes   Call start time  1328   Call end time  1337   General alerts for this patient  HD on MWF   Discharge diagnosis  Upper GI bleed Left lower lobe pneumonia    Is patient permission given to speak with other caregiver?  Yes   List who call center can speak with  Rohini, sig other, friend   Person spoke with today (if not patient) and relationship  Rohini Oliva reviewed with patient/caregiver?  Yes   Is the patient having any side effects they believe may be caused by any medication additions or changes?  No   Does the patient have all medications ordered at discharge?  Yes   Is the patient taking all medications as directed (includes completed medication regime)?  Yes   Does the patient have a primary care provider?   Yes   Does the patient have an appointment with their PCP or pulmonologist within 7 days of discharge?  Yes   Has the patient kept scheduled appointments due by today?  Yes   Did the patient receive a copy of their discharge instructions?  Yes   Nursing interventions  Reviewed instructions with patient   What is the patient's perception of their health status since discharge?  Improving   Is the patient/caregiver able to teach back the hierarchy of who to call/visit for symptoms/problems? PCP, Specialist, Home health nurse, Urgent Care, ED, 911  Yes   Additional teach back comments  Rohini/fabio was upset on the fact when he came home he had singles and was not given anything for it or addressed it.  Advised to contact hospital and speak with the patient relations and voice her concerns to them.  States he was in alot of pain and was in tears the next day from this.  His doctor told him to  increase his neurontin and they also contacted his pain management doctor who was also ok with that.  States that this has helped.     Is the patient/caregiver able to teach back signs and symptoms of worsening condition:  Fever/chills, Shortness of breath, Chest pain   Is the patient/caregiver able to teach back importance of completing antibiotic course of treatment?  Yes   Week 2 call completed?  Yes   Wrap up additional comments  They will call and speak with patient relations regarding discharge.          Lavinia Robles LPN

## 2020-05-04 ENCOUNTER — READMISSION MANAGEMENT (OUTPATIENT)
Dept: CALL CENTER | Facility: HOSPITAL | Age: 75
End: 2020-05-04

## 2020-05-04 NOTE — OUTREACH NOTE
COPD/PN Week 2 Survey      Responses   [facility] patient discharged from?  [place]   COVID-19 Test Status  Negative   Does the patient have one of the following disease processes/diagnoses(primary or secondary)?  COPD/Pneumonia   Was the primary reason for admission:  Pneumonia   Week 2 attempt successful?  No   Unsuccessful attempts  Attempt 1          Laura Chi RN

## 2020-05-11 ENCOUNTER — READMISSION MANAGEMENT (OUTPATIENT)
Dept: CALL CENTER | Facility: HOSPITAL | Age: 75
End: 2020-05-11

## 2020-05-11 LAB — FUNGUS WND CULT: ABNORMAL

## 2020-05-11 NOTE — OUTREACH NOTE
COPD/PN Week 3 Survey      Responses   [facility] patient discharged from?  [place]   COVID-19 Test Status  Negative   Does the patient have one of the following disease processes/diagnoses(primary or secondary)?  COPD/Pneumonia   Was the primary reason for admission:  Pneumonia   Week 3 attempt successful?  No   Unsuccessful attempts  Attempt 1          Earnestine Pacheco RN

## 2020-05-19 ENCOUNTER — READMISSION MANAGEMENT (OUTPATIENT)
Dept: CALL CENTER | Facility: HOSPITAL | Age: 75
End: 2020-05-19

## 2020-05-24 LAB
MYCOBACTERIUM SPEC CULT: NORMAL
NIGHT BLUE STAIN TISS: NORMAL

## 2020-07-23 NOTE — OUTREACH NOTE
COPD/PN Week 4 Survey      Responses   [facility] patient discharged from?  [place]   COVID-19 Test Status  Negative   Does the patient have one of the following disease processes/diagnoses(primary or secondary)?  COPD/Pneumonia   Was the primary reason for admission:  Pneumonia   Week 4 attempt successful?  No          Laura Chi RN   Subjective   Patient ID: Arnol is a 72 year old female.    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 Considering moving back to [facility].Has 6 siblings in [place]-immigrated -  at age 31 y/o-   Feeling depressed lately- crying a lot-  3/24/18-  Sleep ok- energy level is decreased-concentration is ok- appetite ok- pos anhedonia-willing to see a counselor.Feeling depressed between death of , isloation from covid- inability to go out with friends.  htn- checks bp daily- 120-130- 70s- stopped bp meds and bp went up to 190- now wnl-forgot bp med this am-forgets to take lisinopril 3 days/week;  aodm-checks bs daily <140;- occ low bs- accuchec 79 shakiness-    Current Outpatient Medications   Medication Sig Dispense Refill   • Multiple Vitamins-Minerals (VITAMIN - THERAPEUTIC MULTIVITAMINS W/MINERALS) tablet Take by mouth daily.     • lisinopril (ZESTRIL) 10 MG tablet Take 10 mg by mouth daily.     • estradiol (ESTRACE) 0.1 MG/GM vaginal cream INSERT MEDIUM AMOUNT WITH FINGER INTO VAGINA DAILY  3   • glyBURIDE (DIABETA) 2.5 MG tablet Take 1 tablet by mouth daily (with breakfast). 90 tablet 3   • blood glucose (ACCU-CHEK CANDIDA PLUS) test strip Test blood sugar 1 times daily as directed. Diagnosis: aodm. Meter: accu-chek candida plus 100 strip 3   • escitalopram (LEXAPRO) 10 MG tablet Take 1 tablet by mouth daily. 90 tablet 3   • metFORMIN (GLUCOPHAGE-XR) 500 MG 24 hr tablet Take 1 tablet by mouth daily (with breakfast). 30 tablet 11   • Coenzyme Q10 (CO Q-10) 100 MG Cap Take by mouth daily.       No current facility-administered medications for this visit.      Patient's allergies, past medical, surgical, social and family histories were reviewed and updated as appropriate.    Review of Systems   All other systems reviewed and  are negative.      Objective   Vitals:    07/23/20 1054   BP: (!) 148/76   Pulse:    Temp:      Physical Exam  Constitutional:       Appearance: She is well-developed.   HENT:      Head: Normocephalic and atraumatic.      Right Ear: External ear normal.      Left Ear: External ear normal.      Nose: Nose normal.   Eyes:      Conjunctiva/sclera: Conjunctivae normal.      Pupils: Pupils are equal, round, and reactive to light.   Neck:      Musculoskeletal: Normal range of motion and neck supple.   Cardiovascular:      Rate and Rhythm: Normal rate and regular rhythm.      Heart sounds: Normal heart sounds.   Pulmonary:      Effort: Pulmonary effort is normal.      Breath sounds: Normal breath sounds.   Abdominal:      General: Bowel sounds are normal.      Palpations: Abdomen is soft.   Musculoskeletal: Normal range of motion.   Skin:     General: Skin is warm and dry.   Neurological:      Mental Status: She is alert and oriented to person, place, and time.   Psychiatric:         Behavior: Behavior normal.         Thought Content: Thought content normal.         Patient Active Problem List   Diagnosis   • Type 2 diabetes mellitus with hypoglycemia, without long-term current use of insulin (CMS/HCC)   • Fatty liver   • Abnormal mammogram   • Atopic dermatitis   • Chronic antral gastritis   • H/O colonoscopy   • Encounter for general health examination   • Esophageal reflux   • Herpes simplex   • Hypercholesterolemia   • Hypertension   • Need for influenza vaccination   • Osteoarthritis of glenohumeral joint, right   • Postmenopausal atrophic vaginitis   • Psoriasis   • Visit for screening mammogram   • Benign neoplasm of colon   • Stricture or kinking of ureter   • Encounter for examination following treatment at hospital   • 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 Recheck bp in 2 months.    Type 2 diabetes mellitus with hypoglycemia, without long-term current use of insulin (CMS/HCC)  D/c glyburide and start metformin  mg q day- sedre- recheck hba1c in 3 months    Hypercholesterolemia  Defers statin use at this time.    Benign neoplasm of colon  Dr Rome-Repeated cscopy 11/2015- needs repeat 11/ 2025      Esophageal reflux  No recurrence of sx- observe    Gallstones  No sx- observe    Fatty liver  [facility] wnl 7/19- recheck at next blood draw-- wt stable- observe      Schedule follow up: 3 month

## 2021-01-01 ENCOUNTER — APPOINTMENT (OUTPATIENT)
Dept: NEPHROLOGY | Facility: HOSPITAL | Age: 76
End: 2021-01-01

## 2021-01-01 ENCOUNTER — APPOINTMENT (OUTPATIENT)
Dept: GENERAL RADIOLOGY | Facility: HOSPITAL | Age: 76
End: 2021-01-01

## 2021-01-01 ENCOUNTER — ANESTHESIA EVENT CONVERTED (OUTPATIENT)
Dept: ANESTHESIOLOGY | Facility: HOSPITAL | Age: 76
End: 2021-01-01

## 2021-01-01 ENCOUNTER — ANESTHESIA (OUTPATIENT)
Dept: PERIOP | Facility: HOSPITAL | Age: 76
End: 2021-01-01

## 2021-01-01 ENCOUNTER — HOSPITAL ENCOUNTER (INPATIENT)
Facility: HOSPITAL | Age: 76
LOS: 28 days | Discharge: NURSING, REHAB OR LONG-TERM CARE | End: 2022-01-11
Attending: EMERGENCY MEDICINE | Admitting: STUDENT IN AN ORGANIZED HEALTH CARE EDUCATION/TRAINING PROGRAM

## 2021-01-01 ENCOUNTER — APPOINTMENT (OUTPATIENT)
Dept: CARDIOLOGY | Facility: HOSPITAL | Age: 76
End: 2021-01-01

## 2021-01-01 ENCOUNTER — APPOINTMENT (OUTPATIENT)
Dept: MRI IMAGING | Facility: HOSPITAL | Age: 76
End: 2021-01-01

## 2021-01-01 ENCOUNTER — APPOINTMENT (OUTPATIENT)
Dept: CT IMAGING | Facility: HOSPITAL | Age: 76
End: 2021-01-01

## 2021-01-01 ENCOUNTER — ANESTHESIA EVENT (OUTPATIENT)
Dept: PERIOP | Facility: HOSPITAL | Age: 76
End: 2021-01-01

## 2021-01-01 DIAGNOSIS — Z85.51 HISTORY OF BLADDER CANCER: ICD-10-CM

## 2021-01-01 DIAGNOSIS — N18.5 ANEMIA OF CHRONIC RENAL FAILURE, STAGE 5: ICD-10-CM

## 2021-01-01 DIAGNOSIS — I48.0 PAF (PAROXYSMAL ATRIAL FIBRILLATION): ICD-10-CM

## 2021-01-01 DIAGNOSIS — Z87.19 HISTORY OF GI BLEED: ICD-10-CM

## 2021-01-01 DIAGNOSIS — I96 GANGRENE OF TOE OF LEFT FOOT: ICD-10-CM

## 2021-01-01 DIAGNOSIS — N18.6 ESRD ON HEMODIALYSIS: ICD-10-CM

## 2021-01-01 DIAGNOSIS — Z99.2 ESRD ON HEMODIALYSIS: ICD-10-CM

## 2021-01-01 DIAGNOSIS — D63.1 ANEMIA OF CHRONIC RENAL FAILURE, STAGE 5: ICD-10-CM

## 2021-01-01 DIAGNOSIS — Z86.39 HISTORY OF DIABETES MELLITUS: ICD-10-CM

## 2021-01-01 DIAGNOSIS — Z85.46 HISTORY OF PROSTATE CANCER: ICD-10-CM

## 2021-01-01 DIAGNOSIS — L03.116 CELLULITIS OF LEFT FOOT: Primary | ICD-10-CM

## 2021-01-01 DIAGNOSIS — R79.89 ELEVATED LACTIC ACID LEVEL: ICD-10-CM

## 2021-01-01 DIAGNOSIS — N39.0 RECURRENT UTI: ICD-10-CM

## 2021-01-01 DIAGNOSIS — Z86.79 HISTORY OF CHF (CONGESTIVE HEART FAILURE): ICD-10-CM

## 2021-01-01 DIAGNOSIS — I96 GANGRENE OF LEFT FOOT: ICD-10-CM

## 2021-01-01 LAB
ABO GROUP BLD: NORMAL
ALBUMIN SERPL-MCNC: 2.2 G/DL (ref 3.5–5.2)
ALBUMIN SERPL-MCNC: 2.2 G/DL (ref 3.5–5.2)
ALBUMIN SERPL-MCNC: 2.3 G/DL (ref 3.5–5.2)
ALBUMIN SERPL-MCNC: 2.7 G/DL (ref 3.5–5.2)
ALBUMIN SERPL-MCNC: 2.8 G/DL (ref 3.5–5.2)
ALBUMIN SERPL-MCNC: 2.8 G/DL (ref 3.5–5.2)
ALBUMIN SERPL-MCNC: 2.9 G/DL (ref 3.5–5.2)
ALBUMIN SERPL-MCNC: 3.4 G/DL (ref 3.5–5.2)
ALBUMIN/GLOB SERPL: 0.5 G/DL
ALBUMIN/GLOB SERPL: 0.6 G/DL
ALBUMIN/GLOB SERPL: 0.6 G/DL
ALBUMIN/GLOB SERPL: 0.7 G/DL
ALBUMIN/GLOB SERPL: 1 G/DL
ALP SERPL-CCNC: 125 U/L (ref 39–117)
ALP SERPL-CCNC: 125 U/L (ref 39–117)
ALP SERPL-CCNC: 126 U/L (ref 39–117)
ALP SERPL-CCNC: 138 U/L (ref 39–117)
ALP SERPL-CCNC: 85 U/L (ref 39–117)
ALT SERPL W P-5'-P-CCNC: 7 U/L (ref 1–41)
ALT SERPL W P-5'-P-CCNC: 8 U/L (ref 1–41)
ALT SERPL W P-5'-P-CCNC: 8 U/L (ref 1–41)
ALT SERPL W P-5'-P-CCNC: 9 U/L (ref 1–41)
ALT SERPL W P-5'-P-CCNC: <5 U/L (ref 1–41)
ANION GAP SERPL CALCULATED.3IONS-SCNC: 11 MMOL/L (ref 5–15)
ANION GAP SERPL CALCULATED.3IONS-SCNC: 12 MMOL/L (ref 5–15)
ANION GAP SERPL CALCULATED.3IONS-SCNC: 13 MMOL/L (ref 5–15)
ANION GAP SERPL CALCULATED.3IONS-SCNC: 13 MMOL/L (ref 5–15)
ANION GAP SERPL CALCULATED.3IONS-SCNC: 14 MMOL/L (ref 5–15)
ANION GAP SERPL CALCULATED.3IONS-SCNC: 17 MMOL/L (ref 5–15)
APTT PPP: 37.9 SECONDS (ref 22–39)
AST SERPL-CCNC: 10 U/L (ref 1–40)
AST SERPL-CCNC: 13 U/L (ref 1–40)
AST SERPL-CCNC: 16 U/L (ref 1–40)
BACTERIA SPEC AEROBE CULT: ABNORMAL
BACTERIA SPEC AEROBE CULT: NORMAL
BACTERIA UR QL AUTO: ABNORMAL /HPF
BASOPHILS # BLD AUTO: 0 10*3/MM3 (ref 0–0.2)
BASOPHILS # BLD AUTO: 0.02 10*3/MM3 (ref 0–0.2)
BASOPHILS # BLD AUTO: 0.03 10*3/MM3 (ref 0–0.2)
BASOPHILS # BLD AUTO: 0.03 10*3/MM3 (ref 0–0.2)
BASOPHILS # BLD AUTO: 0.04 10*3/MM3 (ref 0–0.2)
BASOPHILS # BLD AUTO: 0.05 10*3/MM3 (ref 0–0.2)
BASOPHILS NFR BLD AUTO: 0 % (ref 0–1.5)
BASOPHILS NFR BLD AUTO: 0.3 % (ref 0–1.5)
BASOPHILS NFR BLD AUTO: 0.4 % (ref 0–1.5)
BASOPHILS NFR BLD AUTO: 0.5 % (ref 0–1.5)
BASOPHILS NFR BLD AUTO: 0.6 % (ref 0–1.5)
BASOPHILS NFR BLD AUTO: 0.6 % (ref 0–1.5)
BASOPHILS NFR BLD AUTO: 0.7 % (ref 0–1.5)
BASOPHILS NFR BLD AUTO: 0.7 % (ref 0–1.5)
BH CV LOWER ARTERIAL LEFT GREAT TOE SYS MAX: 0 MMHG
BH CV LOWER ARTERIAL LEFT TBI RATIO: 0
BH CV LOWER ARTERIAL RIGHT GREAT TOE SYS MAX: 13 MMHG
BH CV LOWER ARTERIAL RIGHT TBI RATIO: 0.11
BILIRUB SERPL-MCNC: 0.2 MG/DL (ref 0–1.2)
BILIRUB SERPL-MCNC: 0.3 MG/DL (ref 0–1.2)
BILIRUB SERPL-MCNC: 0.3 MG/DL (ref 0–1.2)
BILIRUB UR QL STRIP: NEGATIVE
BLD GP AB SCN SERPL QL: NEGATIVE
BUN SERPL-MCNC: 20 MG/DL (ref 8–23)
BUN SERPL-MCNC: 21 MG/DL (ref 8–23)
BUN SERPL-MCNC: 21 MG/DL (ref 8–23)
BUN SERPL-MCNC: 22 MG/DL (ref 8–23)
BUN SERPL-MCNC: 22 MG/DL (ref 8–23)
BUN SERPL-MCNC: 24 MG/DL (ref 8–23)
BUN SERPL-MCNC: 26 MG/DL (ref 8–23)
BUN SERPL-MCNC: 26 MG/DL (ref 8–23)
BUN SERPL-MCNC: 27 MG/DL (ref 8–23)
BUN SERPL-MCNC: 27 MG/DL (ref 8–23)
BUN SERPL-MCNC: 30 MG/DL (ref 8–23)
BUN SERPL-MCNC: 33 MG/DL (ref 8–23)
BUN/CREAT SERPL: 4.9 (ref 7–25)
BUN/CREAT SERPL: 4.9 (ref 7–25)
BUN/CREAT SERPL: 5 (ref 7–25)
BUN/CREAT SERPL: 5.2 (ref 7–25)
BUN/CREAT SERPL: 5.2 (ref 7–25)
BUN/CREAT SERPL: 5.4 (ref 7–25)
BUN/CREAT SERPL: 5.5 (ref 7–25)
BUN/CREAT SERPL: 5.9 (ref 7–25)
BUN/CREAT SERPL: 6 (ref 7–25)
BUN/CREAT SERPL: 6.7 (ref 7–25)
CALCIUM SPEC-SCNC: 7.4 MG/DL (ref 8.6–10.5)
CALCIUM SPEC-SCNC: 7.5 MG/DL (ref 8.6–10.5)
CALCIUM SPEC-SCNC: 7.9 MG/DL (ref 8.6–10.5)
CALCIUM SPEC-SCNC: 8 MG/DL (ref 8.6–10.5)
CALCIUM SPEC-SCNC: 8.1 MG/DL (ref 8.6–10.5)
CALCIUM SPEC-SCNC: 8.3 MG/DL (ref 8.6–10.5)
CHLORIDE SERPL-SCNC: 100 MMOL/L (ref 98–107)
CHLORIDE SERPL-SCNC: 101 MMOL/L (ref 98–107)
CHLORIDE SERPL-SCNC: 101 MMOL/L (ref 98–107)
CHLORIDE SERPL-SCNC: 102 MMOL/L (ref 98–107)
CHLORIDE SERPL-SCNC: 103 MMOL/L (ref 98–107)
CHLORIDE SERPL-SCNC: 104 MMOL/L (ref 98–107)
CHLORIDE SERPL-SCNC: 105 MMOL/L (ref 98–107)
CHLORIDE SERPL-SCNC: 97 MMOL/L (ref 98–107)
CHLORIDE SERPL-SCNC: 99 MMOL/L (ref 98–107)
CK SERPL-CCNC: 31 U/L (ref 20–200)
CLARITY UR: ABNORMAL
CO2 SERPL-SCNC: 20 MMOL/L (ref 22–29)
CO2 SERPL-SCNC: 20 MMOL/L (ref 22–29)
CO2 SERPL-SCNC: 22 MMOL/L (ref 22–29)
CO2 SERPL-SCNC: 22 MMOL/L (ref 22–29)
CO2 SERPL-SCNC: 23 MMOL/L (ref 22–29)
CO2 SERPL-SCNC: 24 MMOL/L (ref 22–29)
CO2 SERPL-SCNC: 25 MMOL/L (ref 22–29)
CO2 SERPL-SCNC: 25 MMOL/L (ref 22–29)
CO2 SERPL-SCNC: 26 MMOL/L (ref 22–29)
CO2 SERPL-SCNC: 28 MMOL/L (ref 22–29)
COLOR UR: YELLOW
CREAT SERPL-MCNC: 3.51 MG/DL (ref 0.76–1.27)
CREAT SERPL-MCNC: 4.07 MG/DL (ref 0.76–1.27)
CREAT SERPL-MCNC: 4.18 MG/DL (ref 0.76–1.27)
CREAT SERPL-MCNC: 4.2 MG/DL (ref 0.76–1.27)
CREAT SERPL-MCNC: 4.26 MG/DL (ref 0.76–1.27)
CREAT SERPL-MCNC: 4.38 MG/DL (ref 0.76–1.27)
CREAT SERPL-MCNC: 4.48 MG/DL (ref 0.76–1.27)
CREAT SERPL-MCNC: 4.49 MG/DL (ref 0.76–1.27)
CREAT SERPL-MCNC: 4.56 MG/DL (ref 0.76–1.27)
CREAT SERPL-MCNC: 4.57 MG/DL (ref 0.76–1.27)
CREAT SERPL-MCNC: 5.33 MG/DL (ref 0.76–1.27)
CREAT SERPL-MCNC: 5.95 MG/DL (ref 0.76–1.27)
D-LACTATE SERPL-SCNC: 2.1 MMOL/L (ref 0.5–2)
D-LACTATE SERPL-SCNC: 2.2 MMOL/L (ref 0.5–2)
D-LACTATE SERPL-SCNC: 2.7 MMOL/L (ref 0.5–2)
DEPRECATED RDW RBC AUTO: 57.4 FL (ref 37–54)
DEPRECATED RDW RBC AUTO: 59.2 FL (ref 37–54)
DEPRECATED RDW RBC AUTO: 59.6 FL (ref 37–54)
DEPRECATED RDW RBC AUTO: 59.8 FL (ref 37–54)
DEPRECATED RDW RBC AUTO: 60 FL (ref 37–54)
DEPRECATED RDW RBC AUTO: 60.8 FL (ref 37–54)
DEPRECATED RDW RBC AUTO: 62 FL (ref 37–54)
DEPRECATED RDW RBC AUTO: 62.1 FL (ref 37–54)
DEPRECATED RDW RBC AUTO: 62.2 FL (ref 37–54)
DEPRECATED RDW RBC AUTO: 62.3 FL (ref 37–54)
EOSINOPHIL # BLD AUTO: 0 10*3/MM3 (ref 0–0.4)
EOSINOPHIL # BLD AUTO: 0.08 10*3/MM3 (ref 0–0.4)
EOSINOPHIL # BLD AUTO: 0.11 10*3/MM3 (ref 0–0.4)
EOSINOPHIL # BLD AUTO: 0.13 10*3/MM3 (ref 0–0.4)
EOSINOPHIL # BLD AUTO: 0.13 10*3/MM3 (ref 0–0.4)
EOSINOPHIL # BLD AUTO: 0.15 10*3/MM3 (ref 0–0.4)
EOSINOPHIL # BLD AUTO: 0.15 10*3/MM3 (ref 0–0.4)
EOSINOPHIL # BLD AUTO: 0.16 10*3/MM3 (ref 0–0.4)
EOSINOPHIL NFR BLD AUTO: 0 % (ref 0.3–6.2)
EOSINOPHIL NFR BLD AUTO: 1.4 % (ref 0.3–6.2)
EOSINOPHIL NFR BLD AUTO: 1.6 % (ref 0.3–6.2)
EOSINOPHIL NFR BLD AUTO: 1.6 % (ref 0.3–6.2)
EOSINOPHIL NFR BLD AUTO: 1.8 % (ref 0.3–6.2)
EOSINOPHIL NFR BLD AUTO: 2.1 % (ref 0.3–6.2)
EOSINOPHIL NFR BLD AUTO: 2.3 % (ref 0.3–6.2)
EOSINOPHIL NFR BLD AUTO: 3.4 % (ref 0.3–6.2)
ERYTHROCYTE [DISTWIDTH] IN BLOOD BY AUTOMATED COUNT: 16.4 % (ref 12.3–15.4)
ERYTHROCYTE [DISTWIDTH] IN BLOOD BY AUTOMATED COUNT: 16.4 % (ref 12.3–15.4)
ERYTHROCYTE [DISTWIDTH] IN BLOOD BY AUTOMATED COUNT: 16.5 % (ref 12.3–15.4)
ERYTHROCYTE [DISTWIDTH] IN BLOOD BY AUTOMATED COUNT: 16.5 % (ref 12.3–15.4)
ERYTHROCYTE [DISTWIDTH] IN BLOOD BY AUTOMATED COUNT: 16.7 % (ref 12.3–15.4)
ERYTHROCYTE [DISTWIDTH] IN BLOOD BY AUTOMATED COUNT: 16.7 % (ref 12.3–15.4)
ERYTHROCYTE [DISTWIDTH] IN BLOOD BY AUTOMATED COUNT: 16.9 % (ref 12.3–15.4)
ERYTHROCYTE [DISTWIDTH] IN BLOOD BY AUTOMATED COUNT: 17.1 % (ref 12.3–15.4)
ERYTHROCYTE [DISTWIDTH] IN BLOOD BY AUTOMATED COUNT: 17.3 % (ref 12.3–15.4)
ERYTHROCYTE [DISTWIDTH] IN BLOOD BY AUTOMATED COUNT: 17.4 % (ref 12.3–15.4)
FERRITIN SERPL-MCNC: 775.9 NG/ML (ref 30–400)
FLUAV SUBTYP SPEC NAA+PROBE: NOT DETECTED
FLUBV RNA ISLT QL NAA+PROBE: NOT DETECTED
FOLATE SERPL-MCNC: 6.79 NG/ML (ref 4.78–24.2)
GFR SERPL CREATININE-BSD FRML MDRD: 11 ML/MIN/1.73
GFR SERPL CREATININE-BSD FRML MDRD: 13 ML/MIN/1.73
GFR SERPL CREATININE-BSD FRML MDRD: 14 ML/MIN/1.73
GFR SERPL CREATININE-BSD FRML MDRD: 17 ML/MIN/1.73
GFR SERPL CREATININE-BSD FRML MDRD: 9 ML/MIN/1.73
GFR SERPL CREATININE-BSD FRML MDRD: ABNORMAL ML/MIN/{1.73_M2}
GLOBULIN UR ELPH-MCNC: 3.4 GM/DL
GLOBULIN UR ELPH-MCNC: 3.8 GM/DL
GLOBULIN UR ELPH-MCNC: 3.9 GM/DL
GLOBULIN UR ELPH-MCNC: 4.2 GM/DL
GLOBULIN UR ELPH-MCNC: 4.3 GM/DL
GLUCOSE BLDC GLUCOMTR-MCNC: 100 MG/DL (ref 70–130)
GLUCOSE BLDC GLUCOMTR-MCNC: 103 MG/DL (ref 70–130)
GLUCOSE BLDC GLUCOMTR-MCNC: 104 MG/DL (ref 70–130)
GLUCOSE BLDC GLUCOMTR-MCNC: 112 MG/DL (ref 70–130)
GLUCOSE BLDC GLUCOMTR-MCNC: 117 MG/DL (ref 70–130)
GLUCOSE BLDC GLUCOMTR-MCNC: 118 MG/DL (ref 70–130)
GLUCOSE BLDC GLUCOMTR-MCNC: 121 MG/DL (ref 70–130)
GLUCOSE BLDC GLUCOMTR-MCNC: 122 MG/DL (ref 70–130)
GLUCOSE BLDC GLUCOMTR-MCNC: 123 MG/DL (ref 70–130)
GLUCOSE BLDC GLUCOMTR-MCNC: 124 MG/DL (ref 70–130)
GLUCOSE BLDC GLUCOMTR-MCNC: 125 MG/DL (ref 70–130)
GLUCOSE BLDC GLUCOMTR-MCNC: 126 MG/DL (ref 70–130)
GLUCOSE BLDC GLUCOMTR-MCNC: 127 MG/DL (ref 70–130)
GLUCOSE BLDC GLUCOMTR-MCNC: 128 MG/DL (ref 70–130)
GLUCOSE BLDC GLUCOMTR-MCNC: 133 MG/DL (ref 70–130)
GLUCOSE BLDC GLUCOMTR-MCNC: 134 MG/DL (ref 70–130)
GLUCOSE BLDC GLUCOMTR-MCNC: 135 MG/DL (ref 70–130)
GLUCOSE BLDC GLUCOMTR-MCNC: 137 MG/DL (ref 70–130)
GLUCOSE BLDC GLUCOMTR-MCNC: 138 MG/DL (ref 70–130)
GLUCOSE BLDC GLUCOMTR-MCNC: 138 MG/DL (ref 70–130)
GLUCOSE BLDC GLUCOMTR-MCNC: 140 MG/DL (ref 70–130)
GLUCOSE BLDC GLUCOMTR-MCNC: 141 MG/DL (ref 70–130)
GLUCOSE BLDC GLUCOMTR-MCNC: 144 MG/DL (ref 70–130)
GLUCOSE BLDC GLUCOMTR-MCNC: 147 MG/DL (ref 70–130)
GLUCOSE BLDC GLUCOMTR-MCNC: 152 MG/DL (ref 70–130)
GLUCOSE BLDC GLUCOMTR-MCNC: 152 MG/DL (ref 70–130)
GLUCOSE BLDC GLUCOMTR-MCNC: 154 MG/DL (ref 70–130)
GLUCOSE BLDC GLUCOMTR-MCNC: 157 MG/DL (ref 70–130)
GLUCOSE BLDC GLUCOMTR-MCNC: 159 MG/DL (ref 70–130)
GLUCOSE BLDC GLUCOMTR-MCNC: 161 MG/DL (ref 70–130)
GLUCOSE BLDC GLUCOMTR-MCNC: 162 MG/DL (ref 70–130)
GLUCOSE BLDC GLUCOMTR-MCNC: 165 MG/DL (ref 70–130)
GLUCOSE BLDC GLUCOMTR-MCNC: 165 MG/DL (ref 70–130)
GLUCOSE BLDC GLUCOMTR-MCNC: 169 MG/DL (ref 70–130)
GLUCOSE BLDC GLUCOMTR-MCNC: 169 MG/DL (ref 70–130)
GLUCOSE BLDC GLUCOMTR-MCNC: 172 MG/DL (ref 70–130)
GLUCOSE BLDC GLUCOMTR-MCNC: 177 MG/DL (ref 70–130)
GLUCOSE BLDC GLUCOMTR-MCNC: 178 MG/DL (ref 70–130)
GLUCOSE BLDC GLUCOMTR-MCNC: 178 MG/DL (ref 70–130)
GLUCOSE BLDC GLUCOMTR-MCNC: 179 MG/DL (ref 70–130)
GLUCOSE BLDC GLUCOMTR-MCNC: 185 MG/DL (ref 70–130)
GLUCOSE BLDC GLUCOMTR-MCNC: 185 MG/DL (ref 70–130)
GLUCOSE BLDC GLUCOMTR-MCNC: 188 MG/DL (ref 70–130)
GLUCOSE BLDC GLUCOMTR-MCNC: 189 MG/DL (ref 70–130)
GLUCOSE BLDC GLUCOMTR-MCNC: 190 MG/DL (ref 70–130)
GLUCOSE BLDC GLUCOMTR-MCNC: 191 MG/DL (ref 70–130)
GLUCOSE BLDC GLUCOMTR-MCNC: 192 MG/DL (ref 70–130)
GLUCOSE BLDC GLUCOMTR-MCNC: 192 MG/DL (ref 70–130)
GLUCOSE BLDC GLUCOMTR-MCNC: 194 MG/DL (ref 70–130)
GLUCOSE BLDC GLUCOMTR-MCNC: 199 MG/DL (ref 70–130)
GLUCOSE BLDC GLUCOMTR-MCNC: 200 MG/DL (ref 70–130)
GLUCOSE BLDC GLUCOMTR-MCNC: 208 MG/DL (ref 70–130)
GLUCOSE BLDC GLUCOMTR-MCNC: 210 MG/DL (ref 70–130)
GLUCOSE BLDC GLUCOMTR-MCNC: 210 MG/DL (ref 70–130)
GLUCOSE BLDC GLUCOMTR-MCNC: 213 MG/DL (ref 70–130)
GLUCOSE BLDC GLUCOMTR-MCNC: 217 MG/DL (ref 70–130)
GLUCOSE BLDC GLUCOMTR-MCNC: 220 MG/DL (ref 70–130)
GLUCOSE BLDC GLUCOMTR-MCNC: 220 MG/DL (ref 70–130)
GLUCOSE BLDC GLUCOMTR-MCNC: 229 MG/DL (ref 70–130)
GLUCOSE BLDC GLUCOMTR-MCNC: 230 MG/DL (ref 70–130)
GLUCOSE BLDC GLUCOMTR-MCNC: 237 MG/DL (ref 70–130)
GLUCOSE BLDC GLUCOMTR-MCNC: 293 MG/DL (ref 70–130)
GLUCOSE BLDC GLUCOMTR-MCNC: 311 MG/DL (ref 70–130)
GLUCOSE BLDC GLUCOMTR-MCNC: 98 MG/DL (ref 70–130)
GLUCOSE SERPL-MCNC: 105 MG/DL (ref 65–99)
GLUCOSE SERPL-MCNC: 110 MG/DL (ref 65–99)
GLUCOSE SERPL-MCNC: 113 MG/DL (ref 65–99)
GLUCOSE SERPL-MCNC: 119 MG/DL (ref 65–99)
GLUCOSE SERPL-MCNC: 122 MG/DL (ref 65–99)
GLUCOSE SERPL-MCNC: 129 MG/DL (ref 65–99)
GLUCOSE SERPL-MCNC: 130 MG/DL (ref 65–99)
GLUCOSE SERPL-MCNC: 132 MG/DL (ref 65–99)
GLUCOSE SERPL-MCNC: 132 MG/DL (ref 65–99)
GLUCOSE SERPL-MCNC: 186 MG/DL (ref 65–99)
GLUCOSE SERPL-MCNC: 197 MG/DL (ref 65–99)
GLUCOSE SERPL-MCNC: 286 MG/DL (ref 65–99)
GLUCOSE UR STRIP-MCNC: NEGATIVE MG/DL
GRAM STN SPEC: ABNORMAL
GRAM STN SPEC: ABNORMAL
HBA1C MFR BLD: 5.5 % (ref 4.8–5.6)
HCT VFR BLD AUTO: 24.2 % (ref 37.5–51)
HCT VFR BLD AUTO: 26.7 % (ref 37.5–51)
HCT VFR BLD AUTO: 27.4 % (ref 37.5–51)
HCT VFR BLD AUTO: 28.4 % (ref 37.5–51)
HCT VFR BLD AUTO: 28.4 % (ref 37.5–51)
HCT VFR BLD AUTO: 28.7 % (ref 37.5–51)
HCT VFR BLD AUTO: 28.9 % (ref 37.5–51)
HCT VFR BLD AUTO: 29 % (ref 37.5–51)
HCT VFR BLD AUTO: 29.1 % (ref 37.5–51)
HCT VFR BLD AUTO: 29.2 % (ref 37.5–51)
HCT VFR BLD AUTO: 30.2 % (ref 37.5–51)
HCT VFR BLD AUTO: 30.6 % (ref 37.5–51)
HCT VFR BLD AUTO: 32.7 % (ref 37.5–51)
HGB BLD-MCNC: 7.3 G/DL (ref 13–17.7)
HGB BLD-MCNC: 8 G/DL (ref 13–17.7)
HGB BLD-MCNC: 8.2 G/DL (ref 13–17.7)
HGB BLD-MCNC: 8.5 G/DL (ref 13–17.7)
HGB BLD-MCNC: 8.6 G/DL (ref 13–17.7)
HGB BLD-MCNC: 8.6 G/DL (ref 13–17.7)
HGB BLD-MCNC: 8.7 G/DL (ref 13–17.7)
HGB BLD-MCNC: 8.7 G/DL (ref 13–17.7)
HGB BLD-MCNC: 8.8 G/DL (ref 13–17.7)
HGB BLD-MCNC: 9.1 G/DL (ref 13–17.7)
HGB BLD-MCNC: 9.4 G/DL (ref 13–17.7)
HGB UR QL STRIP.AUTO: ABNORMAL
HYALINE CASTS UR QL AUTO: ABNORMAL /LPF
IMM GRANULOCYTES # BLD AUTO: 0.04 10*3/MM3 (ref 0–0.05)
IMM GRANULOCYTES # BLD AUTO: 0.05 10*3/MM3 (ref 0–0.05)
IMM GRANULOCYTES # BLD AUTO: 0.05 10*3/MM3 (ref 0–0.05)
IMM GRANULOCYTES # BLD AUTO: 0.06 10*3/MM3 (ref 0–0.05)
IMM GRANULOCYTES # BLD AUTO: 0.07 10*3/MM3 (ref 0–0.05)
IMM GRANULOCYTES # BLD AUTO: 0.08 10*3/MM3 (ref 0–0.05)
IMM GRANULOCYTES # BLD AUTO: 0.08 10*3/MM3 (ref 0–0.05)
IMM GRANULOCYTES # BLD AUTO: 0.1 10*3/MM3 (ref 0–0.05)
IMM GRANULOCYTES NFR BLD AUTO: 0.6 % (ref 0–0.5)
IMM GRANULOCYTES NFR BLD AUTO: 0.9 % (ref 0–0.5)
IMM GRANULOCYTES NFR BLD AUTO: 1 % (ref 0–0.5)
IMM GRANULOCYTES NFR BLD AUTO: 1.1 % (ref 0–0.5)
IMM GRANULOCYTES NFR BLD AUTO: 1.1 % (ref 0–0.5)
INR PPP: 1.33 (ref 0.85–1.16)
INR PPP: 1.37 (ref 0.85–1.16)
INR PPP: 1.37 (ref 0.85–1.16)
INR PPP: 1.39 (ref 0.85–1.16)
INR PPP: 1.4 (ref 0.85–1.16)
INR PPP: 1.43 (ref 0.85–1.16)
INR PPP: 1.44 (ref 0.85–1.16)
INR PPP: 1.51 (ref 0.85–1.16)
INR PPP: 1.53 (ref 0.85–1.16)
INR PPP: 1.54 (ref 0.85–1.16)
INR PPP: 1.59 (ref 0.85–1.16)
INR PPP: 1.71 (ref 0.85–1.16)
INR PPP: 1.81 (ref 0.85–1.16)
INR PPP: 1.87 (ref 0.85–1.16)
INR PPP: 2.1 (ref 0.85–1.16)
INR PPP: 2.17 (ref 0.85–1.16)
INR PPP: 2.49 (ref 0.85–1.16)
INR PPP: 2.51 (ref 0.85–1.16)
IRON 24H UR-MRATE: 33 MCG/DL (ref 59–158)
IRON 24H UR-MRATE: 33 MCG/DL (ref 59–158)
IRON SATN MFR SERPL: 22 % (ref 20–50)
KETONES UR QL STRIP: NEGATIVE
LEUKOCYTE ESTERASE UR QL STRIP.AUTO: ABNORMAL
LYMPHOCYTES # BLD AUTO: 0.56 10*3/MM3 (ref 0.7–3.1)
LYMPHOCYTES # BLD AUTO: 0.81 10*3/MM3 (ref 0.7–3.1)
LYMPHOCYTES # BLD AUTO: 0.91 10*3/MM3 (ref 0.7–3.1)
LYMPHOCYTES # BLD AUTO: 1.33 10*3/MM3 (ref 0.7–3.1)
LYMPHOCYTES # BLD AUTO: 1.34 10*3/MM3 (ref 0.7–3.1)
LYMPHOCYTES # BLD AUTO: 1.35 10*3/MM3 (ref 0.7–3.1)
LYMPHOCYTES # BLD AUTO: 1.47 10*3/MM3 (ref 0.7–3.1)
LYMPHOCYTES # BLD AUTO: 1.5 10*3/MM3 (ref 0.7–3.1)
LYMPHOCYTES NFR BLD AUTO: 15.8 % (ref 19.6–45.3)
LYMPHOCYTES NFR BLD AUTO: 18.3 % (ref 19.6–45.3)
LYMPHOCYTES NFR BLD AUTO: 18.3 % (ref 19.6–45.3)
LYMPHOCYTES NFR BLD AUTO: 18.4 % (ref 19.6–45.3)
LYMPHOCYTES NFR BLD AUTO: 19.5 % (ref 19.6–45.3)
LYMPHOCYTES NFR BLD AUTO: 19.7 % (ref 19.6–45.3)
LYMPHOCYTES NFR BLD AUTO: 21 % (ref 19.6–45.3)
LYMPHOCYTES NFR BLD AUTO: 5.2 % (ref 19.6–45.3)
MAGNESIUM SERPL-MCNC: 1.9 MG/DL (ref 1.6–2.4)
MAXIMAL PREDICTED HEART RATE: 146 BPM
MCH RBC QN AUTO: 29.2 PG (ref 26.6–33)
MCH RBC QN AUTO: 29.6 PG (ref 26.6–33)
MCH RBC QN AUTO: 29.7 PG (ref 26.6–33)
MCH RBC QN AUTO: 29.8 PG (ref 26.6–33)
MCH RBC QN AUTO: 30 PG (ref 26.6–33)
MCH RBC QN AUTO: 30 PG (ref 26.6–33)
MCH RBC QN AUTO: 30.2 PG (ref 26.6–33)
MCHC RBC AUTO-ENTMCNC: 28.7 G/DL (ref 31.5–35.7)
MCHC RBC AUTO-ENTMCNC: 29.7 G/DL (ref 31.5–35.7)
MCHC RBC AUTO-ENTMCNC: 29.8 G/DL (ref 31.5–35.7)
MCHC RBC AUTO-ENTMCNC: 29.9 G/DL (ref 31.5–35.7)
MCHC RBC AUTO-ENTMCNC: 30 G/DL (ref 31.5–35.7)
MCHC RBC AUTO-ENTMCNC: 30 G/DL (ref 31.5–35.7)
MCHC RBC AUTO-ENTMCNC: 30.2 G/DL (ref 31.5–35.7)
MCHC RBC AUTO-ENTMCNC: 30.7 G/DL (ref 31.5–35.7)
MCV RBC AUTO: 100 FL (ref 79–97)
MCV RBC AUTO: 101.7 FL (ref 79–97)
MCV RBC AUTO: 103.8 FL (ref 79–97)
MCV RBC AUTO: 97.3 FL (ref 79–97)
MCV RBC AUTO: 97.6 FL (ref 79–97)
MCV RBC AUTO: 98.9 FL (ref 79–97)
MCV RBC AUTO: 99.3 FL (ref 79–97)
MCV RBC AUTO: 99.6 FL (ref 79–97)
MCV RBC AUTO: 99.6 FL (ref 79–97)
MCV RBC AUTO: 99.7 FL (ref 79–97)
MONOCYTES # BLD AUTO: 0.47 10*3/MM3 (ref 0.1–0.9)
MONOCYTES # BLD AUTO: 0.56 10*3/MM3 (ref 0.1–0.9)
MONOCYTES # BLD AUTO: 0.63 10*3/MM3 (ref 0.1–0.9)
MONOCYTES # BLD AUTO: 0.64 10*3/MM3 (ref 0.1–0.9)
MONOCYTES # BLD AUTO: 0.67 10*3/MM3 (ref 0.1–0.9)
MONOCYTES # BLD AUTO: 0.73 10*3/MM3 (ref 0.1–0.9)
MONOCYTES # BLD AUTO: 0.76 10*3/MM3 (ref 0.1–0.9)
MONOCYTES # BLD AUTO: 0.77 10*3/MM3 (ref 0.1–0.9)
MONOCYTES NFR BLD AUTO: 10 % (ref 5–12)
MONOCYTES NFR BLD AUTO: 10.7 % (ref 5–12)
MONOCYTES NFR BLD AUTO: 5.8 % (ref 5–12)
MONOCYTES NFR BLD AUTO: 9.6 % (ref 5–12)
MONOCYTES NFR BLD AUTO: 9.7 % (ref 5–12)
MONOCYTES NFR BLD AUTO: 9.8 % (ref 5–12)
NEUTROPHILS NFR BLD AUTO: 2.89 10*3/MM3 (ref 1.7–7)
NEUTROPHILS NFR BLD AUTO: 4.14 10*3/MM3 (ref 1.7–7)
NEUTROPHILS NFR BLD AUTO: 4.18 10*3/MM3 (ref 1.7–7)
NEUTROPHILS NFR BLD AUTO: 4.64 10*3/MM3 (ref 1.7–7)
NEUTROPHILS NFR BLD AUTO: 4.98 10*3/MM3 (ref 1.7–7)
NEUTROPHILS NFR BLD AUTO: 5.1 10*3/MM3 (ref 1.7–7)
NEUTROPHILS NFR BLD AUTO: 5.54 10*3/MM3 (ref 1.7–7)
NEUTROPHILS NFR BLD AUTO: 65.2 % (ref 42.7–76)
NEUTROPHILS NFR BLD AUTO: 65.7 % (ref 42.7–76)
NEUTROPHILS NFR BLD AUTO: 66.9 % (ref 42.7–76)
NEUTROPHILS NFR BLD AUTO: 67.9 % (ref 42.7–76)
NEUTROPHILS NFR BLD AUTO: 68.1 % (ref 42.7–76)
NEUTROPHILS NFR BLD AUTO: 69 % (ref 42.7–76)
NEUTROPHILS NFR BLD AUTO: 71.9 % (ref 42.7–76)
NEUTROPHILS NFR BLD AUTO: 88.1 % (ref 42.7–76)
NEUTROPHILS NFR BLD AUTO: 9.55 10*3/MM3 (ref 1.7–7)
NITRITE UR QL STRIP: NEGATIVE
NRBC BLD AUTO-RTO: 0 /100 WBC (ref 0–0.2)
NT-PROBNP SERPL-MCNC: 3352 PG/ML (ref 0–900)
PH UR STRIP.AUTO: 7.5 [PH] (ref 5–8)
PHOSPHATE SERPL-MCNC: 2.8 MG/DL (ref 2.5–4.5)
PHOSPHATE SERPL-MCNC: 3.3 MG/DL (ref 2.5–4.5)
PHOSPHATE SERPL-MCNC: 3.6 MG/DL (ref 2.5–4.5)
PHOSPHATE SERPL-MCNC: 3.7 MG/DL (ref 2.5–4.5)
PLATELET # BLD AUTO: 161 10*3/MM3 (ref 140–450)
PLATELET # BLD AUTO: 188 10*3/MM3 (ref 140–450)
PLATELET # BLD AUTO: 197 10*3/MM3 (ref 140–450)
PLATELET # BLD AUTO: 197 10*3/MM3 (ref 140–450)
PLATELET # BLD AUTO: 203 10*3/MM3 (ref 140–450)
PLATELET # BLD AUTO: 225 10*3/MM3 (ref 140–450)
PLATELET # BLD AUTO: 245 10*3/MM3 (ref 140–450)
PLATELET # BLD AUTO: 253 10*3/MM3 (ref 140–450)
PLATELET # BLD AUTO: 264 10*3/MM3 (ref 140–450)
PLATELET # BLD AUTO: 267 10*3/MM3 (ref 140–450)
PMV BLD AUTO: 10.2 FL (ref 6–12)
PMV BLD AUTO: 10.2 FL (ref 6–12)
PMV BLD AUTO: 10.3 FL (ref 6–12)
PMV BLD AUTO: 10.3 FL (ref 6–12)
PMV BLD AUTO: 10.4 FL (ref 6–12)
PMV BLD AUTO: 10.5 FL (ref 6–12)
PMV BLD AUTO: 10.7 FL (ref 6–12)
PMV BLD AUTO: 11.1 FL (ref 6–12)
POTASSIUM SERPL-SCNC: 3.4 MMOL/L (ref 3.5–5.2)
POTASSIUM SERPL-SCNC: 3.5 MMOL/L (ref 3.5–5.2)
POTASSIUM SERPL-SCNC: 3.6 MMOL/L (ref 3.5–5.2)
POTASSIUM SERPL-SCNC: 3.6 MMOL/L (ref 3.5–5.2)
POTASSIUM SERPL-SCNC: 3.7 MMOL/L (ref 3.5–5.2)
POTASSIUM SERPL-SCNC: 3.9 MMOL/L (ref 3.5–5.2)
POTASSIUM SERPL-SCNC: 3.9 MMOL/L (ref 3.5–5.2)
POTASSIUM SERPL-SCNC: 4 MMOL/L (ref 3.5–5.2)
POTASSIUM SERPL-SCNC: 4.3 MMOL/L (ref 3.5–5.2)
POTASSIUM SERPL-SCNC: 4.3 MMOL/L (ref 3.5–5.2)
POTASSIUM SERPL-SCNC: 4.5 MMOL/L (ref 3.5–5.2)
POTASSIUM SERPL-SCNC: 4.5 MMOL/L (ref 3.5–5.2)
PROCALCITONIN SERPL-MCNC: 0.14 NG/ML (ref 0–0.25)
PROCALCITONIN SERPL-MCNC: 0.19 NG/ML (ref 0–0.25)
PROT SERPL-MCNC: 6 G/DL (ref 6–8.5)
PROT SERPL-MCNC: 6.2 G/DL (ref 6–8.5)
PROT SERPL-MCNC: 6.4 G/DL (ref 6–8.5)
PROT SERPL-MCNC: 6.8 G/DL (ref 6–8.5)
PROT SERPL-MCNC: 7.1 G/DL (ref 6–8.5)
PROT UR QL STRIP: ABNORMAL
PROTHROMBIN TIME: 16 SECONDS (ref 11.4–14.4)
PROTHROMBIN TIME: 16.4 SECONDS (ref 11.4–14.4)
PROTHROMBIN TIME: 16.4 SECONDS (ref 11.4–14.4)
PROTHROMBIN TIME: 16.6 SECONDS (ref 11.4–14.4)
PROTHROMBIN TIME: 16.7 SECONDS (ref 11.4–14.4)
PROTHROMBIN TIME: 16.9 SECONDS (ref 11.4–14.4)
PROTHROMBIN TIME: 17.1 SECONDS (ref 11.4–14.4)
PROTHROMBIN TIME: 17.7 SECONDS (ref 11.4–14.4)
PROTHROMBIN TIME: 17.8 SECONDS (ref 11.4–14.4)
PROTHROMBIN TIME: 17.9 SECONDS (ref 11.4–14.4)
PROTHROMBIN TIME: 18.4 SECONDS (ref 11.4–14.4)
PROTHROMBIN TIME: 19.5 SECONDS (ref 11.4–14.4)
PROTHROMBIN TIME: 20.3 SECONDS (ref 11.4–14.4)
PROTHROMBIN TIME: 20.8 SECONDS (ref 11.4–14.4)
PROTHROMBIN TIME: 22.7 SECONDS (ref 11.4–14.4)
PROTHROMBIN TIME: 23.3 SECONDS (ref 11.4–14.4)
PROTHROMBIN TIME: 25.9 SECONDS (ref 11.4–14.4)
PROTHROMBIN TIME: 26 SECONDS (ref 11.4–14.4)
QT INTERVAL: 450 MS
QTC INTERVAL: 525 MS
RBC # BLD AUTO: 2.43 10*6/MM3 (ref 4.14–5.8)
RBC # BLD AUTO: 2.67 10*6/MM3 (ref 4.14–5.8)
RBC # BLD AUTO: 2.77 10*6/MM3 (ref 4.14–5.8)
RBC # BLD AUTO: 2.85 10*6/MM3 (ref 4.14–5.8)
RBC # BLD AUTO: 2.9 10*6/MM3 (ref 4.14–5.8)
RBC # BLD AUTO: 2.91 10*6/MM3 (ref 4.14–5.8)
RBC # BLD AUTO: 2.92 10*6/MM3 (ref 4.14–5.8)
RBC # BLD AUTO: 2.95 10*6/MM3 (ref 4.14–5.8)
RBC # BLD AUTO: 3.01 10*6/MM3 (ref 4.14–5.8)
RBC # BLD AUTO: 3.15 10*6/MM3 (ref 4.14–5.8)
RBC # UR STRIP: ABNORMAL /HPF
REF LAB TEST METHOD: ABNORMAL
RETICS # AUTO: 0.08 10*6/MM3 (ref 0.02–0.13)
RETICS/RBC NFR AUTO: 3.07 % (ref 0.7–1.9)
RH BLD: POSITIVE
SARS-COV-2 RNA PNL SPEC NAA+PROBE: NOT DETECTED
SODIUM SERPL-SCNC: 134 MMOL/L (ref 136–145)
SODIUM SERPL-SCNC: 135 MMOL/L (ref 136–145)
SODIUM SERPL-SCNC: 136 MMOL/L (ref 136–145)
SODIUM SERPL-SCNC: 137 MMOL/L (ref 136–145)
SODIUM SERPL-SCNC: 137 MMOL/L (ref 136–145)
SODIUM SERPL-SCNC: 138 MMOL/L (ref 136–145)
SODIUM SERPL-SCNC: 138 MMOL/L (ref 136–145)
SODIUM SERPL-SCNC: 139 MMOL/L (ref 136–145)
SODIUM SERPL-SCNC: 142 MMOL/L (ref 136–145)
SP GR UR STRIP: 1.02 (ref 1–1.03)
SQUAMOUS #/AREA URNS HPF: ABNORMAL /HPF
STRESS TARGET HR: 124 BPM
T&S EXPIRATION DATE: NORMAL
TIBC SERPL-MCNC: 152 MCG/DL (ref 298–536)
TRANSFERRIN SERPL-MCNC: 102 MG/DL (ref 200–360)
UPPER ARTERIAL LEFT ARM BRACHIAL SYS MAX: 114 MMHG
UROBILINOGEN UR QL STRIP: ABNORMAL
VANCOMYCIN SERPL-MCNC: 20.4 MCG/ML (ref 5–40)
VANCOMYCIN SERPL-MCNC: 22.2 MCG/ML (ref 5–40)
VANCOMYCIN SERPL-MCNC: 23.3 MCG/ML (ref 5–40)
VIT B12 BLD-MCNC: 548 PG/ML (ref 211–946)
WBC # UR STRIP: ABNORMAL /HPF
WBC NRBC COR # BLD: 10.84 10*3/MM3 (ref 3.4–10.8)
WBC NRBC COR # BLD: 4.4 10*3/MM3 (ref 3.4–10.8)
WBC NRBC COR # BLD: 5.76 10*3/MM3 (ref 3.4–10.8)
WBC NRBC COR # BLD: 5.76 10*3/MM3 (ref 3.4–10.8)
WBC NRBC COR # BLD: 6.42 10*3/MM3 (ref 3.4–10.8)
WBC NRBC COR # BLD: 6.73 10*3/MM3 (ref 3.4–10.8)
WBC NRBC COR # BLD: 6.83 10*3/MM3 (ref 3.4–10.8)
WBC NRBC COR # BLD: 7.31 10*3/MM3 (ref 3.4–10.8)
WBC NRBC COR # BLD: 7.62 10*3/MM3 (ref 3.4–10.8)
WBC NRBC COR # BLD: 8.03 10*3/MM3 (ref 3.4–10.8)

## 2021-01-01 PROCEDURE — 85610 PROTHROMBIN TIME: CPT

## 2021-01-01 PROCEDURE — 99232 SBSQ HOSP IP/OBS MODERATE 35: CPT | Performed by: NURSE PRACTITIONER

## 2021-01-01 PROCEDURE — 85027 COMPLETE CBC AUTOMATED: CPT | Performed by: INTERNAL MEDICINE

## 2021-01-01 PROCEDURE — 63710000001 INSULIN LISPRO (HUMAN) PER 5 UNITS: Performed by: STUDENT IN AN ORGANIZED HEALTH CARE EDUCATION/TRAINING PROGRAM

## 2021-01-01 PROCEDURE — 99232 SBSQ HOSP IP/OBS MODERATE 35: CPT | Performed by: INTERNAL MEDICINE

## 2021-01-01 PROCEDURE — 5A1D70Z PERFORMANCE OF URINARY FILTRATION, INTERMITTENT, LESS THAN 6 HOURS PER DAY: ICD-10-PCS | Performed by: INTERNAL MEDICINE

## 2021-01-01 PROCEDURE — 25010000002 ROPIVACAINE PER 1 MG

## 2021-01-01 PROCEDURE — 82962 GLUCOSE BLOOD TEST: CPT

## 2021-01-01 PROCEDURE — 93005 ELECTROCARDIOGRAM TRACING: CPT | Performed by: PHYSICIAN ASSISTANT

## 2021-01-01 PROCEDURE — 80069 RENAL FUNCTION PANEL: CPT | Performed by: INTERNAL MEDICINE

## 2021-01-01 PROCEDURE — 25010000002 HEPARIN (PORCINE) PER 1000 UNITS: Performed by: STUDENT IN AN ORGANIZED HEALTH CARE EDUCATION/TRAINING PROGRAM

## 2021-01-01 PROCEDURE — 85610 PROTHROMBIN TIME: CPT | Performed by: INTERNAL MEDICINE

## 2021-01-01 PROCEDURE — 97530 THERAPEUTIC ACTIVITIES: CPT

## 2021-01-01 PROCEDURE — 25010000002 VANCOMYCIN PER 500 MG: Performed by: THORACIC SURGERY (CARDIOTHORACIC VASCULAR SURGERY)

## 2021-01-01 PROCEDURE — 25010000002 CEFTAZIDIME PER 500 MG: Performed by: INTERNAL MEDICINE

## 2021-01-01 PROCEDURE — 97110 THERAPEUTIC EXERCISES: CPT

## 2021-01-01 PROCEDURE — 25010000002 FENTANYL CITRATE (PF) 50 MCG/ML SOLUTION: Performed by: NURSE ANESTHETIST, CERTIFIED REGISTERED

## 2021-01-01 PROCEDURE — 82746 ASSAY OF FOLIC ACID SERUM: CPT | Performed by: NURSE PRACTITIONER

## 2021-01-01 PROCEDURE — 99024 POSTOP FOLLOW-UP VISIT: CPT | Performed by: THORACIC SURGERY (CARDIOTHORACIC VASCULAR SURGERY)

## 2021-01-01 PROCEDURE — 99233 SBSQ HOSP IP/OBS HIGH 50: CPT | Performed by: HOSPITALIST

## 2021-01-01 PROCEDURE — 85025 COMPLETE CBC W/AUTO DIFF WBC: CPT | Performed by: NURSE PRACTITIONER

## 2021-01-01 PROCEDURE — 85730 THROMBOPLASTIN TIME PARTIAL: CPT | Performed by: NURSE PRACTITIONER

## 2021-01-01 PROCEDURE — 83880 ASSAY OF NATRIURETIC PEPTIDE: CPT | Performed by: PHYSICIAN ASSISTANT

## 2021-01-01 PROCEDURE — 99233 SBSQ HOSP IP/OBS HIGH 50: CPT | Performed by: NURSE PRACTITIONER

## 2021-01-01 PROCEDURE — 80048 BASIC METABOLIC PNL TOTAL CA: CPT | Performed by: INTERNAL MEDICINE

## 2021-01-01 PROCEDURE — 80202 ASSAY OF VANCOMYCIN: CPT

## 2021-01-01 PROCEDURE — 99231 SBSQ HOSP IP/OBS SF/LOW 25: CPT | Performed by: THORACIC SURGERY (CARDIOTHORACIC VASCULAR SURGERY)

## 2021-01-01 PROCEDURE — 25010000002 ALBUMIN HUMAN 25% PER 50 ML

## 2021-01-01 PROCEDURE — 80053 COMPREHEN METABOLIC PANEL: CPT | Performed by: PHYSICIAN ASSISTANT

## 2021-01-01 PROCEDURE — 80053 COMPREHEN METABOLIC PANEL: CPT | Performed by: STUDENT IN AN ORGANIZED HEALTH CARE EDUCATION/TRAINING PROGRAM

## 2021-01-01 PROCEDURE — 99233 SBSQ HOSP IP/OBS HIGH 50: CPT | Performed by: INTERNAL MEDICINE

## 2021-01-01 PROCEDURE — 80053 COMPREHEN METABOLIC PANEL: CPT | Performed by: HOSPITALIST

## 2021-01-01 PROCEDURE — 25010000002 MIDAZOLAM PER 1 MG: Performed by: NURSE ANESTHETIST, CERTIFIED REGISTERED

## 2021-01-01 PROCEDURE — 86900 BLOOD TYPING SEROLOGIC ABO: CPT | Performed by: PHYSICIAN ASSISTANT

## 2021-01-01 PROCEDURE — 85045 AUTOMATED RETICULOCYTE COUNT: CPT | Performed by: NURSE PRACTITIONER

## 2021-01-01 PROCEDURE — 85018 HEMOGLOBIN: CPT | Performed by: NURSE PRACTITIONER

## 2021-01-01 PROCEDURE — 0Y6D0Z3 DETACHMENT AT LEFT UPPER LEG, LOW, OPEN APPROACH: ICD-10-PCS | Performed by: THORACIC SURGERY (CARDIOTHORACIC VASCULAR SURGERY)

## 2021-01-01 PROCEDURE — 86901 BLOOD TYPING SEROLOGIC RH(D): CPT | Performed by: PHYSICIAN ASSISTANT

## 2021-01-01 PROCEDURE — P9047 ALBUMIN (HUMAN), 25%, 50ML: HCPCS | Performed by: NURSE PRACTITIONER

## 2021-01-01 PROCEDURE — 25010000002 MORPHINE PER 10 MG: Performed by: NURSE PRACTITIONER

## 2021-01-01 PROCEDURE — 80048 BASIC METABOLIC PNL TOTAL CA: CPT | Performed by: NURSE PRACTITIONER

## 2021-01-01 PROCEDURE — 25010000002 NA FERRIC GLUC CPLX PER 12.5 MG: Performed by: HOSPITALIST

## 2021-01-01 PROCEDURE — 82728 ASSAY OF FERRITIN: CPT | Performed by: NURSE PRACTITIONER

## 2021-01-01 PROCEDURE — 25010000002 VANCOMYCIN 10 G RECONSTITUTED SOLUTION: Performed by: PHYSICIAN ASSISTANT

## 2021-01-01 PROCEDURE — P9047 ALBUMIN (HUMAN), 25%, 50ML: HCPCS

## 2021-01-01 PROCEDURE — 82550 ASSAY OF CK (CPK): CPT | Performed by: INTERNAL MEDICINE

## 2021-01-01 PROCEDURE — 80048 BASIC METABOLIC PNL TOTAL CA: CPT | Performed by: STUDENT IN AN ORGANIZED HEALTH CARE EDUCATION/TRAINING PROGRAM

## 2021-01-01 PROCEDURE — 87070 CULTURE OTHR SPECIMN AEROBIC: CPT | Performed by: INTERNAL MEDICINE

## 2021-01-01 PROCEDURE — 25010000002 PROPOFOL 10 MG/ML EMULSION: Performed by: NURSE ANESTHETIST, CERTIFIED REGISTERED

## 2021-01-01 PROCEDURE — 27590 AMPUTATE LEG AT THIGH: CPT | Performed by: STUDENT IN AN ORGANIZED HEALTH CARE EDUCATION/TRAINING PROGRAM

## 2021-01-01 PROCEDURE — 85014 HEMATOCRIT: CPT | Performed by: NURSE PRACTITIONER

## 2021-01-01 PROCEDURE — 99232 SBSQ HOSP IP/OBS MODERATE 35: CPT | Performed by: FAMILY MEDICINE

## 2021-01-01 PROCEDURE — 87147 CULTURE TYPE IMMUNOLOGIC: CPT | Performed by: INTERNAL MEDICINE

## 2021-01-01 PROCEDURE — 85610 PROTHROMBIN TIME: CPT | Performed by: STUDENT IN AN ORGANIZED HEALTH CARE EDUCATION/TRAINING PROGRAM

## 2021-01-01 PROCEDURE — 86923 COMPATIBILITY TEST ELECTRIC: CPT

## 2021-01-01 PROCEDURE — 73718 MRI LOWER EXTREMITY W/O DYE: CPT

## 2021-01-01 PROCEDURE — 97535 SELF CARE MNGMENT TRAINING: CPT

## 2021-01-01 PROCEDURE — 99285 EMERGENCY DEPT VISIT HI MDM: CPT

## 2021-01-01 PROCEDURE — 63710000001 ONDANSETRON PER 8 MG: Performed by: STUDENT IN AN ORGANIZED HEALTH CARE EDUCATION/TRAINING PROGRAM

## 2021-01-01 PROCEDURE — 80053 COMPREHEN METABOLIC PANEL: CPT | Performed by: NURSE PRACTITIONER

## 2021-01-01 PROCEDURE — 85610 PROTHROMBIN TIME: CPT | Performed by: HOSPITALIST

## 2021-01-01 PROCEDURE — 85027 COMPLETE CBC AUTOMATED: CPT | Performed by: HOSPITALIST

## 2021-01-01 PROCEDURE — 99222 1ST HOSP IP/OBS MODERATE 55: CPT | Performed by: PHYSICIAN ASSISTANT

## 2021-01-01 PROCEDURE — 25010000002 ONDANSETRON PER 1 MG: Performed by: NURSE PRACTITIONER

## 2021-01-01 PROCEDURE — 86850 RBC ANTIBODY SCREEN: CPT | Performed by: PHYSICIAN ASSISTANT

## 2021-01-01 PROCEDURE — C1889 IMPLANT/INSERT DEVICE, NOC: HCPCS | Performed by: THORACIC SURGERY (CARDIOTHORACIC VASCULAR SURGERY)

## 2021-01-01 PROCEDURE — 83735 ASSAY OF MAGNESIUM: CPT | Performed by: NURSE PRACTITIONER

## 2021-01-01 PROCEDURE — 25010000002 CEFTRIAXONE PER 250 MG: Performed by: PHYSICIAN ASSISTANT

## 2021-01-01 PROCEDURE — 83036 HEMOGLOBIN GLYCOSYLATED A1C: CPT | Performed by: NURSE PRACTITIONER

## 2021-01-01 PROCEDURE — 83605 ASSAY OF LACTIC ACID: CPT | Performed by: INTERNAL MEDICINE

## 2021-01-01 PROCEDURE — 87040 BLOOD CULTURE FOR BACTERIA: CPT | Performed by: PHYSICIAN ASSISTANT

## 2021-01-01 PROCEDURE — 85610 PROTHROMBIN TIME: CPT | Performed by: NURSE PRACTITIONER

## 2021-01-01 PROCEDURE — 84466 ASSAY OF TRANSFERRIN: CPT | Performed by: NURSE PRACTITIONER

## 2021-01-01 PROCEDURE — 71045 X-RAY EXAM CHEST 1 VIEW: CPT

## 2021-01-01 PROCEDURE — 87205 SMEAR GRAM STAIN: CPT | Performed by: INTERNAL MEDICINE

## 2021-01-01 PROCEDURE — 85025 COMPLETE CBC W/AUTO DIFF WBC: CPT | Performed by: INTERNAL MEDICINE

## 2021-01-01 PROCEDURE — 84145 PROCALCITONIN (PCT): CPT | Performed by: INTERNAL MEDICINE

## 2021-01-01 PROCEDURE — 88311 DECALCIFY TISSUE: CPT | Performed by: THORACIC SURGERY (CARDIOTHORACIC VASCULAR SURGERY)

## 2021-01-01 PROCEDURE — 25010000002 ONDANSETRON PER 1 MG: Performed by: NURSE ANESTHETIST, CERTIFIED REGISTERED

## 2021-01-01 PROCEDURE — 25010000002 ALBUMIN HUMAN 25% PER 50 ML: Performed by: NURSE PRACTITIONER

## 2021-01-01 PROCEDURE — 25010000002 CEFTAZIDIME PER 500 MG

## 2021-01-01 PROCEDURE — 25010000002 VANCOMYCIN 10 G RECONSTITUTED SOLUTION

## 2021-01-01 PROCEDURE — 25010000002 NEOSTIGMINE 10 MG/10ML SOLUTION: Performed by: NURSE ANESTHETIST, CERTIFIED REGISTERED

## 2021-01-01 PROCEDURE — 87186 SC STD MICRODIL/AGAR DIL: CPT | Performed by: INTERNAL MEDICINE

## 2021-01-01 PROCEDURE — 83540 ASSAY OF IRON: CPT | Performed by: NURSE PRACTITIONER

## 2021-01-01 PROCEDURE — 27590 AMPUTATE LEG AT THIGH: CPT | Performed by: THORACIC SURGERY (CARDIOTHORACIC VASCULAR SURGERY)

## 2021-01-01 PROCEDURE — 88307 TISSUE EXAM BY PATHOLOGIST: CPT | Performed by: THORACIC SURGERY (CARDIOTHORACIC VASCULAR SURGERY)

## 2021-01-01 PROCEDURE — 93923 UPR/LXTR ART STDY 3+ LVLS: CPT | Performed by: INTERNAL MEDICINE

## 2021-01-01 PROCEDURE — 25010000002 HEPARIN (PORCINE) PER 1000 UNITS: Performed by: INTERNAL MEDICINE

## 2021-01-01 PROCEDURE — 81001 URINALYSIS AUTO W/SCOPE: CPT | Performed by: PHYSICIAN ASSISTANT

## 2021-01-01 PROCEDURE — 87636 SARSCOV2 & INF A&B AMP PRB: CPT | Performed by: PHYSICIAN ASSISTANT

## 2021-01-01 PROCEDURE — 82607 VITAMIN B-12: CPT | Performed by: NURSE PRACTITIONER

## 2021-01-01 PROCEDURE — 84100 ASSAY OF PHOSPHORUS: CPT | Performed by: STUDENT IN AN ORGANIZED HEALTH CARE EDUCATION/TRAINING PROGRAM

## 2021-01-01 PROCEDURE — 25010000002 VANCOMYCIN 10 G RECONSTITUTED SOLUTION: Performed by: INTERNAL MEDICINE

## 2021-01-01 PROCEDURE — 25010000002 DEXAMETHASONE PER 1 MG: Performed by: NURSE ANESTHETIST, CERTIFIED REGISTERED

## 2021-01-01 PROCEDURE — 99232 SBSQ HOSP IP/OBS MODERATE 35: CPT | Performed by: HOSPITALIST

## 2021-01-01 PROCEDURE — 99222 1ST HOSP IP/OBS MODERATE 55: CPT | Performed by: FAMILY MEDICINE

## 2021-01-01 PROCEDURE — 93923 UPR/LXTR ART STDY 3+ LVLS: CPT

## 2021-01-01 PROCEDURE — 87086 URINE CULTURE/COLONY COUNT: CPT | Performed by: PHYSICIAN ASSISTANT

## 2021-01-01 PROCEDURE — 25010000002 VANCOMYCIN

## 2021-01-01 PROCEDURE — 84145 PROCALCITONIN (PCT): CPT | Performed by: NURSE PRACTITIONER

## 2021-01-01 PROCEDURE — 85025 COMPLETE CBC W/AUTO DIFF WBC: CPT | Performed by: PHYSICIAN ASSISTANT

## 2021-01-01 PROCEDURE — 25010000002 CEFTRIAXONE PER 250 MG: Performed by: NURSE PRACTITIONER

## 2021-01-01 PROCEDURE — 36415 COLL VENOUS BLD VENIPUNCTURE: CPT

## 2021-01-01 PROCEDURE — 63710000001 INSULIN LISPRO (HUMAN) PER 5 UNITS: Performed by: NURSE PRACTITIONER

## 2021-01-01 PROCEDURE — 97166 OT EVAL MOD COMPLEX 45 MIN: CPT

## 2021-01-01 PROCEDURE — 83605 ASSAY OF LACTIC ACID: CPT | Performed by: PHYSICIAN ASSISTANT

## 2021-01-01 PROCEDURE — P9612 CATHETERIZE FOR URINE SPEC: HCPCS

## 2021-01-01 PROCEDURE — 97162 PT EVAL MOD COMPLEX 30 MIN: CPT

## 2021-01-01 DEVICE — CLIP LIGAT VASC HORIZON TI SM YEL 6CT: Type: IMPLANTABLE DEVICE | Site: LEG | Status: FUNCTIONAL

## 2021-01-01 DEVICE — CLIP LIGAT VASC HORIZON TI MD BLU 6CT: Type: IMPLANTABLE DEVICE | Site: LEG | Status: FUNCTIONAL

## 2021-01-01 RX ORDER — GLYCOPYRROLATE 0.2 MG/ML
INJECTION INTRAMUSCULAR; INTRAVENOUS AS NEEDED
Status: DISCONTINUED | OUTPATIENT
Start: 2021-01-01 | End: 2021-01-01 | Stop reason: SURG

## 2021-01-01 RX ORDER — HYDROMORPHONE HYDROCHLORIDE 1 MG/ML
0.5 INJECTION, SOLUTION INTRAMUSCULAR; INTRAVENOUS; SUBCUTANEOUS
Status: ACTIVE | OUTPATIENT
Start: 2021-01-01 | End: 2021-01-01

## 2021-01-01 RX ORDER — ALBUMIN (HUMAN) 12.5 G/50ML
SOLUTION INTRAVENOUS
Status: DISCONTINUED
Start: 2021-01-01 | End: 2022-01-01 | Stop reason: HOSPADM

## 2021-01-01 RX ORDER — ACETAMINOPHEN 325 MG/1
650 TABLET ORAL EVERY 4 HOURS PRN
Status: DISCONTINUED | OUTPATIENT
Start: 2021-01-01 | End: 2022-01-01 | Stop reason: HOSPADM

## 2021-01-01 RX ORDER — ALBUMIN (HUMAN) 12.5 G/50ML
12.5 SOLUTION INTRAVENOUS AS NEEDED
Status: ACTIVE | OUTPATIENT
Start: 2021-01-01 | End: 2021-01-01

## 2021-01-01 RX ORDER — VANCOMYCIN HYDROCHLORIDE 1 G/200ML
1000 INJECTION, SOLUTION INTRAVENOUS
Status: DISCONTINUED | OUTPATIENT
Start: 2021-01-01 | End: 2021-01-01

## 2021-01-01 RX ORDER — NALOXONE HCL 0.4 MG/ML
0.4 VIAL (ML) INJECTION
Status: DISCONTINUED | OUTPATIENT
Start: 2021-01-01 | End: 2022-01-01 | Stop reason: HOSPADM

## 2021-01-01 RX ORDER — ALBUMIN (HUMAN) 12.5 G/50ML
SOLUTION INTRAVENOUS
Status: COMPLETED
Start: 2021-01-01 | End: 2021-01-01

## 2021-01-01 RX ORDER — ONDANSETRON 2 MG/ML
INJECTION INTRAMUSCULAR; INTRAVENOUS AS NEEDED
Status: DISCONTINUED | OUTPATIENT
Start: 2021-01-01 | End: 2021-01-01 | Stop reason: SURG

## 2021-01-01 RX ORDER — HYDROCODONE BITARTRATE AND ACETAMINOPHEN 7.5; 325 MG/1; MG/1
1 TABLET ORAL EVERY 6 HOURS PRN
Status: DISCONTINUED | OUTPATIENT
Start: 2021-01-01 | End: 2021-01-01

## 2021-01-01 RX ORDER — PROPOFOL 10 MG/ML
VIAL (ML) INTRAVENOUS AS NEEDED
Status: DISCONTINUED | OUTPATIENT
Start: 2021-01-01 | End: 2021-01-01 | Stop reason: SURG

## 2021-01-01 RX ORDER — SODIUM CHLORIDE 0.9 % (FLUSH) 0.9 %
3 SYRINGE (ML) INJECTION EVERY 12 HOURS SCHEDULED
Status: DISCONTINUED | OUTPATIENT
Start: 2021-01-01 | End: 2021-01-01

## 2021-01-01 RX ORDER — DOCUSATE SODIUM 100 MG/1
100 CAPSULE, LIQUID FILLED ORAL 2 TIMES DAILY PRN
Status: DISCONTINUED | OUTPATIENT
Start: 2021-01-01 | End: 2022-01-01

## 2021-01-01 RX ORDER — MIDAZOLAM HYDROCHLORIDE 1 MG/ML
INJECTION INTRAMUSCULAR; INTRAVENOUS AS NEEDED
Status: DISCONTINUED | OUTPATIENT
Start: 2021-01-01 | End: 2021-01-01 | Stop reason: SURG

## 2021-01-01 RX ORDER — DOCUSATE SODIUM 100 MG/1
100 CAPSULE, LIQUID FILLED ORAL 2 TIMES DAILY
Status: DISCONTINUED | OUTPATIENT
Start: 2021-01-01 | End: 2021-01-01

## 2021-01-01 RX ORDER — GABAPENTIN 300 MG/1
300 CAPSULE ORAL NIGHTLY
Status: DISCONTINUED | OUTPATIENT
Start: 2021-01-01 | End: 2022-01-01 | Stop reason: HOSPADM

## 2021-01-01 RX ORDER — ONDANSETRON 2 MG/ML
4 INJECTION INTRAMUSCULAR; INTRAVENOUS ONCE
Status: COMPLETED | OUTPATIENT
Start: 2021-01-01 | End: 2021-01-01

## 2021-01-01 RX ORDER — ATORVASTATIN CALCIUM 20 MG/1
20 TABLET, FILM COATED ORAL NIGHTLY
Status: DISCONTINUED | OUTPATIENT
Start: 2021-01-01 | End: 2022-01-01 | Stop reason: HOSPADM

## 2021-01-01 RX ORDER — POLYETHYLENE GLYCOL 3350 17 G/17G
17 POWDER, FOR SOLUTION ORAL DAILY
COMMUNITY
End: 2022-01-01 | Stop reason: HOSPADM

## 2021-01-01 RX ORDER — SODIUM CHLORIDE 0.9 % (FLUSH) 0.9 %
10 SYRINGE (ML) INJECTION AS NEEDED
Status: DISCONTINUED | OUTPATIENT
Start: 2021-01-01 | End: 2022-01-01 | Stop reason: HOSPADM

## 2021-01-01 RX ORDER — WARFARIN SODIUM 2.5 MG/1
2.5 TABLET ORAL
Status: COMPLETED | OUTPATIENT
Start: 2021-01-01 | End: 2021-01-01

## 2021-01-01 RX ORDER — NICOTINE POLACRILEX 4 MG
15 LOZENGE BUCCAL
Status: DISCONTINUED | OUTPATIENT
Start: 2021-01-01 | End: 2022-01-01 | Stop reason: HOSPADM

## 2021-01-01 RX ORDER — BUPIVACAINE HYDROCHLORIDE 2.5 MG/ML
INJECTION, SOLUTION EPIDURAL; INFILTRATION; INTRACAUDAL
Status: COMPLETED | OUTPATIENT
Start: 2021-01-01 | End: 2021-01-01

## 2021-01-01 RX ORDER — LIDOCAINE HYDROCHLORIDE 10 MG/ML
0.5 INJECTION, SOLUTION EPIDURAL; INFILTRATION; INTRACAUDAL; PERINEURAL ONCE AS NEEDED
Status: DISCONTINUED | OUTPATIENT
Start: 2021-01-01 | End: 2021-01-01

## 2021-01-01 RX ORDER — PROMETHAZINE HYDROCHLORIDE 25 MG/1
25 TABLET ORAL ONCE AS NEEDED
Status: DISCONTINUED | OUTPATIENT
Start: 2021-01-01 | End: 2021-01-01

## 2021-01-01 RX ORDER — GABAPENTIN 300 MG/1
600 CAPSULE ORAL EVERY 8 HOURS SCHEDULED
Status: DISCONTINUED | OUTPATIENT
Start: 2021-01-01 | End: 2021-01-01

## 2021-01-01 RX ORDER — ONDANSETRON 4 MG/1
4 TABLET, FILM COATED ORAL EVERY 6 HOURS PRN
COMMUNITY

## 2021-01-01 RX ORDER — LIDOCAINE HYDROCHLORIDE 10 MG/ML
INJECTION, SOLUTION EPIDURAL; INFILTRATION; INTRACAUDAL; PERINEURAL AS NEEDED
Status: DISCONTINUED | OUTPATIENT
Start: 2021-01-01 | End: 2021-01-01 | Stop reason: SURG

## 2021-01-01 RX ORDER — ONDANSETRON 2 MG/ML
4 INJECTION INTRAMUSCULAR; INTRAVENOUS ONCE AS NEEDED
Status: DISCONTINUED | OUTPATIENT
Start: 2021-01-01 | End: 2021-01-01

## 2021-01-01 RX ORDER — SODIUM CHLORIDE 0.9 % (FLUSH) 0.9 %
10 SYRINGE (ML) INJECTION EVERY 12 HOURS SCHEDULED
Status: DISCONTINUED | OUTPATIENT
Start: 2021-01-01 | End: 2021-01-01

## 2021-01-01 RX ORDER — SODIUM CHLORIDE 9 MG/ML
5 INJECTION, SOLUTION INTRAVENOUS CONTINUOUS
Status: DISCONTINUED | OUTPATIENT
Start: 2021-01-01 | End: 2022-01-01 | Stop reason: HOSPADM

## 2021-01-01 RX ORDER — POTASSIUM CHLORIDE 20 MEQ/1
20 TABLET, EXTENDED RELEASE ORAL DAILY
COMMUNITY
End: 2022-01-01 | Stop reason: HOSPADM

## 2021-01-01 RX ORDER — LEVOTHYROXINE SODIUM 175 UG/1
175 TABLET ORAL DAILY
COMMUNITY
End: 2022-01-01 | Stop reason: HOSPADM

## 2021-01-01 RX ORDER — WARFARIN SODIUM 5 MG/1
5 TABLET ORAL
Status: DISCONTINUED | OUTPATIENT
Start: 2021-01-01 | End: 2021-01-01

## 2021-01-01 RX ORDER — LIDOCAINE HYDROCHLORIDE 10 MG/ML
3 INJECTION, SOLUTION INFILTRATION; PERINEURAL
Status: DISCONTINUED | OUTPATIENT
Start: 2021-01-01 | End: 2021-01-01

## 2021-01-01 RX ORDER — LEVOTHYROXINE SODIUM 0.1 MG/1
200 TABLET ORAL
Status: DISCONTINUED | OUTPATIENT
Start: 2021-01-01 | End: 2022-01-01 | Stop reason: HOSPADM

## 2021-01-01 RX ORDER — SODIUM CHLORIDE 0.9 % (FLUSH) 0.9 %
10 SYRINGE (ML) INJECTION EVERY 12 HOURS SCHEDULED
Status: DISCONTINUED | OUTPATIENT
Start: 2021-01-01 | End: 2022-01-01 | Stop reason: HOSPADM

## 2021-01-01 RX ORDER — MIDODRINE HYDROCHLORIDE 10 MG/1
10 TABLET ORAL
Status: DISCONTINUED | OUTPATIENT
Start: 2021-01-01 | End: 2021-01-01

## 2021-01-01 RX ORDER — DIMETHICONE 5 G/100ML
1 CREAM TOPICAL 2 TIMES DAILY
COMMUNITY
End: 2022-01-01 | Stop reason: HOSPADM

## 2021-01-01 RX ORDER — ROPIVACAINE HYDROCHLORIDE 2 MG/ML
INJECTION, SOLUTION EPIDURAL; INFILTRATION; PERINEURAL
Status: COMPLETED
Start: 2021-01-01 | End: 2021-01-01

## 2021-01-01 RX ORDER — SODIUM CHLORIDE 0.9 % (FLUSH) 0.9 %
10 SYRINGE (ML) INJECTION AS NEEDED
Status: DISCONTINUED | OUTPATIENT
Start: 2021-01-01 | End: 2021-01-01

## 2021-01-01 RX ORDER — IPRATROPIUM BROMIDE AND ALBUTEROL SULFATE 2.5; .5 MG/3ML; MG/3ML
3 SOLUTION RESPIRATORY (INHALATION) ONCE AS NEEDED
Status: DISCONTINUED | OUTPATIENT
Start: 2021-01-01 | End: 2021-01-01

## 2021-01-01 RX ORDER — VANCOMYCIN HYDROCHLORIDE 1 G/200ML
1000 INJECTION, SOLUTION INTRAVENOUS ONCE
Status: COMPLETED | OUTPATIENT
Start: 2021-01-01 | End: 2021-01-01

## 2021-01-01 RX ORDER — NEOSTIGMINE METHYLSULFATE 1 MG/ML
INJECTION, SOLUTION INTRAVENOUS AS NEEDED
Status: DISCONTINUED | OUTPATIENT
Start: 2021-01-01 | End: 2021-01-01 | Stop reason: SURG

## 2021-01-01 RX ORDER — MIDAZOLAM HYDROCHLORIDE 1 MG/ML
0.5 INJECTION INTRAMUSCULAR; INTRAVENOUS
Status: DISCONTINUED | OUTPATIENT
Start: 2021-01-01 | End: 2021-01-01

## 2021-01-01 RX ORDER — CASTOR OIL AND BALSAM, PERU 788; 87 MG/G; MG/G
1 OINTMENT TOPICAL EVERY 12 HOURS SCHEDULED
Status: DISCONTINUED | OUTPATIENT
Start: 2021-01-01 | End: 2021-01-01

## 2021-01-01 RX ORDER — HEPARIN SODIUM 1000 [USP'U]/ML
5000 INJECTION, SOLUTION INTRAVENOUS; SUBCUTANEOUS ONCE
Status: COMPLETED | OUTPATIENT
Start: 2021-01-01 | End: 2021-01-01

## 2021-01-01 RX ORDER — KETAMINE HCL IN NACL, ISO-OSM 100MG/10ML
SYRINGE (ML) INJECTION AS NEEDED
Status: DISCONTINUED | OUTPATIENT
Start: 2021-01-01 | End: 2021-01-01 | Stop reason: SURG

## 2021-01-01 RX ORDER — HYDRALAZINE HYDROCHLORIDE 20 MG/ML
5 INJECTION INTRAMUSCULAR; INTRAVENOUS
Status: DISCONTINUED | OUTPATIENT
Start: 2021-01-01 | End: 2021-01-01

## 2021-01-01 RX ORDER — ROPIVACAINE HYDROCHLORIDE 2 MG/ML
10 INJECTION, SOLUTION EPIDURAL; INFILTRATION CONTINUOUS
Status: DISPENSED | OUTPATIENT
Start: 2021-01-01 | End: 2022-01-01

## 2021-01-01 RX ORDER — ALBUMIN (HUMAN) 12.5 G/50ML
12.5 SOLUTION INTRAVENOUS AS NEEDED
Status: CANCELLED | OUTPATIENT
Start: 2021-01-01 | End: 2021-01-01

## 2021-01-01 RX ORDER — LIDOCAINE 40 MG/G
1 CREAM TOPICAL AS NEEDED
Status: DISCONTINUED | OUTPATIENT
Start: 2021-01-01 | End: 2022-01-01 | Stop reason: HOSPADM

## 2021-01-01 RX ORDER — FENTANYL CITRATE 50 UG/ML
INJECTION, SOLUTION INTRAMUSCULAR; INTRAVENOUS AS NEEDED
Status: DISCONTINUED | OUTPATIENT
Start: 2021-01-01 | End: 2021-01-01 | Stop reason: SURG

## 2021-01-01 RX ORDER — SODIUM CHLORIDE, SODIUM LACTATE, POTASSIUM CHLORIDE, CALCIUM CHLORIDE 600; 310; 30; 20 MG/100ML; MG/100ML; MG/100ML; MG/100ML
9 INJECTION, SOLUTION INTRAVENOUS CONTINUOUS
Status: CANCELLED | OUTPATIENT
Start: 2021-01-01

## 2021-01-01 RX ORDER — HEPARIN SODIUM 5000 [USP'U]/ML
5000 INJECTION, SOLUTION INTRAVENOUS; SUBCUTANEOUS EVERY 12 HOURS SCHEDULED
Status: DISCONTINUED | OUTPATIENT
Start: 2021-01-01 | End: 2022-01-01

## 2021-01-01 RX ORDER — GABAPENTIN 800 MG/1
1 TABLET ORAL 3 TIMES DAILY
COMMUNITY
Start: 2021-01-01 | End: 2022-01-01 | Stop reason: HOSPADM

## 2021-01-01 RX ORDER — FAMOTIDINE 10 MG/ML
20 INJECTION, SOLUTION INTRAVENOUS ONCE
Status: CANCELLED | OUTPATIENT
Start: 2021-01-01 | End: 2021-01-01

## 2021-01-01 RX ORDER — MORPHINE SULFATE 2 MG/ML
1 INJECTION, SOLUTION INTRAMUSCULAR; INTRAVENOUS EVERY 4 HOURS PRN
Status: DISPENSED | OUTPATIENT
Start: 2021-01-01 | End: 2021-01-01

## 2021-01-01 RX ORDER — HYDROCODONE BITARTRATE AND ACETAMINOPHEN 10; 325 MG/1; MG/1
1 TABLET ORAL EVERY 6 HOURS PRN
Status: DISPENSED | OUTPATIENT
Start: 2021-01-01 | End: 2021-01-01

## 2021-01-01 RX ORDER — HYDROCODONE BITARTRATE AND ACETAMINOPHEN 5; 325 MG/1; MG/1
1 TABLET ORAL ONCE AS NEEDED
Status: DISCONTINUED | OUTPATIENT
Start: 2021-01-01 | End: 2021-01-01

## 2021-01-01 RX ORDER — ACETAMINOPHEN 650 MG
1 TABLET, EXTENDED RELEASE ORAL DAILY
COMMUNITY
End: 2022-01-01 | Stop reason: HOSPADM

## 2021-01-01 RX ORDER — LACTULOSE 10 G/15ML
20 SOLUTION ORAL DAILY
COMMUNITY
End: 2022-01-01 | Stop reason: HOSPADM

## 2021-01-01 RX ORDER — HYDROCODONE BITARTRATE AND ACETAMINOPHEN 10; 325 MG/1; MG/1
1 TABLET ORAL EVERY 4 HOURS PRN
Status: DISCONTINUED | OUTPATIENT
Start: 2021-01-01 | End: 2022-01-01 | Stop reason: HOSPADM

## 2021-01-01 RX ORDER — HYDROMORPHONE HYDROCHLORIDE 1 MG/ML
0.5 INJECTION, SOLUTION INTRAMUSCULAR; INTRAVENOUS; SUBCUTANEOUS
Status: DISCONTINUED | OUTPATIENT
Start: 2021-01-01 | End: 2021-01-01

## 2021-01-01 RX ORDER — BISACODYL 10 MG
10 SUPPOSITORY, RECTAL RECTAL DAILY PRN
Status: DISCONTINUED | OUTPATIENT
Start: 2021-01-01 | End: 2022-01-01 | Stop reason: HOSPADM

## 2021-01-01 RX ORDER — WARFARIN SODIUM 2.5 MG/1
2.5 TABLET ORAL
Status: DISCONTINUED | OUTPATIENT
Start: 2021-01-01 | End: 2021-01-01

## 2021-01-01 RX ORDER — LINEZOLID 600 MG/1
600 TABLET, FILM COATED ORAL EVERY 12 HOURS SCHEDULED
Status: DISCONTINUED | OUTPATIENT
Start: 2021-01-01 | End: 2022-01-01

## 2021-01-01 RX ORDER — GABAPENTIN 400 MG/1
800 CAPSULE ORAL EVERY 8 HOURS SCHEDULED
Status: DISCONTINUED | OUTPATIENT
Start: 2021-01-01 | End: 2021-01-01

## 2021-01-01 RX ORDER — METRONIDAZOLE 500 MG/1
500 TABLET ORAL EVERY 12 HOURS SCHEDULED
Status: DISCONTINUED | OUTPATIENT
Start: 2021-01-01 | End: 2022-01-01

## 2021-01-01 RX ORDER — EPHEDRINE SULFATE 50 MG/ML
INJECTION, SOLUTION INTRAVENOUS AS NEEDED
Status: DISCONTINUED | OUTPATIENT
Start: 2021-01-01 | End: 2021-01-01 | Stop reason: SURG

## 2021-01-01 RX ORDER — CHOLECALCIFEROL (VITAMIN D3) 125 MCG
5 CAPSULE ORAL NIGHTLY PRN
Status: DISCONTINUED | OUTPATIENT
Start: 2021-01-01 | End: 2022-01-01 | Stop reason: HOSPADM

## 2021-01-01 RX ORDER — MAGNESIUM HYDROXIDE 1200 MG/15ML
LIQUID ORAL AS NEEDED
Status: DISCONTINUED | OUTPATIENT
Start: 2021-01-01 | End: 2021-01-01 | Stop reason: HOSPADM

## 2021-01-01 RX ORDER — METRONIDAZOLE 500 MG/1
500 TABLET ORAL EVERY 12 HOURS SCHEDULED
Status: DISCONTINUED | OUTPATIENT
Start: 2021-01-01 | End: 2021-01-01

## 2021-01-01 RX ORDER — PROMETHAZINE HYDROCHLORIDE 25 MG/1
25 SUPPOSITORY RECTAL ONCE AS NEEDED
Status: DISCONTINUED | OUTPATIENT
Start: 2021-01-01 | End: 2021-01-01

## 2021-01-01 RX ORDER — DROPERIDOL 2.5 MG/ML
0.62 INJECTION, SOLUTION INTRAMUSCULAR; INTRAVENOUS AS NEEDED
Status: DISCONTINUED | OUTPATIENT
Start: 2021-01-01 | End: 2021-01-01

## 2021-01-01 RX ORDER — CEFTAZIDIME 1 G/1
1 INJECTION, POWDER, FOR SOLUTION INTRAMUSCULAR; INTRAVENOUS
Status: DISCONTINUED | OUTPATIENT
Start: 2021-01-01 | End: 2021-01-01

## 2021-01-01 RX ORDER — SODIUM CHLORIDE 0.9 % (FLUSH) 0.9 %
3-10 SYRINGE (ML) INJECTION AS NEEDED
Status: DISCONTINUED | OUTPATIENT
Start: 2021-01-01 | End: 2021-01-01

## 2021-01-01 RX ORDER — ONDANSETRON 2 MG/ML
4 INJECTION INTRAMUSCULAR; INTRAVENOUS EVERY 6 HOURS PRN
Status: DISCONTINUED | OUTPATIENT
Start: 2021-01-01 | End: 2022-01-01 | Stop reason: HOSPADM

## 2021-01-01 RX ORDER — HYDROCODONE BITARTRATE AND ACETAMINOPHEN 10; 325 MG/1; MG/1
1 TABLET ORAL EVERY 6 HOURS
COMMUNITY
Start: 2021-01-01 | End: 2022-01-01 | Stop reason: HOSPADM

## 2021-01-01 RX ORDER — ONDANSETRON 4 MG/1
4 TABLET, FILM COATED ORAL EVERY 6 HOURS PRN
Status: DISCONTINUED | OUTPATIENT
Start: 2021-01-01 | End: 2022-01-01 | Stop reason: HOSPADM

## 2021-01-01 RX ORDER — ROCURONIUM BROMIDE 10 MG/ML
INJECTION, SOLUTION INTRAVENOUS AS NEEDED
Status: DISCONTINUED | OUTPATIENT
Start: 2021-01-01 | End: 2021-01-01 | Stop reason: SURG

## 2021-01-01 RX ORDER — HYDROCODONE BITARTRATE AND ACETAMINOPHEN 10; 325 MG/1; MG/1
1 TABLET ORAL EVERY 6 HOURS PRN
Status: DISCONTINUED | OUTPATIENT
Start: 2021-01-01 | End: 2021-01-01

## 2021-01-01 RX ORDER — FENTANYL CITRATE 50 UG/ML
50 INJECTION, SOLUTION INTRAMUSCULAR; INTRAVENOUS
Status: DISCONTINUED | OUTPATIENT
Start: 2021-01-01 | End: 2021-01-01

## 2021-01-01 RX ORDER — FAMOTIDINE 20 MG/1
20 TABLET, FILM COATED ORAL ONCE
Status: COMPLETED | OUTPATIENT
Start: 2021-01-01 | End: 2021-01-01

## 2021-01-01 RX ORDER — LIDOCAINE HYDROCHLORIDE 30 MG/G
1 CREAM TOPICAL 3 TIMES WEEKLY
COMMUNITY
End: 2022-01-01 | Stop reason: HOSPADM

## 2021-01-01 RX ORDER — ACETAMINOPHEN 325 MG/1
650 TABLET ORAL EVERY 4 HOURS PRN
Status: DISCONTINUED | OUTPATIENT
Start: 2021-01-01 | End: 2021-01-01 | Stop reason: SDUPTHER

## 2021-01-01 RX ORDER — DEXTROSE MONOHYDRATE 25 G/50ML
25 INJECTION, SOLUTION INTRAVENOUS
Status: DISCONTINUED | OUTPATIENT
Start: 2021-01-01 | End: 2022-01-01 | Stop reason: HOSPADM

## 2021-01-01 RX ORDER — CALCIUM ACETATE 667 MG/1
1334 CAPSULE ORAL 3 TIMES DAILY
COMMUNITY
End: 2022-01-01 | Stop reason: HOSPADM

## 2021-01-01 RX ORDER — NALOXONE HCL 0.4 MG/ML
0.4 VIAL (ML) INJECTION AS NEEDED
Status: DISCONTINUED | OUTPATIENT
Start: 2021-01-01 | End: 2021-01-01

## 2021-01-01 RX ORDER — ALBUMIN (HUMAN) 12.5 G/50ML
25 SOLUTION INTRAVENOUS ONCE
Status: COMPLETED | OUTPATIENT
Start: 2021-01-01 | End: 2021-01-01

## 2021-01-01 RX ORDER — DEXAMETHASONE SODIUM PHOSPHATE 4 MG/ML
INJECTION, SOLUTION INTRA-ARTICULAR; INTRALESIONAL; INTRAMUSCULAR; INTRAVENOUS; SOFT TISSUE AS NEEDED
Status: DISCONTINUED | OUTPATIENT
Start: 2021-01-01 | End: 2021-01-01 | Stop reason: SURG

## 2021-01-01 RX ORDER — LABETALOL HYDROCHLORIDE 5 MG/ML
5 INJECTION, SOLUTION INTRAVENOUS
Status: DISCONTINUED | OUTPATIENT
Start: 2021-01-01 | End: 2021-01-01

## 2021-01-01 RX ORDER — ALBUMIN (HUMAN) 12.5 G/50ML
25 SOLUTION INTRAVENOUS AS NEEDED
Status: ACTIVE | OUTPATIENT
Start: 2021-01-01 | End: 2021-01-01

## 2021-01-01 RX ORDER — AMOXICILLIN 250 MG
2 CAPSULE ORAL 2 TIMES DAILY PRN
Status: DISCONTINUED | OUTPATIENT
Start: 2021-01-01 | End: 2022-01-01 | Stop reason: HOSPADM

## 2021-01-01 RX ORDER — BUPIVACAINE HYDROCHLORIDE 5 MG/ML
INJECTION, SOLUTION EPIDURAL; INTRACAUDAL
Status: COMPLETED | OUTPATIENT
Start: 2021-01-01 | End: 2021-01-01

## 2021-01-01 RX ORDER — ASPIRIN 81 MG/1
81 TABLET, CHEWABLE ORAL DAILY
Status: DISCONTINUED | OUTPATIENT
Start: 2021-01-01 | End: 2022-01-01 | Stop reason: HOSPADM

## 2021-01-01 RX ORDER — DROPERIDOL 2.5 MG/ML
0.62 INJECTION, SOLUTION INTRAMUSCULAR; INTRAVENOUS ONCE AS NEEDED
Status: DISCONTINUED | OUTPATIENT
Start: 2021-01-01 | End: 2021-01-01

## 2021-01-01 RX ADMIN — HYDROCODONE BITARTRATE AND ACETAMINOPHEN 1 TABLET: 10; 325 TABLET ORAL at 03:55

## 2021-01-01 RX ADMIN — MIDODRINE HYDROCHLORIDE 10 MG: 10 TABLET ORAL at 18:56

## 2021-01-01 RX ADMIN — HYDROCODONE BITARTRATE AND ACETAMINOPHEN 1 TABLET: 10; 325 TABLET ORAL at 15:00

## 2021-01-01 RX ADMIN — ASPIRIN 81 MG CHEWABLE TABLET 81 MG: 81 TABLET CHEWABLE at 08:20

## 2021-01-01 RX ADMIN — HYDROCODONE BITARTRATE AND ACETAMINOPHEN 1 TABLET: 10; 325 TABLET ORAL at 23:58

## 2021-01-01 RX ADMIN — LEVOTHYROXINE SODIUM 200 MCG: 0.2 TABLET ORAL at 05:17

## 2021-01-01 RX ADMIN — METRONIDAZOLE 500 MG: 500 TABLET ORAL at 08:14

## 2021-01-01 RX ADMIN — HYDROCODONE BITARTRATE AND ACETAMINOPHEN 1 TABLET: 10; 325 TABLET ORAL at 10:40

## 2021-01-01 RX ADMIN — LINEZOLID 600 MG: 600 TABLET ORAL at 09:00

## 2021-01-01 RX ADMIN — ATORVASTATIN CALCIUM 20 MG: 20 TABLET, FILM COATED ORAL at 20:55

## 2021-01-01 RX ADMIN — ASPIRIN 81 MG CHEWABLE TABLET 81 MG: 81 TABLET CHEWABLE at 10:35

## 2021-01-01 RX ADMIN — ASPIRIN 81 MG CHEWABLE TABLET 81 MG: 81 TABLET CHEWABLE at 08:57

## 2021-01-01 RX ADMIN — Medication 5 MG: at 20:10

## 2021-01-01 RX ADMIN — HYDROCODONE BITARTRATE AND ACETAMINOPHEN 1 TABLET: 10; 325 TABLET ORAL at 21:10

## 2021-01-01 RX ADMIN — Medication 30 MG: at 10:22

## 2021-01-01 RX ADMIN — LINEZOLID 600 MG: 600 TABLET ORAL at 09:04

## 2021-01-01 RX ADMIN — ATORVASTATIN CALCIUM 20 MG: 20 TABLET, FILM COATED ORAL at 21:18

## 2021-01-01 RX ADMIN — LIDOCAINE 4% 1 APPLICATION: 4 CREAM TOPICAL at 08:04

## 2021-01-01 RX ADMIN — MIDODRINE HYDROCHLORIDE 10 MG: 10 TABLET ORAL at 08:08

## 2021-01-01 RX ADMIN — HYDROCODONE BITARTRATE AND ACETAMINOPHEN 1 TABLET: 10; 325 TABLET ORAL at 05:11

## 2021-01-01 RX ADMIN — GABAPENTIN 600 MG: 300 CAPSULE ORAL at 15:59

## 2021-01-01 RX ADMIN — CEFTAZIDIME 1 G: 1 INJECTION, POWDER, FOR SOLUTION INTRAMUSCULAR; INTRAVENOUS at 15:19

## 2021-01-01 RX ADMIN — GABAPENTIN 800 MG: 400 CAPSULE ORAL at 05:36

## 2021-01-01 RX ADMIN — HYDROCODONE BITARTRATE AND ACETAMINOPHEN 1 TABLET: 10; 325 TABLET ORAL at 11:54

## 2021-01-01 RX ADMIN — ATORVASTATIN CALCIUM 20 MG: 20 TABLET, FILM COATED ORAL at 21:00

## 2021-01-01 RX ADMIN — ATORVASTATIN CALCIUM 20 MG: 20 TABLET, FILM COATED ORAL at 21:28

## 2021-01-01 RX ADMIN — FENTANYL CITRATE 50 MCG: 50 INJECTION, SOLUTION INTRAMUSCULAR; INTRAVENOUS at 09:39

## 2021-01-01 RX ADMIN — LINAGLIPTIN 5 MG: 5 TABLET, FILM COATED ORAL at 09:18

## 2021-01-01 RX ADMIN — ASPIRIN 81 MG CHEWABLE TABLET 81 MG: 81 TABLET CHEWABLE at 09:00

## 2021-01-01 RX ADMIN — LEVOTHYROXINE SODIUM 200 MCG: 0.2 TABLET ORAL at 05:36

## 2021-01-01 RX ADMIN — ALBUMIN HUMAN 25 G: 0.25 SOLUTION INTRAVENOUS at 10:45

## 2021-01-01 RX ADMIN — VANCOMYCIN HYDROCHLORIDE 1250 MG: 10 INJECTION, POWDER, LYOPHILIZED, FOR SOLUTION INTRAVENOUS at 13:48

## 2021-01-01 RX ADMIN — GABAPENTIN 600 MG: 300 CAPSULE ORAL at 17:07

## 2021-01-01 RX ADMIN — METRONIDAZOLE 500 MG: 500 TABLET ORAL at 21:28

## 2021-01-01 RX ADMIN — WARFARIN SODIUM 5 MG: 5 TABLET ORAL at 18:26

## 2021-01-01 RX ADMIN — HYDROCODONE BITARTRATE AND ACETAMINOPHEN 1 TABLET: 10; 325 TABLET ORAL at 21:19

## 2021-01-01 RX ADMIN — ATORVASTATIN CALCIUM 20 MG: 20 TABLET, FILM COATED ORAL at 21:31

## 2021-01-01 RX ADMIN — HYDROCODONE BITARTRATE AND ACETAMINOPHEN 1 TABLET: 10; 325 TABLET ORAL at 13:17

## 2021-01-01 RX ADMIN — ASPIRIN 81 MG CHEWABLE TABLET 81 MG: 81 TABLET CHEWABLE at 08:08

## 2021-01-01 RX ADMIN — HYDROCODONE BITARTRATE AND ACETAMINOPHEN 1 TABLET: 10; 325 TABLET ORAL at 14:48

## 2021-01-01 RX ADMIN — ATORVASTATIN CALCIUM 20 MG: 20 TABLET, FILM COATED ORAL at 20:18

## 2021-01-01 RX ADMIN — BUPIVACAINE HYDROCHLORIDE 30 ML: 2.5 INJECTION, SOLUTION EPIDURAL; INFILTRATION; INTRACAUDAL at 09:15

## 2021-01-01 RX ADMIN — LEVOTHYROXINE SODIUM 200 MCG: 0.2 TABLET ORAL at 05:11

## 2021-01-01 RX ADMIN — MIDODRINE HYDROCHLORIDE 10 MG: 10 TABLET ORAL at 17:02

## 2021-01-01 RX ADMIN — GABAPENTIN 800 MG: 400 CAPSULE ORAL at 06:12

## 2021-01-01 RX ADMIN — GABAPENTIN 600 MG: 300 CAPSULE ORAL at 05:23

## 2021-01-01 RX ADMIN — ONDANSETRON HYDROCHLORIDE 4 MG: 4 TABLET, FILM COATED ORAL at 09:00

## 2021-01-01 RX ADMIN — CASTOR OIL AND BALSAM, PERU 1 APPLICATION: 788; 87 OINTMENT TOPICAL at 20:56

## 2021-01-01 RX ADMIN — ROPIVACAINE HYDROCHLORIDE 10 ML/HR: 2 INJECTION, SOLUTION EPIDURAL; INFILTRATION at 11:56

## 2021-01-01 RX ADMIN — LIDOCAINE HYDROCHLORIDE 100 MG: 10 INJECTION, SOLUTION EPIDURAL; INFILTRATION; INTRACAUDAL; PERINEURAL at 09:55

## 2021-01-01 RX ADMIN — ROPIVACAINE HYDROCHLORIDE 10 ML/HR: 2 INJECTION, SOLUTION EPIDURAL; INFILTRATION at 17:55

## 2021-01-01 RX ADMIN — HYDROCODONE BITARTRATE AND ACETAMINOPHEN 1 TABLET: 10; 325 TABLET ORAL at 04:56

## 2021-01-01 RX ADMIN — GABAPENTIN 600 MG: 300 CAPSULE ORAL at 13:48

## 2021-01-01 RX ADMIN — PROPOFOL 200 MG: 10 INJECTION, EMULSION INTRAVENOUS at 09:55

## 2021-01-01 RX ADMIN — LINAGLIPTIN 5 MG: 5 TABLET, FILM COATED ORAL at 08:08

## 2021-01-01 RX ADMIN — GABAPENTIN 600 MG: 300 CAPSULE ORAL at 05:02

## 2021-01-01 RX ADMIN — HYDROCODONE BITARTRATE AND ACETAMINOPHEN 1 TABLET: 10; 325 TABLET ORAL at 20:55

## 2021-01-01 RX ADMIN — CASTOR OIL AND BALSAM, PERU 1 APPLICATION: 788; 87 OINTMENT TOPICAL at 13:49

## 2021-01-01 RX ADMIN — GABAPENTIN 800 MG: 400 CAPSULE ORAL at 13:48

## 2021-01-01 RX ADMIN — HYDROCODONE BITARTRATE AND ACETAMINOPHEN 1 TABLET: 10; 325 TABLET ORAL at 05:23

## 2021-01-01 RX ADMIN — HYDROCODONE BITARTRATE AND ACETAMINOPHEN 1 TABLET: 10; 325 TABLET ORAL at 13:48

## 2021-01-01 RX ADMIN — WARFARIN SODIUM 5 MG: 5 TABLET ORAL at 17:55

## 2021-01-01 RX ADMIN — HYDROCODONE BITARTRATE AND ACETAMINOPHEN 1 TABLET: 10; 325 TABLET ORAL at 05:59

## 2021-01-01 RX ADMIN — MIDODRINE HYDROCHLORIDE 10 MG: 10 TABLET ORAL at 08:04

## 2021-01-01 RX ADMIN — GABAPENTIN 800 MG: 400 CAPSULE ORAL at 20:28

## 2021-01-01 RX ADMIN — GABAPENTIN 600 MG: 300 CAPSULE ORAL at 14:20

## 2021-01-01 RX ADMIN — GABAPENTIN 600 MG: 300 CAPSULE ORAL at 14:09

## 2021-01-01 RX ADMIN — GABAPENTIN 600 MG: 300 CAPSULE ORAL at 13:28

## 2021-01-01 RX ADMIN — ASPIRIN 81 MG CHEWABLE TABLET 81 MG: 81 TABLET CHEWABLE at 08:15

## 2021-01-01 RX ADMIN — MORPHINE SULFATE 1 MG: 2 INJECTION, SOLUTION INTRAMUSCULAR; INTRAVENOUS at 20:29

## 2021-01-01 RX ADMIN — MIDODRINE HYDROCHLORIDE 10 MG: 10 TABLET ORAL at 16:32

## 2021-01-01 RX ADMIN — HYDROCODONE BITARTRATE AND ACETAMINOPHEN 1 TABLET: 10; 325 TABLET ORAL at 02:15

## 2021-01-01 RX ADMIN — GLYCOPYRROLATE 0.4 MG: 0.2 INJECTION INTRAMUSCULAR; INTRAVENOUS at 10:53

## 2021-01-01 RX ADMIN — GABAPENTIN 600 MG: 300 CAPSULE ORAL at 04:56

## 2021-01-01 RX ADMIN — LINEZOLID 600 MG: 600 TABLET ORAL at 20:41

## 2021-01-01 RX ADMIN — MIDODRINE HYDROCHLORIDE 10 MG: 10 TABLET ORAL at 16:52

## 2021-01-01 RX ADMIN — GABAPENTIN 600 MG: 300 CAPSULE ORAL at 05:11

## 2021-01-01 RX ADMIN — ASPIRIN 81 MG CHEWABLE TABLET 81 MG: 81 TABLET CHEWABLE at 09:04

## 2021-01-01 RX ADMIN — METRONIDAZOLE 500 MG: 500 TABLET ORAL at 21:10

## 2021-01-01 RX ADMIN — CEFTAZIDIME 1 G: 1 INJECTION, POWDER, FOR SOLUTION INTRAMUSCULAR; INTRAVENOUS at 12:21

## 2021-01-01 RX ADMIN — MIDODRINE HYDROCHLORIDE 10 MG: 10 TABLET ORAL at 17:03

## 2021-01-01 RX ADMIN — ASPIRIN 81 MG CHEWABLE TABLET 81 MG: 81 TABLET CHEWABLE at 08:14

## 2021-01-01 RX ADMIN — SODIUM CHLORIDE, PRESERVATIVE FREE 10 ML: 5 INJECTION INTRAVENOUS at 21:55

## 2021-01-01 RX ADMIN — GABAPENTIN 600 MG: 300 CAPSULE ORAL at 20:40

## 2021-01-01 RX ADMIN — METRONIDAZOLE 500 MG: 500 TABLET ORAL at 20:40

## 2021-01-01 RX ADMIN — MIDODRINE HYDROCHLORIDE 10 MG: 10 TABLET ORAL at 17:28

## 2021-01-01 RX ADMIN — ASPIRIN 81 MG CHEWABLE TABLET 81 MG: 81 TABLET CHEWABLE at 07:34

## 2021-01-01 RX ADMIN — CEFTAZIDIME 1 G: 1 INJECTION, POWDER, FOR SOLUTION INTRAMUSCULAR; INTRAVENOUS at 11:30

## 2021-01-01 RX ADMIN — VANCOMYCIN HYDROCHLORIDE 1250 MG: 10 INJECTION, POWDER, LYOPHILIZED, FOR SOLUTION INTRAVENOUS at 12:22

## 2021-01-01 RX ADMIN — Medication 30 MG: at 09:30

## 2021-01-01 RX ADMIN — ATORVASTATIN CALCIUM 20 MG: 20 TABLET, FILM COATED ORAL at 21:15

## 2021-01-01 RX ADMIN — MORPHINE SULFATE 1 MG: 2 INJECTION, SOLUTION INTRAMUSCULAR; INTRAVENOUS at 11:25

## 2021-01-01 RX ADMIN — HYDROCODONE BITARTRATE AND ACETAMINOPHEN 1 TABLET: 10; 325 TABLET ORAL at 21:08

## 2021-01-01 RX ADMIN — WARFARIN SODIUM 5 MG: 5 TABLET ORAL at 17:59

## 2021-01-01 RX ADMIN — MIDAZOLAM HYDROCHLORIDE 1 MG: 1 INJECTION, SOLUTION INTRAMUSCULAR; INTRAVENOUS at 09:39

## 2021-01-01 RX ADMIN — GABAPENTIN 600 MG: 300 CAPSULE ORAL at 05:24

## 2021-01-01 RX ADMIN — LEVOTHYROXINE SODIUM 200 MCG: 0.2 TABLET ORAL at 04:56

## 2021-01-01 RX ADMIN — ATORVASTATIN CALCIUM 20 MG: 20 TABLET, FILM COATED ORAL at 20:10

## 2021-01-01 RX ADMIN — ASPIRIN 81 MG CHEWABLE TABLET 81 MG: 81 TABLET CHEWABLE at 08:04

## 2021-01-01 RX ADMIN — ACETAMINOPHEN 650 MG: 325 TABLET, FILM COATED ORAL at 21:10

## 2021-01-01 RX ADMIN — HYDROCODONE BITARTRATE AND ACETAMINOPHEN 1 TABLET: 10; 325 TABLET ORAL at 21:00

## 2021-01-01 RX ADMIN — METRONIDAZOLE 500 MG: 500 TABLET ORAL at 08:08

## 2021-01-01 RX ADMIN — VANCOMYCIN HYDROCHLORIDE 1000 MG: 1 INJECTION, SOLUTION INTRAVENOUS at 09:28

## 2021-01-01 RX ADMIN — HYDROCODONE BITARTRATE AND ACETAMINOPHEN 1 TABLET: 10; 325 TABLET ORAL at 06:09

## 2021-01-01 RX ADMIN — HYDROCODONE BITARTRATE AND ACETAMINOPHEN 1 TABLET: 10; 325 TABLET ORAL at 21:28

## 2021-01-01 RX ADMIN — CASTOR OIL AND BALSAM, PERU 1 APPLICATION: 788; 87 OINTMENT TOPICAL at 21:11

## 2021-01-01 RX ADMIN — METRONIDAZOLE 500 MG: 500 TABLET ORAL at 20:17

## 2021-01-01 RX ADMIN — FENTANYL CITRATE 50 MCG: 50 INJECTION, SOLUTION INTRAMUSCULAR; INTRAVENOUS at 09:30

## 2021-01-01 RX ADMIN — METRONIDAZOLE 500 MG: 500 TABLET ORAL at 20:55

## 2021-01-01 RX ADMIN — HYDROCODONE BITARTRATE AND ACETAMINOPHEN 1 TABLET: 10; 325 TABLET ORAL at 21:31

## 2021-01-01 RX ADMIN — DOCUSATE SODIUM 100 MG: 100 CAPSULE, LIQUID FILLED ORAL at 00:03

## 2021-01-01 RX ADMIN — FAMOTIDINE 20 MG: 20 TABLET ORAL at 09:14

## 2021-01-01 RX ADMIN — HYDROCODONE BITARTRATE AND ACETAMINOPHEN 1 TABLET: 10; 325 TABLET ORAL at 06:22

## 2021-01-01 RX ADMIN — METRONIDAZOLE 500 MG: 500 TABLET ORAL at 13:48

## 2021-01-01 RX ADMIN — LEVOTHYROXINE SODIUM 200 MCG: 0.2 TABLET ORAL at 04:55

## 2021-01-01 RX ADMIN — MIDODRINE HYDROCHLORIDE 10 MG: 10 TABLET ORAL at 17:43

## 2021-01-01 RX ADMIN — SODIUM CHLORIDE 125 MG: 9 INJECTION, SOLUTION INTRAVENOUS at 16:11

## 2021-01-01 RX ADMIN — ROCURONIUM BROMIDE 50 MG: 10 INJECTION, SOLUTION INTRAVENOUS at 09:55

## 2021-01-01 RX ADMIN — GABAPENTIN 600 MG: 300 CAPSULE ORAL at 12:58

## 2021-01-01 RX ADMIN — GABAPENTIN 600 MG: 300 CAPSULE ORAL at 20:54

## 2021-01-01 RX ADMIN — MIDAZOLAM HYDROCHLORIDE 1 MG: 1 INJECTION, SOLUTION INTRAMUSCULAR; INTRAVENOUS at 09:30

## 2021-01-01 RX ADMIN — LEVOTHYROXINE SODIUM 200 MCG: 0.2 TABLET ORAL at 06:09

## 2021-01-01 RX ADMIN — EPHEDRINE SULFATE 10 MG: 50 INJECTION INTRAVENOUS at 10:00

## 2021-01-01 RX ADMIN — LIDOCAINE 4% 1 APPLICATION: 4 CREAM TOPICAL at 09:05

## 2021-01-01 RX ADMIN — MORPHINE SULFATE 1 MG: 2 INJECTION, SOLUTION INTRAMUSCULAR; INTRAVENOUS at 05:36

## 2021-01-01 RX ADMIN — GABAPENTIN 600 MG: 300 CAPSULE ORAL at 05:13

## 2021-01-01 RX ADMIN — METRONIDAZOLE 500 MG: 500 TABLET ORAL at 07:33

## 2021-01-01 RX ADMIN — GABAPENTIN 600 MG: 300 CAPSULE ORAL at 06:11

## 2021-01-01 RX ADMIN — HYDROCODONE BITARTRATE AND ACETAMINOPHEN 1 TABLET: 10; 325 TABLET ORAL at 20:54

## 2021-01-01 RX ADMIN — ATORVASTATIN CALCIUM 20 MG: 20 TABLET, FILM COATED ORAL at 21:10

## 2021-01-01 RX ADMIN — SODIUM CHLORIDE, PRESERVATIVE FREE 10 ML: 5 INJECTION INTRAVENOUS at 08:15

## 2021-01-01 RX ADMIN — GABAPENTIN 300 MG: 300 CAPSULE ORAL at 20:17

## 2021-01-01 RX ADMIN — LEVOTHYROXINE SODIUM 200 MCG: 0.2 TABLET ORAL at 06:22

## 2021-01-01 RX ADMIN — LINEZOLID 600 MG: 600 TABLET ORAL at 22:08

## 2021-01-01 RX ADMIN — METRONIDAZOLE 500 MG: 500 TABLET ORAL at 20:19

## 2021-01-01 RX ADMIN — VANCOMYCIN HYDROCHLORIDE 1250 MG: 10 INJECTION, POWDER, LYOPHILIZED, FOR SOLUTION INTRAVENOUS at 12:12

## 2021-01-01 RX ADMIN — METRONIDAZOLE 500 MG: 500 TABLET ORAL at 08:20

## 2021-01-01 RX ADMIN — HYDROCODONE BITARTRATE AND ACETAMINOPHEN 1 TABLET: 10; 325 TABLET ORAL at 06:02

## 2021-01-01 RX ADMIN — HYDROCODONE BITARTRATE AND ACETAMINOPHEN 1 TABLET: 10; 325 TABLET ORAL at 16:52

## 2021-01-01 RX ADMIN — HYDROCODONE BITARTRATE AND ACETAMINOPHEN 1 TABLET: 10; 325 TABLET ORAL at 03:53

## 2021-01-01 RX ADMIN — DEXAMETHASONE SODIUM PHOSPHATE 4 MG: 4 INJECTION, SOLUTION INTRA-ARTICULAR; INTRALESIONAL; INTRAMUSCULAR; INTRAVENOUS; SOFT TISSUE at 10:00

## 2021-01-01 RX ADMIN — HYDROCODONE BITARTRATE AND ACETAMINOPHEN 1 TABLET: 10; 325 TABLET ORAL at 09:55

## 2021-01-01 RX ADMIN — VANCOMYCIN HYDROCHLORIDE 1250 MG: 10 INJECTION, POWDER, LYOPHILIZED, FOR SOLUTION INTRAVENOUS at 10:03

## 2021-01-01 RX ADMIN — GABAPENTIN 600 MG: 300 CAPSULE ORAL at 21:28

## 2021-01-01 RX ADMIN — MIDODRINE HYDROCHLORIDE 10 MG: 10 TABLET ORAL at 17:40

## 2021-01-01 RX ADMIN — METRONIDAZOLE 500 MG: 500 TABLET ORAL at 20:18

## 2021-01-01 RX ADMIN — HYDROCODONE BITARTRATE AND ACETAMINOPHEN 1 TABLET: 10; 325 TABLET ORAL at 13:05

## 2021-01-01 RX ADMIN — LEVOTHYROXINE SODIUM 200 MCG: 0.2 TABLET ORAL at 05:59

## 2021-01-01 RX ADMIN — HYDROCODONE BITARTRATE AND ACETAMINOPHEN 1 TABLET: 10; 325 TABLET ORAL at 10:29

## 2021-01-01 RX ADMIN — CEFTAZIDIME 1 G: 1 INJECTION, POWDER, FOR SOLUTION INTRAMUSCULAR; INTRAVENOUS at 16:20

## 2021-01-01 RX ADMIN — SODIUM CHLORIDE, PRESERVATIVE FREE 10 ML: 5 INJECTION INTRAVENOUS at 20:17

## 2021-01-01 RX ADMIN — SODIUM CHLORIDE, PRESERVATIVE FREE 10 ML: 5 INJECTION INTRAVENOUS at 21:11

## 2021-01-01 RX ADMIN — SODIUM CHLORIDE: 9 INJECTION, SOLUTION INTRAVENOUS at 09:15

## 2021-01-01 RX ADMIN — LINAGLIPTIN 5 MG: 5 TABLET, FILM COATED ORAL at 10:36

## 2021-01-01 RX ADMIN — GABAPENTIN 600 MG: 300 CAPSULE ORAL at 21:10

## 2021-01-01 RX ADMIN — HYDROCODONE BITARTRATE AND ACETAMINOPHEN 1 TABLET: 10; 325 TABLET ORAL at 13:01

## 2021-01-01 RX ADMIN — METRONIDAZOLE 500 MG: 500 TABLET ORAL at 13:29

## 2021-01-01 RX ADMIN — HYDROCODONE BITARTRATE AND ACETAMINOPHEN 1 TABLET: 10; 325 TABLET ORAL at 21:15

## 2021-01-01 RX ADMIN — INSULIN LISPRO 3 UNITS: 100 INJECTION, SOLUTION INTRAVENOUS; SUBCUTANEOUS at 16:30

## 2021-01-01 RX ADMIN — BUPIVACAINE HYDROCHLORIDE 10 ML: 5 INJECTION, SOLUTION EPIDURAL; INTRACAUDAL at 09:34

## 2021-01-01 RX ADMIN — LINEZOLID 600 MG: 600 TABLET ORAL at 20:18

## 2021-01-01 RX ADMIN — GABAPENTIN 600 MG: 300 CAPSULE ORAL at 06:39

## 2021-01-01 RX ADMIN — GABAPENTIN 600 MG: 300 CAPSULE ORAL at 06:02

## 2021-01-01 RX ADMIN — ASPIRIN 81 MG CHEWABLE TABLET 81 MG: 81 TABLET CHEWABLE at 13:28

## 2021-01-01 RX ADMIN — METRONIDAZOLE 500 MG: 500 TABLET ORAL at 08:54

## 2021-01-01 RX ADMIN — SODIUM CHLORIDE 1 G: 900 INJECTION INTRAVENOUS at 20:29

## 2021-01-01 RX ADMIN — GABAPENTIN 600 MG: 300 CAPSULE ORAL at 20:20

## 2021-01-01 RX ADMIN — MIDODRINE HYDROCHLORIDE 10 MG: 10 TABLET ORAL at 07:33

## 2021-01-01 RX ADMIN — GABAPENTIN 600 MG: 300 CAPSULE ORAL at 21:00

## 2021-01-01 RX ADMIN — GABAPENTIN 600 MG: 300 CAPSULE ORAL at 13:05

## 2021-01-01 RX ADMIN — LINEZOLID 600 MG: 600 TABLET ORAL at 09:12

## 2021-01-01 RX ADMIN — LEVOTHYROXINE SODIUM 200 MCG: 0.2 TABLET ORAL at 05:02

## 2021-01-01 RX ADMIN — HYDROCODONE BITARTRATE AND ACETAMINOPHEN 1 TABLET: 10; 325 TABLET ORAL at 13:49

## 2021-01-01 RX ADMIN — HEPARIN SODIUM 2000 UNITS: 1000 INJECTION INTRAVENOUS; SUBCUTANEOUS at 09:30

## 2021-01-01 RX ADMIN — CEFTAZIDIME 1 G: 1 INJECTION, POWDER, FOR SOLUTION INTRAMUSCULAR; INTRAVENOUS at 17:00

## 2021-01-01 RX ADMIN — METRONIDAZOLE 500 MG: 500 TABLET ORAL at 09:12

## 2021-01-01 RX ADMIN — CASTOR OIL AND BALSAM, PERU 1 APPLICATION: 788; 87 OINTMENT TOPICAL at 13:29

## 2021-01-01 RX ADMIN — HYDROCODONE BITARTRATE AND ACETAMINOPHEN 1 TABLET: 10; 325 TABLET ORAL at 09:09

## 2021-01-01 RX ADMIN — HYDROCODONE BITARTRATE AND ACETAMINOPHEN 1 TABLET: 10; 325 TABLET ORAL at 00:03

## 2021-01-01 RX ADMIN — WARFARIN SODIUM 5 MG: 5 TABLET ORAL at 17:03

## 2021-01-01 RX ADMIN — GABAPENTIN 600 MG: 300 CAPSULE ORAL at 21:56

## 2021-01-01 RX ADMIN — METRONIDAZOLE 500 MG: 500 TABLET ORAL at 21:31

## 2021-01-01 RX ADMIN — LEVOTHYROXINE SODIUM 200 MCG: 0.2 TABLET ORAL at 06:39

## 2021-01-01 RX ADMIN — HYDROCODONE BITARTRATE AND ACETAMINOPHEN 1 TABLET: 10; 325 TABLET ORAL at 16:58

## 2021-01-01 RX ADMIN — ALBUMIN HUMAN 25 G: 0.25 SOLUTION INTRAVENOUS at 10:00

## 2021-01-01 RX ADMIN — SODIUM CHLORIDE, PRESERVATIVE FREE 10 ML: 5 INJECTION INTRAVENOUS at 14:44

## 2021-01-01 RX ADMIN — ALBUMIN HUMAN 25 G: 0.25 SOLUTION INTRAVENOUS at 21:05

## 2021-01-01 RX ADMIN — MIDODRINE HYDROCHLORIDE 10 MG: 10 TABLET ORAL at 08:20

## 2021-01-01 RX ADMIN — LINAGLIPTIN 5 MG: 5 TABLET, FILM COATED ORAL at 17:35

## 2021-01-01 RX ADMIN — METRONIDAZOLE 500 MG: 500 TABLET ORAL at 20:10

## 2021-01-01 RX ADMIN — VANCOMYCIN HYDROCHLORIDE 2500 MG: 10 INJECTION, POWDER, LYOPHILIZED, FOR SOLUTION INTRAVENOUS at 21:23

## 2021-01-01 RX ADMIN — LEVOTHYROXINE SODIUM 200 MCG: 0.2 TABLET ORAL at 06:12

## 2021-01-01 RX ADMIN — HYDROCODONE BITARTRATE AND ACETAMINOPHEN 1 TABLET: 10; 325 TABLET ORAL at 05:13

## 2021-01-01 RX ADMIN — LINEZOLID 600 MG: 600 TABLET ORAL at 17:00

## 2021-01-01 RX ADMIN — ONDANSETRON 4 MG: 2 INJECTION INTRAMUSCULAR; INTRAVENOUS at 06:12

## 2021-01-01 RX ADMIN — ATORVASTATIN CALCIUM 20 MG: 20 TABLET, FILM COATED ORAL at 20:17

## 2021-01-01 RX ADMIN — HYDROCODONE BITARTRATE AND ACETAMINOPHEN 1 TABLET: 10; 325 TABLET ORAL at 20:27

## 2021-01-01 RX ADMIN — GABAPENTIN 800 MG: 400 CAPSULE ORAL at 21:10

## 2021-01-01 RX ADMIN — HYDROCODONE BITARTRATE AND ACETAMINOPHEN 1 TABLET: 10; 325 TABLET ORAL at 14:20

## 2021-01-01 RX ADMIN — GABAPENTIN 600 MG: 300 CAPSULE ORAL at 23:06

## 2021-01-01 RX ADMIN — INSULIN LISPRO 3 UNITS: 100 INJECTION, SOLUTION INTRAVENOUS; SUBCUTANEOUS at 17:59

## 2021-01-01 RX ADMIN — METRONIDAZOLE 500 MG: 500 TABLET ORAL at 21:55

## 2021-01-01 RX ADMIN — LINEZOLID 600 MG: 600 TABLET ORAL at 20:17

## 2021-01-01 RX ADMIN — ASPIRIN 81 MG CHEWABLE TABLET 81 MG: 81 TABLET CHEWABLE at 08:54

## 2021-01-01 RX ADMIN — LEVOTHYROXINE SODIUM 200 MCG: 0.2 TABLET ORAL at 06:10

## 2021-01-01 RX ADMIN — METRONIDAZOLE 500 MG: 500 TABLET ORAL at 21:18

## 2021-01-01 RX ADMIN — ASPIRIN 81 MG CHEWABLE TABLET 81 MG: 81 TABLET CHEWABLE at 08:23

## 2021-01-01 RX ADMIN — DOCUSATE SODIUM 100 MG: 100 CAPSULE, LIQUID FILLED ORAL at 09:00

## 2021-01-01 RX ADMIN — HYDROCODONE BITARTRATE AND ACETAMINOPHEN 1 TABLET: 10; 325 TABLET ORAL at 15:59

## 2021-01-01 RX ADMIN — VANCOMYCIN HYDROCHLORIDE 1250 MG: 10 INJECTION, POWDER, LYOPHILIZED, FOR SOLUTION INTRAVENOUS at 13:49

## 2021-01-01 RX ADMIN — WARFARIN SODIUM 2.5 MG: 2.5 TABLET ORAL at 17:28

## 2021-01-01 RX ADMIN — HYDROCODONE BITARTRATE AND ACETAMINOPHEN 1 TABLET: 10; 325 TABLET ORAL at 09:05

## 2021-01-01 RX ADMIN — GABAPENTIN 600 MG: 300 CAPSULE ORAL at 13:01

## 2021-01-01 RX ADMIN — GABAPENTIN 600 MG: 300 CAPSULE ORAL at 21:18

## 2021-01-01 RX ADMIN — ASPIRIN 81 MG CHEWABLE TABLET 81 MG: 81 TABLET CHEWABLE at 09:34

## 2021-01-01 RX ADMIN — HEPARIN SODIUM 5000 UNITS: 5000 INJECTION, SOLUTION INTRAVENOUS; SUBCUTANEOUS at 09:00

## 2021-01-01 RX ADMIN — ASPIRIN 81 MG CHEWABLE TABLET 81 MG: 81 TABLET CHEWABLE at 13:46

## 2021-01-01 RX ADMIN — HYDROCODONE BITARTRATE AND ACETAMINOPHEN 1 TABLET: 10; 325 TABLET ORAL at 14:09

## 2021-01-01 RX ADMIN — HYDROCODONE BITARTRATE AND ACETAMINOPHEN 1 TABLET: 10; 325 TABLET ORAL at 13:59

## 2021-01-01 RX ADMIN — GABAPENTIN 600 MG: 300 CAPSULE ORAL at 05:59

## 2021-01-01 RX ADMIN — MIDODRINE HYDROCHLORIDE 10 MG: 10 TABLET ORAL at 17:14

## 2021-01-01 RX ADMIN — NEOSTIGMINE METHYLSULFATE 3 MG: 0.5 INJECTION INTRAVENOUS at 10:53

## 2021-01-01 RX ADMIN — SODIUM CHLORIDE, PRESERVATIVE FREE 10 ML: 5 INJECTION INTRAVENOUS at 08:24

## 2021-01-01 RX ADMIN — GABAPENTIN 600 MG: 300 CAPSULE ORAL at 21:31

## 2021-01-01 RX ADMIN — ROPIVACAINE HYDROCHLORIDE 10 ML/HR: 2 INJECTION, SOLUTION EPIDURAL; INFILTRATION at 23:55

## 2021-01-01 RX ADMIN — LEVOTHYROXINE SODIUM 200 MCG: 0.2 TABLET ORAL at 05:23

## 2021-01-01 RX ADMIN — METRONIDAZOLE 500 MG: 500 TABLET ORAL at 21:01

## 2021-01-01 RX ADMIN — CASTOR OIL AND BALSAM, PERU 1 APPLICATION: 788; 87 OINTMENT TOPICAL at 08:23

## 2021-01-01 RX ADMIN — ALBUMIN (HUMAN) 25 G: 12.5 SOLUTION INTRAVENOUS at 12:35

## 2021-01-01 RX ADMIN — CASTOR OIL AND BALSAM, PERU 1 APPLICATION: 788; 87 OINTMENT TOPICAL at 21:31

## 2021-01-01 RX ADMIN — MIDODRINE HYDROCHLORIDE 10 MG: 10 TABLET ORAL at 17:20

## 2021-01-01 RX ADMIN — GABAPENTIN 600 MG: 300 CAPSULE ORAL at 06:09

## 2021-01-01 RX ADMIN — METRONIDAZOLE 500 MG: 500 TABLET ORAL at 21:08

## 2021-01-01 RX ADMIN — ATORVASTATIN CALCIUM 20 MG: 20 TABLET, FILM COATED ORAL at 20:41

## 2021-01-01 RX ADMIN — MIDODRINE HYDROCHLORIDE 10 MG: 10 TABLET ORAL at 08:23

## 2021-01-01 RX ADMIN — BUPIVACAINE HYDROCHLORIDE 20 ML: 2.5 INJECTION, SOLUTION EPIDURAL; INFILTRATION; INTRACAUDAL at 09:34

## 2021-01-01 RX ADMIN — GABAPENTIN 600 MG: 300 CAPSULE ORAL at 20:55

## 2021-01-01 RX ADMIN — ATORVASTATIN CALCIUM 20 MG: 20 TABLET, FILM COATED ORAL at 20:29

## 2021-01-01 RX ADMIN — CASTOR OIL AND BALSAM, PERU 1 APPLICATION: 788; 87 OINTMENT TOPICAL at 08:14

## 2021-01-01 RX ADMIN — WARFARIN SODIUM 5 MG: 5 TABLET ORAL at 18:14

## 2021-01-01 RX ADMIN — MIDODRINE HYDROCHLORIDE 10 MG: 10 TABLET ORAL at 10:35

## 2021-01-01 RX ADMIN — METRONIDAZOLE 500 MG: 500 TABLET ORAL at 08:57

## 2021-01-01 RX ADMIN — CASTOR OIL AND BALSAM, PERU 1 APPLICATION: 788; 87 OINTMENT TOPICAL at 21:28

## 2021-01-01 RX ADMIN — GABAPENTIN 600 MG: 300 CAPSULE ORAL at 04:55

## 2021-01-01 RX ADMIN — GABAPENTIN 600 MG: 300 CAPSULE ORAL at 20:18

## 2021-01-01 RX ADMIN — LIDOCAINE 4% 1 APPLICATION: 4 CREAM TOPICAL at 07:46

## 2021-01-01 RX ADMIN — LINAGLIPTIN 5 MG: 5 TABLET, FILM COATED ORAL at 13:05

## 2021-01-01 RX ADMIN — CASTOR OIL AND BALSAM, PERU 1 APPLICATION: 788; 87 OINTMENT TOPICAL at 21:19

## 2021-01-01 RX ADMIN — METRONIDAZOLE 500 MG: 500 TABLET ORAL at 08:15

## 2021-01-01 RX ADMIN — HEPARIN SODIUM 5000 UNITS: 5000 INJECTION, SOLUTION INTRAVENOUS; SUBCUTANEOUS at 20:17

## 2021-01-01 RX ADMIN — HYDROCODONE BITARTRATE AND ACETAMINOPHEN 1 TABLET: 10; 325 TABLET ORAL at 08:27

## 2021-01-01 RX ADMIN — CASTOR OIL AND BALSAM, PERU 1 APPLICATION: 788; 87 OINTMENT TOPICAL at 08:04

## 2021-01-01 RX ADMIN — ATORVASTATIN CALCIUM 20 MG: 20 TABLET, FILM COATED ORAL at 21:19

## 2021-01-01 RX ADMIN — CASTOR OIL AND BALSAM, PERU 1 APPLICATION: 788; 87 OINTMENT TOPICAL at 20:53

## 2021-01-01 RX ADMIN — GABAPENTIN 600 MG: 300 CAPSULE ORAL at 21:19

## 2021-01-01 RX ADMIN — SODIUM CHLORIDE 2 G: 900 INJECTION INTRAVENOUS at 20:46

## 2021-01-01 RX ADMIN — MIDODRINE HYDROCHLORIDE 10 MG: 10 TABLET ORAL at 08:14

## 2021-01-01 RX ADMIN — GABAPENTIN 600 MG: 300 CAPSULE ORAL at 21:15

## 2021-01-01 RX ADMIN — GABAPENTIN 600 MG: 300 CAPSULE ORAL at 13:17

## 2021-01-01 RX ADMIN — HYDROCODONE BITARTRATE AND ACETAMINOPHEN 1 TABLET: 10; 325 TABLET ORAL at 13:34

## 2021-01-01 RX ADMIN — METRONIDAZOLE 500 MG: 500 TABLET ORAL at 21:15

## 2021-01-01 RX ADMIN — ALBUMIN HUMAN 25 G: 0.25 SOLUTION INTRAVENOUS at 12:35

## 2021-01-01 RX ADMIN — SODIUM CHLORIDE, PRESERVATIVE FREE 10 ML: 5 INJECTION INTRAVENOUS at 08:58

## 2021-01-01 RX ADMIN — GABAPENTIN 600 MG: 300 CAPSULE ORAL at 05:18

## 2021-01-01 RX ADMIN — HYDROCODONE BITARTRATE AND ACETAMINOPHEN 1 TABLET: 10; 325 TABLET ORAL at 17:00

## 2021-01-01 RX ADMIN — MIDODRINE HYDROCHLORIDE 10 MG: 10 TABLET ORAL at 05:26

## 2021-01-01 RX ADMIN — HYDROCODONE BITARTRATE AND ACETAMINOPHEN 1 TABLET: 10; 325 TABLET ORAL at 21:55

## 2021-01-01 RX ADMIN — HYDROCODONE BITARTRATE AND ACETAMINOPHEN 1 TABLET: 10; 325 TABLET ORAL at 06:11

## 2021-01-01 RX ADMIN — ATORVASTATIN CALCIUM 20 MG: 20 TABLET, FILM COATED ORAL at 20:19

## 2021-01-01 RX ADMIN — GABAPENTIN 600 MG: 300 CAPSULE ORAL at 06:22

## 2021-01-01 RX ADMIN — WARFARIN SODIUM 2.5 MG: 2.5 TABLET ORAL at 17:02

## 2021-01-01 RX ADMIN — GABAPENTIN 600 MG: 300 CAPSULE ORAL at 21:08

## 2021-01-01 RX ADMIN — CEFTAZIDIME 1 G: 1 INJECTION, POWDER, FOR SOLUTION INTRAMUSCULAR; INTRAVENOUS at 12:12

## 2021-01-01 RX ADMIN — SODIUM CHLORIDE, PRESERVATIVE FREE 10 ML: 5 INJECTION INTRAVENOUS at 21:28

## 2021-01-01 RX ADMIN — WARFARIN SODIUM 5 MG: 5 TABLET ORAL at 17:43

## 2021-01-01 RX ADMIN — GABAPENTIN 600 MG: 300 CAPSULE ORAL at 13:11

## 2021-01-01 RX ADMIN — ONDANSETRON 4 MG: 2 INJECTION INTRAMUSCULAR; INTRAVENOUS at 10:45

## 2021-01-01 RX ADMIN — SODIUM CHLORIDE, PRESERVATIVE FREE 10 ML: 5 INJECTION INTRAVENOUS at 20:19

## 2021-01-01 RX ADMIN — HYDROCODONE BITARTRATE AND ACETAMINOPHEN 1 TABLET: 10; 325 TABLET ORAL at 13:11

## 2021-01-01 RX ADMIN — HYDROCODONE BITARTRATE AND ACETAMINOPHEN 1 TABLET: 10; 325 TABLET ORAL at 05:18

## 2021-01-01 RX ADMIN — INSULIN LISPRO 4 UNITS: 100 INJECTION, SOLUTION INTRAVENOUS; SUBCUTANEOUS at 08:54

## 2021-01-01 RX ADMIN — METRONIDAZOLE 500 MG: 500 TABLET ORAL at 09:00

## 2021-01-01 RX ADMIN — SODIUM CHLORIDE, PRESERVATIVE FREE 10 ML: 5 INJECTION INTRAVENOUS at 20:18

## 2021-01-01 RX ADMIN — METRONIDAZOLE 500 MG: 500 TABLET ORAL at 08:04

## 2021-01-01 RX ADMIN — METRONIDAZOLE 500 MG: 500 TABLET ORAL at 09:04

## 2021-01-01 RX ADMIN — MIDODRINE HYDROCHLORIDE 10 MG: 10 TABLET ORAL at 17:59

## 2021-01-01 RX ADMIN — LEVOTHYROXINE SODIUM 200 MCG: 0.2 TABLET ORAL at 05:13

## 2021-01-01 RX ADMIN — MORPHINE SULFATE 1 MG: 2 INJECTION, SOLUTION INTRAMUSCULAR; INTRAVENOUS at 00:10

## 2021-01-01 RX ADMIN — GABAPENTIN 600 MG: 300 CAPSULE ORAL at 13:34

## 2021-01-01 RX ADMIN — WARFARIN SODIUM 5 MG: 5 TABLET ORAL at 17:38

## 2021-01-01 RX ADMIN — Medication 5 MG: at 20:55

## 2021-01-01 RX ADMIN — LIDOCAINE 4% 1 APPLICATION: 4 CREAM TOPICAL at 08:15

## 2021-01-01 RX ADMIN — LINEZOLID 600 MG: 600 TABLET ORAL at 21:08

## 2021-01-01 RX ADMIN — LEVOTHYROXINE SODIUM 200 MCG: 0.2 TABLET ORAL at 06:02

## 2021-01-01 RX ADMIN — WARFARIN SODIUM 5 MG: 5 TABLET ORAL at 17:40

## 2021-01-01 RX ADMIN — LINAGLIPTIN 5 MG: 5 TABLET, FILM COATED ORAL at 09:00

## 2021-01-01 RX ADMIN — METRONIDAZOLE 500 MG: 500 TABLET ORAL at 08:23

## 2021-01-01 RX ADMIN — ATORVASTATIN CALCIUM 20 MG: 20 TABLET, FILM COATED ORAL at 21:55

## 2021-01-01 RX ADMIN — LEVOTHYROXINE SODIUM 200 MCG: 0.2 TABLET ORAL at 05:25

## 2021-01-01 RX ADMIN — GABAPENTIN 600 MG: 300 CAPSULE ORAL at 13:35

## 2021-01-01 RX ADMIN — GABAPENTIN 600 MG: 300 CAPSULE ORAL at 15:00

## 2021-01-01 RX ADMIN — METRONIDAZOLE 500 MG: 500 TABLET ORAL at 21:19

## 2021-01-01 RX ADMIN — DOCUSATE SODIUM 50 MG AND SENNOSIDES 8.6 MG 2 TABLET: 8.6; 5 TABLET, FILM COATED ORAL at 09:00

## 2021-01-01 RX ADMIN — GABAPENTIN 600 MG: 300 CAPSULE ORAL at 13:49

## 2021-01-01 RX ADMIN — SODIUM CHLORIDE, PRESERVATIVE FREE 10 ML: 5 INJECTION INTRAVENOUS at 21:10

## 2021-01-01 RX ADMIN — ATORVASTATIN CALCIUM 20 MG: 20 TABLET, FILM COATED ORAL at 21:08

## 2021-01-01 RX ADMIN — METRONIDAZOLE 500 MG: 500 TABLET ORAL at 20:56

## 2021-01-01 RX ADMIN — HYDROCODONE BITARTRATE AND ACETAMINOPHEN 1 TABLET: 10; 325 TABLET ORAL at 18:19

## 2021-01-01 RX ADMIN — LIDOCAINE 4% 1 APPLICATION: 4 CREAM TOPICAL at 06:35

## 2021-12-13 PROBLEM — I96 GANGRENE: Status: ACTIVE | Noted: 2021-01-01

## 2021-12-13 PROBLEM — N39.0 ACUTE UTI (URINARY TRACT INFECTION): Status: ACTIVE | Noted: 2021-01-01

## 2021-12-13 PROBLEM — E87.20 LACTIC ACIDOSIS: Status: ACTIVE | Noted: 2021-01-01

## 2021-12-13 PROBLEM — R31.9 HEMATURIA: Status: ACTIVE | Noted: 2021-01-01

## 2021-12-14 PROBLEM — L03.116 CELLULITIS OF LEFT FOOT: Status: ACTIVE | Noted: 2021-01-01

## 2021-12-24 PROBLEM — I50.32 CHRONIC DIASTOLIC CHF (CONGESTIVE HEART FAILURE): Chronic | Status: ACTIVE | Noted: 2020-04-01

## 2021-12-24 PROBLEM — E87.20 LACTIC ACIDOSIS: Status: RESOLVED | Noted: 2021-01-01 | Resolved: 2021-01-01

## 2021-12-24 PROBLEM — N18.6 ESRD (END STAGE RENAL DISEASE) (HCC): Chronic | Status: ACTIVE | Noted: 2020-03-31

## 2021-12-24 PROBLEM — R31.9 HEMATURIA: Status: RESOLVED | Noted: 2021-01-01 | Resolved: 2021-01-01

## 2022-01-01 ENCOUNTER — APPOINTMENT (OUTPATIENT)
Dept: NEPHROLOGY | Facility: HOSPITAL | Age: 77
End: 2022-01-01

## 2022-01-01 ENCOUNTER — APPOINTMENT (OUTPATIENT)
Dept: GENERAL RADIOLOGY | Facility: HOSPITAL | Age: 77
End: 2022-01-01

## 2022-01-01 VITALS
WEIGHT: 219.9 LBS | BODY MASS INDEX: 26.78 KG/M2 | HEIGHT: 76 IN | TEMPERATURE: 98.2 F | RESPIRATION RATE: 18 BRPM | OXYGEN SATURATION: 93 % | HEART RATE: 78 BPM | DIASTOLIC BLOOD PRESSURE: 74 MMHG | SYSTOLIC BLOOD PRESSURE: 110 MMHG

## 2022-01-01 LAB
ALBUMIN SERPL-MCNC: 3.6 G/DL (ref 3.5–5.2)
ALBUMIN SERPL-MCNC: 3.6 G/DL (ref 3.5–5.2)
ALBUMIN SERPL-MCNC: 3.7 G/DL (ref 3.5–5.2)
ALBUMIN SERPL-MCNC: 4 G/DL (ref 3.5–5.2)
ALBUMIN SERPL-MCNC: 4 G/DL (ref 3.5–5.2)
ALBUMIN/GLOB SERPL: 1.4 G/DL
ALP SERPL-CCNC: 86 U/L (ref 39–117)
ALT SERPL W P-5'-P-CCNC: <5 U/L (ref 1–41)
ANION GAP SERPL CALCULATED.3IONS-SCNC: 10 MMOL/L (ref 5–15)
ANION GAP SERPL CALCULATED.3IONS-SCNC: 13 MMOL/L (ref 5–15)
ANION GAP SERPL CALCULATED.3IONS-SCNC: 14 MMOL/L (ref 5–15)
ANION GAP SERPL CALCULATED.3IONS-SCNC: 17 MMOL/L (ref 5–15)
ANION GAP SERPL CALCULATED.3IONS-SCNC: 17 MMOL/L (ref 5–15)
AST SERPL-CCNC: 11 U/L (ref 1–40)
BASOPHILS # BLD AUTO: 0.03 10*3/MM3 (ref 0–0.2)
BASOPHILS # BLD AUTO: 0.03 10*3/MM3 (ref 0–0.2)
BASOPHILS # BLD AUTO: 0.05 10*3/MM3 (ref 0–0.2)
BASOPHILS # BLD AUTO: 0.06 10*3/MM3 (ref 0–0.2)
BASOPHILS # BLD AUTO: 0.06 10*3/MM3 (ref 0–0.2)
BASOPHILS NFR BLD AUTO: 0.4 % (ref 0–1.5)
BH BB BLOOD EXPIRATION DATE: NORMAL
BH BB BLOOD TYPE BARCODE: 7300
BH BB DISPENSE STATUS: NORMAL
BH BB PRODUCT CODE: NORMAL
BH BB UNIT NUMBER: NORMAL
BILIRUB SERPL-MCNC: 0.4 MG/DL (ref 0–1.2)
BUN SERPL-MCNC: 18 MG/DL (ref 8–23)
BUN SERPL-MCNC: 21 MG/DL (ref 8–23)
BUN SERPL-MCNC: 22 MG/DL (ref 8–23)
BUN SERPL-MCNC: 24 MG/DL (ref 8–23)
BUN SERPL-MCNC: 25 MG/DL (ref 8–23)
BUN SERPL-MCNC: 33 MG/DL (ref 8–23)
BUN SERPL-MCNC: 39 MG/DL (ref 8–23)
BUN/CREAT SERPL: 4.8 (ref 7–25)
BUN/CREAT SERPL: 5.2 (ref 7–25)
BUN/CREAT SERPL: 5.8 (ref 7–25)
BUN/CREAT SERPL: 5.9 (ref 7–25)
BUN/CREAT SERPL: 6.1 (ref 7–25)
BUN/CREAT SERPL: 6.5 (ref 7–25)
BUN/CREAT SERPL: 6.8 (ref 7–25)
CALCIUM SPEC-SCNC: 8.2 MG/DL (ref 8.6–10.5)
CALCIUM SPEC-SCNC: 8.5 MG/DL (ref 8.6–10.5)
CALCIUM SPEC-SCNC: 8.5 MG/DL (ref 8.6–10.5)
CALCIUM SPEC-SCNC: 8.7 MG/DL (ref 8.6–10.5)
CALCIUM SPEC-SCNC: 8.9 MG/DL (ref 8.6–10.5)
CALCIUM SPEC-SCNC: 9 MG/DL (ref 8.6–10.5)
CALCIUM SPEC-SCNC: 9.2 MG/DL (ref 8.6–10.5)
CHLORIDE SERPL-SCNC: 100 MMOL/L (ref 98–107)
CHLORIDE SERPL-SCNC: 100 MMOL/L (ref 98–107)
CHLORIDE SERPL-SCNC: 101 MMOL/L (ref 98–107)
CHLORIDE SERPL-SCNC: 98 MMOL/L (ref 98–107)
CHLORIDE SERPL-SCNC: 98 MMOL/L (ref 98–107)
CHLORIDE SERPL-SCNC: 99 MMOL/L (ref 98–107)
CHLORIDE SERPL-SCNC: 99 MMOL/L (ref 98–107)
CO2 SERPL-SCNC: 20 MMOL/L (ref 22–29)
CO2 SERPL-SCNC: 22 MMOL/L (ref 22–29)
CO2 SERPL-SCNC: 24 MMOL/L (ref 22–29)
CO2 SERPL-SCNC: 25 MMOL/L (ref 22–29)
CREAT SERPL-MCNC: 3.53 MG/DL (ref 0.76–1.27)
CREAT SERPL-MCNC: 3.59 MG/DL (ref 0.76–1.27)
CREAT SERPL-MCNC: 3.73 MG/DL (ref 0.76–1.27)
CREAT SERPL-MCNC: 4.11 MG/DL (ref 0.76–1.27)
CREAT SERPL-MCNC: 4.85 MG/DL (ref 0.76–1.27)
CREAT SERPL-MCNC: 4.86 MG/DL (ref 0.76–1.27)
CREAT SERPL-MCNC: 6.02 MG/DL (ref 0.76–1.27)
CROSSMATCH INTERPRETATION: NORMAL
CRP SERPL-MCNC: 10.15 MG/DL (ref 0–0.5)
CYTO UR: NORMAL
DEPRECATED RDW RBC AUTO: 61.8 FL (ref 37–54)
DEPRECATED RDW RBC AUTO: 63.1 FL (ref 37–54)
DEPRECATED RDW RBC AUTO: 63.7 FL (ref 37–54)
DEPRECATED RDW RBC AUTO: 63.9 FL (ref 37–54)
DEPRECATED RDW RBC AUTO: 64.3 FL (ref 37–54)
DEPRECATED RDW RBC AUTO: 65.9 FL (ref 37–54)
EOSINOPHIL # BLD AUTO: 0.17 10*3/MM3 (ref 0–0.4)
EOSINOPHIL # BLD AUTO: 0.19 10*3/MM3 (ref 0–0.4)
EOSINOPHIL # BLD AUTO: 0.2 10*3/MM3 (ref 0–0.4)
EOSINOPHIL # BLD AUTO: 0.21 10*3/MM3 (ref 0–0.4)
EOSINOPHIL # BLD AUTO: 0.24 10*3/MM3 (ref 0–0.4)
EOSINOPHIL NFR BLD AUTO: 1.3 % (ref 0.3–6.2)
EOSINOPHIL NFR BLD AUTO: 1.5 % (ref 0.3–6.2)
EOSINOPHIL NFR BLD AUTO: 1.9 % (ref 0.3–6.2)
EOSINOPHIL NFR BLD AUTO: 2.3 % (ref 0.3–6.2)
EOSINOPHIL NFR BLD AUTO: 2.5 % (ref 0.3–6.2)
ERYTHROCYTE [DISTWIDTH] IN BLOOD BY AUTOMATED COUNT: 17.2 % (ref 12.3–15.4)
ERYTHROCYTE [DISTWIDTH] IN BLOOD BY AUTOMATED COUNT: 17.3 % (ref 12.3–15.4)
ERYTHROCYTE [DISTWIDTH] IN BLOOD BY AUTOMATED COUNT: 17.5 % (ref 12.3–15.4)
ERYTHROCYTE [DISTWIDTH] IN BLOOD BY AUTOMATED COUNT: 17.8 % (ref 12.3–15.4)
ERYTHROCYTE [DISTWIDTH] IN BLOOD BY AUTOMATED COUNT: 18.2 % (ref 12.3–15.4)
ERYTHROCYTE [DISTWIDTH] IN BLOOD BY AUTOMATED COUNT: 18.6 % (ref 12.3–15.4)
GFR SERPL CREATININE-BSD FRML MDRD: 12 ML/MIN/1.73
GFR SERPL CREATININE-BSD FRML MDRD: 12 ML/MIN/1.73
GFR SERPL CREATININE-BSD FRML MDRD: 14 ML/MIN/1.73
GFR SERPL CREATININE-BSD FRML MDRD: 16 ML/MIN/1.73
GFR SERPL CREATININE-BSD FRML MDRD: 17 ML/MIN/1.73
GFR SERPL CREATININE-BSD FRML MDRD: 17 ML/MIN/1.73
GFR SERPL CREATININE-BSD FRML MDRD: 9 ML/MIN/1.73
GFR SERPL CREATININE-BSD FRML MDRD: ABNORMAL ML/MIN/{1.73_M2}
GLOBULIN UR ELPH-MCNC: 2.8 GM/DL
GLUCOSE BLDC GLUCOMTR-MCNC: 103 MG/DL (ref 70–130)
GLUCOSE BLDC GLUCOMTR-MCNC: 108 MG/DL (ref 70–130)
GLUCOSE BLDC GLUCOMTR-MCNC: 110 MG/DL (ref 70–130)
GLUCOSE BLDC GLUCOMTR-MCNC: 113 MG/DL (ref 70–130)
GLUCOSE BLDC GLUCOMTR-MCNC: 114 MG/DL (ref 70–130)
GLUCOSE BLDC GLUCOMTR-MCNC: 119 MG/DL (ref 70–130)
GLUCOSE BLDC GLUCOMTR-MCNC: 121 MG/DL (ref 70–130)
GLUCOSE BLDC GLUCOMTR-MCNC: 122 MG/DL (ref 70–130)
GLUCOSE BLDC GLUCOMTR-MCNC: 124 MG/DL (ref 70–130)
GLUCOSE BLDC GLUCOMTR-MCNC: 125 MG/DL (ref 70–130)
GLUCOSE BLDC GLUCOMTR-MCNC: 126 MG/DL (ref 70–130)
GLUCOSE BLDC GLUCOMTR-MCNC: 127 MG/DL (ref 70–130)
GLUCOSE BLDC GLUCOMTR-MCNC: 131 MG/DL (ref 70–130)
GLUCOSE BLDC GLUCOMTR-MCNC: 132 MG/DL (ref 70–130)
GLUCOSE BLDC GLUCOMTR-MCNC: 136 MG/DL (ref 70–130)
GLUCOSE BLDC GLUCOMTR-MCNC: 137 MG/DL (ref 70–130)
GLUCOSE BLDC GLUCOMTR-MCNC: 138 MG/DL (ref 70–130)
GLUCOSE BLDC GLUCOMTR-MCNC: 141 MG/DL (ref 70–130)
GLUCOSE BLDC GLUCOMTR-MCNC: 142 MG/DL (ref 70–130)
GLUCOSE BLDC GLUCOMTR-MCNC: 145 MG/DL (ref 70–130)
GLUCOSE BLDC GLUCOMTR-MCNC: 145 MG/DL (ref 70–130)
GLUCOSE BLDC GLUCOMTR-MCNC: 148 MG/DL (ref 70–130)
GLUCOSE BLDC GLUCOMTR-MCNC: 149 MG/DL (ref 70–130)
GLUCOSE BLDC GLUCOMTR-MCNC: 150 MG/DL (ref 70–130)
GLUCOSE BLDC GLUCOMTR-MCNC: 152 MG/DL (ref 70–130)
GLUCOSE BLDC GLUCOMTR-MCNC: 154 MG/DL (ref 70–130)
GLUCOSE BLDC GLUCOMTR-MCNC: 158 MG/DL (ref 70–130)
GLUCOSE BLDC GLUCOMTR-MCNC: 160 MG/DL (ref 70–130)
GLUCOSE BLDC GLUCOMTR-MCNC: 165 MG/DL (ref 70–130)
GLUCOSE BLDC GLUCOMTR-MCNC: 165 MG/DL (ref 70–130)
GLUCOSE BLDC GLUCOMTR-MCNC: 168 MG/DL (ref 70–130)
GLUCOSE BLDC GLUCOMTR-MCNC: 171 MG/DL (ref 70–130)
GLUCOSE BLDC GLUCOMTR-MCNC: 175 MG/DL (ref 70–130)
GLUCOSE BLDC GLUCOMTR-MCNC: 189 MG/DL (ref 70–130)
GLUCOSE SERPL-MCNC: 111 MG/DL (ref 65–99)
GLUCOSE SERPL-MCNC: 112 MG/DL (ref 65–99)
GLUCOSE SERPL-MCNC: 122 MG/DL (ref 65–99)
GLUCOSE SERPL-MCNC: 134 MG/DL (ref 65–99)
GLUCOSE SERPL-MCNC: 141 MG/DL (ref 65–99)
GLUCOSE SERPL-MCNC: 144 MG/DL (ref 65–99)
GLUCOSE SERPL-MCNC: 160 MG/DL (ref 65–99)
HCT VFR BLD AUTO: 22.9 % (ref 37.5–51)
HCT VFR BLD AUTO: 22.9 % (ref 37.5–51)
HCT VFR BLD AUTO: 23.3 % (ref 37.5–51)
HCT VFR BLD AUTO: 23.5 % (ref 37.5–51)
HCT VFR BLD AUTO: 23.9 % (ref 37.5–51)
HCT VFR BLD AUTO: 24.5 % (ref 37.5–51)
HCT VFR BLD AUTO: 24.9 % (ref 37.5–51)
HCT VFR BLD AUTO: 26.3 % (ref 37.5–51)
HCT VFR BLD AUTO: 26.4 % (ref 37.5–51)
HCT VFR BLD AUTO: 26.6 % (ref 37.5–51)
HCT VFR BLD AUTO: 27.1 % (ref 37.5–51)
HGB BLD-MCNC: 7 G/DL (ref 13–17.7)
HGB BLD-MCNC: 7 G/DL (ref 13–17.7)
HGB BLD-MCNC: 7.1 G/DL (ref 13–17.7)
HGB BLD-MCNC: 7.2 G/DL (ref 13–17.7)
HGB BLD-MCNC: 7.2 G/DL (ref 13–17.7)
HGB BLD-MCNC: 7.3 G/DL (ref 13–17.7)
HGB BLD-MCNC: 7.4 G/DL (ref 13–17.7)
HGB BLD-MCNC: 7.7 G/DL (ref 13–17.7)
HGB BLD-MCNC: 7.7 G/DL (ref 13–17.7)
HGB BLD-MCNC: 7.9 G/DL (ref 13–17.7)
HGB BLD-MCNC: 8 G/DL (ref 13–17.7)
IMM GRANULOCYTES # BLD AUTO: 0.05 10*3/MM3 (ref 0–0.05)
IMM GRANULOCYTES # BLD AUTO: 0.07 10*3/MM3 (ref 0–0.05)
IMM GRANULOCYTES # BLD AUTO: 0.33 10*3/MM3 (ref 0–0.05)
IMM GRANULOCYTES # BLD AUTO: 0.68 10*3/MM3 (ref 0–0.05)
IMM GRANULOCYTES # BLD AUTO: 0.77 10*3/MM3 (ref 0–0.05)
IMM GRANULOCYTES NFR BLD AUTO: 0.7 % (ref 0–0.5)
IMM GRANULOCYTES NFR BLD AUTO: 0.9 % (ref 0–0.5)
IMM GRANULOCYTES NFR BLD AUTO: 2.6 % (ref 0–0.5)
IMM GRANULOCYTES NFR BLD AUTO: 4.6 % (ref 0–0.5)
IMM GRANULOCYTES NFR BLD AUTO: 5.7 % (ref 0–0.5)
INR PPP: 1.29 (ref 0.85–1.16)
INR PPP: 1.38 (ref 0.85–1.16)
INR PPP: 1.38 (ref 0.85–1.16)
INR PPP: 1.4 (ref 0.85–1.16)
INR PPP: 1.46 (ref 0.85–1.16)
INR PPP: 1.47 (ref 0.85–1.16)
INR PPP: 1.5 (ref 0.85–1.16)
INR PPP: 1.56 (ref 0.85–1.16)
LAB AP CASE REPORT: NORMAL
LAB AP CLINICAL INFORMATION: NORMAL
LYMPHOCYTES # BLD AUTO: 1.07 10*3/MM3 (ref 0.7–3.1)
LYMPHOCYTES # BLD AUTO: 1.08 10*3/MM3 (ref 0.7–3.1)
LYMPHOCYTES # BLD AUTO: 1.31 10*3/MM3 (ref 0.7–3.1)
LYMPHOCYTES # BLD AUTO: 1.61 10*3/MM3 (ref 0.7–3.1)
LYMPHOCYTES # BLD AUTO: 1.71 10*3/MM3 (ref 0.7–3.1)
LYMPHOCYTES NFR BLD AUTO: 10.4 % (ref 19.6–45.3)
LYMPHOCYTES NFR BLD AUTO: 10.8 % (ref 19.6–45.3)
LYMPHOCYTES NFR BLD AUTO: 12.6 % (ref 19.6–45.3)
LYMPHOCYTES NFR BLD AUTO: 13.5 % (ref 19.6–45.3)
LYMPHOCYTES NFR BLD AUTO: 14.4 % (ref 19.6–45.3)
MCH RBC QN AUTO: 29.6 PG (ref 26.6–33)
MCH RBC QN AUTO: 30.2 PG (ref 26.6–33)
MCH RBC QN AUTO: 30.2 PG (ref 26.6–33)
MCH RBC QN AUTO: 30.3 PG (ref 26.6–33)
MCH RBC QN AUTO: 30.3 PG (ref 26.6–33)
MCH RBC QN AUTO: 30.5 PG (ref 26.6–33)
MCHC RBC AUTO-ENTMCNC: 29.2 G/DL (ref 31.5–35.7)
MCHC RBC AUTO-ENTMCNC: 29.3 G/DL (ref 31.5–35.7)
MCHC RBC AUTO-ENTMCNC: 29.5 G/DL (ref 31.5–35.7)
MCHC RBC AUTO-ENTMCNC: 29.7 G/DL (ref 31.5–35.7)
MCHC RBC AUTO-ENTMCNC: 29.7 G/DL (ref 31.5–35.7)
MCHC RBC AUTO-ENTMCNC: 30.1 G/DL (ref 31.5–35.7)
MCV RBC AUTO: 100.4 FL (ref 79–97)
MCV RBC AUTO: 101.5 FL (ref 79–97)
MCV RBC AUTO: 101.6 FL (ref 79–97)
MCV RBC AUTO: 102.3 FL (ref 79–97)
MCV RBC AUTO: 102.7 FL (ref 79–97)
MCV RBC AUTO: 103.5 FL (ref 79–97)
MONOCYTES # BLD AUTO: 0.58 10*3/MM3 (ref 0.1–0.9)
MONOCYTES # BLD AUTO: 0.82 10*3/MM3 (ref 0.1–0.9)
MONOCYTES # BLD AUTO: 1.03 10*3/MM3 (ref 0.1–0.9)
MONOCYTES # BLD AUTO: 1.36 10*3/MM3 (ref 0.1–0.9)
MONOCYTES # BLD AUTO: 1.41 10*3/MM3 (ref 0.1–0.9)
MONOCYTES NFR BLD AUTO: 10 % (ref 5–12)
MONOCYTES NFR BLD AUTO: 10.3 % (ref 5–12)
MONOCYTES NFR BLD AUTO: 7.8 % (ref 5–12)
MONOCYTES NFR BLD AUTO: 8.1 % (ref 5–12)
MONOCYTES NFR BLD AUTO: 9.5 % (ref 5–12)
NEUTROPHILS NFR BLD AUTO: 10.95 10*3/MM3 (ref 1.7–7)
NEUTROPHILS NFR BLD AUTO: 5.54 10*3/MM3 (ref 1.7–7)
NEUTROPHILS NFR BLD AUTO: 5.78 10*3/MM3 (ref 1.7–7)
NEUTROPHILS NFR BLD AUTO: 69.8 % (ref 42.7–76)
NEUTROPHILS NFR BLD AUTO: 72.4 % (ref 42.7–76)
NEUTROPHILS NFR BLD AUTO: 73.4 % (ref 42.7–76)
NEUTROPHILS NFR BLD AUTO: 74.4 % (ref 42.7–76)
NEUTROPHILS NFR BLD AUTO: 76.6 % (ref 42.7–76)
NEUTROPHILS NFR BLD AUTO: 9.51 10*3/MM3 (ref 1.7–7)
NEUTROPHILS NFR BLD AUTO: 9.68 10*3/MM3 (ref 1.7–7)
NRBC BLD AUTO-RTO: 0 /100 WBC (ref 0–0.2)
NRBC BLD AUTO-RTO: 0.3 /100 WBC (ref 0–0.2)
NRBC BLD AUTO-RTO: 1.7 /100 WBC (ref 0–0.2)
NRBC BLD AUTO-RTO: 2.4 /100 WBC (ref 0–0.2)
NRBC BLD AUTO-RTO: 3.6 /100 WBC (ref 0–0.2)
PATH REPORT.FINAL DX SPEC: NORMAL
PATH REPORT.GROSS SPEC: NORMAL
PHOSPHATE SERPL-MCNC: 2.6 MG/DL (ref 2.5–4.5)
PHOSPHATE SERPL-MCNC: 3.9 MG/DL (ref 2.5–4.5)
PHOSPHATE SERPL-MCNC: 4.1 MG/DL (ref 2.5–4.5)
PHOSPHATE SERPL-MCNC: 4.2 MG/DL (ref 2.5–4.5)
PLATELET # BLD AUTO: 160 10*3/MM3 (ref 140–450)
PLATELET # BLD AUTO: 179 10*3/MM3 (ref 140–450)
PLATELET # BLD AUTO: 188 10*3/MM3 (ref 140–450)
PLATELET # BLD AUTO: 216 10*3/MM3 (ref 140–450)
PLATELET # BLD AUTO: 217 10*3/MM3 (ref 140–450)
PLATELET # BLD AUTO: 228 10*3/MM3 (ref 140–450)
PMV BLD AUTO: 10.5 FL (ref 6–12)
PMV BLD AUTO: 10.5 FL (ref 6–12)
PMV BLD AUTO: 10.7 FL (ref 6–12)
PMV BLD AUTO: 10.8 FL (ref 6–12)
PMV BLD AUTO: 10.9 FL (ref 6–12)
PMV BLD AUTO: 11.5 FL (ref 6–12)
POTASSIUM SERPL-SCNC: 3.6 MMOL/L (ref 3.5–5.2)
POTASSIUM SERPL-SCNC: 3.7 MMOL/L (ref 3.5–5.2)
POTASSIUM SERPL-SCNC: 3.8 MMOL/L (ref 3.5–5.2)
POTASSIUM SERPL-SCNC: 4.1 MMOL/L (ref 3.5–5.2)
POTASSIUM SERPL-SCNC: 4.3 MMOL/L (ref 3.5–5.2)
PROCALCITONIN SERPL-MCNC: 0.54 NG/ML (ref 0–0.25)
PROT SERPL-MCNC: 6.8 G/DL (ref 6–8.5)
PROTHROMBIN TIME: 15.7 SECONDS (ref 11.4–14.4)
PROTHROMBIN TIME: 16.5 SECONDS (ref 11.4–14.4)
PROTHROMBIN TIME: 16.5 SECONDS (ref 11.4–14.4)
PROTHROMBIN TIME: 16.7 SECONDS (ref 11.4–14.4)
PROTHROMBIN TIME: 17.2 SECONDS (ref 11.4–14.4)
PROTHROMBIN TIME: 17.3 SECONDS (ref 11.4–14.4)
PROTHROMBIN TIME: 17.6 SECONDS (ref 11.4–14.4)
PROTHROMBIN TIME: 18.2 SECONDS (ref 11.4–14.4)
RBC # BLD AUTO: 2.26 10*6/MM3 (ref 4.14–5.8)
RBC # BLD AUTO: 2.38 10*6/MM3 (ref 4.14–5.8)
RBC # BLD AUTO: 2.45 10*6/MM3 (ref 4.14–5.8)
RBC # BLD AUTO: 2.54 10*6/MM3 (ref 4.14–5.8)
RBC # BLD AUTO: 2.6 10*6/MM3 (ref 4.14–5.8)
RBC # BLD AUTO: 2.65 10*6/MM3 (ref 4.14–5.8)
SARS-COV-2 RDRP RESP QL NAA+PROBE: NORMAL
SODIUM SERPL-SCNC: 134 MMOL/L (ref 136–145)
SODIUM SERPL-SCNC: 135 MMOL/L (ref 136–145)
SODIUM SERPL-SCNC: 136 MMOL/L (ref 136–145)
SODIUM SERPL-SCNC: 138 MMOL/L (ref 136–145)
SODIUM SERPL-SCNC: 139 MMOL/L (ref 136–145)
T4 FREE SERPL-MCNC: 1.5 NG/DL (ref 0.93–1.7)
TSH SERPL DL<=0.05 MIU/L-ACNC: 7.05 UIU/ML (ref 0.27–4.2)
UNIT  ABO: NORMAL
UNIT  RH: NORMAL
WBC NRBC COR # BLD: 12.06 10*3/MM3 (ref 3.4–10.8)
WBC NRBC COR # BLD: 12.64 10*3/MM3 (ref 3.4–10.8)
WBC NRBC COR # BLD: 13.62 10*3/MM3 (ref 3.4–10.8)
WBC NRBC COR # BLD: 14.9 10*3/MM3 (ref 3.4–10.8)
WBC NRBC COR # BLD: 7.44 10*3/MM3 (ref 3.4–10.8)
WBC NRBC COR # BLD: 7.98 10*3/MM3 (ref 3.4–10.8)

## 2022-01-01 PROCEDURE — 63710000001 INSULIN DETEMIR PER 5 UNITS: Performed by: INTERNAL MEDICINE

## 2022-01-01 PROCEDURE — 99024 POSTOP FOLLOW-UP VISIT: CPT | Performed by: THORACIC SURGERY (CARDIOTHORACIC VASCULAR SURGERY)

## 2022-01-01 PROCEDURE — 94799 UNLISTED PULMONARY SVC/PX: CPT

## 2022-01-01 PROCEDURE — 85610 PROTHROMBIN TIME: CPT | Performed by: STUDENT IN AN ORGANIZED HEALTH CARE EDUCATION/TRAINING PROGRAM

## 2022-01-01 PROCEDURE — 85610 PROTHROMBIN TIME: CPT | Performed by: INTERNAL MEDICINE

## 2022-01-01 PROCEDURE — 63710000001 INSULIN LISPRO (HUMAN) PER 5 UNITS: Performed by: STUDENT IN AN ORGANIZED HEALTH CARE EDUCATION/TRAINING PROGRAM

## 2022-01-01 PROCEDURE — 85014 HEMATOCRIT: CPT | Performed by: NURSE PRACTITIONER

## 2022-01-01 PROCEDURE — 85018 HEMOGLOBIN: CPT | Performed by: NURSE PRACTITIONER

## 2022-01-01 PROCEDURE — 25010000002 ROPIVACAINE PER 1 MG

## 2022-01-01 PROCEDURE — 25010000002 HEPARIN (PORCINE) PER 1000 UNITS: Performed by: STUDENT IN AN ORGANIZED HEALTH CARE EDUCATION/TRAINING PROGRAM

## 2022-01-01 PROCEDURE — 80053 COMPREHEN METABOLIC PANEL: CPT | Performed by: NURSE PRACTITIONER

## 2022-01-01 PROCEDURE — 99232 SBSQ HOSP IP/OBS MODERATE 35: CPT | Performed by: INTERNAL MEDICINE

## 2022-01-01 PROCEDURE — 80069 RENAL FUNCTION PANEL: CPT | Performed by: INTERNAL MEDICINE

## 2022-01-01 PROCEDURE — 99233 SBSQ HOSP IP/OBS HIGH 50: CPT | Performed by: NURSE PRACTITIONER

## 2022-01-01 PROCEDURE — 97530 THERAPEUTIC ACTIVITIES: CPT

## 2022-01-01 PROCEDURE — 82962 GLUCOSE BLOOD TEST: CPT

## 2022-01-01 PROCEDURE — 97164 PT RE-EVAL EST PLAN CARE: CPT

## 2022-01-01 PROCEDURE — 25010000002 METHYLNALTREXONE 12 MG/0.6ML SOLUTION: Performed by: INTERNAL MEDICINE

## 2022-01-01 PROCEDURE — 99232 SBSQ HOSP IP/OBS MODERATE 35: CPT | Performed by: FAMILY MEDICINE

## 2022-01-01 PROCEDURE — 85025 COMPLETE CBC W/AUTO DIFF WBC: CPT | Performed by: INTERNAL MEDICINE

## 2022-01-01 PROCEDURE — 87635 SARS-COV-2 COVID-19 AMP PRB: CPT | Performed by: INTERNAL MEDICINE

## 2022-01-01 PROCEDURE — 97110 THERAPEUTIC EXERCISES: CPT

## 2022-01-01 PROCEDURE — 25010000002 EPOETIN ALFA-EPBX 10000 UNIT/ML SOLUTION: Performed by: INTERNAL MEDICINE

## 2022-01-01 PROCEDURE — 5A1D70Z PERFORMANCE OF URINARY FILTRATION, INTERMITTENT, LESS THAN 6 HOURS PER DAY: ICD-10-PCS | Performed by: INTERNAL MEDICINE

## 2022-01-01 PROCEDURE — 71045 X-RAY EXAM CHEST 1 VIEW: CPT

## 2022-01-01 PROCEDURE — P9047 ALBUMIN (HUMAN), 25%, 50ML: HCPCS

## 2022-01-01 PROCEDURE — 84443 ASSAY THYROID STIM HORMONE: CPT | Performed by: NURSE PRACTITIONER

## 2022-01-01 PROCEDURE — 97110 THERAPEUTIC EXERCISES: CPT | Performed by: OCCUPATIONAL THERAPIST

## 2022-01-01 PROCEDURE — 85014 HEMATOCRIT: CPT | Performed by: FAMILY MEDICINE

## 2022-01-01 PROCEDURE — 86140 C-REACTIVE PROTEIN: CPT | Performed by: INTERNAL MEDICINE

## 2022-01-01 PROCEDURE — 99233 SBSQ HOSP IP/OBS HIGH 50: CPT | Performed by: INTERNAL MEDICINE

## 2022-01-01 PROCEDURE — 85027 COMPLETE CBC AUTOMATED: CPT | Performed by: NURSE PRACTITIONER

## 2022-01-01 PROCEDURE — 85025 COMPLETE CBC W/AUTO DIFF WBC: CPT | Performed by: PHYSICIAN ASSISTANT

## 2022-01-01 PROCEDURE — 25010000002 METOCLOPRAMIDE PER 10 MG: Performed by: FAMILY MEDICINE

## 2022-01-01 PROCEDURE — 84439 ASSAY OF FREE THYROXINE: CPT | Performed by: INTERNAL MEDICINE

## 2022-01-01 PROCEDURE — 99232 SBSQ HOSP IP/OBS MODERATE 35: CPT | Performed by: NURSE PRACTITIONER

## 2022-01-01 PROCEDURE — 25010000002 ALBUMIN HUMAN 25% PER 50 ML

## 2022-01-01 PROCEDURE — 99239 HOSP IP/OBS DSCHRG MGMT >30: CPT | Performed by: NURSE PRACTITIONER

## 2022-01-01 PROCEDURE — 80048 BASIC METABOLIC PNL TOTAL CA: CPT | Performed by: INTERNAL MEDICINE

## 2022-01-01 PROCEDURE — 80048 BASIC METABOLIC PNL TOTAL CA: CPT | Performed by: NURSE PRACTITIONER

## 2022-01-01 PROCEDURE — 85018 HEMOGLOBIN: CPT | Performed by: FAMILY MEDICINE

## 2022-01-01 PROCEDURE — 97535 SELF CARE MNGMENT TRAINING: CPT

## 2022-01-01 PROCEDURE — 25010000002 NEOSTIGMINE 10 MG/10ML SOLUTION 10 ML VIAL: Performed by: FAMILY MEDICINE

## 2022-01-01 PROCEDURE — 97168 OT RE-EVAL EST PLAN CARE: CPT

## 2022-01-01 PROCEDURE — 84145 PROCALCITONIN (PCT): CPT | Performed by: INTERNAL MEDICINE

## 2022-01-01 PROCEDURE — 85025 COMPLETE CBC W/AUTO DIFF WBC: CPT | Performed by: NURSE PRACTITIONER

## 2022-01-01 RX ORDER — GABAPENTIN 300 MG/1
300 CAPSULE ORAL NIGHTLY
Qty: 3 CAPSULE | Refills: 0 | Status: SHIPPED | OUTPATIENT
Start: 2022-01-01

## 2022-01-01 RX ORDER — HYDROCODONE BITARTRATE AND ACETAMINOPHEN 10; 325 MG/1; MG/1
1 TABLET ORAL EVERY 4 HOURS PRN
Qty: 18 TABLET | Refills: 0 | Status: SHIPPED | OUTPATIENT
Start: 2022-01-01

## 2022-01-01 RX ORDER — LIDOCAINE 40 MG/G
1 CREAM TOPICAL AS NEEDED
Start: 2022-01-01

## 2022-01-01 RX ORDER — ATORVASTATIN CALCIUM 20 MG/1
20 TABLET, FILM COATED ORAL NIGHTLY
Start: 2022-01-01

## 2022-01-01 RX ORDER — ALBUMIN (HUMAN) 12.5 G/50ML
12.5 SOLUTION INTRAVENOUS AS NEEDED
Status: CANCELLED | OUTPATIENT
Start: 2022-01-01 | End: 2022-01-01

## 2022-01-01 RX ORDER — MIDODRINE HYDROCHLORIDE 10 MG/1
10 TABLET ORAL DAILY PRN
Status: DISCONTINUED | OUTPATIENT
Start: 2022-01-01 | End: 2022-01-01 | Stop reason: HOSPADM

## 2022-01-01 RX ORDER — WARFARIN SODIUM 7.5 MG/1
7.5 TABLET ORAL
Status: DISCONTINUED | OUTPATIENT
Start: 2022-01-01 | End: 2022-01-01

## 2022-01-01 RX ORDER — METOCLOPRAMIDE HYDROCHLORIDE 5 MG/ML
10 INJECTION INTRAMUSCULAR; INTRAVENOUS EVERY 6 HOURS SCHEDULED
Status: COMPLETED | OUTPATIENT
Start: 2022-01-01 | End: 2022-01-01

## 2022-01-01 RX ORDER — ALBUMIN (HUMAN) 12.5 G/50ML
SOLUTION INTRAVENOUS
Status: COMPLETED
Start: 2022-01-01 | End: 2022-01-01

## 2022-01-01 RX ORDER — LEVOTHYROXINE SODIUM 0.2 MG/1
200 TABLET ORAL
Start: 2022-01-01

## 2022-01-01 RX ORDER — MIDODRINE HYDROCHLORIDE 10 MG/1
10 TABLET ORAL ONCE
Status: DISCONTINUED | OUTPATIENT
Start: 2022-01-01 | End: 2022-01-01 | Stop reason: HOSPADM

## 2022-01-01 RX ORDER — DOCUSATE SODIUM 100 MG/1
100 CAPSULE, LIQUID FILLED ORAL 2 TIMES DAILY
Status: DISCONTINUED | OUTPATIENT
Start: 2022-01-01 | End: 2022-01-01 | Stop reason: HOSPADM

## 2022-01-01 RX ORDER — WARFARIN SODIUM 5 MG/1
10 TABLET ORAL
Status: DISCONTINUED | OUTPATIENT
Start: 2022-01-01 | End: 2022-01-01

## 2022-01-01 RX ORDER — MIDODRINE HYDROCHLORIDE 10 MG/1
10 TABLET ORAL DAILY PRN
Start: 2022-01-01

## 2022-01-01 RX ORDER — WARFARIN SODIUM 5 MG/1
5 TABLET ORAL
Status: DISCONTINUED | OUTPATIENT
Start: 2022-01-01 | End: 2022-01-01

## 2022-01-01 RX ORDER — MANNITOL 20 G/100ML
25 INJECTION, SOLUTION INTRAVENOUS ONCE
Status: COMPLETED | OUTPATIENT
Start: 2022-01-01 | End: 2022-01-01

## 2022-01-01 RX ORDER — ALBUMIN (HUMAN) 12.5 G/50ML
SOLUTION INTRAVENOUS
Status: DISCONTINUED
Start: 2022-01-01 | End: 2022-01-01 | Stop reason: HOSPADM

## 2022-01-01 RX ADMIN — ATORVASTATIN CALCIUM 20 MG: 20 TABLET, FILM COATED ORAL at 20:09

## 2022-01-01 RX ADMIN — ASPIRIN 81 MG CHEWABLE TABLET 81 MG: 81 TABLET CHEWABLE at 13:13

## 2022-01-01 RX ADMIN — ASPIRIN 81 MG CHEWABLE TABLET 81 MG: 81 TABLET CHEWABLE at 09:14

## 2022-01-01 RX ADMIN — DOCUSATE SODIUM 50 MG AND SENNOSIDES 8.6 MG 2 TABLET: 8.6; 5 TABLET, FILM COATED ORAL at 08:43

## 2022-01-01 RX ADMIN — ELUXADOLINE 100 MG: 100 TABLET, FILM COATED ORAL at 18:40

## 2022-01-01 RX ADMIN — HYDROCODONE BITARTRATE AND ACETAMINOPHEN 1 TABLET: 10; 325 TABLET ORAL at 03:05

## 2022-01-01 RX ADMIN — SODIUM CHLORIDE, PRESERVATIVE FREE 10 ML: 5 INJECTION INTRAVENOUS at 20:10

## 2022-01-01 RX ADMIN — ELUXADOLINE 100 MG: 100 TABLET, FILM COATED ORAL at 18:06

## 2022-01-01 RX ADMIN — HYDROCODONE BITARTRATE AND ACETAMINOPHEN 1 TABLET: 10; 325 TABLET ORAL at 08:52

## 2022-01-01 RX ADMIN — HYDROCODONE BITARTRATE AND ACETAMINOPHEN 1 TABLET: 10; 325 TABLET ORAL at 21:21

## 2022-01-01 RX ADMIN — HEPARIN SODIUM 5000 UNITS: 5000 INJECTION, SOLUTION INTRAVENOUS; SUBCUTANEOUS at 21:08

## 2022-01-01 RX ADMIN — DOCUSATE SODIUM 100 MG: 100 CAPSULE, LIQUID FILLED ORAL at 13:13

## 2022-01-01 RX ADMIN — ELUXADOLINE 100 MG: 100 TABLET, FILM COATED ORAL at 13:14

## 2022-01-01 RX ADMIN — ELUXADOLINE 100 MG: 100 TABLET, FILM COATED ORAL at 16:17

## 2022-01-01 RX ADMIN — DOCUSATE SODIUM 100 MG: 100 CAPSULE, LIQUID FILLED ORAL at 21:08

## 2022-01-01 RX ADMIN — HYDROCODONE BITARTRATE AND ACETAMINOPHEN 1 TABLET: 10; 325 TABLET ORAL at 22:36

## 2022-01-01 RX ADMIN — INSULIN DETEMIR 10 UNITS: 100 INJECTION, SOLUTION SUBCUTANEOUS at 22:27

## 2022-01-01 RX ADMIN — DOCUSATE SODIUM 100 MG: 100 CAPSULE, LIQUID FILLED ORAL at 21:14

## 2022-01-01 RX ADMIN — LEVOTHYROXINE SODIUM 200 MCG: 0.2 TABLET ORAL at 06:06

## 2022-01-01 RX ADMIN — GABAPENTIN 300 MG: 300 CAPSULE ORAL at 20:49

## 2022-01-01 RX ADMIN — HYDROCODONE BITARTRATE AND ACETAMINOPHEN 1 TABLET: 10; 325 TABLET ORAL at 21:08

## 2022-01-01 RX ADMIN — DOCUSATE SODIUM 100 MG: 100 CAPSULE, LIQUID FILLED ORAL at 22:04

## 2022-01-01 RX ADMIN — WARFARIN SODIUM 5 MG: 5 TABLET ORAL at 16:55

## 2022-01-01 RX ADMIN — ATORVASTATIN CALCIUM 20 MG: 20 TABLET, FILM COATED ORAL at 20:49

## 2022-01-01 RX ADMIN — ASPIRIN 81 MG CHEWABLE TABLET 81 MG: 81 TABLET CHEWABLE at 08:03

## 2022-01-01 RX ADMIN — ATORVASTATIN CALCIUM 20 MG: 20 TABLET, FILM COATED ORAL at 21:08

## 2022-01-01 RX ADMIN — HYDROCODONE BITARTRATE AND ACETAMINOPHEN 1 TABLET: 10; 325 TABLET ORAL at 16:16

## 2022-01-01 RX ADMIN — HYDROCODONE BITARTRATE AND ACETAMINOPHEN 1 TABLET: 10; 325 TABLET ORAL at 13:13

## 2022-01-01 RX ADMIN — INSULIN DETEMIR 10 UNITS: 100 INJECTION, SOLUTION SUBCUTANEOUS at 21:41

## 2022-01-01 RX ADMIN — GABAPENTIN 300 MG: 300 CAPSULE ORAL at 21:09

## 2022-01-01 RX ADMIN — GABAPENTIN 300 MG: 300 CAPSULE ORAL at 21:08

## 2022-01-01 RX ADMIN — LEVOTHYROXINE SODIUM 200 MCG: 0.2 TABLET ORAL at 04:56

## 2022-01-01 RX ADMIN — LIDOCAINE 4% 1 APPLICATION: 4 CREAM TOPICAL at 08:52

## 2022-01-01 RX ADMIN — BISACODYL SUPPOSITORY 10 MG: 10 SUPPOSITORY RECTAL at 22:31

## 2022-01-01 RX ADMIN — SODIUM CHLORIDE, PRESERVATIVE FREE 10 ML: 5 INJECTION INTRAVENOUS at 21:08

## 2022-01-01 RX ADMIN — DOCUSATE SODIUM 50 MG AND SENNOSIDES 8.6 MG 2 TABLET: 8.6; 5 TABLET, FILM COATED ORAL at 13:56

## 2022-01-01 RX ADMIN — INSULIN LISPRO 2 UNITS: 100 INJECTION, SOLUTION INTRAVENOUS; SUBCUTANEOUS at 08:42

## 2022-01-01 RX ADMIN — ASPIRIN 81 MG CHEWABLE TABLET 81 MG: 81 TABLET CHEWABLE at 11:57

## 2022-01-01 RX ADMIN — HEPARIN SODIUM 5000 UNITS: 5000 INJECTION, SOLUTION INTRAVENOUS; SUBCUTANEOUS at 21:50

## 2022-01-01 RX ADMIN — LINEZOLID 600 MG: 600 TABLET ORAL at 09:57

## 2022-01-01 RX ADMIN — HYDROCODONE BITARTRATE AND ACETAMINOPHEN 1 TABLET: 10; 325 TABLET ORAL at 19:03

## 2022-01-01 RX ADMIN — LEVOTHYROXINE SODIUM 200 MCG: 0.2 TABLET ORAL at 05:50

## 2022-01-01 RX ADMIN — ATORVASTATIN CALCIUM 20 MG: 20 TABLET, FILM COATED ORAL at 22:04

## 2022-01-01 RX ADMIN — INSULIN DETEMIR 10 UNITS: 100 INJECTION, SOLUTION SUBCUTANEOUS at 20:10

## 2022-01-01 RX ADMIN — METRONIDAZOLE 500 MG: 500 TABLET ORAL at 08:03

## 2022-01-01 RX ADMIN — HYDROCODONE BITARTRATE AND ACETAMINOPHEN 1 TABLET: 10; 325 TABLET ORAL at 09:33

## 2022-01-01 RX ADMIN — HEPARIN SODIUM 5000 UNITS: 5000 INJECTION, SOLUTION INTRAVENOUS; SUBCUTANEOUS at 13:09

## 2022-01-01 RX ADMIN — ASPIRIN 81 MG CHEWABLE TABLET 81 MG: 81 TABLET CHEWABLE at 09:27

## 2022-01-01 RX ADMIN — INSULIN LISPRO 2 UNITS: 100 INJECTION, SOLUTION INTRAVENOUS; SUBCUTANEOUS at 17:40

## 2022-01-01 RX ADMIN — WARFARIN SODIUM 7.5 MG: 7.5 TABLET ORAL at 18:24

## 2022-01-01 RX ADMIN — INSULIN LISPRO 2 UNITS: 100 INJECTION, SOLUTION INTRAVENOUS; SUBCUTANEOUS at 09:28

## 2022-01-01 RX ADMIN — ROPIVACAINE HYDROCHLORIDE 10 ML/HR: 2 INJECTION, SOLUTION EPIDURAL; INFILTRATION at 21:33

## 2022-01-01 RX ADMIN — ALBUMIN HUMAN 25 G: 0.25 SOLUTION INTRAVENOUS at 08:11

## 2022-01-01 RX ADMIN — ROPIVACAINE HYDROCHLORIDE 10 ML/HR: 2 INJECTION, SOLUTION EPIDURAL; INFILTRATION at 16:53

## 2022-01-01 RX ADMIN — ATORVASTATIN CALCIUM 20 MG: 20 TABLET, FILM COATED ORAL at 21:09

## 2022-01-01 RX ADMIN — ASPIRIN 81 MG CHEWABLE TABLET 81 MG: 81 TABLET CHEWABLE at 09:33

## 2022-01-01 RX ADMIN — HYDROCODONE BITARTRATE AND ACETAMINOPHEN 1 TABLET: 10; 325 TABLET ORAL at 08:51

## 2022-01-01 RX ADMIN — METHYLNALTREXONE BROMIDE 6 MG: 12 INJECTION, SOLUTION SUBCUTANEOUS at 09:18

## 2022-01-01 RX ADMIN — METRONIDAZOLE 500 MG: 500 TABLET ORAL at 09:27

## 2022-01-01 RX ADMIN — HEPARIN SODIUM 5000 UNITS: 5000 INJECTION, SOLUTION INTRAVENOUS; SUBCUTANEOUS at 09:28

## 2022-01-01 RX ADMIN — HYDROCODONE BITARTRATE AND ACETAMINOPHEN 1 TABLET: 10; 325 TABLET ORAL at 04:52

## 2022-01-01 RX ADMIN — INSULIN LISPRO 2 UNITS: 100 INJECTION, SOLUTION INTRAVENOUS; SUBCUTANEOUS at 08:43

## 2022-01-01 RX ADMIN — ELUXADOLINE 100 MG: 100 TABLET, FILM COATED ORAL at 09:34

## 2022-01-01 RX ADMIN — HYDROCODONE BITARTRATE AND ACETAMINOPHEN 1 TABLET: 10; 325 TABLET ORAL at 13:09

## 2022-01-01 RX ADMIN — ATORVASTATIN CALCIUM 20 MG: 20 TABLET, FILM COATED ORAL at 20:54

## 2022-01-01 RX ADMIN — METOCLOPRAMIDE 10 MG: 5 INJECTION, SOLUTION INTRAMUSCULAR; INTRAVENOUS at 05:58

## 2022-01-01 RX ADMIN — ATORVASTATIN CALCIUM 20 MG: 20 TABLET, FILM COATED ORAL at 21:14

## 2022-01-01 RX ADMIN — SODIUM CHLORIDE, PRESERVATIVE FREE 10 ML: 5 INJECTION INTRAVENOUS at 08:44

## 2022-01-01 RX ADMIN — INSULIN DETEMIR 10 UNITS: 100 INJECTION, SOLUTION SUBCUTANEOUS at 22:04

## 2022-01-01 RX ADMIN — SODIUM CHLORIDE, PRESERVATIVE FREE 10 ML: 5 INJECTION INTRAVENOUS at 00:21

## 2022-01-01 RX ADMIN — DOCUSATE SODIUM 100 MG: 100 CAPSULE, LIQUID FILLED ORAL at 20:10

## 2022-01-01 RX ADMIN — ELUXADOLINE 100 MG: 100 TABLET, FILM COATED ORAL at 17:00

## 2022-01-01 RX ADMIN — ELUXADOLINE 100 MG: 100 TABLET, FILM COATED ORAL at 18:24

## 2022-01-01 RX ADMIN — METHYLNALTREXONE BROMIDE 6 MG: 12 INJECTION, SOLUTION SUBCUTANEOUS at 08:43

## 2022-01-01 RX ADMIN — INSULIN DETEMIR 10 UNITS: 100 INJECTION, SOLUTION SUBCUTANEOUS at 21:21

## 2022-01-01 RX ADMIN — INSULIN LISPRO 2 UNITS: 100 INJECTION, SOLUTION INTRAVENOUS; SUBCUTANEOUS at 13:08

## 2022-01-01 RX ADMIN — HYDROCODONE BITARTRATE AND ACETAMINOPHEN 1 TABLET: 10; 325 TABLET ORAL at 17:51

## 2022-01-01 RX ADMIN — HYDROCODONE BITARTRATE AND ACETAMINOPHEN 1 TABLET: 10; 325 TABLET ORAL at 22:31

## 2022-01-01 RX ADMIN — LINEZOLID 600 MG: 600 TABLET ORAL at 20:09

## 2022-01-01 RX ADMIN — MIDODRINE HYDROCHLORIDE 10 MG: 10 TABLET ORAL at 08:17

## 2022-01-01 RX ADMIN — LEVOTHYROXINE SODIUM 200 MCG: 0.2 TABLET ORAL at 06:34

## 2022-01-01 RX ADMIN — GABAPENTIN 300 MG: 300 CAPSULE ORAL at 20:54

## 2022-01-01 RX ADMIN — HYDROCODONE BITARTRATE AND ACETAMINOPHEN 1 TABLET: 10; 325 TABLET ORAL at 15:23

## 2022-01-01 RX ADMIN — ASPIRIN 81 MG CHEWABLE TABLET 81 MG: 81 TABLET CHEWABLE at 08:42

## 2022-01-01 RX ADMIN — LIDOCAINE 4% 1 APPLICATION: 4 CREAM TOPICAL at 08:21

## 2022-01-01 RX ADMIN — EPOETIN ALFA-EPBX 20000 UNITS: 10000 INJECTION, SOLUTION INTRAVENOUS; SUBCUTANEOUS at 11:54

## 2022-01-01 RX ADMIN — METOCLOPRAMIDE 10 MG: 5 INJECTION, SOLUTION INTRAMUSCULAR; INTRAVENOUS at 16:05

## 2022-01-01 RX ADMIN — HYDROCODONE BITARTRATE AND ACETAMINOPHEN 1 TABLET: 10; 325 TABLET ORAL at 05:13

## 2022-01-01 RX ADMIN — ELUXADOLINE 100 MG: 100 TABLET, FILM COATED ORAL at 17:44

## 2022-01-01 RX ADMIN — INSULIN DETEMIR 10 UNITS: 100 INJECTION, SOLUTION SUBCUTANEOUS at 20:54

## 2022-01-01 RX ADMIN — ELUXADOLINE 100 MG: 100 TABLET, FILM COATED ORAL at 09:01

## 2022-01-01 RX ADMIN — EPOETIN ALFA-EPBX 20000 UNITS: 10000 INJECTION, SOLUTION INTRAVENOUS; SUBCUTANEOUS at 16:09

## 2022-01-01 RX ADMIN — METRONIDAZOLE 500 MG: 500 TABLET ORAL at 20:49

## 2022-01-01 RX ADMIN — INSULIN DETEMIR 10 UNITS: 100 INJECTION, SOLUTION SUBCUTANEOUS at 20:49

## 2022-01-01 RX ADMIN — ASPIRIN 81 MG CHEWABLE TABLET 81 MG: 81 TABLET CHEWABLE at 08:43

## 2022-01-01 RX ADMIN — LEVOTHYROXINE SODIUM 200 MCG: 0.2 TABLET ORAL at 05:09

## 2022-01-01 RX ADMIN — LEVOTHYROXINE SODIUM 200 MCG: 0.2 TABLET ORAL at 05:18

## 2022-01-01 RX ADMIN — HEPARIN SODIUM 5000 UNITS: 5000 INJECTION, SOLUTION INTRAVENOUS; SUBCUTANEOUS at 20:48

## 2022-01-01 RX ADMIN — LEVOTHYROXINE SODIUM 200 MCG: 0.2 TABLET ORAL at 05:58

## 2022-01-01 RX ADMIN — HEPARIN SODIUM 5000 UNITS: 5000 INJECTION, SOLUTION INTRAVENOUS; SUBCUTANEOUS at 09:56

## 2022-01-01 RX ADMIN — GABAPENTIN 300 MG: 300 CAPSULE ORAL at 21:15

## 2022-01-01 RX ADMIN — METRONIDAZOLE 500 MG: 500 TABLET ORAL at 20:09

## 2022-01-01 RX ADMIN — MIDODRINE HYDROCHLORIDE 10 MG: 10 TABLET ORAL at 08:11

## 2022-01-01 RX ADMIN — ASPIRIN 81 MG CHEWABLE TABLET 81 MG: 81 TABLET CHEWABLE at 09:56

## 2022-01-01 RX ADMIN — METRONIDAZOLE 500 MG: 500 TABLET ORAL at 09:56

## 2022-01-01 RX ADMIN — INSULIN LISPRO 2 UNITS: 100 INJECTION, SOLUTION INTRAVENOUS; SUBCUTANEOUS at 17:22

## 2022-01-01 RX ADMIN — LINEZOLID 600 MG: 600 TABLET ORAL at 20:49

## 2022-01-01 RX ADMIN — HEPARIN SODIUM 5000 UNITS: 5000 INJECTION, SOLUTION INTRAVENOUS; SUBCUTANEOUS at 08:02

## 2022-01-01 RX ADMIN — HYDROCODONE BITARTRATE AND ACETAMINOPHEN 1 TABLET: 10; 325 TABLET ORAL at 05:15

## 2022-01-01 RX ADMIN — NEOSTIGMINE 0.4 MG/HR: 1 INJECTION INTRAVENOUS at 21:08

## 2022-01-01 RX ADMIN — ATORVASTATIN CALCIUM 20 MG: 20 TABLET, FILM COATED ORAL at 20:10

## 2022-01-01 RX ADMIN — SODIUM CHLORIDE, PRESERVATIVE FREE 10 ML: 5 INJECTION INTRAVENOUS at 09:20

## 2022-01-01 RX ADMIN — ATORVASTATIN CALCIUM 20 MG: 20 TABLET, FILM COATED ORAL at 22:31

## 2022-01-01 RX ADMIN — SODIUM CHLORIDE, PRESERVATIVE FREE 10 ML: 5 INJECTION INTRAVENOUS at 09:28

## 2022-01-01 RX ADMIN — SODIUM CHLORIDE, PRESERVATIVE FREE 10 ML: 5 INJECTION INTRAVENOUS at 22:32

## 2022-01-01 RX ADMIN — SODIUM CHLORIDE, PRESERVATIVE FREE 10 ML: 5 INJECTION INTRAVENOUS at 22:05

## 2022-01-01 RX ADMIN — INSULIN LISPRO 2 UNITS: 100 INJECTION, SOLUTION INTRAVENOUS; SUBCUTANEOUS at 09:34

## 2022-01-01 RX ADMIN — HYDROCODONE BITARTRATE AND ACETAMINOPHEN 1 TABLET: 10; 325 TABLET ORAL at 05:19

## 2022-01-01 RX ADMIN — DOCUSATE SODIUM 100 MG: 100 CAPSULE, LIQUID FILLED ORAL at 08:43

## 2022-01-01 RX ADMIN — SODIUM CHLORIDE, PRESERVATIVE FREE 10 ML: 5 INJECTION INTRAVENOUS at 08:04

## 2022-01-01 RX ADMIN — INSULIN LISPRO 2 UNITS: 100 INJECTION, SOLUTION INTRAVENOUS; SUBCUTANEOUS at 12:47

## 2022-01-01 RX ADMIN — ELUXADOLINE 100 MG: 100 TABLET, FILM COATED ORAL at 18:01

## 2022-01-01 RX ADMIN — HEPARIN SODIUM 5000 UNITS: 5000 INJECTION, SOLUTION INTRAVENOUS; SUBCUTANEOUS at 20:09

## 2022-01-01 RX ADMIN — GABAPENTIN 300 MG: 300 CAPSULE ORAL at 20:10

## 2022-01-01 RX ADMIN — METHYLNALTREXONE BROMIDE 6 MG: 12 INJECTION, SOLUTION SUBCUTANEOUS at 13:13

## 2022-01-01 RX ADMIN — SODIUM CHLORIDE, PRESERVATIVE FREE 10 ML: 5 INJECTION INTRAVENOUS at 05:58

## 2022-01-01 RX ADMIN — GABAPENTIN 300 MG: 300 CAPSULE ORAL at 20:09

## 2022-01-01 RX ADMIN — LEVOTHYROXINE SODIUM 200 MCG: 0.2 TABLET ORAL at 05:35

## 2022-01-01 RX ADMIN — INSULIN LISPRO 2 UNITS: 100 INJECTION, SOLUTION INTRAVENOUS; SUBCUTANEOUS at 12:14

## 2022-01-01 RX ADMIN — HYDROCODONE BITARTRATE AND ACETAMINOPHEN 1 TABLET: 10; 325 TABLET ORAL at 03:50

## 2022-01-01 RX ADMIN — DOCUSATE SODIUM 100 MG: 100 CAPSULE, LIQUID FILLED ORAL at 09:33

## 2022-01-01 RX ADMIN — LIDOCAINE 4% 1 APPLICATION: 4 CREAM TOPICAL at 08:20

## 2022-01-01 RX ADMIN — ALBUMIN HUMAN 25 G: 0.25 SOLUTION INTRAVENOUS at 08:49

## 2022-01-01 RX ADMIN — METOCLOPRAMIDE 10 MG: 5 INJECTION, SOLUTION INTRAMUSCULAR; INTRAVENOUS at 12:56

## 2022-01-01 RX ADMIN — HYDROCODONE BITARTRATE AND ACETAMINOPHEN 1 TABLET: 10; 325 TABLET ORAL at 15:34

## 2022-01-01 RX ADMIN — HYDROCODONE BITARTRATE AND ACETAMINOPHEN 1 TABLET: 10; 325 TABLET ORAL at 20:10

## 2022-01-01 RX ADMIN — SODIUM CHLORIDE, PRESERVATIVE FREE 10 ML: 5 INJECTION INTRAVENOUS at 09:57

## 2022-01-01 RX ADMIN — ELUXADOLINE 100 MG: 100 TABLET, FILM COATED ORAL at 08:03

## 2022-01-01 RX ADMIN — LINEZOLID 600 MG: 600 TABLET ORAL at 09:27

## 2022-01-01 RX ADMIN — DOCUSATE SODIUM 100 MG: 100 CAPSULE, LIQUID FILLED ORAL at 13:56

## 2022-01-01 RX ADMIN — ELUXADOLINE 100 MG: 100 TABLET, FILM COATED ORAL at 17:46

## 2022-01-01 RX ADMIN — DOCUSATE SODIUM 100 MG: 100 CAPSULE, LIQUID FILLED ORAL at 23:37

## 2022-01-01 RX ADMIN — SODIUM CHLORIDE, PRESERVATIVE FREE 10 ML: 5 INJECTION INTRAVENOUS at 09:34

## 2022-01-01 RX ADMIN — LEVOTHYROXINE SODIUM 200 MCG: 0.2 TABLET ORAL at 06:08

## 2022-01-01 RX ADMIN — ROPIVACAINE HYDROCHLORIDE 10 ML/HR: 2 INJECTION, SOLUTION EPIDURAL; INFILTRATION at 04:31

## 2022-01-01 RX ADMIN — HEPARIN SODIUM 5000 UNITS: 5000 INJECTION, SOLUTION INTRAVENOUS; SUBCUTANEOUS at 09:14

## 2022-01-01 RX ADMIN — LINEZOLID 600 MG: 600 TABLET ORAL at 08:03

## 2022-01-01 RX ADMIN — LEVOTHYROXINE SODIUM 200 MCG: 0.2 TABLET ORAL at 05:13

## 2022-01-01 RX ADMIN — MANNITOL 25 G: 20 INJECTION, SOLUTION INTRAVENOUS at 10:23

## 2022-01-01 RX ADMIN — DOCUSATE SODIUM 100 MG: 100 CAPSULE, LIQUID FILLED ORAL at 08:42

## 2022-01-01 RX ADMIN — METHYLNALTREXONE BROMIDE 6 MG: 12 INJECTION, SOLUTION SUBCUTANEOUS at 14:35

## 2022-01-01 RX ADMIN — LIDOCAINE 4% 1 APPLICATION: 4 CREAM TOPICAL at 07:00

## 2022-01-01 RX ADMIN — HYDROCODONE BITARTRATE AND ACETAMINOPHEN 1 TABLET: 10; 325 TABLET ORAL at 13:28

## 2022-01-01 RX ADMIN — ASPIRIN 81 MG CHEWABLE TABLET 81 MG: 81 TABLET CHEWABLE at 13:09

## 2022-01-01 RX ADMIN — ELUXADOLINE 100 MG: 100 TABLET, FILM COATED ORAL at 08:51

## 2022-01-01 RX ADMIN — HYDROCODONE BITARTRATE AND ACETAMINOPHEN 1 TABLET: 10; 325 TABLET ORAL at 20:52

## 2022-01-01 RX ADMIN — SODIUM CHLORIDE, PRESERVATIVE FREE 10 ML: 5 INJECTION INTRAVENOUS at 21:50

## 2022-01-01 RX ADMIN — HYDROCODONE BITARTRATE AND ACETAMINOPHEN 1 TABLET: 10; 325 TABLET ORAL at 18:00

## 2022-01-01 RX ADMIN — DOCUSATE SODIUM 100 MG: 100 CAPSULE, LIQUID FILLED ORAL at 22:31

## 2022-01-01 RX ADMIN — WARFARIN SODIUM 5 MG: 5 TABLET ORAL at 18:57

## 2022-01-01 RX ADMIN — HYDROCODONE BITARTRATE AND ACETAMINOPHEN 1 TABLET: 10; 325 TABLET ORAL at 09:31

## 2022-01-01 RX ADMIN — ATORVASTATIN CALCIUM 20 MG: 20 TABLET, FILM COATED ORAL at 21:50

## 2022-01-01 RX ADMIN — HYDROCODONE BITARTRATE AND ACETAMINOPHEN 1 TABLET: 10; 325 TABLET ORAL at 22:09

## 2022-01-01 RX ADMIN — HYDROCODONE BITARTRATE AND ACETAMINOPHEN 1 TABLET: 10; 325 TABLET ORAL at 17:29

## 2022-01-01 RX ADMIN — GABAPENTIN 300 MG: 300 CAPSULE ORAL at 22:04

## 2022-01-01 RX ADMIN — SODIUM CHLORIDE, PRESERVATIVE FREE 10 ML: 5 INJECTION INTRAVENOUS at 13:15

## 2022-01-01 RX ADMIN — GABAPENTIN 300 MG: 300 CAPSULE ORAL at 21:50

## 2022-01-01 RX ADMIN — ELUXADOLINE 100 MG: 100 TABLET, FILM COATED ORAL at 12:42

## 2022-01-01 RX ADMIN — METOCLOPRAMIDE 10 MG: 5 INJECTION, SOLUTION INTRAMUSCULAR; INTRAVENOUS at 00:20

## 2022-01-01 RX ADMIN — INSULIN DETEMIR 10 UNITS: 100 INJECTION, SOLUTION SUBCUTANEOUS at 21:08

## 2022-01-01 RX ADMIN — GABAPENTIN 300 MG: 300 CAPSULE ORAL at 22:31

## 2022-01-01 RX ADMIN — ROPIVACAINE HYDROCHLORIDE 10 ML/HR: 2 INJECTION, SOLUTION EPIDURAL; INFILTRATION at 12:56

## 2022-01-01 NOTE — PLAN OF CARE
Problem: Adult Inpatient Plan of Care  Goal: Plan of Care Review  Recent Flowsheet Documentation  Taken 1/1/2022 1326 by Felipe Urrutia OT  Plan of Care Reviewed With: patient  Outcome Summary:   VSS   OT re-eval completed s/p L AKA. Pt limited by weakness, decreased balance, pain. Pt required Max A to change into clean hospital gown, set up/supervision for grooming, Max A x 2 for STS at RW x 2 reps. Pt issued green theraband and blue foam block for strengthening and demonstrated good understanding of all ex's. Pt will benefi from skilled OT services to address deficits, facilitate increased fxl I. Recommend SNF for rehab at discharge.   Goal Outcome Evaluation:  Plan of Care Reviewed With: patient           Outcome Summary: VSS; OT re-eval completed s/p L AKA. Pt limited by weakness, decreased balance, pain. Pt required Max A to change into clean hospital gown, set up/supervision for grooming, Max A x 2 for STS at RW x 2 reps. Pt issued green theraband and blue foam block for strengthening and demonstrated good understanding of all ex's. Pt will benefi from skilled OT services to address deficits, facilitate increased fxl I. Recommend SNF for rehab at discharge.

## 2022-01-01 NOTE — THERAPY RE-EVALUATION
Patient Name: Chase Obrien  : 1945    MRN: [id number]                              Today's Date: 2022       Admit Date: 2021    Visit Dx:     ICD-10-CM ICD-9-CM   1. Cellulitis of left foot  L03.116 682.7   2. Gangrene of toe of left foot ([facility])  I96 785.4   3. Elevated lactic acid level  R79.89 276.2   4. Recurrent UTI  N39.0 599.0   5. History of bladder cancer  Z85.51 V10.51   6. History of prostate cancer  Z85.46 V10.46   7. ESRD on hemodialysis ([facility])  N18.6 585.6    Z99.2 V45.11   8. History of GI bleed  Z87.19 V12.79   9. PAF (paroxysmal atrial fibrillation) ([facility])  I48.0 427.31   10. History of CHF (congestive heart failure)  Z86.79 V12.59   11. History of diabetes mellitus  Z86.39 V12.29   12. Anemia of chronic renal failure, stage 5 ([facility])  N18.5 285.21    D63.1 585.5   13. Gangrene of left foot ([facility])  I96 785.4     Patient Active Problem List   Diagnosis   • Cellulitis   • BALDO (acute kidney injury) ([facility])   • Atrial fibrillation ([facility])   • Type 2 diabetes mellitus ([facility])   • Hypothyroid   • Chronic kidney disease, stage IV (severe) ([facility])   • WINSTON (obstructive sleep apnea), noncompliant w/ NIPPV   • CAD (coronary artery disease)   • Anemia   • Dyslipidemia   • Tobacco abuse disorder   • ESRD (end stage renal disease) ([facility])   • Elaine's syndrome/ pseudocolonic obstruction   • Chronic diastolic CHF (congestive heart failure) ([facility])   • Acute blood loss anemia   • Noncompliance with recommended medical treatment   • Gangrene of left foot ([facility])     Past Medical History:   Diagnosis Date   • Cellulitis    • CHF (congestive heart failure) ([facility])    • Chronic infection    • Degenerative disc disease, lumbar    • Diabetes mellitus ([facility])    • Hyperlipidemia    • Hypertension    • Myocardial infarction ([facility])    • Renal disorder    • Rotator cuff tear      Past Surgical History:   Procedure Laterality Date   • BRONCHOSCOPY N/A 2020    Procedure: BRONCHOSCOPY;  Surgeon: Fox Cervantes MD;   Location:  [facility];  Service: Pulmonary;  Laterality: N/A;   • CORONARY ARTERY BYPASS GRAFT     • ENDOSCOPY N/A 4/2/2020    Procedure: ESOPHAGOGASTRODUODENOSCOPY AT BEDSIDE;  Surgeon: Brunner, Mark I, MD;  Location:  [facility];  Service: Gastroenterology;  Laterality: N/A;   • ENDOSCOPY N/A 4/8/2020    Procedure: ESOPHAGOGASTRODUODENOSCOPY AT BEDSIDE;  Surgeon: Zhang Martinez MD;  Location:  [facility];  Service: Gastroenterology;  Laterality: N/A;  with gastric lavage using Edlich tube   • ENDOSCOPY N/A 4/8/2020    Procedure: ESOPHAGOGASTRODUODENOSCOPY AT BEDSIDE;  Surgeon: Zhang Martinez MD;  Location:  [facility];  Service: Gastroenterology;  Laterality: N/A;   • ENDOSCOPY N/A 4/9/2020    Procedure: ESOPHAGOGASTRODUODENOSCOPY AT BEDSIDE;  Surgeon: Zhang Martinez MD;  Location:  [facility];  Service: Gastroenterology;  Laterality: N/A;   • ENDOSCOPY N/A 4/9/2020    Procedure: ESOPHAGOGASTRODUODENOSCOPY AT BEDSIDE;  Surgeon: Zhang Martinez MD;  Location:  [facility];  Service: Gastroenterology;  Laterality: N/A;      General Information     Row Name 01/01/22 7396          Physical Therapy Time and Intention    Document Type re-evaluation; therapy note (daily note)  POD #2 L AKA  -MB     Mode of Treatment physical therapy  -MB     Row Name 01/01/22 4709          General Information    Patient Profile Reviewed yes  -MB     Prior Level of Function --  Please see IE.  -MB     Existing Precautions/Restrictions fall; non-weight bearing; left  L AKA 12/30  -MB     Barriers to Rehab medically complex; previous functional deficit  -MB     Row Name 01/01/22 3725          Living Environment    Lives With --  Please see IE.  -MB     Row Name 01/01/22 1939          Home Main Entrance    Number of Stairs, Main Entrance none  ramp entrance  -MB     Row Name 01/01/22 3113          Cognition    Orientation Status (Cognition) oriented x 3  -MB     Row Name 01/01/22 7457          Safety  Issues, Functional Mobility    Safety Issues Affecting Function (Mobility) insight into deficits/self-awareness; sequencing abilities; safety precaution awareness  -MB     Impairments Affecting Function (Mobility) balance; endurance/activity tolerance; pain; strength  -MB           User Key  (r) = Recorded By, (t) = Taken By, (c) = Cosigned By    Initials Name Provider Type    Josefina Teran, PT Physical Therapist               Mobility     Row Name 01/01/22 1335          Bed Mobility    Comment (Bed Mobility) Pt. received and left sitting UIC.  -MB     Row Name 01/01/22 1335          Transfers    Comment (Transfers) STS from chair w/ VCs for set up (R foot placement), safe hand placement, and sequencing.  Pt. able to stand ~75%upright w/ max A x 2.  -MB     Row Name 01/01/22 1335          Sit-Stand Transfer    Sit-Stand [place] (Transfers) maximum assist (25% patient effort); 2 person assist; verbal cues; nonverbal cues (demo/gesture)  -MB     Assistive Device (Sit-Stand Transfers) walker, front-wheeled  -MB     Row Name 01/01/22 1335          Gait/Stairs (Locomotion)    [place] Level (Gait) unable to assess  -MB     Comment (Gait/Stairs) Not safe to attempt.  -MB           User Key  (r) = Recorded By, (t) = Taken By, (c) = Cosigned By    Initials Name Provider Type    Josefina Teran, PT Physical Therapist               Obj/Interventions     Row Name 01/01/22 1334          Range of Motion Comprehensive    General Range of Motion lower extremity range of motion deficits identified  -MB     Comment, General Range of Motion RLE WFL, L AKA increased pain w/ movement  -MB     Row Name 01/01/22 1335          Strength Comprehensive (MMT)    General Manual Muscle Testing (MMT) Assessment lower extremity strength deficits identified  -MB     Comment, General Manual Muscle Testing (MMT) Assessment RLE 4-/5.  L residual limb NT.  -MB     Row Name 01/01/22 1335          Motor Skills    Motor Skills  therapeutic exercise  -MB     Row Name 01/01/22 1335          Hip (Therapeutic Exercise)    Hip (Therapeutic Exercise) isometric exercises; AROM (active range of motion)  -MB     Hip AROM (Therapeutic Exercise) left; flexion; aBduction; 10 repetitions  -MB     Hip Isometrics (Therapeutic Exercise) bilateral; gluteal sets; 10 repetitions  -MB     Row Name 01/01/22 1335          Knee (Therapeutic Exercise)    Knee (Therapeutic Exercise) strengthening exercise  -MB     Knee Strengthening (Therapeutic Exercise) right; LAQ (long arc quad); 10 repetitions  -MB     Row Name 01/01/22 1335          Ankle (Therapeutic Exercise)    Ankle AROM (Therapeutic Exercise) right; plantarflexion; dorsiflexion; 10 repetitions  -MB     Row Name 01/01/22 1335          Balance    Static Standing Balance severe impairment; supported  -MB     Dynamic Standing Balance severe impairment; supported  -MB     Balance Interventions occupation based/functional task; weight shifting activity; sit to stand  -MB     Row Name 01/01/22 1335          Sensory Assessment (Somatosensory)    Right LE Sensory Assessment light touch awareness; intact  -MB           User Key  (r) = Recorded By, (t) = Taken By, (c) = Cosigned By    Initials Name Provider Type    MB Josefina Tapia, PT Physical Therapist               Goals/Plan     Row Name 01/01/22 1527          Bed Mobility Goal 1 (PT)    Activity/Assistive Device (Bed Mobility Goal 1, PT) bed mobility activities, all  -MB     [place] Level/Cues Needed (Bed Mobility Goal 1, PT) minimum assist (75% or more patient effort)  -MB     Time Frame (Bed Mobility Goal 1, PT) 10 days  -MB     Progress/Outcomes (Bed Mobility Goal 1, PT) goal ongoing  -MB     Row Name 01/01/22 1527          Transfer Goal 1 (PT)    Activity/Assistive Device (Transfer Goal 1, PT) sit-to-stand/stand-to-sit; bed-to-chair/chair-to-bed; walker, rolling  -MB     [place] Level/Cues Needed (Transfer Goal 1, PT) moderate assist  (50-74% patient effort)  -MB     Time Frame (Transfer Goal 1, PT) 2 weeks  -MB     Progress/Outcome (Transfer Goal 1, PT) goal ongoing  -MB     Row Name 01/01/22 1527          Gait Training Goal 1 (PT)    Activity/Assistive Device (Gait Training Goal 1, PT) other (see comments)  W/c mobility and navigation  -MB     [place] Level (Gait Training Goal 1, PT) supervision required  -MB     Distance (Gait Training Goal 1, PT) 150  -MB     Time Frame (Gait Training Goal 1, PT) 2 weeks  -MB     Progress/Outcome (Gait Training Goal 1, PT) goal ongoing  -MB     Row Name 01/01/22 1527          Patient Education Goal (PT)    Activity (Patient Education Goal, PT) HEP  -MB     [place]/Cues/Accuracy (Memory Goal 2, PT) demonstrates adequately  -MB     Time Frame (Patient Education Goal, PT) 1 week  -MB     Progress/Outcome (Patient Education Goal, PT) goal ongoing  -MB           User Key  (r) = Recorded By, (t) = Taken By, (c) = Cosigned By    Initials Name Provider Type    Josefina Teran, PT Physical Therapist               Clinical Impression     Row Name 01/01/22 1476          Pain Scale: Numbers Pre/Post-Treatment    Pretreatment Pain Rating 8/10  -MB     Posttreatment Pain Rating 8/10  -MB     Pain Location - Side Left  -MB     Pain Location residual limb  -MB     Row Name 01/01/22 2955          Pain Scale: FACES Pre/Post-Treatment    Pain: FACES Scale, Pretreatment 2-->hurts little bit  -MB     Posttreatment Pain Rating 4-->hurts little more  -MB     Pain Location - Side Left  -MB     Pain Location residual limb  -MB     Row Name 01/01/22 1129          Plan of Care Review    Plan of Care Reviewed With patient  -MB     Progress no change  -MB     Outcome Summary PT re-eval completed s/p L AKA.  Patient presents w/ decreased strength, impaired balance, pain w/ mobility, and functional decline.  He transferred sit to stand w/ RW and max A x 2 w/ difficulty assuming fully upright posture.  Provided instruction  for L AKA care and HEP and encouraged pt. to continue on his own for improved strengthening and pain management.  Skilled IPPT indicated to improve pt's safety and independence w/ functional mobility.  Recommend SNF rehab at D/C for best outcome.  -MB     Row Name 01/01/22 1336          Therapy Assessment/Plan (PT)    Patient/Family Therapy Goals Statement (PT) independent mobility  -MB     Rehab Potential (PT) good, to achieve stated therapy goals  -MB     Criteria for Skilled Interventions Met (PT) yes; meets criteria; skilled treatment is necessary  -MB     Row Name 01/01/22 1332          Vital Signs    Pre Systolic BP Rehab --  VSS.  RN cleared for PT.  -MB     Pre Patient Position Sitting  -MB     Intra Patient Position Standing  -MB     Post Patient Position Sitting  -MB     Row Name 01/01/22 9217          Positioning and Restraints    Pre-Treatment Position sitting in chair/recliner  -MB     Post Treatment Position chair  -MB     In Chair notified nsg; reclined; call light within reach; encouraged to call for assist; exit alarm on; on mechanical lift sling; waffle cushion; legs elevated; LLE elevated  -MB           User Key  (r) = Recorded By, (t) = Taken By, (c) = Cosigned By    Initials Name Provider Type    Josefina Teran, PT Physical Therapist               Outcome Measures     Row Name 01/01/22 1335 01/01/22 0931       How much help from another person do you currently need...    Turning from your back to your side while in flat bed without using bedrails? 2  -MB 3  -MW    Moving from lying on back to sitting on the side of a flat bed without bedrails? 2  -MB 2  -MW    Moving to and from a bed to a chair (including a wheelchair)? 1  -MB 2  -MW    Standing up from a chair using your arms (e.g., wheelchair, bedside chair)? 2  -MB 2  -MW    Climbing 3-5 steps with a railing? 1  -MB 1  -MW    To walk in hospital room? 1  -MB 1  -MW    AM-PAC 6 Clicks Score (PT) 9  -MB 11  -MW    Row Name 01/01/22  1335 01/01/22 1326       Functional Assessment    Outcome Measure Options AM-PAC 6 Clicks Basic Mobility (PT)  -MB AM-PAC 6 Clicks Daily Activity (OT)  -TA          User Key  (r) = Recorded By, (t) = Taken By, (c) = Cosigned By    Initials Name Provider Type    TA Felipe Urrutia, OT Occupational Therapist    MB Josefina Tapia, PT Physical Therapist    Kortney Cabello, RN Registered Nurse                             Physical Therapy Education                 Title: PT OT SLP Therapies (Done)     Topic: Physical Therapy (Done)     Point: Mobility training (Done)     Learning Progress Summary           Patient Acceptance, E, VU by  at 12/30/2021 0630      Show all documentation for this point (4)                 Point: Home exercise program (Done)     Learning Progress Summary           Patient Acceptance, E, VU by  at 12/30/2021 0630      Show all documentation for this point (3)                 Point: Body mechanics (Done)     Learning Progress Summary           Patient Acceptance, E, VU by  at 12/30/2021 0630      Show all documentation for this point (4)                 Point: Precautions (Done)     Learning Progress Summary           Patient Acceptance, E, VU by  at 12/30/2021 0630      Show all documentation for this point (4)                             User Key     Initials Effective Dates Name Provider Type Discipline     11/16/18 -  Porfirio Dunn, RN Registered Nurse Nurse              PT Recommendation and Plan  Planned Therapy Interventions (PT): balance training, bed mobility training, gait training, home exercise program, patient/family education, ROM (range of motion), strengthening, transfer training, wheelchair management/propulsion training  Plan of Care Reviewed With: patient  Progress: no change  Outcome Summary: PT re-eval completed s/p L AKA.  Patient presents w/ decreased strength, impaired balance, pain w/ mobility, and functional decline.  He transferred sit to stand  w/ RW and max A x 2 w/ difficulty assuming fully upright posture.  Provided instruction for L AKA care and HEP and encouraged pt. to continue on his own for improved strengthening and pain management.  Skilled IPPT indicated to improve pt's safety and independence w/ functional mobility.  Recommend SNF rehab at D/C for best outcome.     Time Calculation:    PT Charges     Row Name 01/01/22 1531             Time Calculation    Start Time 1335  -MB              Time Calculation- PT    Total Timed Code Minutes- PT 30 minute(s)  -MB              Timed Charges    64727 - PT Therapeutic Exercise Minutes 30  -MB              Untimed Charges    PT Eval/Re-eval Minutes 30  -MB              Total Minutes    Timed Charges Total Minutes 30  -MB      Untimed Charges Total Minutes 30  -MB       Total Minutes 60  -MB            User Key  (r) = Recorded By, (t) = Taken By, (c) = Cosigned By    Initials Name Provider Type    Josefina Teran, PT Physical Therapist              Therapy Charges for Today     Code Description Service Date Service Provider Modifiers Qty    20812723612 HC PT THER PROC EA 15 MIN 1/1/2022 Josefina Tapia, PT GP 2    22175050999 HC PT RE-EVAL ESTABLISHED PLAN 2 1/1/2022 Josefina Tapia, PT GP 1          PT G-Codes  Outcome Measure Options: AM-PAC 6 Clicks Basic Mobility (PT)  AM-PAC 6 Clicks Score (PT): 9  AM-PAC 6 Clicks Score (OT): 15    Josefina Tapia PT  1/1/2022

## 2022-01-01 NOTE — PLAN OF CARE
Goal Outcome Evaluation:  Plan of Care Reviewed With: patient        Progress: no change  Outcome Summary: PT re-eval completed s/p L AKA.  Patient presents w/ decreased strength, impaired balance, pain w/ mobility, and functional decline.  He transferred sit to stand w/ RW and max A x 2 w/ difficulty assuming fully upright posture.  Provided instruction for L AKA care and HEP and encouraged pt. to continue on his own for improved strengthening and pain management.  Skilled IPPT indicated to improve pt's safety and independence w/ functional mobility.  Recommend SNF rehab at D/C for best outcome.

## 2022-01-01 NOTE — THERAPY RE-EVALUATION
Patient Name: Chase Obrien  : 1945    MRN: [id number]                              Today's Date: 2022       Admit Date: 2021    Visit Dx:     ICD-10-CM ICD-9-CM   1. Cellulitis of left foot  L03.116 682.7   2. Gangrene of toe of left foot ([facility])  I96 785.4   3. Elevated lactic acid level  R79.89 276.2   4. Recurrent UTI  N39.0 599.0   5. History of bladder cancer  Z85.51 V10.51   6. History of prostate cancer  Z85.46 V10.46   7. ESRD on hemodialysis ([facility])  N18.6 585.6    Z99.2 V45.11   8. History of GI bleed  Z87.19 V12.79   9. PAF (paroxysmal atrial fibrillation) ([facility])  I48.0 427.31   10. History of CHF (congestive heart failure)  Z86.79 V12.59   11. History of diabetes mellitus  Z86.39 V12.29   12. Anemia of chronic renal failure, stage 5 ([facility])  N18.5 285.21    D63.1 585.5   13. Gangrene of left foot ([facility])  I96 785.4     Patient Active Problem List   Diagnosis   • Cellulitis   • BALDO (acute kidney injury) ([facility])   • Atrial fibrillation ([facility])   • Type 2 diabetes mellitus ([facility])   • Hypothyroid   • Chronic kidney disease, stage IV (severe) ([facility])   • WINSTON (obstructive sleep apnea), noncompliant w/ NIPPV   • CAD (coronary artery disease)   • Anemia   • Dyslipidemia   • Tobacco abuse disorder   • ESRD (end stage renal disease) ([facility])   • Elaine's syndrome/ pseudocolonic obstruction   • Chronic diastolic CHF (congestive heart failure) ([facility])   • Acute blood loss anemia   • Noncompliance with recommended medical treatment   • Gangrene of left foot ([facility])     Past Medical History:   Diagnosis Date   • Cellulitis    • CHF (congestive heart failure) ([facility])    • Chronic infection    • Degenerative disc disease, lumbar    • Diabetes mellitus ([facility])    • Hyperlipidemia    • Hypertension    • Myocardial infarction ([facility])    • Renal disorder    • Rotator cuff tear      Past Surgical History:   Procedure Laterality Date   • BRONCHOSCOPY N/A 2020    Procedure: BRONCHOSCOPY;  Surgeon: Fox Cervantes MD;   Location:  [facility];  Service: Pulmonary;  Laterality: N/A;   • CORONARY ARTERY BYPASS GRAFT     • ENDOSCOPY N/A 4/2/2020    Procedure: ESOPHAGOGASTRODUODENOSCOPY AT BEDSIDE;  Surgeon: Brunner, Mark I, MD;  Location:  [facility];  Service: Gastroenterology;  Laterality: N/A;   • ENDOSCOPY N/A 4/8/2020    Procedure: ESOPHAGOGASTRODUODENOSCOPY AT BEDSIDE;  Surgeon: Zhang Martinez MD;  Location:  [facility];  Service: Gastroenterology;  Laterality: N/A;  with gastric lavage using Edlich tube   • ENDOSCOPY N/A 4/8/2020    Procedure: ESOPHAGOGASTRODUODENOSCOPY AT BEDSIDE;  Surgeon: Zhang Martinez MD;  Location:  [facility];  Service: Gastroenterology;  Laterality: N/A;   • ENDOSCOPY N/A 4/9/2020    Procedure: ESOPHAGOGASTRODUODENOSCOPY AT BEDSIDE;  Surgeon: Zhang Martinez MD;  Location:  [facility];  Service: Gastroenterology;  Laterality: N/A;   • ENDOSCOPY N/A 4/9/2020    Procedure: ESOPHAGOGASTRODUODENOSCOPY AT BEDSIDE;  Surgeon: Zhang Martinez MD;  Location:  [facility];  Service: Gastroenterology;  Laterality: N/A;      General Information     Row Name 01/01/22 1326          OT Time and Intention    Document Type re-evaluation  -TA     Mode of Treatment occupational therapy  -TA     Row Name 01/01/22 1326          General Information    Patient Profile Reviewed yes  -TA     Prior Level of Function --  See IE for details  -TA     Existing Precautions/Restrictions fall; non-weight bearing; left  L AKA, post op day #2  -TA     Barriers to Rehab medically complex  -TA     Row Name 01/01/22 1326          Occupational Profile    Reason for Services/Referral (Occupational Profile) fxl decline from PLOF  -TA     Patient Goals (Occupational Profile) Return to walking  -TA     Row Name 01/01/22 1326          Living Environment    Lives With --  See IE for details  -TA     Row Name 01/01/22 1326          Cognition    Orientation Status (Cognition) oriented x 3  -TA     Row Name 01/01/22  1326          Safety Issues, Functional Mobility    Safety Issues Affecting Function (Mobility) insight into deficits/self-awareness  -TA     Impairments Affecting Function (Mobility) balance; endurance/activity tolerance; pain; strength  -TA     Cognitive Impairments, Mobility Safety/Performance insight into deficits/self-awareness  -TA           User Key  (r) = Recorded By, (t) = Taken By, (c) = Cosigned By    Initials Name Provider Type    TA Felipe Urrutia, OT Occupational Therapist                 Mobility/ADL's     Row Name 01/01/22 1326          Transfers    Transfers sit-stand transfer  -TA     Comment (Transfers) STS x 2 reps  -TA     Sit-Stand Concepcion (Transfers) maximum assist (25% patient effort); 2 person assist; verbal cues  -TA     Row Name 01/01/22 1326          Sit-Stand Transfer    Assistive Device (Sit-Stand Transfers) walker, front-wheeled  -TA     Row Name 01/01/22 1326          Activities of Daily Living    BADL Assessment/Intervention upper body dressing; grooming  -TA     Row Name 01/01/22 1326          Upper Body Dressing Assessment/Training    [place] Level (Upper Body Dressing) doff; don; maximum assist (25% patient effort); verbal cues  clean hospital gown  -TA     Position (Upper Body Dressing) supported sitting  -TA     Row Name 01/01/22 1326          Grooming Assessment/Training    [place] Level (Grooming) wash face, hands; hair care, combing/brushing; set up; standby assist  -TA     Position (Grooming) supported sitting  -TA           User Key  (r) = Recorded By, (t) = Taken By, (c) = Cosigned By    Initials Name Provider Type    TA Felipe Urrutia, OT Occupational Therapist               Obj/Interventions     Row Name 01/01/22 1411          Sensory Assessment (Somatosensory)    Sensory Assessment (Somatosensory) bilateral UE; sensation intact  -TA     Row Name 01/01/22 1411          Vision Assessment/Intervention    Visual Impairment/Limitations WFL  -TA      Row Name 01/01/22 1411          Range of Motion Comprehensive    General Range of Motion bilateral upper extremity ROM WFL  -TA     Row Name 01/01/22 1411          Strength Comprehensive (MMT)    General Manual Muscle Testing (MMT) Assessment upper extremity strength deficits identified  -TA     Comment, General Manual Muscle Testing (MMT) Assessment BUE 4+/5  -TA     Row Name 01/01/22 1411          Shoulder (Therapeutic Exercise)    Shoulder AROM (Therapeutic Exercise) left; right; horizontal aBduction/aDduction; flexion; extension; 10 repetitions; 2 sets; sitting  -TA     Shoulder Strengthening (Therapeutic Exercise) green  -TA     Row Name 01/01/22 1411          Elbow/Forearm (Therapeutic Exercise)    Elbow/Forearm (Therapeutic Exercise) AROM (active range of motion)  -TA     Elbow/Forearm AROM (Therapeutic Exercise) left; right; flexion; extension; sitting; 10 repetitions; 2 sets  -TA     Elbow/Forearm Strengthening (Therapeutic Exercise) green  -TA     Row Name 01/01/22 1411          Hand (Therapeutic Exercise)    Hand (Therapeutic Exercise) AROM (active range of motion)  -TA     Hand AROM/AAROM (Therapeutic Exercise) left; right; finger flexion; finger extension; 10 repetitions; 2 sets  -TA     Hand Strengthening (Therapeutic Exercise) squeeze ball/egg  -TA     Row Name 01/01/22 1411          Balance    Balance Assessment sitting dynamic balance; standing static balance  -TA     Dynamic Sitting Balance WFL; sitting in chair; supported  -TA     Static Standing Balance severe impairment; supported; standing  -TA     Balance Interventions sit to stand; occupation based/functional task; weight shifting activity  -TA     Row Name 01/01/22 1411          Therapeutic Exercise    Therapeutic Exercise shoulder; elbow/forearm; hand  -TA           User Key  (r) = Recorded By, (t) = Taken By, (c) = Cosigned By    Initials Name Provider Type    Felipe Olivas OT Occupational Therapist               Goals/Plan      [facility] Name 01/01/22 1326          Transfer Goal 1 (OT)    Activity/Assistive Device (Transfer Goal 1, OT) sit-to-stand/stand-to-sit; bed-to-chair/chair-to-bed; toilet; commode, bedside without drop arms  -TA     [place] Level/Cues Needed (Transfer Goal 1, OT) moderate assist (50-74% patient effort); verbal cues required  -TA     Time Frame (Transfer Goal 1, OT) by discharge  -TA     Progress/Outcome (Transfer Goal 1, OT) goal ongoing  -TA     [facility] Name 01/01/22 1326          Dressing Goal 1 (OT)    Activity/Device (Dressing Goal 1, OT) lower body dressing  sock, AE PRN  -TA     [place]/Cues Needed (Dressing Goal 1, OT) standby assist; verbal cues required  -TA     Time Frame (Dressing Goal 1, OT) by discharge  -TA     Progress/Outcome (Dressing Goal 1, OT) goal ongoing  -TA     [facility] Name 01/01/22 1326          Strength Goal 1 (OT)    Strength Goal 1 (OT) Pt will increase BUE MMS by 1/2 MMG to support fxl I with ADLs  -TA     Time Frame (Strength Goal 1, OT) by discharge  -TA     Progress/Outcome (Strength Goal 1, OT) goal ongoing  -TA     [facility] Name 01/01/22 1326          Therapy Assessment/Plan (OT)    Planned Therapy Interventions (OT) activity tolerance training; adaptive equipment training; BADL retraining; functional balance retraining; occupation/activity based interventions; patient/caregiver education/training; ROM/therapeutic exercise; strengthening exercise; transfer/mobility retraining  -TA           User Key  (r) = Recorded By, (t) = Taken By, (c) = Cosigned By    Initials Name Provider Type    Felipe Olivas OT Occupational Therapist               Clinical Impression     Row Name 01/01/22 1326          Pain Assessment    Additional Documentation Pain Scale: FACES Pre/Post-Treatment (Group)  -TA     [facility] Name 01/01/22 1326          Pain Scale: FACES Pre/Post-Treatment    Pain: FACES Scale, Pretreatment 4-->hurts little more  With standing  -TA     Posttreatment Pain Rating 2-->hurts little  bit  -TA     Pain Location - Side Left  -TA     Pain Location - Orientation lower  -TA     Pain Location extremity  -TA     Pre/Posttreatment Pain Comment Pt premedicated for tx, RN present and aware  -TA     Row Name 01/01/22 1326          Plan of Care Review    Plan of Care Reviewed With patient  -TA     Outcome Summary VSS; OT re-eval completed s/p L AKA. Pt limited by weakness, decreased balance, pain. Pt required Max A to change into clean hospital gown, set up/supervision for grooming, Max A x 2 for STS at RW x 2 reps. Pt issued green theraband and blue foam block for strengthening and demonstrated good understanding of all ex's. Pt will benefi from skilled OT services to address deficits, facilitate increased fxl I. Recommend SNF for rehab at discharge.  -TA     Row Name 01/01/22 1326          Therapy Assessment/Plan (OT)    Patient/Family Therapy Goal Statement (OT) return to walking  -TA     Rehab Potential (OT) good, to achieve stated therapy goals  -TA     Criteria for Skilled Therapeutic Interventions Met (OT) yes; skilled treatment is necessary  -TA     Therapy Frequency (OT) daily  -TA     Row Name 01/01/22 1326          Therapy Plan Review/Discharge Plan (OT)    Anticipated Discharge Disposition (OT) skilled nursing facility  rehab  -TA     Row Name 01/01/22 1326          Vital Signs    Pre Systolic BP Rehab --  VSS; RN cleared pt for tx  -TA     O2 Delivery Pre Treatment room air  -TA     O2 Delivery Intra Treatment room air  -TA     O2 Delivery Post Treatment room air  -TA     Pre Patient Position Sitting  -TA     Intra Patient Position Standing  -TA     Post Patient Position Sitting  -TA     Row Name 01/01/22 1326          Positioning and Restraints    Pre-Treatment Position sitting in chair/recliner  -TA     Post Treatment Position chair  -TA     In Chair notified nsg; reclined; call light within reach; encouraged to call for assist; exit alarm on; waffle cushion; legs elevated; LLE elevated  -TA            User Key  (r) = Recorded By, (t) = Taken By, (c) = Cosigned By    Initials Name Provider Type    Felipe Olivas OT Occupational Therapist               Outcome Measures     Row Name 01/01/22 1326          How much help from another is currently needed...    Putting on and taking off regular lower body clothing? 2  -TA     Bathing (including washing, rinsing, and drying) 2  -TA     Toileting (which includes using toilet bed pan or urinal) 2  -TA     Putting on and taking off regular upper body clothing 2  -TA     Taking care of personal grooming (such as brushing teeth) 3  -TA     Eating meals 4  -TA     AM-PAC 6 Clicks Score (OT) 15  -TA     Row Name 01/01/22 0931          How much help from another person do you currently need...    Turning from your back to your side while in flat bed without using bedrails? 3  -MW     Moving from lying on back to sitting on the side of a flat bed without bedrails? 2  -MW     Moving to and from a bed to a chair (including a wheelchair)? 2  -MW     Standing up from a chair using your arms (e.g., wheelchair, bedside chair)? 2  -MW     Climbing 3-5 steps with a railing? 1  -MW     To walk in hospital room? 1  -MW     AM-PAC 6 Clicks Score (PT) 11  -MW     Row Name 01/01/22 1326          Functional Assessment    Outcome Measure Options AM-PAC 6 Clicks Daily Activity (OT)  -TA           User Key  (r) = Recorded By, (t) = Taken By, (c) = Cosigned By    Initials Name Provider Type    Felipe Olivas OT Occupational Therapist    Kortney Cabello, RN Registered Nurse                Occupational Therapy Education                 Title: PT OT SLP Therapies (Done)     Topic: Occupational Therapy (Done)     Point: ADL training (Done)     Description:   Instruct learner(s) on proper safety adaptation and remediation techniques during self care or transfers.   Instruct in proper use of assistive devices.              Learning Progress Summary           Patient  Acceptance, E, VU by  at 12/30/2021 0630      Show all documentation for this point (5)                 Point: Home exercise program (Done)     Description:   Instruct learner(s) on appropriate technique for monitoring, assisting and/or progressing therapeutic exercises/activities.              Learning Progress Summary           Patient Acceptance, E, VU by  at 12/30/2021 0630      Show all documentation for this point (2)                 Point: Precautions (Done)     Description:   Instruct learner(s) on prescribed precautions during self-care and functional transfers.              Learning Progress Summary           Patient Acceptance, E, VU by  at 12/30/2021 0630      Show all documentation for this point (5)                 Point: Body mechanics (Done)     Description:   Instruct learner(s) on proper positioning and spine alignment during self-care, functional mobility activities and/or exercises.              Learning Progress Summary           Patient Acceptance, E, VU by  at 12/30/2021 0630      Show all documentation for this point (5)                             User Key     Initials Effective Dates Name Provider Type Discipline     11/16/18 -  Porfirio Dunn RN Registered Nurse Nurse              OT Recommendation and Plan  Planned Therapy Interventions (OT): activity tolerance training, adaptive equipment training, BADL retraining, functional balance retraining, occupation/activity based interventions, patient/caregiver education/training, ROM/therapeutic exercise, strengthening exercise, transfer/mobility retraining  Therapy Frequency (OT): daily  Plan of Care Review  Plan of Care Reviewed With: patient  Outcome Summary: VSS; OT re-eval completed s/p L AKA. Pt limited by weakness, decreased balance, pain. Pt required Max A to change into clean hospital gown, set up/supervision for grooming, Max A x 2 for STS at RW x 2 reps. Pt issued green theraband and blue foam block for strengthening  and demonstrated good understanding of all ex's. Pt will benefi from skilled OT services to address deficits, facilitate increased fxl I. Recommend SNF for rehab at discharge.     Time Calculation:    Time Calculation- OT     Row Name 01/01/22 1326             Time Calculation- OT    OT Start Time 1326  ttc 13 minutes  -TA      Total Timed Code Minutes- OT 13 minute(s)  -TA      OT Received On 01/01/22  -TA      OT Goal Re-Cert Due Date 01/11/22  -TA              Untimed Charges    OT Eval/Re-eval Minutes 28  -TA              Total Minutes    Untimed Charges Total Minutes 28  -TA       Total Minutes 28  -TA            User Key  (r) = Recorded By, (t) = Taken By, (c) = Cosigned By    Initials Name Provider Type    TA Felipe Urrutia, OT Occupational Therapist              Therapy Charges for Today     Code Description Service Date Service Provider Modifiers Qty    08747963989 HC OT RE-EVAL 2 1/1/2022 Felipe Urrutia, OT GO 1    79368143549 HC OT SELF CARE/MGMT/TRAIN EA 15 MIN 1/1/2022 Felipe Urrutia OT GO 1               Felipe Urrutia OT  1/1/2022

## 2022-01-01 NOTE — PROGRESS NOTES
Cardiothoracic Surgery Progress Note      POD #: 2-left above-knee amputation     LOS: 18 days      Subjective: Up in recliner chair very talkative.    Objective:vital Signs below noted T-max past 2498.3 °F  Temp:  [97.6 °F (36.4 °C)-98.3 °F (36.8 °C)] 98.3 °F (36.8 °C)  Heart Rate:  [67-90] 79  Resp:  [16-20] 18  BP: ()/(51-68) 98/59    Physical Exam:   General Appearance: Oriented x3   Lungs:   Heart:   Skin:   Incision: Amputation site dressing change.  ELLIOTT drain removed.  New dressing applied.  Margins are healing and flaps are viable     Results:  Results from last 7 days   Lab Units 12/31/21  1032   WBC 10*3/mm3 10.84*   HEMOGLOBIN g/dL 7.3*   HEMATOCRIT % 24.2*   PLATELETS 10*3/mm3 203     Results from last 7 days   Lab Units 12/31/21  1032   SODIUM mmol/L 134*   POTASSIUM mmol/L 4.5   CHLORIDE mmol/L 97*   CO2 mmol/L 20.0*   BUN mg/dL 30*   CREATININE mg/dL 4.49*   GLUCOSE mg/dL 286*   CALCIUM mg/dL 8.1*         Assessment: #1.  Postop day 2 left above-knee amputation for severe ischemia and gangrene left lower extremity left foot healing well at this time  2 renal failure on hemodialysis for over 2 years #3 bladder cancer prostate cancer with hematuria      Plan: Continue daily dressing change amputation site.  ELLIOTT drain was moved.  Dialysis per renal medicine.  Overall medical manage per hospitalist.      Luís Bradshaw MD - 01/01/22 - 07:30 EST

## 2022-01-01 NOTE — PROGRESS NOTES
[facility]    Acute pain service Inpatient Progress Note    Patient Name: Chase Obrien  :  1945  MRN:  [id number]        Acute Pain  Service Inpatient Progress Note:    Analgesia:Good  Pain Score:5/10  LOC: alert and awake  Resp Status: room air  Cardiac: VS stable  Side Effects:None  Catheter Site:clean, dressing intact and dry  Cath type: peripheral nerve cath with ON Q  Infusion rate: 10ml/hr  Dosing/Volume: ropivacaine 0.2%  Catheter Plan:Catheter to remain Insitu and Continue catheter infusion rate unchanged

## 2022-01-01 NOTE — PROGRESS NOTES
[facility] Medicine Services  PROGRESS NOTE    Patient Name: Chase Obrien  : 1945  MRN: [id number]    Date of Admission: 2021  Primary Care Physician: Radha Purcell    Subjective   Subjective     CC:  Left AKA    HPI:  Talking on the phone, sitting up in the chair, no complaints    ROS:  Gen- No fevers, chills  CV- No chest pain, palpitations  Resp- No cough, dyspnea  GI- No N/V/D, abd pain        Objective   Objective     Vital Signs:   Temp:  [97.6 °F (36.4 °C)-98.3 °F (36.8 °C)] 98.3 °F (36.8 °C)  Heart Rate:  [67-90] 79  Resp:  [16-20] 16  BP: ()/(51-68) 95/59     Physical Exam:  Constitutional: No acute distress, awake, alert  HENT: NCAT, mucous membranes moist  Respiratory: Clear to auscultation bilaterally, respiratory effort normal   Cardiovascular: RRR, no murmurs, rubs, or gallops  Gastrointestinal: obese, soft, nontender, mild distension  Musculoskeletal: L stump in bandage  Psychiatric: Appropriate affect, cooperative  Neurologic: Oriented x 3, speech clear  Skin: No rashes      Results Reviewed:  LAB RESULTS:      Lab 22  0742 21  1617 21  1032 21  0946 21  1626 21  0831 21  0652 21  0652 21  0658 21  0658   WBC  --   --  10.84* 8.03  --   --   --  6.83  --  7.62   HEMOGLOBIN  --   --  7.3* 8.5*  --   --   --  9.1*  --  8.7*   HEMATOCRIT  --   --  24.2* 28.4*  --   --   --  30.6*  --  29.0*   PLATELETS  --   --  203 245  --   --   --  253  --  267   NEUTROS ABS  --   --  9.55* 5.54  --   --   --  4.64  --  5.10   IMMATURE GRANS (ABS)  --   --  0.10* 0.08*  --   --   --  0.04  --  0.07*   LYMPHS ABS  --   --  0.56* 1.47  --   --   --  1.33  --  1.50   MONOS ABS  --   --  0.63 0.77  --   --   --  0.67  --  0.76   EOS ABS  --   --  0.00 0.13  --   --   --  0.11  --  0.16   MCV  --   --  99.6* 97.6*  --   --   --  101.7*  --  99.3*   PROTIME 16.5* 16.7*  --  18.4* 16.9* 17.1*   < > 17.8*   < >  19.5*    < > = values in this interval not displayed.         Lab 12/31/21  1617 12/31/21  1032 12/29/21  0946 12/28/21  0831 12/27/21  0652 12/26/21  0700   SODIUM  --  134* 137 135* 139 139   POTASSIUM  --  4.5 4.5 4.3 3.6 3.5   CHLORIDE  --  97* 100 101 102 101   CO2  --  20.0* 23.0 20.0* 24.0 25.0   ANION GAP  --  17.0* 14.0 14.0 13.0 13.0   BUN  --  30* 33* 27* 24* 20   CREATININE  --  4.49* 5.95* 4.57* 4.48* 4.07*   GLUCOSE  --  286* 113* 129* 122* 197*   CALCIUM  --  8.1* 8.1* 8.1* 8.1* 8.0*   PHOSPHORUS 2.8  --   --  3.7  --   --          Lab 12/31/21  1032 12/28/21  0831   TOTAL PROTEIN 6.8  --    ALBUMIN 3.40* 2.80*   GLOBULIN 3.4  --    ALT (SGPT) <5  --    AST (SGOT) 10  --    BILIRUBIN 0.3  --    ALK PHOS 85  --          Lab 01/01/22  0742 12/31/21  1617 12/29/21  0946 12/28/21  1626 12/28/21  0831   PROTIME 16.5* 16.7* 18.4* 16.9* 17.1*   INR 1.38* 1.40* 1.59* 1.43* 1.44*             Lab 12/28/21  0833   ABO TYPING B   RH TYPING Positive   ANTIBODY SCREEN Negative         Brief Urine Lab Results  (Last result in the past 365 days)      Color   Clarity   Blood   Leuk Est   Nitrite   Protein   CREAT   Urine HCG        12/13/21 2006 Yellow   Turbid   Large (3+)   Large (3+)   Negative   >=300 mg/dL (3+)                 Microbiology Results Abnormal     Procedure Component Value - Date/Time    Blood Culture - Blood, Arm, Right [[id number]]  (Normal) Collected: 12/13/21 2010    Lab Status: Final result Specimen: Blood from Arm, Right Updated: 12/18/21 2045     Blood Culture No growth at 5 days    Blood Culture - Blood, Arm, Right [[id number]]  (Normal) Collected: 12/13/21 2000    Lab Status: Final result Specimen: Blood from Arm, Right Updated: 12/18/21 2045     Blood Culture No growth at 5 days    Urine Culture - Urine, Urine, Catheter [[id number]] Collected: 12/13/21 2006    Lab Status: Final result Specimen: Urine, Catheter Updated: 12/14/21 1710     Urine Culture >100,000 CFU/mL Mixed Radha Isolated     Narrative:      Specimen contains mixed organisms of questionable pathogenicity which indicates contamination with commensal harrison.  Further identification is unlikely to provide clinically useful information.  Suggest recollection.    COVID PRE-OP / PRE-PROCEDURE SCREENING ORDER (NO ISOLATION) - Swab, Nasopharynx [[id number]]  (Normal) Collected: 12/13/21 1954    Lab Status: Final result Specimen: Swab from Nasopharynx Updated: 12/13/21 2031    Narrative:      The following orders were created for panel order COVID PRE-OP / PRE-PROCEDURE SCREENING ORDER (NO ISOLATION) - Swab, Nasopharynx.  Procedure                               Abnormality         Status                     ---------                               -----------         ------                     COVID-19 and FLU A/B PCR...[[id number]]  Normal              Final result                 Please view results for these tests on the individual orders.    COVID-19 and FLU A/B PCR - Swab, Nasopharynx [[id number]]  (Normal) Collected: 12/13/21 1954    Lab Status: Final result Specimen: Swab from Nasopharynx Updated: 12/13/21 2031     COVID19 Not Detected     Influenza A PCR Not Detected     Influenza B PCR Not Detected    Narrative:      Fact sheet for providers: https://www.fda.gov/media/422296/download    Fact sheet for patients: https://www.fda.gov/media/950224/download    Test performed by PCR.          femoral cath     Result Date: 12/30/2021  Chase Barros CRNA     12/30/2021  9:42 AM Femoral cath Patient reassessed immediately prior to procedure Patient location during procedure: OR Reason for block: post-op pain management Performed by CRNA: Adonis Nesbitt CRNA Assisted by: Chase Barros CRNA Preanesthetic Checklist Completed: patient identified, IV checked, site marked, surgical consent, monitors and equipment checked, pre-op evaluation and timeout performed Prep: Sterile barriers:gloves, cap, sterile barriers and mask Prep: ChloraPrep Patient  monitoring: blood pressure monitoring, continuous pulse oximetry and EKG Procedure Sedation: yes Performed under: local infiltration Guidance:ultrasound guided, nerve stimulator and landmark technique Images:still images not obtained Laterality:left Block Type:femoral Injection Technique:catheter Needle Type:short-bevel Needle Gauge:20 G Resistance on Injection: none Cath Depth at skin: 9 cm Medications Used: bupivacaine PF (MARCAINE) 0.25 % injection, 20 mL bupivacaine (PF) (MARCAINE) 0.5 % injection, 10 mL Post Assessment Injection Assessment: negative aspiration for heme, no paresthesia on injection and incremental injection Patient Tolerance:comfortable throughout block Complications:no Additional Notes The BBRaun 360 degree echogenic needle was introduced in plane, in a lateral to medial direction at the level of the inguinal crease.  Under ultrasound guidance, the femoral artery and vein where located.  The needle was then directed below Fascia Iliacus towards the Femoral nerve.  NS was utilized to hydro dissect and ashly needle advancement towards the target structure.   LA was injected incrementally in 3-5 ml aliquots with negative aspirate.  LA spread was visualized around the nerve, negative intraneural injection, low injection pressures.  Thank you      popliteal     Result Date: 12/30/2021  Chase Barros CRNA     12/30/2021  9:42 AM  popliteal Patient reassessed immediately prior to procedure Patient location during procedure: OR Reason for block: at surgeon's request and post-op pain management Performed by CRNA: Chase Barros CRNA Assisted by: Adonis Nesbitt CRNA Preanesthetic Checklist Completed: patient identified, IV checked, site marked, risks and benefits discussed, surgical consent, monitors and equipment checked, pre-op evaluation and timeout performed Prep: Pt Position: right lateral decubitus Sterile barriers:gloves, cap, sterile barriers and mask Prep: ChloraPrep Patient  monitoring: blood pressure monitoring, continuous pulse oximetry and EKG Procedure Sedation: yes Performed under: local infiltration Guidance:ultrasound guided, nerve stimulator and landmark technique Images:still images not obtained Laterality:left Block Type:femoral Injection Technique:single-shot Needle Type:short-bevel Needle Gauge:20 G Resistance on Injection: none Catheter Size:20 G Medications Used: bupivacaine PF (MARCAINE) 0.25 % injection, 30 mL Post Assessment Injection Assessment: negative aspiration for heme, no paresthesia on injection and incremental injection Patient Tolerance:comfortable throughout block Complications:no Additional Notes The BBRaun 360 degree echogenic needle was introduced in plane, in a lateral to medial direction at the level of the inguinal crease.  Under ultrasound guidance, the femoral artery and vein where located.  The needle was then directed below Fascia Iliacus towards the Femoral nerve.  NS was utilized to hydro dissect and ashly needle advancement towards the target structure.   LA was injected incrementally in 3-5 ml aliquots with negative aspirate.  LA spread was visualized around the nerve, negative intraneural injection, low injection pressures.  Thank you       Results for orders placed during the hospital encounter of 02/19/20    Transthoracic Echo Complete With Contrast if Necessary Per Protocol    Interpretation Summary  · Estimated EF = 55%.  · Mild mitral valve regurgitation is present      I have reviewed the medications:  Scheduled Meds:albumin human, , ,   albumin human, , ,   albumin human, , ,   aspirin, 81 mg, Oral, Daily  atorvastatin, 20 mg, Oral, Nightly  cefTAZidime (FORTAZ) 1 g in lidocaine PF 1% (XYLOCAINE), 1 g, Intramuscular, Once per day on Mon Wed Fri  Eluxadoline, 100 mg, Oral, BID With Meals  gabapentin, 300 mg, Oral, Nightly  heparin (porcine), 5,000 Units, Subcutaneous, Q12H  insulin detemir, 10 Units, Subcutaneous, Nightly  insulin lispro,  0-7 Units, Subcutaneous, TID AC  levothyroxine, 200 mcg, Oral, Q AM  linezolid, 600 mg, Oral, Q12H  metroNIDAZOLE, 500 mg, Oral, Q12H  sodium chloride, 10 mL, Intravenous, Q12H      Continuous Infusions:ropivacaine (NAROPIN) 0.2% peripheral nerve cath (moog), 10 mL/hr, Last Rate: 10 mL/hr (22 0431)  sodium chloride, 5 mL/hr, Last Rate: Stopped (21 1135)      PRN Meds:.•  acetaminophen **OR** acetaminophen  •  bisacodyl  •  dextrose  •  dextrose  •  docusate sodium  •  glucagon (human recombinant)  •  HYDROcodone-acetaminophen  •  lidocaine  •  melatonin  •  [] Morphine **AND** naloxone  •  ondansetron **OR** ondansetron  •  senna-docusate sodium  •  [COMPLETED] Insert peripheral IV **AND** sodium chloride  •  sodium chloride    Assessment/Plan   Assessment & Plan     Active Hospital Problems    Diagnosis  POA   • **Gangrene of left foot (HCC) [I96]  Yes   • Chronic diastolic CHF (congestive heart failure) (HCC) [I50.32]  Yes   • ESRD (end stage renal disease) (HCC) [N18.6]  Yes   • Atrial fibrillation (HCC) [I48.91]  Yes   • Type 2 diabetes mellitus (HCC) [E11.9]  Yes   • Hypothyroid [E03.9]  Yes   • WINSTON (obstructive sleep apnea), noncompliant w/ NIPPV [G47.33]  Yes   • CAD (coronary artery disease) [I25.10]  Yes   • Anemia [D64.9]  Yes   • Dyslipidemia [E78.5]  Yes      Resolved Hospital Problems    Diagnosis Date Resolved POA   • Hematuria [R31.9] 2021 Yes   • Lactic acidosis [E87.2] 2021 Yes        Brief Hospital Course to date:  Chase Obrien is a 76 y.o. male  with CHF, DM, HL, HTN, ESRD on HD, history of GIB (gastric ulcers and embolization of L gastric artery ) and atrial fibrillation. More recent diagnosis of hematuria with subsequent cystoscopy with diagnosis of bladder/prostate cancer.     L toe gangrene second, third, and fourth toes  Cellulitis L foot  -continue zyvox, ceftazidime, flagyl per ID  -Dr. Bradshaw following, underwent left AKA on    -Continue gabapentin  300 mg nightly secondary to ESRD       PAD  Hx of atrial fibrillation  -Initially, pt would not continue HD or treatment without resuming coumadin even after educated on bleeding risk; however on 12/23, pt developed hematuria,so coumadin was placed on hold. Restarted on 12/28 per pt's request, despite risk of recurrent bleeding, which he has been made aware of by multiple providers     Hx of GIB  -gastric ulcer  -L gastric artery embolization here in 2020  -hgb stable     Hx of recent gross hematuria  Recent diagnosis of bladder/prostate cancer s/p cystoscopy at [place], data deficient   -Hematuria returned 12/23, likely due to ongoing coumadin use. Held on 12/23 until 12/27  -Monitor CBC  -multiple discussions with pt, by myself and partners, and, despite contrary advice and other options, Coumadin was resumed on 12/28     No evidence of UTI  -urine culture negative     ESRD   -HD MWF if pt agreeable (has refused multiple times due to his INR not being therapeutic)     DM  -Continue SSI  -Continue Levemir 10 units at night     WINSTON  -noncompliant with CPAP     Hypothyroidism  -continue synthroid    Anemia  -no epo due to recent bladder cancer, monitor  -transfuse as needed     Decubitus wound  -WOC following, patient has been refusing dressing changes.      CHF by history  CAD    DVT prophylaxis:  Medical and mechanical DVT prophylaxis orders are present.       AM-PAC 6 Clicks Score (PT): 11 (12/31/21 0800)    Disposition: I expect the patient to be discharged TBD, when okay with ID and CT surgery    CODE STATUS:   Code Status and Medical Interventions:   Ordered at: 12/14/21 0003     Level Of Support Discussed With:    Patient     Code Status (Patient has no pulse and is not breathing):    CPR (Attempt to Resuscitate)     Medical Interventions (Patient has pulse or is breathing):    Full Support       Luli Dunlap, DO  01/01/22

## 2022-01-01 NOTE — PLAN OF CARE
Goal Outcome Evaluation:  Plan of Care Reviewed With: patient        Progress: improving    Pt is alert and oriented X 4. VSS. Ropivacaine at 10ml/hr. Guillaume catheter care in place. 50 ml bloody drainage from the ELLIOTT drain. Albumin given for hypotension.Pt has not had BM since 12/24 but refused prn stool softener.

## 2022-01-02 NOTE — PROGRESS NOTES
[facility]    Acute pain service Inpatient Progress Note    Patient Name: Chase Obrien  :  1945  MRN:  [id number]        Acute Pain  Service Inpatient Progress Note:    LOC: asleep but arousable  Infusion rate: 10ml/hr  Catheter Plan:Catheter to remain Insitu

## 2022-01-02 NOTE — PROGRESS NOTES
[facility] Medicine Services  PROGRESS NOTE    Patient Name: Chase Obrien  : 1945  MRN: [id number]    Date of Admission: 2021  Primary Care Physician: Radha Purcell    Subjective   Subjective     CC:  Sepsis, left AKA    HPI:  Pleasant, no complaints.  Per nursing continues to refuse bowel regimen and no bowel movement documented since     ROS:  Gen- No fevers, chills  CV- No chest pain, palpitations  Resp- No cough, dyspnea  GI- No N/V/D, abd pain    Objective   Objective     Vital Signs:   Temp:  [97.7 °F (36.5 °C)-98.5 °F (36.9 °C)] 98.5 °F (36.9 °C)  Heart Rate:  [75-81] 77  Resp:  [14-18] 18  BP: ()/(50-67) 92/50     Physical Exam:  Constitutional: No acute distress, awake, alert  HENT: NCAT, mucous membranes moist  Respiratory: Clear to auscultation bilaterally, respiratory effort normal   Cardiovascular: RRR, no murmurs, rubs, or gallops  Gastrointestinal: Obese, soft, nontender, distended  Musculoskeletal: No bilateral ankle edema, left AKA  Psychiatric: Appropriate affect, cooperative  Neurologic: Oriented x 3, speech clear  Skin: No rashes      Results Reviewed:  LAB RESULTS:      Lab 22  0742 21  1617 21  1032 21  0946 21  1626 21  0831 21  0652 21  0652   WBC  --   --  10.84* 8.03  --   --   --  6.83   HEMOGLOBIN  --   --  7.3* 8.5*  --   --   --  9.1*   HEMATOCRIT  --   --  24.2* 28.4*  --   --   --  30.6*   PLATELETS  --   --  203 245  --   --   --  253   NEUTROS ABS  --   --  9.55* 5.54  --   --   --  4.64   IMMATURE GRANS (ABS)  --   --  0.10* 0.08*  --   --   --  0.04   LYMPHS ABS  --   --  0.56* 1.47  --   --   --  1.33   MONOS ABS  --   --  0.63 0.77  --   --   --  0.67   EOS ABS  --   --  0.00 0.13  --   --   --  0.11   MCV  --   --  99.6* 97.6*  --   --   --  101.7*   PROTIME 16.5* 16.7*  --  18.4* 16.9* 17.1*   < > 17.8*    < > = values in this interval not displayed.         Lab  01/01/22  0742 12/31/21  1617 12/31/21  1032 12/29/21  0946 12/28/21  0831 12/27/21  0652   SODIUM 135*  --  134* 137 135* 139   POTASSIUM 4.1  --  4.5 4.5 4.3 3.6   CHLORIDE 98  --  97* 100 101 102   CO2 20.0*  --  20.0* 23.0 20.0* 24.0   ANION GAP 17.0*  --  17.0* 14.0 14.0 13.0   BUN 21  --  30* 33* 27* 24*   CREATININE 3.53*  --  4.49* 5.95* 4.57* 4.48*   GLUCOSE 160*  --  286* 113* 129* 122*   CALCIUM 8.2*  --  8.1* 8.1* 8.1* 8.1*   PHOSPHORUS 2.6 2.8  --   --  3.7  --          Lab 01/01/22  0742 12/31/21  1032 12/28/21  0831   TOTAL PROTEIN  --  6.8  --    ALBUMIN 3.60 3.40* 2.80*   GLOBULIN  --  3.4  --    ALT (SGPT)  --  <5  --    AST (SGOT)  --  10  --    BILIRUBIN  --  0.3  --    ALK PHOS  --  85  --          Lab 01/01/22  0742 12/31/21  1617 12/29/21  0946 12/28/21  1626 12/28/21  0831   PROTIME 16.5* 16.7* 18.4* 16.9* 17.1*   INR 1.38* 1.40* 1.59* 1.43* 1.44*             Lab 12/28/21  0833   ABO TYPING B   RH TYPING Positive   ANTIBODY SCREEN Negative         Brief Urine Lab Results  (Last result in the past 365 days)      Color   Clarity   Blood   Leuk Est   Nitrite   Protein   CREAT   Urine HCG        12/13/21 2006 Yellow   Turbid   Large (3+)   Large (3+)   Negative   >=300 mg/dL (3+)                 Microbiology Results Abnormal     Procedure Component Value - Date/Time    Blood Culture - Blood, Arm, Right [[id number]]  (Normal) Collected: 12/13/21 2010    Lab Status: Final result Specimen: Blood from Arm, Right Updated: 12/18/21 2045     Blood Culture No growth at 5 days    Blood Culture - Blood, Arm, Right [[id number]]  (Normal) Collected: 12/13/21 2000    Lab Status: Final result Specimen: Blood from Arm, Right Updated: 12/18/21 2045     Blood Culture No growth at 5 days    Urine Culture - Urine, Urine, Catheter [[id number]] Collected: 12/13/21 2006    Lab Status: Final result Specimen: Urine, Catheter Updated: 12/14/21 1710     Urine Culture >100,000 CFU/mL Mixed Radha Isolated    Narrative:       Specimen contains mixed organisms of questionable pathogenicity which indicates contamination with commensal harrison.  Further identification is unlikely to provide clinically useful information.  Suggest recollection.    COVID PRE-OP / PRE-PROCEDURE SCREENING ORDER (NO ISOLATION) - Swab, Nasopharynx [[id number]]  (Normal) Collected: 12/13/21 1954    Lab Status: Final result Specimen: Swab from Nasopharynx Updated: 12/13/21 2031    Narrative:      The following orders were created for panel order COVID PRE-OP / PRE-PROCEDURE SCREENING ORDER (NO ISOLATION) - Swab, Nasopharynx.  Procedure                               Abnormality         Status                     ---------                               -----------         ------                     COVID-19 and FLU A/B PCR...[[id number]]  Normal              Final result                 Please view results for these tests on the individual orders.    COVID-19 and FLU A/B PCR - Swab, Nasopharynx [[id number]]  (Normal) Collected: 12/13/21 1954    Lab Status: Final result Specimen: Swab from Nasopharynx Updated: 12/13/21 2031     COVID19 Not Detected     Influenza A PCR Not Detected     Influenza B PCR Not Detected    Narrative:      Fact sheet for providers: https://www.fda.gov/media/466651/download    Fact sheet for patients: https://www.fda.gov/media/555595/download    Test performed by PCR.          No radiology results from the last 24 hrs    Results for orders placed during the hospital encounter of 02/19/20    Transthoracic Echo Complete With Contrast if Necessary Per Protocol    Interpretation Summary  · Estimated EF = 55%.  · Mild mitral valve regurgitation is present      I have reviewed the medications:  Scheduled Meds:albumin human, , ,   albumin human, , ,   albumin human, , ,   aspirin, 81 mg, Oral, Daily  atorvastatin, 20 mg, Oral, Nightly  cefTAZidime (FORTAZ) 1 g in lidocaine PF 1% (XYLOCAINE), 1 g, Intramuscular, Once per day on Mon Wed Fri  Eluxadoline,  100 mg, Oral, BID With Meals  gabapentin, 300 mg, Oral, Nightly  heparin (porcine), 5,000 Units, Subcutaneous, Q12H  insulin detemir, 10 Units, Subcutaneous, Nightly  insulin lispro, 0-7 Units, Subcutaneous, TID AC  levothyroxine, 200 mcg, Oral, Q AM  linezolid, 600 mg, Oral, Q12H  metroNIDAZOLE, 500 mg, Oral, Q12H  sodium chloride, 10 mL, Intravenous, Q12H      Continuous Infusions:ropivacaine (NAROPIN) 0.2% peripheral nerve cath (moog), 10 mL/hr, Last Rate: 10 mL/hr (22 0431)  sodium chloride, 5 mL/hr, Last Rate: Stopped (21 1135)      PRN Meds:.•  acetaminophen **OR** acetaminophen  •  bisacodyl  •  dextrose  •  dextrose  •  docusate sodium  •  glucagon (human recombinant)  •  HYDROcodone-acetaminophen  •  lidocaine  •  melatonin  •  [] Morphine **AND** naloxone  •  ondansetron **OR** ondansetron  •  senna-docusate sodium  •  [COMPLETED] Insert peripheral IV **AND** sodium chloride  •  sodium chloride    Assessment/Plan   Assessment & Plan     Active Hospital Problems    Diagnosis  POA   • **Gangrene of left foot (HCC) [I96]  Yes   • Chronic diastolic CHF (congestive heart failure) (HCC) [I50.32]  Yes   • ESRD (end stage renal disease) (HCC) [N18.6]  Yes   • Atrial fibrillation (HCC) [I48.91]  Yes   • Type 2 diabetes mellitus (HCC) [E11.9]  Yes   • Hypothyroid [E03.9]  Yes   • WINSTON (obstructive sleep apnea), noncompliant w/ NIPPV [G47.33]  Yes   • CAD (coronary artery disease) [I25.10]  Yes   • Anemia [D64.9]  Yes   • Dyslipidemia [E78.5]  Yes      Resolved Hospital Problems    Diagnosis Date Resolved POA   • Hematuria [R31.9] 2021 Yes   • Lactic acidosis [E87.2] 2021 Yes        Brief Hospital Course to date:  Chase Obrien is a 76 y.o. male  with CHF, DM, HL, HTN, ESRD on HD, history of GIB (gastric ulcers and embolization of L gastric artery ) and atrial fibrillation. More recent diagnosis of hematuria with subsequent cystoscopy with diagnosis of bladder/prostate cancer.     L  toe gangrene second, third, and fourth toes  Cellulitis L foot  -continue zyvox, ceftazidime, flagyl per ID  -Dr. Bradshaw following, underwent left AKA on 12/30   -Continue gabapentin 300 mg nightly secondary to ESRD        PAD  Hx of atrial fibrillation  -Initially, pt would not continue HD or treatment without resuming coumadin even after educated on bleeding risk; however on 12/23, pt developed hematuria,so coumadin was placed on hold. Restarted on 12/28 per pt's request, despite risk of recurrent bleeding, which he has been made aware of by multiple providers  -Pharmacy dosing Coumadin, intermittently being given secondary to surgery, recommendation of CT surgery self INR below baseline     Hx of GIB  -gastric ulcer  -L gastric artery embolization here in 2020  -hgb stable     Hx of recent gross hematuria  Recent diagnosis of bladder/prostate cancer s/p cystoscopy at [facility], data deficient   -Hematuria returned 12/23, likely due to ongoing coumadin use. Held on 12/23 until 12/27  -Monitor CBC  -multiple discussions with pt, by myself and partners, and, despite contrary advice and other options, Coumadin was resumed on 12/28     No evidence of UTI  -urine culture negative     ESRD   -HD MWF if pt agreeable (has refused multiple times due to his INR not being therapeutic)     DM  -Continue SSI  -Continue Levemir 10 units at night     WINSTON  -noncompliant with CPAP     Hypothyroidism  -continue synthroid    Constipation  -Refusing all bowel regimens  -trial of subcu Relistor     Anemia  -no epo due to recent bladder cancer, monitor  -transfuse as needed     Decubitus wound  -WOC following, patient has been refusing dressing changes.      CHF by history  CAD       DVT prophylaxis:  Medical and mechanical DVT prophylaxis orders are present.       AM-PAC 6 Clicks Score (PT): 9 (01/01/22 0556)    Disposition: I expect the patient to be discharged when ok with CTS    CODE STATUS:   Code Status and Medical Interventions:    Ordered at: 12/14/21 0003     Level Of Support Discussed With:    Patient     Code Status (Patient has no pulse and is not breathing):    CPR (Attempt to Resuscitate)     Medical Interventions (Patient has pulse or is breathing):    Full Support       Luli Dunlap, DO  01/02/22

## 2022-01-02 NOTE — PLAN OF CARE
Goal Outcome Evaluation:                 Problem: Skin Injury Risk Increased  Goal: Skin Health and Integrity  Intervention: Optimize Skin Protection  Recent Flowsheet Documentation  Taken 1/2/2022 0200 by Sebastián Ch RN  Pressure Reduction Techniques: frequent weight shift encouraged  Head of Bed (HOB): HOB at 30-45 degrees  Pressure Reduction Devices: specialty bed utilized  Skin Protection: adhesive use limited  Taken 1/2/2022 0000 by Sebastián Ch RN  Pressure Reduction Techniques: frequent weight shift encouraged  Head of Bed (HOB): HOB at 30-45 degrees  Pressure Reduction Devices: specialty bed utilized  Skin Protection: adhesive use limited  Taken 1/1/2022 2200 by Sebastián Ch RN  Pressure Reduction Techniques: frequent weight shift encouraged  Head of Bed (HOB): HOB at 30-45 degrees  Pressure Reduction Devices: specialty bed utilized  Skin Protection: adhesive use limited  Taken 1/1/2022 2000 by Sebastián Ch RN  Pressure Reduction Techniques: frequent weight shift encouraged  Head of Bed (HOB): HOB at 30-45 degrees  Pressure Reduction Devices: specialty bed utilized  Skin Protection: adhesive use limited     Problem: Fall Injury Risk  Goal: Absence of Fall and Fall-Related Injury  Intervention: Identify and Manage Contributors to Fall Injury Risk  Recent Flowsheet Documentation  Taken 1/2/2022 0200 by Sebastián Ch RN  Medication Review/Management: medications reviewed  Taken 1/2/2022 0000 by Sebastián Ch RN  Medication Review/Management: medications reviewed  Taken 1/1/2022 2200 by Sebastián Ch RN  Medication Review/Management: medications reviewed  Taken 1/1/2022 2000 by Sebastián Ch RN  Medication Review/Management: medications reviewed  Intervention: Promote Injury-Free Environment  Recent Flowsheet Documentation  Taken 1/2/2022 0200 by Sebastián Ch RN  Safety Promotion/Fall Prevention:   activity supervised   safety round/check completed   toileting scheduled  Taken  1/2/2022 0000 by Sebastián Ch RN  Safety Promotion/Fall Prevention:   activity supervised   safety round/check completed   toileting scheduled  Taken 1/1/2022 2200 by Sebastián Ch RN  Safety Promotion/Fall Prevention:   activity supervised   safety round/check completed   toileting scheduled  Taken 1/1/2022 2000 by Sebsatián Ch RN  Safety Promotion/Fall Prevention:   activity supervised   safety round/check completed   toileting scheduled     Problem: Adult Inpatient Plan of Care  Goal: Absence of Hospital-Acquired Illness or Injury  Intervention: Identify and Manage Fall Risk  Recent Flowsheet Documentation  Taken 1/2/2022 0200 by Sebastián Ch RN  Safety Promotion/Fall Prevention:   activity supervised   safety round/check completed   toileting scheduled  Taken 1/2/2022 0000 by Sebastián Ch RN  Safety Promotion/Fall Prevention:   activity supervised   safety round/check completed   toileting scheduled  Taken 1/1/2022 2200 by Sebastián Ch RN  Safety Promotion/Fall Prevention:   activity supervised   safety round/check completed   toileting scheduled  Taken 1/1/2022 2000 by Sebastián Ch RN  Safety Promotion/Fall Prevention:   activity supervised   safety round/check completed   toileting scheduled  Intervention: Prevent Skin Injury  Recent Flowsheet Documentation  Taken 1/2/2022 0200 by Sebastián Ch RN  Body Position: supine  Skin Protection: adhesive use limited  Taken 1/2/2022 0000 by Sebastián Ch RN  Body Position: supine  Skin Protection: adhesive use limited  Taken 1/1/2022 2200 by Sebastián Ch RN  Body Position: supine  Skin Protection: adhesive use limited  Taken 1/1/2022 2000 by Sebastián Ch RN  Body Position: supine  Skin Protection: adhesive use limited  Intervention: Prevent and Manage VTE (venous thromboembolism) Risk  Recent Flowsheet Documentation  Taken 1/2/2022 0200 by Sebastián Ch RN  VTE Prevention/Management: patient refused intervention  Taken  1/2/2022 0000 by Sebastián Ch RN  VTE Prevention/Management: patient refused intervention  Taken 1/1/2022 2200 by Sebastián Ch RN  VTE Prevention/Management: patient refused intervention  Intervention: Prevent Infection  Recent Flowsheet Documentation  Taken 1/2/2022 0200 by Sebastián Ch RN  Infection Prevention: environmental surveillance performed  Taken 1/2/2022 0000 by Sebastián Ch RN  Infection Prevention: environmental surveillance performed  Taken 1/1/2022 2200 by Sebastián Ch RN  Infection Prevention: environmental surveillance performed  Taken 1/1/2022 2000 by Sebastián Ch RN  Infection Prevention: environmental surveillance performed  Goal: Optimal Comfort and Wellbeing  Intervention: Provide Person-Centered Care  Recent Flowsheet Documentation  Taken 1/2/2022 0200 by Sebastián Ch RN  Trust Relationship/Rapport: care explained  Taken 1/2/2022 0000 by Sebastián Ch RN  Trust Relationship/Rapport: care explained  Taken 1/1/2022 2200 by Sebastián Ch RN  Trust Relationship/Rapport: care explained  Taken 1/1/2022 2000 by Sebastián Ch RN  Trust Relationship/Rapport: care explained     Problem: Respiratory Compromise (Heart Failure)  Goal: Effective Oxygenation and Ventilation  Intervention: Promote Airway Secretion Clearance  Recent Flowsheet Documentation  Taken 1/1/2022 2000 by Sebastián Ch RN  Cough And Deep Breathing: done independently per patient     Problem: Device-Related Complication Risk (Hemodialysis)  Goal: Safe, Effective Therapy Delivery  Intervention: Optimize Device Care and Function  Recent Flowsheet Documentation  Taken 1/2/2022 0200 by Sebastián Ch RN  Medication Review/Management: medications reviewed  Taken 1/2/2022 0000 by Sebastián Ch RN  Medication Review/Management: medications reviewed  Taken 1/1/2022 2200 by Sebastián Ch RN  Medication Review/Management: medications reviewed  Taken 1/1/2022 2000 by Sebastián Ch RN  Medication  Review/Management: medications reviewed     Problem: Infection (Hemodialysis)  Goal: Absence of Infection Signs and Symptoms  Intervention: Prevent or Manage Infection  Recent Flowsheet Documentation  Taken 1/2/2022 0200 by Sebastián Ch RN  Infection Prevention: environmental surveillance performed  Taken 1/2/2022 0000 by Sebastián Ch RN  Infection Prevention: environmental surveillance performed  Taken 1/1/2022 2200 by Sebastián Ch RN  Infection Prevention: environmental surveillance performed  Taken 1/1/2022 2000 by Sebastián Ch RN  Infection Prevention: environmental surveillance performed    VSS, voids well, ropivacaine @ 10 ml/hr, tele NSR, refuses to get in the bed, tele NSR, RA, pace in place, will continue to monitor for changes.

## 2022-01-02 NOTE — PLAN OF CARE
"Goal Outcome Evaluation:         Pt up in chair all day, refuses to get in the bed, coccyx area assessed when stood up by PT, red/maroon, very slow to javi , mepilex in place, unable to change d/t pt not being able to stand but a few seconds.    Last charted BM is 8 days ago, offered bowel meds, pt refused, states he had a Bm about 2 or 3 days ago.   Abdomen very distended, bloody urine output in pace, Ropivicaine infusing at 10ml/hr, PRN lortab given for pain. BP on lower end, pt refuses levemir, also states novolog is \"like giving him water, it doesn't do anything\", all the while drinking regular mountain dew and eating processed snack cakes. Will continue to monitor and educate....  "

## 2022-01-02 NOTE — PROGRESS NOTES
"   LOS: 19 days    Patient Care Team:  Radha Purcell as PCP - General (Family Medicine)    Reason For Visit:  F/U ESRD.  Subjective           Review of Systems:    Pulm: No soa   CV:  No CP      Objective     albumin human, , ,   albumin human, , ,   albumin human, , ,   aspirin, 81 mg, Oral, Daily  atorvastatin, 20 mg, Oral, Nightly  cefTAZidime (FORTAZ) 1 g in lidocaine PF 1% (XYLOCAINE), 1 g, Intramuscular, Once per day on Mon Wed Fri  Eluxadoline, 100 mg, Oral, BID With Meals  gabapentin, 300 mg, Oral, Nightly  heparin (porcine), 5,000 Units, Subcutaneous, Q12H  insulin detemir, 10 Units, Subcutaneous, Nightly  insulin lispro, 0-7 Units, Subcutaneous, TID AC  levothyroxine, 200 mcg, Oral, Q AM  linezolid, 600 mg, Oral, Q12H  methylnaltrexone, 6 mg, Subcutaneous, Every Other Day  metroNIDAZOLE, 500 mg, Oral, Q12H  sodium chloride, 10 mL, Intravenous, Q12H  warfarin, 10 mg, Oral, Once      Pharmacy Consult,   ropivacaine (NAROPIN) 0.2% peripheral nerve cath (moog), 10 mL/hr, Last Rate: 10 mL/hr (01/01/22 0431)  sodium chloride, 5 mL/hr, Last Rate: Stopped (12/30/21 1135)          Vital Signs:  Blood pressure 92/50, pulse 77, temperature 98.5 °F (36.9 °C), temperature source Oral, resp. rate 18, height 193 cm (76\"), weight 110 kg (242 lb 6.4 oz), SpO2 94 %.    Flowsheet Rows      First Filed Value   Admission Height 193 cm (76\") Documented at 12/13/2021 1937   Admission Weight 129 kg (285 lb) Documented at 12/13/2021 1937 01/01 0701 - 01/02 0700  In: 320 [P.O.:320]  Out: 75 [Urine:75]    Physical Exam:    General Appearance: NAD, alert and cooperative, Ox3  Eyes: PER, conjunctivae and sclerae normal, no icterus  Lungs: respirations regular and unlabored, no crepitus, clear to auscultation  Heart/CV: regular rhythm & normal rate, no murmur, no gallop, no rub and no edema  Abdomen: not distended, soft, non-tender, no masses,  bowel sounds present  Skin: No rash, Warm and dry. " AVF.    Radiology:            Labs:  Results from last 7 days   Lab Units 12/31/21  1032 12/29/21  0946 12/27/21  0652   WBC 10*3/mm3 10.84* 8.03 6.83   HEMOGLOBIN g/dL 7.3* 8.5* 9.1*   HEMATOCRIT % 24.2* 28.4* 30.6*   PLATELETS 10*3/mm3 203 245 253     Results from last 7 days   Lab Units 01/01/22  0742 12/31/21  1617 12/31/21  1032 12/29/21  0946 12/28/21  0831   SODIUM mmol/L 135*  --  134* 137 135*   POTASSIUM mmol/L 4.1  --  4.5 4.5 4.3   CHLORIDE mmol/L 98  --  97* 100 101   CO2 mmol/L 20.0*  --  20.0* 23.0 20.0*   BUN mg/dL 21  --  30* 33* 27*   CREATININE mg/dL 3.53*  --  4.49* 5.95* 4.57*   CALCIUM mg/dL 8.2*  --  8.1* 8.1* 8.1*   PHOSPHORUS mg/dL 2.6 2.8  --   --  3.7   ALBUMIN g/dL 3.60  --  3.40*  --  2.80*     Results from last 7 days   Lab Units 01/01/22  0742   GLUCOSE mg/dL 160*       Results from last 7 days   Lab Units 12/31/21  1032   ALK PHOS U/L 85   BILIRUBIN mg/dL 0.3   ALT (SGPT) U/L <5   AST (SGOT) U/L 10                 Estimated Creatinine Clearance: 24.6 mL/min (A) (by C-G formula based on SCr of 3.53 mg/dL (H)).      Assessment       Gangrene of left foot (HCC)    Atrial fibrillation (HCC)    Type 2 diabetes mellitus (HCC)    Hypothyroid    WINSTON (obstructive sleep apnea), noncompliant w/ NIPPV    CAD (coronary artery disease)    Anemia    Dyslipidemia    ESRD (end stage renal disease) (HCC)    Chronic diastolic CHF (congestive heart failure) (HCC)            Impression: ESRD. PATIENT REFUSES DIALYSIS UNLESS HIS INR > 2.0. ANEMIA.            Recommendations: COUMADIN. HD 1/3/22.      Xavi Lay MD  01/02/22  13:02 EST

## 2022-01-02 NOTE — PROGRESS NOTES
"Pharmacy Consult - Warfarin    Chase Obrien is a 76 y.o. male on warfarin therapy.    Height - 193 cm (76\")  Weight - 110 kg (242 lb 6.4 oz)    Consulting Provider: Xavi Lay MD  Indication: Afib  Goal INR: 2-3  Home Regimen:   - Warfarin 5mg Monday, Wednesday, Friday   - Warfarin 2.5mg Tuesday, Thursday, Saturday, Sunday     Bridge Therapy: No, but pt remains on SQH until INR is therapeutic    Drug-Drug Interactions with current regimen:  Metronidazole - increase in INR, increased risk of bleeding   Aspirin - increased risk of bleeding   Ceftazidime - increased risk of bleeding   Levothyroxine - increased risk of bleeding   Linezolid - may increase INR    Warfarin Dosing During Admission:  Date  12/15 12/16 12/17 12/18 12/19 12/20 12/21 12/22 12/23   INR  1.37 1.37 1.33 1.51 1.54 1.81 2.1 2.51 2.49   Dose  5 mg  5 mg 5 mg  5mg 7.5mg  5mg 5mg 2.5mg D/C due to hematuria     Date  12/24 12/25 12/26 12/27 12/28 12/29 12/30 12/31 1/1   INR  2.17 1.87 1.71 1.53 1.44 1.59 -- 1.40 1.38   Dose  0 0 0 0 5mg 0 0 0 0     Date  1/2           INR  1.29           Dose  5 mg             Education Provided: Warfarin education provided on 12/22/21 verbally and in writing. Discussed effects of warfarin, importance of checking INR, drug-drug and drug-food interactions, and signs/symptoms of bleeding and clotting. Patient verbalized understanding through teach back. All pertinent questions were answered.      Discharge Follow up:   Following Provider - [facility]   Follow up time range or appointment -  2-3 days    Labs:  Results from last 7 days   Lab Units 01/02/22  0935 01/01/22  0742 12/31/21  1617 12/31/21  1032 12/29/21  0946 12/28/21  1626 12/28/21  0831 12/27/21  0652   INR  1.29* 1.38* 1.40*  --  1.59* 1.43* 1.44* 1.53*   HEMOGLOBIN g/dL  --   --   --  7.3* 8.5*  --   --  9.1*   HEMATOCRIT %  --   --   --  24.2* 28.4*  --   --  30.6*     Results from last 7 days   Lab Units 01/01/22  0742 " 12/31/21  1032 12/29/21  0946   SODIUM mmol/L 135* 134* 137   POTASSIUM mmol/L 4.1 4.5 4.5   CHLORIDE mmol/L 98 97* 100   CO2 mmol/L 20.0* 20.0* 23.0   BUN mg/dL 21 30* 33*   CREATININE mg/dL 3.53* 4.49* 5.95*   CALCIUM mg/dL 8.2* 8.1* 8.1*   BILIRUBIN mg/dL  --  0.3  --    ALK PHOS U/L  --  85  --    ALT (SGPT) U/L  --  <5  --    AST (SGOT) U/L  --  10  --    GLUCOSE mg/dL 160* 286* 113*     Current dietary intake: 25-50% of meals in last 24 hrs  Diet Order   Procedures    Diet Regular; Renal     Assessment/Plan:  Pharmacy to dose warfarin for Afib. Goal INR 2-3.  Subtherapeutic INR of 1.29 today. Decreased from 1.38 yesterday.  Of note: patient has not had a dose of warfarin since 12/28, as it was being held by [facility] for L AKA performed on 12/30. Patient has been receiving SQH q12h since 12/31, which will continue until INR is at goal. Prior to that, warfarin was held 12/23 to 12/27 due to hematuria. Continue to monitor this and major interaction with metronidazole.   Will order dose of warfarin 5mg today.  Daily PT/INR ordered.  Pharmacy will continue to monitor signs/symptoms of bleeding, dietary intake, and drug-drug interactions and make dose adjustments as necessary.    Taylor Riedel, PharmD  Pharmacy Resident  1/2/2022  13:18 EST

## 2022-01-02 NOTE — PROGRESS NOTES
Cardiothoracic Surgery Progress Note      POD #: #3-left above-knee amputation     LOS: 19 days      Subjective: Awake and alert very talkative and no complaints today    Objective:  Vital Signs  Temp:  [97.7 °F (36.5 °C)-98.4 °F (36.9 °C)] 98.2 °F (36.8 °C)  Heart Rate:  [75-81] 75  Resp:  [14-18] 18  BP: ()/(53-67) 95/57    Physical Exam:   General Appearance: Very alert and talkative no complaints   Lungs:   Heart:   Skin:   Incision: Amputation site dressing change.  New dressing applied margins are healing well and flaps are viable     Results:  Results from last 7 days   Lab Units 12/31/21  1032   WBC 10*3/mm3 10.84*   HEMOGLOBIN g/dL 7.3*   HEMATOCRIT % 24.2*   PLATELETS 10*3/mm3 203     Results from last 7 days   Lab Units 01/01/22  0742   SODIUM mmol/L 135*   POTASSIUM mmol/L 4.1   CHLORIDE mmol/L 98   CO2 mmol/L 20.0*   BUN mg/dL 21   CREATININE mg/dL 3.53*   GLUCOSE mg/dL 160*   CALCIUM mg/dL 8.2*         Assessment: #1.  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  Amputation site healing well  2 renal failure on hemodialysis for over 2 years  3.  Bladder cancer and prostate cancer with hematuria      Plan: Continue daily dressing change amputation site.  Dialysis per renal medicine.  Overall medical manage per hospitalist.      Luís Bradshaw MD - 01/02/22 - 07:12 EST

## 2022-01-03 NOTE — PLAN OF CARE
Goal Outcome Evaluation:  Plan of Care Reviewed With: patient VSS. SR WITH 1 degree AV block on tele. F/C patent with dark blood tinged urine noted. Positioned q2 hrs in bed by staff with skin interventions as pt would allow. Sacrum red and nonblanchable with small scabbed area on lower right buttock. Area cleaned and large non adherent foam dressing placed over sacrum. Refusing heel protector for right foot. Dressing to left AKA C/D/I with ropivacaine at 10 mh/hr controlling pain. Pt non compliant with diet, several [employer] found in bed.

## 2022-01-03 NOTE — PROGRESS NOTES
Cardiothoracic Surgery Progress Note      POD #: 4-left above-knee amputation   LOS: 20 days      Subjective: Awake and alert very cooperative.  Objective:  Vital Signs vital signs below noted T-max past 2490.5 °F  Temp:  [97.8 °F (36.6 °C)-98.5 °F (36.9 °C)] 98.3 °F (36.8 °C)  Heart Rate:  [73-80] 73  Resp:  [16-18] 16  BP: ()/(50-72) 103/57    Physical Exam:   General Appearance: Oriented x3   Lungs:   Heart:   Skin:   Incision: Amputation site dressing change.  Sutures intact skin margin viable skin flaps are viable.     Results:  Results from last 7 days   Lab Units 12/31/21  1032   WBC 10*3/mm3 10.84*   HEMOGLOBIN g/dL 7.3*   HEMATOCRIT % 24.2*   PLATELETS 10*3/mm3 203     Results from last 7 days   Lab Units 01/01/22  0742   SODIUM mmol/L 135*   POTASSIUM mmol/L 4.1   CHLORIDE mmol/L 98   CO2 mmol/L 20.0*   BUN mg/dL 21   CREATININE mg/dL 3.53*   GLUCOSE mg/dL 160*   CALCIUM mg/dL 8.2*         Assessment: #1.  Postop day 4 left above-knee amputation for ischemic gangrene of the left lower extremity left foot.  Amputation site healing well  2 renal failure on hemodialysis for over 2 years  3 bladder cancer prostate cancer with hematuria.      Plan: Continue daily dressing change amputation site.  Dialysis per renal medicine.  Medical manage per hospitalist.      Luís Bradshaw MD - 01/03/22 - 06:05 EST

## 2022-01-03 NOTE — PLAN OF CARE
Goal Outcome Evaluation:   VSS. NSR with 1st Degree block on monitor. Skin interventions in place, speciality bed ordered per [facility]OC. Dialysis today with 3 liters removed. Urinary catheter with red urine output noted.

## 2022-01-03 NOTE — PROGRESS NOTES
"Pharmacy Consult - Warfarin  Chase Obrien is a 76 y.o. male on warfarin therapy.    Height - 193 cm (76\")  Weight - 104 kg (229 lb 14.4 oz)    Consulting Provider: Xavi Lay MD  Indication: Atrial fibrillation  Goal INR: 2-3  Home Regimen:   - Warfarin 5 mg on Monday, Wednesday, & Friday   - Warfarin 2.5 mg on Tuesday, Thursday, Saturday, & Sunday     Bridge Therapy: No, but patient remains on SQH until INR is therapeutic    Drug-Drug Interactions with current regimen:  Metronidazole - increase in INR, increased risk of bleeding   Aspirin - increased risk of bleeding   Ceftazidime - increased risk of bleeding   Levothyroxine - increased risk of bleeding   Linezolid - may increase INR    Warfarin Dosing During Admission:  Date  12/15 12/16 12/17 12/18 12/19 12/20 12/21 12/22 12/23   INR  1.37 1.37 1.33 1.51 1.54 1.81 2.1 2.51 2.49   Dose  5 mg  5 mg 5 mg  5 mg 7.5 mg  5 mg 5 mg 2.5 mg D/C due to hematuria     Date  12/24 12/25 12/26 12/27 12/28 12/29 12/30 12/31 1/1   INR  2.17 1.87 1.71 1.53 1.44 1.59 -- 1.40 1.38   Dose  0 0 0 0 5 mg 0 0 0 0     Date  1/2 1/3          INR  1.29 1.38          Dose  5 mg (5 mg)            Education Provided: Warfarin education provided on 12/22/21 verbally and in writing. Discussed effects of warfarin, importance of checking INR, drug-drug and drug-food interactions, and signs/symptoms of bleeding and clotting. Patient verbalized understanding through teach back. All pertinent questions were answered.      Discharge Follow up:   Following Provider - [facility]   Follow up time range or appointment -  2-3 days following discharge     Labs:  Results from last 7 days   Lab Units 01/03/22  0703 01/02/22  0935 01/01/22  0742 12/31/21  1617 12/31/21  1032 12/29/21  0946 12/28/21  1626 12/28/21  0831   INR  1.38* 1.29* 1.38* 1.40*  --  1.59* 1.43* 1.44*   HEMOGLOBIN g/dL  --   --   --   --  7.3* 8.5*  --   --    HEMATOCRIT %  --   --   --   --  24.2* 28.4*  " "--   --      Results from last 7 days   Lab Units 01/01/22  0742 12/31/21  1032 12/29/21  0946   SODIUM mmol/L 135* 134* 137   POTASSIUM mmol/L 4.1 4.5 4.5   CHLORIDE mmol/L 98 97* 100   CO2 mmol/L 20.0* 20.0* 23.0   BUN mg/dL 21 30* 33*   CREATININE mg/dL 3.53* 4.49* 5.95*   CALCIUM mg/dL 8.2* 8.1* 8.1*   BILIRUBIN mg/dL  --  0.3  --    ALK PHOS U/L  --  85  --    ALT (SGPT) U/L  --  <5  --    AST (SGOT) U/L  --  10  --    GLUCOSE mg/dL 160* 286* 113*     Current dietary intake: No meals documented on 1/2 other than \"[employer] & 2 [facility]'s\".   Diet Order   Procedures    Diet Regular; Renal     Assessment/Plan:  Pharmacy to dose warfarin for atrial fibrillation.  Goal INR: 2-3  Patient's INR is SUBtherapeutic today at 1.38, slightly increased from 1.29 on 1/2.   Of note, patients warfarin has been held for L AKA performed on 12/30. Patient has been receiving SQH Q12H since 12/31, which will continue until INR >2.   Due to the potential for significant drug-drug interaction with Metronidazole, give Warfarin 5 mg today.  Daily PT/INR ordered.  Monitor for signs/symptoms of bleeding, dietary intake, and drug-drug interactions.   Pharmacy will continue to follow and make dose adjustments as necessary.    Thank you,    Jessica Perdue, PharmD  Pharmacy Resident   1/3/2022  09:14 EST  "

## 2022-01-03 NOTE — PLAN OF CARE
Problem: Adult Inpatient Plan of Care  Goal: Plan of Care Review  Flowsheets (Taken 1/3/2022 1430)  Progress: no change  Plan of Care Reviewed With: patient  Outcome Summary: Pt requiring CGA-min A for rolling R/L for sling placement. Dep A transfer using mechanical lift from bed to chair. Pt deferring STS attempt due to fatigue and weakness from dialysis treatment. Reviewed HEP and NWB precautions. Will continue to progresss strength and mobility as able.   Goal Outcome Evaluation:  Plan of Care Reviewed With: patient        Progress: no change  Outcome Summary: Pt requiring CGA-min A for rolling R/L for sling placement. Dep A transfer using mechanical lift from bed to chair. Pt deferring STS attempt due to fatigue and weakness from dialysis treatment. Reviewed HEP and NWB precautions. Will continue to progresss strength and mobility as able.

## 2022-01-03 NOTE — CASE MANAGEMENT/SOCIAL WORK
Continued Stay Note  [facility]     Patient Name: Chase Obrien  MRN: [id number]  Today's Date: 1/3/2022    Admit Date: 12/13/2021     Discharge Plan     Row Name 01/03/22 1703       Plan    Plan SNF    Plan Comments Case mgt f/u. Per Kayce at [facility]), they will not be able to accept him for skilled care.Case mgt will talk to patient and his POA re: other facilities               Discharge Codes    No documentation.               Expected Discharge Date and Time     Expected Discharge Date Expected Discharge Time    Jan 4, 2022             Sonja C Kellerman, RN

## 2022-01-03 NOTE — NURSING NOTE
Daily Low Timmy <=14   Timmy score: 14  Staff concerned that previous pressure injury that was blanching possibly nonblanching now, will follow up at a later date.. Interventions in place and documented per protocol. Apply barrier cream daily/PRN. Keep patient dry and turn regularly. Elevate and offload heels.  Contact WOC for any concerns.  Per our records patient is on low-air-loss mattress, nurse was unsure but was encouraged to call wound care nurse back to verify when patient gets back from dialysis.

## 2022-01-03 NOTE — DISCHARGE PLACEMENT REQUEST
"Timbo Obrien (76 y.o. Male)             Date of Birth Social Security Number Address Home Phone MRN    1945  [address]  [facility] [address]  [id number]    [employer] Marital Status             None Single       Admission Date Admission Type Admitting Provider Attending Provider Department, Room/Bed    21 Emergency Marguerite Mohr MD Hall, Holly, MD [facility] 3H, S383/1    Discharge Date Discharge Disposition Discharge Destination                         Attending Provider: Marguerite Mohr MD    Allergies: Penicillins    Isolation: None   Infection: MRSA (21)   Code Status: CPR   Advance Care Planning Activity    Ht: 193 cm (76\")   Wt: 104 kg (229 lb 14.4 oz)    Admission Cmt: None   Principal Problem: Gangrene of left foot (HCC) [I96]                 Active Insurance as of 2021     Primary Coverage     Payor Plan Insurance Group Employer/Plan Group    [facility] MEDICARE REPLACEMENT [facility] MEDICARE REPLACEMENT [id number]     Payor Plan Address Payor Plan Phone Number Payor Plan Fax Number Effective Dates    [address] [address]   2021 - None Entered    [facility] [id number]       Subscriber Name Subscriber Birth Date Member ID       TIMBO OBRIEN 1945 [id number]                 Emergency Contacts      (Rel.) Home Phone Work Phone Mobile Phone    Rohini Sifuentes (Friend) -- -- [phone]               History & Physical      Shell Watson DO at [phone]              [facility] Medicine Services  HISTORY AND PHYSICAL    Patient Name: Timbo Obrien  : 1945  MRN: [id number]  Primary Care Physician: Radha Purcell  Date of admission: 2021    Subjective   Subjective     Chief Complaint:  Left foot pain    HPI:  Timbo Obrien is a 75 y.o. male with history of CHF, DDD, DM, hyperlipidemia, hypertension, ESRD on HD, GI bleed, A. fib not on anticoagulation due to a history of GI bleed, hypothyroidism, CAD.  " Patient was discharged from [facility] 2 weeks ago for acute cystitis with indwelling Guillaume catheter, hematuria, bladder/prostate cancer, supratherapeutic INR, and chronic colonic pseudoobstruction. One month ago patient underwent cystoscopy with catheter placement by urology looking for source of hematuria. At that time he was diagnosed with active prostate and bladder cancer with follow-up arranged at  urology. At the time of admission at OSH his INR was 4.4, cardiology was consulted, and anticoagulation was stopped per medical record.  Patient and girlfriend state that they were not agreeable to stopping his anticoagulation indefinitely.  Patient was treated with 7 days of IV Rocephin and discharged to inpatient rehabilitation in [place].  Girlfriend states that he was at [facility] for 7 days, and during his hospitalization his shoes were never removed.  When he arrived to rehab they found black discoloration on the toes of his left foot.  He was seen by wound care earlier today who recommended patient coming to the ED for evaluation.  Patient reports that for the past week he has had discoloration of the toes of his left foot with redness, warmth, and drainage.  He also reports intermittent hematuria in his Guillaume catheter bag.  He denies fever, chills, shortness of air, chest pain, nausea, vomiting, diarrhea, dumping, or any other complaints this time.  MRI of the left foot shows no clear evidence of osteomyelitis or septic arthritis, no fracture identified.  Soft tissue edema in the foot particularly on the dorsal side.  Fairly diffuse muscle edema on the plantar side of the left foot which may reflect some myositis.  No evidence of drainable abscess.  Patient started on Rocephin and vancomycin in the ED, and is being admitted to the hospital medicine service for further evaluation management.      COVID Details:        Symptoms: [x] NONE [] Fever []  Cough [] Shortness of breath []  Change in taste or smell  The patient qualifies to receive the vaccine, but they have not yet received it.    Review of Systems   Constitutional: Negative.    HENT: Negative.    Eyes: Negative.    Respiratory: Negative.    Cardiovascular: Negative.    Gastrointestinal: Positive for abdominal distention (chronic). Negative for abdominal pain, blood in stool, constipation, diarrhea, nausea and vomiting.   Endocrine: Negative.    Genitourinary: Positive for hematuria.        Indwelling Guillaume catheter   Musculoskeletal: Positive for joint swelling. Negative for back pain and neck stiffness.   Skin: Positive for color change and wound. Negative for pallor.   Allergic/Immunologic: Negative.    Neurological: Negative.    Hematological: Negative.    Psychiatric/Behavioral: Negative.         All other systems reviewed and are negative.     Personal History     Past Medical History:   Diagnosis Date   • Cellulitis    • CHF (congestive heart failure) (HCC)    • Chronic infection    • Degenerative disc disease, lumbar    • Diabetes mellitus (HCC)    • Hyperlipidemia    • Hypertension    • Myocardial infarction (HCC)    • Renal disorder    • Rotator cuff tear        Past Surgical History:   Procedure Laterality Date   • BRONCHOSCOPY N/A 4/12/2020    Procedure: BRONCHOSCOPY;  Surgeon: Fox Cervantes MD;  Location: [facility] ENDOSCOPY;  Service: Pulmonary;  Laterality: N/A;   • CORONARY ARTERY BYPASS GRAFT     • ENDOSCOPY N/A 4/2/2020    Procedure: ESOPHAGOGASTRODUODENOSCOPY AT BEDSIDE;  Surgeon: Brunner, Mark I, MD;  Location:  [facility] ENDOSCOPY;  Service: Gastroenterology;  Laterality: N/A;   • ENDOSCOPY N/A 4/8/2020    Procedure: ESOPHAGOGASTRODUODENOSCOPY AT BEDSIDE;  Surgeon: Zhang Martinez MD;  Location:  [facility] ENDOSCOPY;  Service: Gastroenterology;  Laterality: N/A;  with gastric lavage using Edlich tube   • ENDOSCOPY N/A 4/8/2020    Procedure: ESOPHAGOGASTRODUODENOSCOPY AT BEDSIDE;  Surgeon: Zhang Martinez MD;   Location:  [facility] ENDOSCOPY;  Service: Gastroenterology;  Laterality: N/A;   • ENDOSCOPY N/A 4/9/2020    Procedure: ESOPHAGOGASTRODUODENOSCOPY AT BEDSIDE;  Surgeon: Zhang Martinez MD;  Location:  [facility];  Service: Gastroenterology;  Laterality: N/A;   • ENDOSCOPY N/A 4/9/2020    Procedure: ESOPHAGOGASTRODUODENOSCOPY AT BEDSIDE;  Surgeon: Zhang Martinez MD;  Location:  [facility]OS[facility];  Service: Gastroenterology;  Laterality: N/A;       Family History: pertinent FHx was reviewed and unremarkable.     Social History:  reports that he has quit smoking. He uses smokeless tobacco. He reports that he does not drink alcohol and does not use drugs.  Social History     Social History Narrative    Retired  and              Medications:  No current facility-administered medications on file prior to encounter.     Current Outpatient Medications on File Prior to Encounter   Medication Sig Dispense Refill   • gabapentin (NEURONTIN) 800 MG tablet Take 1 tablet by mouth 3 (Three) Times a Day.     • HYDROcodone-acetaminophen (NORCO)  MG per tablet Take 1 tablet by mouth Every 6 (Six) Hours.     • acetaminophen (TYLENOL) 325 MG tablet Take 2 tablets by mouth Every 4 (Four) Hours As Needed for Mild Pain .     • aspirin 81 MG chewable tablet Chew 81 mg Daily.     • atorvastatin (LIPITOR) 20 MG tablet Take 20 mg by mouth Every Night.     • bisacodyl (DULCOLAX) 10 MG suppository Insert 1 suppository into the rectum Daily As Needed for Constipation.     • docusate sodium 100 MG capsule Take 100 mg by mouth 2 (Two) Times a Day.     • glipizide (GLUCOTROL) 10 MG tablet Take 10 mg by mouth Daily With Breakfast.     • levothyroxine (SYNTHROID, LEVOTHROID) 200 MCG tablet Take 1 tablet by mouth daily. 30 tablet 0   • midodrine (PROAMATINE) 10 MG tablet Take 10 mg by mouth 2 (Two) Times a Day As Needed.     • SITagliptin (JANUVIA) 50 MG tablet Take 50 mg by mouth Daily.           Allergies    Allergen Reactions   • Penicillins Other (See Comments) and Unknown - Low Severity     Received ceftriaxone 2016       Objective   Objective     Vital Signs:   Temp:  [98.7 °F (37.1 °C)] 98.7 °F (37.1 °C)  Heart Rate:  [85] 85  Resp:  [18] 18  BP: (109-118)/(62-74) 109/62    Physical Exam   Constitutional: Awake, alert, resting in bed, girlfriend at bedside  Eyes: PERRLA, sclerae anicteric, no conjunctival injection  HENT: NCAT, mucous membranes moist  Neck: Supple, no thyromegaly, no lymphadenopathy, trachea midline  Respiratory: Clear to auscultation bilaterally, nonlabored respirations   Cardiovascular: irregularly irregular, + MALLORY , rubs, or gallops, palpable pedal pulses bilaterally  Gastrointestinal: Positive bowel sounds, soft, nontender, distended   Musculoskeletal: 2 plus edema, no clubbing or cyanosis to extremities  Psychiatric: Appropriate affect, cooperative  Neurologic: Oriented x 3, strength symmetric in all extremities, Cranial Nerves grossly intact to confrontation, speech clear  Skin: Chronic venous stasis bilateral lower extremities.  Erythema and warmth to left foot with gangrenous left great second and third toes.     Result Review:  I have personally reviewed the results from the time of this admission to 12/13/21 10:44 PM EST and agree with these findings:  [x]  Laboratory  [x]  Microbiology  [x]  Radiology  []  EKG/Telemetry   []  Cardiology/Vascular   []  Pathology  [x]  Old records  []  Other:  Most notable findings include:     LAB RESULTS:      Lab 12/13/21 2031   WBC 6.42   HEMOGLOBIN 8.8*   HEMATOCRIT 28.7*   PLATELETS 197   NEUTROS ABS 4.18   IMMATURE GRANS (ABS) 0.06*   LYMPHS ABS 1.35   MONOS ABS 0.64   EOS ABS 0.15   MCV 97.3*   PROCALCITONIN 0.19   LACTATE 2.7*         Lab 12/13/21 2031   SODIUM 138   POTASSIUM 4.0   CHLORIDE 99   CO2 28.0   ANION GAP 11.0   BUN 21   CREATININE 4.18*   GLUCOSE 132*   CALCIUM 8.3*         Lab 12/13/21 2031   TOTAL PROTEIN 7.1   ALBUMIN 2.80*    GLOBULIN 4.3   ALT (SGPT) 9   AST (SGOT) 16   BILIRUBIN 0.3   ALK PHOS 138*         Lab 12/13/21 2031   PROBNP 3,352.0*                 UA    Urinalysis 12/13/21 12/13/21 2006 2006   Squamous Epithelial Cells, UA 0-2    Specific Gravity, UA  1.016   Ketones, UA  Negative   Blood, UA  Large (3+) (A)   Leukocytes, UA  Large (3+) (A)   Nitrite, UA  Negative   RBC, UA Too Numerous to Count (A)    WBC, UA Too Numerous to Count (A)    Bacteria, UA 3+ (A)    (A) Abnormal value            Microbiology Results (last 10 days)     Procedure Component Value - Date/Time    COVID PRE-OP / PRE-PROCEDURE SCREENING ORDER (NO ISOLATION) - Swab, Nasopharynx [[id number]]  (Normal) Collected: 12/13/21 1954    Lab Status: Final result Specimen: Swab from Nasopharynx Updated: 12/13/21 2031    Narrative:      The following orders were created for panel order COVID PRE-OP / PRE-PROCEDURE SCREENING ORDER (NO ISOLATION) - Swab, Nasopharynx.  Procedure                               Abnormality         Status                     ---------                               -----------         ------                     COVID-19 and FLU A/B PCR...[[id number]]  Normal              Final result                 Please view results for these tests on the individual orders.    COVID-19 and FLU A/B PCR - Swab, Nasopharynx [[id number]]  (Normal) Collected: 12/13/21 1954    Lab Status: Final result Specimen: Swab from Nasopharynx Updated: 12/13/21 2031     COVID19 Not Detected     Influenza A PCR Not Detected     Influenza B PCR Not Detected    Narrative:      Fact sheet for providers: https://www.fda.gov/media/255866/download    Fact sheet for patients: https://www.fda.gov/media/904104/download    Test performed by PCR.          XR Chest 1 View    Result Date: 12/13/2021  CR Chest 1 Vw INDICATION: Chest pain. COMPARISON:  None available. FINDINGS: Portable AP view(s) of the chest.  The heart and mediastinal contours are normal. The lungs are clear.  Distended bowel is noted underneath the diaphragms. No pneumothorax or pleural effusion.     Impression: No acute cardiopulmonary findings. Distended bowel is noted underneath the diaphragm. Signer Name: Shira Garcia MD  Signed: 12/13/2021 8:24 PM  Workstation Name: [id number]  [facility]    MRI Foot Left Without Contrast    Result Date: 12/13/2021  MRI Foot LT WO INDICATION:  Diabetes. Cellulitis of the lower extremity. Left foot wound infection for one month. TECHNIQUE: MRI of the left foot without IV contrast. COMPARISON:  None available. FINDINGS: There is no evidence of acute fracture. There is no bone marrow edema identified to suggest acute osteomyelitis. No definite erosive changes are seen. There is generalized soft tissue edema about the foot, particularly on the dorsal side. No loculated abscess is seen allowing for the lack of contrast. There is also some edema in the plantar musculature, and myositis is not excluded. There is no clear evidence for septic arthritis. There is a mild degree of degenerative disease in the toes and mid foot, as well as in the hindfoot. The sinus tarsus is within normal limits. Achilles tendon is normal.     Impression: 1. No clear evidence of osteomyelitis or septic arthritis. No fracture is identified. 2. Soft tissue edema in the foot, particularly on the dorsal side which may indicate cellulitis. Also, there is fairly diffuse muscle edema on the plantar side of the foot which may reflect some myositis as well. No evidence of a drainable abscess allowing for the lack of IV contrast. Signer Name: Hi Gonzales MD  Signed: 12/13/2021 11:12 PM  Workstation Name: [id number]  [facility]      Results for orders placed during the hospital encounter of 02/19/20    Transthoracic Echo Complete With Contrast if Necessary Per Protocol    Interpretation Summary  · Estimated EF = 55%.  · Mild mitral valve regurgitation is  present      Assessment/Plan   Assessment & Plan       Atrial fibrillation (HCC)    Type 2 diabetes mellitus (HCC)    Hypothyroid    WINSTON (obstructive sleep apnea), noncompliant w/ NIPPV    CAD (coronary artery disease)    CHF (congestive heart failure) (HCC)    Anemia    Dyslipidemia    ESRD (end stage renal disease) (HCC)    Hematuria    Acute UTI (urinary tract infection)    Lactic acidosis    Gangrene (HCC)    Chase Obrien is a 75 y.o. male with history of CHF, DDD, DM, hyperlipidemia, hypertension, ESRD on HD, GI bleed, A. fib not on anticoagulation due to a history of GI bleed, hypothyroidism, CAD is here with gangrene/cellulitis left foot.    Assessment and Plan:    Necrotic toes left foot  Cellulitis left foot  --MRI of the left foot shows no clear evidence of osteomyelitis or septic arthritis, no fracture identified.  Soft tissue edema in the foot particularly on the dorsal side.  Fairly diffuse muscle edema on the plantar side of the left foot which may reflect some myositis.  No evidence of drainable abscess.   --vancomycin and rocephin  --BC x 2  --Wound culture  --Consult Dr. Bradshaw in the a.m.  --N.p.o. after midnight until evaluated by Dr. Bradshaw  --pain control  --A.m. labs    Acute UTI  Hematuria  Lactic acidosis  Recent diagnosis of prostate and bladder cancer   --rocephin   --urine culture  --reflex lactate  --cbc, bmp in the am    ESRD on HD  --Followed by Dr. Amaral in [place]  --Dialysis MWF  --consult nephrology  --bmp in the am    T2DM   --a1c in the am  --fsbg with ssi  --hold Januvia    CHF  --Chest x-ray shows no acute cardiopulmonary findings  --strict I&O's  --daily weight    CAD  HLD  AFIB  --aspirin  --atorvastatin  --Anticoagulation was stopped at recent admission to [facility] due to hematuria.  Patient/girlfriend would like to discuss restarting prior to discharge    WINSTON  --noncompliant with CPAP    Hypothyroidism  --synthroid    Anemia  --anemia profile  --cbc in the  am    Decubitus ulcer  --consult WOC in the am      DVT prophylaxis:  mechanical    CODE STATUS:    Level Of Support Discussed With: Patient  Code Status (Patient has no pulse and is not breathing): CPR (Attempt to Resuscitate)  Medical Interventions (Patient has pulse or is breathing): Full Support      This note has been completed as part of a split-shared workflow.   Signature: Electronically signed by Saima Schuler, MARYURI, 12/14/21, 12:03 AM EST.       Attending   Admission Attestation       I have seen and examined the patient, performing an independent face-to-face diagnostic evaluation with plan of care reviewed and developed with the advanced practice clinician (APC).      Brief Summary Statement:   Chase Obrien is a 75 y.o. male with PMHx of CHF, degenerative disc disease, T2DM, hyperlipidemia, hypertension and end-stage renal disease, on hemodialysis, history of GI bleed, coronary artery disease, past medical history of bladder and prostate cancer.  Patient presented to [facility] ED with necrotic lesions on the left foot.  Patient been admitted to [facility] 2 weeks ago for acute cystitis, hematuria, bladder and prostate cancer and super subtherapeutic INR and chronic colonic pseudoobstruction.  Patient resides with his girlfriend. Pt presents to [facility] ED with black, necrotic toes on the left foot. We will admit pt to telemetry and consult Dr Bradshaw and ID in the am.     Remainder of detailed HPI is as noted by APC and has been reviewed and/or edited by me for completeness.    Attending Physical Exam:  Constitutional: Awake, alert  Eyes: PERRLA, sclerae anicteric, no conjunctival injection  HENT: NCAT, mucous membranes moist  Neck: Supple, no thyromegaly, no lymphadenopathy, trachea midline  Respiratory: Clear to auscultation bilaterally, nonlabored respirations   Cardiovascular: RRR, no murmurs, rubs, or gallops, palpable pedal pulses bilaterally  Gastrointestinal: Positive bowel sounds, soft,  distended with hyperactive bowel sounds  Musculoskeletal: No bilateral ankle edema, no clubbing or cyanosis to extremities  Psychiatric: Appropriate affect, cooperative  Neurologic: Oriented x 3, strength symmetric in all extremities, Cranial Nerves grossly intact to confrontation, speech clear  Skin: No rashes, gangrenous appearing toes on the left foot, with significant necrosis and erythema extending up the foot.      Brief Assessment/Plan :  See detailed assessment and plan developed with APC which I have reviewed and/or edited for completeness.    Admission Status: I believe that this patient meets INPATIENT status due to new a fib, CHF exac, and new onset a fib with RVR.  I feel patient’s risk for adverse outcomes and need for care warrant INPATIENT evaluation and I predict the patient’s care encounter to likely last beyond 2 midnights.    Shell Watson DO  12/14/21                            Electronically signed by Shell Watson DO at 12/14/21 0646          Operative/Procedure Notes (all)      Luís Bradshaw MD at 12/30/21 1011  Version 1 of 1       OP NOTE    Patient Name:  Chase Obrien    Date of Surgery: December 30, 2021      Pre-op Diagnosis: Gangrene of the left lower extremity/foot secondary to severe peripheral vascular disease    Post-op Diagnosis:   Same    Surgeon(s):  Luís Bradshaw MD    Assistant(s):   Assistant: Natalie Son PA-C was responsible for performing the following activities: Retraction, Suction, Irrigation, Suturing, Closing and Placing Dressing and their skilled assistance was necessary for the success of this case.    Anesthesia: Left femoral single shot nerve block under ultrasound guidance followed by left femoral catheter nerve block under ultrasonic guidance followed by intravenous induction of general endotracheal anesthesia    Indications: Patient patient is a 75-year-old male with renal failure now on hemodialysis for 2 years with diabetes mellitus and severe  peripheral vascular disease in addition to chronic venous stasis disease of both lower extremities.  He was admitted to [facility] some 2 weeks ago with gangrene of the left great toe second toe third toe and fourth toe he also had chronic venous stasis ulcer disease both lower extremities.  He has not unreconstructable vascular disease.  We discussed with him the alternatives between left below-knee amputation left above-knee amputation initially he declined consideration of a left above-knee amputation.  Initially was unclear whether he wanted any amputation at all but I told him that with the progression of this gangrene he would become septic.  His significant other and I discussed the situation by telephone several times.  He also declined dialysis for approximately a week due to not wanting to come off his Coumadin anticoagulation for the operative procedure.  Eventually Dalton of nephrology convinced him to proceed ahead with amputation but this time I was concerned about the overall status of his left lower extremity with venous stasis ulcerations progressing as well as the progression of the gangrenous changes of the toes on to the forefoot and long discussion this morning with his significant other/power of  regarding the situation and the healing process that I thought would be much quicker with an above-knee amputation and that the chance for needing amputation a higher level from this above-knee amputation will be very remote.  We eventually convinced him to proceed ahead with the left above-knee amputation at this time.  He understood there is a risk of bleeding, infection, stroke, heart attack, death, and possible postop phantom pain.    Procedure(s): Low level left above-knee amputation  Patient underwent left femoral nerve single shot block under spinal guidance in the preoperative anesthesia followed by left femoral catheter nerve block placement under ultrasound guidance  by anesthesia in the preop area followed by induction of general endotracheal anesthesia without difficulty in the operating suite.  Timeout was called to verify the procedure be done left above-knee amputation in the left lower extremity was prepped from the upper thigh down to including the left foot with ChloraPrep and if  3-minute drying time was sterilely draped.  Anterior and posterior fishmouth skin flaps were made at the low level just above the patella and carried on down into the deep soft tissues.  The anterior flap was first to be developed and medially in the thigh the supra femoral artery and the popliteal vein were identified ligated proximal and distally with 0 silk suture and divided.  It should be noted that the superficial femoral artery was heavily calcified and this suture scissors could barely cut through this calcific vessel which was occluded.  The periosteum was stripped from the femur for approxi-3 inches above the anterior skin flap line.  It was then transected with the bone power saw.  After the medial lateral muscle bundles of the anterior flap were divided with Bovie cautery the posterior flap musculature was also divided with Bovie cautery and using amputation knife the posterior flap was completed and the specimen was removed from the operative field.  Further hemostasis controlled to the anterior and posterior flap with Bovie cautery.  The sciatic nerve was then identified within the posterior flap brought on extension and ligated with 0 chromic catgut suture and cut distal to the ligature with a #10 knife blade and allowed to retract into thedepth of the surgical wound.  The open stump was irrigated with copious amounts of irrisept orthopedic solution.  The patellar tendon was then  dissected free from the anterior skin flap soft tissue musculature and brought to lie over the cut end of the femur.  The patellar tendon was then sutured to the distal transected femur through  osteotomies medial lateral and inferiorly with a 2.4 mm drill and using 0 Vicryl's for the sutures.  #15 Kamlesh-Frey drain was then placed through separate stab laterally in the thigh brought along the depth of wound and anchored skin with 2-0 silk suture and cut to proper length.  After assurance of hemostasis the investing fascia the anterior posterior flap was reapproximated with 0 Vicryl sutures in the subcutaneous layers between the anterior posterior flap were reapproximated with 0 Vicryl sutures and 2-0 Vicryl sutures the skin margins were reapproximated with 2-0 nylon simple sutures.  Incision was then covered with 5 x 9 Xeroform gauze followed by 4 x 4 fluffs Kerlix wrap and #8 Spandage to mid dressing to hold the Kerlix in place.  The ELLIOTT drain was attached to bulb like reservoir was then placed in a Velcro pouch which was placed on the tube that dressing.  Patient awakened from the endotracheal anesthesia and taken recovery area stable vital signs.  Sponge needle count were correct x2  Estimated Blood Loss: Approxitwo 100 mL    Findings: Heavily calcified and occluded left superficial femoral artery.  There was also heavily calcified arterial muscle  branches within the anterior and posterior flaps.  The popliteal vein was patent without clot.    Complications: No immediate complications occurred.      Luís Bradshaw MD     Date: 12/30/2021 Time: 11:41 EST    Electronically signed by Luís Bradshaw MD at 12/30/21 1158          Consult Notes (all)      Brian Collado MD at 12/15/21 1209          INFECTIOUS DISEASE CONSULT/INITIAL HOSPITAL VISIT    Chase Obrien  1945  [id number]    Date of consult: 12/15/2021    Admit date: 12/13/2021    Requesting Provider: Rosemarie Prescott DO  Evaluating physician: Brian Collado MD  Reason for Consultation: Left foot gangrene  Chief Complaint: Above      Subjective   History of present illness:  Patient is a  75 y.o.  Yr old male with diabetes mellitus  type 2, hypertension, hyperlipidemia, peripheral vascular disease, myocardial infarction, congestive heart failure, end-stage renal disease on hemodialysis Monday, Wednesday, Friday, atrial fibrillation (with recent disagreement on stopping his Coumadin), recent diagnosis of prostate and bladder cancer (with follow-up at  urology), with worsening left foot gangrenous changes (first through fourth toe) over the past 1 to 2 weeks since 12/1/2021.  The patient was admitted to [facility] on 12/13/2021.  MRI of the left foot without evidence of osteomyelitis or abscess.  I was consulted on 12/15/2021 for further evaluation and treatment.  Patient has no other localizing signs or symptoms of infection.    Past Medical History:   Diagnosis Date   • Cellulitis    • CHF (congestive heart failure) (HCC)    • Chronic infection    • Degenerative disc disease, lumbar    • Diabetes mellitus (HCC)    • Hyperlipidemia    • Hypertension    • Myocardial infarction (HCC)    • Renal disorder    • Rotator cuff tear        Past Surgical History:   Procedure Laterality Date   • BRONCHOSCOPY N/A 4/12/2020    Procedure: BRONCHOSCOPY;  Surgeon: Fox Cervantes MD;  Location:  [facility];  Service: Pulmonary;  Laterality: N/A;   • CORONARY ARTERY BYPASS GRAFT     • ENDOSCOPY N/A 4/2/2020    Procedure: ESOPHAGOGASTRODUODENOSCOPY AT BEDSIDE;  Surgeon: Brunner, Mark I, MD;  Location:  [facility];  Service: Gastroenterology;  Laterality: N/A;   • ENDOSCOPY N/A 4/8/2020    Procedure: ESOPHAGOGASTRODUODENOSCOPY AT BEDSIDE;  Surgeon: Zhang Martinez MD;  Location:  [facility];  Service: Gastroenterology;  Laterality: N/A;  with gastric lavage using Edlich tube   • ENDOSCOPY N/A 4/8/2020    Procedure: ESOPHAGOGASTRODUODENOSCOPY AT BEDSIDE;  Surgeon: Zhang Martinez MD;  Location:  [facility];  Service: Gastroenterology;  Laterality: N/A;   • ENDOSCOPY N/A 4/9/2020    Procedure:  ESOPHAGOGASTRODUODENOSCOPY AT BEDSIDE;  Surgeon: Zhang Martinez MD;  Location:  [facility];  Service: Gastroenterology;  Laterality: N/A;   • ENDOSCOPY N/A 4/9/2020    Procedure: ESOPHAGOGASTRODUODENOSCOPY AT BEDSIDE;  Surgeon: Zhang Martinez MD;  Location:  [facility];  Service: Gastroenterology;  Laterality: N/A;       Pediatric History   Patient Parents   • Not on file     Other Topics Concern   • Not on file   Social History Narrative    Lives with girl friend        Family history is unknown by patient.    Allergies   Allergen Reactions   • Penicillins Other (See Comments) and Unknown - Low Severity     Received ceftriaxone 2016         There is no immunization history on file for this patient.    Medication:    Current Facility-Administered Medications:   •  acetaminophen (TYLENOL) tablet 650 mg, 650 mg, Oral, Q4H PRN, Saima Schuler APRN  •  aspirin chewable tablet 81 mg, 81 mg, Oral, Daily, Saima Schuler APRN, 81 mg at 12/15/21 0934  •  atorvastatin (LIPITOR) tablet 20 mg, 20 mg, Oral, Nightly, Saima Schuler APRN, 20 mg at 12/14/21 2029  •  bisacodyl (DULCOLAX) suppository 10 mg, 10 mg, Rectal, Daily PRN, Saima Schuler APRN  •  castor oil-balsam peru (VENELEX) ointment 1 application, 1 application, Topical, Q12H, Shell Wtason, DO  •  cefTRIAXone (ROCEPHIN) 1 g/100 mL 0.9% NS (MBP), 1 g, Intravenous, Q24H, Saima Schuler APRN, 1 g at 12/14/21 2029  •  dextrose (D50W) (25 g/50 mL) IV injection 25 g, 25 g, Intravenous, Q15 Min PRN, Saima Schuler APRN  •  dextrose (GLUTOSE) oral gel 15 g, 15 g, Oral, Q15 Min PRN, Saima Schuler APRN  •  diatrizoate meglumine-sodium (GASTROGRAFIN) 66-10 % oral solution 15 mL, 15 mL, Oral, Once, Shell Watson, DO  •  docusate sodium (COLACE) capsule 100 mg, 100 mg, Oral, BID, Saima Schuler APRN  •  gabapentin (NEURONTIN) capsule 800 mg, 800 mg, Oral, Q8H, Marely Lay APRN, 800 mg at 12/15/21 0536  •   glucagon (human recombinant) (GLUCAGEN DIAGNOSTIC) injection 1 mg, 1 mg, Subcutaneous, Q15 Min PRN, Saima Schuler, APRN  •  insulin lispro (humaLOG) injection 0-7 Units, 0-7 Units, Subcutaneous, TID AC, Saima Schuler, APRN  •  levothyroxine (SYNTHROID, LEVOTHROID) tablet 200 mcg, 200 mcg, Oral, Q AM, Saima Schuler, APRN, 200 mcg at 12/15/21 0536  •  melatonin tablet 5 mg, 5 mg, Oral, Nightly PRN, Saima Schuler, APRN  •  midodrine (PROAMATINE) tablet 10 mg, 10 mg, Oral, BID , Shell Watson DO, 10 mg at 12/14/21 1856  •  morphine injection 1 mg, 1 mg, Intravenous, Q4H PRN, 1 mg at 12/15/21 1125 **AND** naloxone (NARCAN) injection 0.4 mg, 0.4 mg, Intravenous, Q5 Min PRN, Saima Schuler, APRN  •  Pharmacy to dose vancomycin, , Does not apply, Continuous PRN, Saima Schuler, APRN  •  [COMPLETED] Insert peripheral IV, , , Once **AND** sodium chloride 0.9 % flush 10 mL, 10 mL, Intravenous, PRN, Zuleyma Silva PA-C  •  sodium chloride 0.9 % flush 10 mL, 10 mL, Intravenous, Q12H, Saima Schuler, APRN  •  sodium chloride 0.9 % flush 10 mL, 10 mL, Intravenous, PRN, Saima Schuler, APRN  •  vancomycin 1250 mg/250 mL 0.9% NS IVPB ([facility]), 1,250 mg, Intravenous, Once per day on Mon Wed Fri, Miguel A Kolb, [facility]    Please refer to the medical record for a full medication list    Review of Systems:    Constitutional-- No Fever, chills or sweats.  Appetite good, and no malaise. No fatigue.  HEENT-- No new vision, hearing or throat complaints.  No epistaxis or oral sores.  Denies odynophagia or dysphagia.  No odynophagia or dysphagia. No headache, photophobia or neck stiffness.  CV-- No chest pain, palpitation or syncope  Resp-- No SOB/cough/Hemoptysis  GI- No nausea, vomiting, or diarrhea.  No hematochezia, melena, or hematemesis. Denies jaundice or chronic liver disease.  -- No dysuria, hematuria, or flank pain.  Denies hesitancy, urgency or flank pain.  Lymph- no swollen  "lymph nodes in neck/axilla or groin.   Heme- No active bruising or bleeding; no Hx of DVT or PE.  MS-- no swelling or pain in the bones or joints of arms/legs.  No new back pain.  Neuro-- No acute focal weakness or numbness in the arms or legs.  No seizures.  Skin--No rashes or lesions, except as per HPI    Physical Exam:   Vital Signs   Temp:  [97.7 °F (36.5 °C)-98.7 °F (37.1 °C)] 97.7 °F (36.5 °C)  Heart Rate:  [70-79] 79  Resp:  [16-20] 16  BP: ()/(51-83) 98/63    Temp  Min: 97.7 °F (36.5 °C)  Max: 98.7 °F (37.1 °C)  BP  Min: 96/51  Max: 148/83  Pulse  Min: 70  Max: 79  Resp  Min: 16  Max: 20  SpO2  Min: 89 %  Max: 98 %    Blood pressure 98/63, pulse 79, temperature 97.7 °F (36.5 °C), temperature source Oral, resp. rate 16, height 193 cm (76\"), weight 129 kg (285 lb), SpO2 96 %.  GENERAL: Awake and alert, in mild to moderate distress. Appears older than stated age.  Not cooperative with exam.  HEENT:  Normocephalic, atraumatic.  Oropharynx without thrush. Dentition in good repair. No cervical adenopathy. No neck masses.  Ears externally normal, Nose externally normal.  EYES: PERRL. No conjunctival injection. No icterus. EOM full.  LYMPHATICS: No lymphadenopathy of the neck or axillary or inguinal regions.   HEART: No murmur, gallop, or pericardial friction rub. Reg rate rhythm, No JVD at 45 degrees.  LUNGS: Clear to auscultation and percussion. No respiratory distress, no use of accessory muscles.  ABDOMEN: Soft, nontender, nondistended. No appreciable HSM.  Bowel sounds normal.  GENITAL: No external lesions, breasts without masses, back straight, no CVAT, rectal external without lesions.   SKIN: Warm and dry without cutaneous eruptions.  No nodules.  Left foot with dry gangrenous changes without foul smell or crepitus with some dry gangrene to left first through fourth toes, without fluctuance.  PSYCHIATRIC: Mental status lucid. No confusion.  EXT:  No cellulitic change.  NEURO: Oriented to name, CN 2 to " 12 intact, DTR 1 + and symmetric, sensory decreased to LT upper and lower extremitiy, motor 5/5 upper and lower extremity, cerebellar and gait not tested.      Results Review:   I reviewed the patient's new clinical results.  I reviewed the patient's new imaging results and agree with the interpretation.  I reviewed the patient's other test results and agree with the interpretation    Results from last 7 days   Lab Units 12/15/21  0700 12/14/21  0612 12/13/21 2031   WBC 10*3/mm3 5.76 4.40 6.42   HEMOGLOBIN g/dL 8.2* 8.0* 8.8*   HEMATOCRIT % 27.4* 26.7* 28.7*   PLATELETS 10*3/mm3 188 161 197     Results from last 7 days   Lab Units 12/15/21  0700   SODIUM mmol/L 142   POTASSIUM mmol/L 4.3   CHLORIDE mmol/L 105   CO2 mmol/L 25.0   BUN mg/dL 26*   CREATININE mg/dL 5.33*   GLUCOSE mg/dL 105*   CALCIUM mg/dL 7.5*     Results from last 7 days   Lab Units 12/15/21  0700   ALK PHOS U/L 125*   BILIRUBIN mg/dL 0.2   ALT (SGPT) U/L 8   AST (SGOT) U/L 13                 Results from last 7 days   Lab Units 12/13/21  2031   LACTATE mmol/L 2.7*     Estimated Creatinine Clearance: 17.9 mL/min (A) (by C-G formula based on SCr of 5.33 mg/dL (H)).    Microbiology:  Microbiology Results Abnormal     Procedure Component Value - Date/Time    Blood Culture - Blood, Arm, Right [[id number]]  (Normal) Collected: 12/13/21 2010    Lab Status: Preliminary result Specimen: Blood from Arm, Right Updated: 12/14/21 2045     Blood Culture No growth at 24 hours    Blood Culture - Blood, Arm, Right [[id number]]  (Normal) Collected: 12/13/21 2000    Lab Status: Preliminary result Specimen: Blood from Arm, Right Updated: 12/14/21 2045     Blood Culture No growth at 24 hours    Urine Culture - Urine, Urine, Catheter [[id number]] Collected: 12/13/21 2006    Lab Status: Final result Specimen: Urine, Catheter Updated: 12/14/21 1710     Urine Culture >100,000 CFU/mL Mixed Radha Isolated    Narrative:      Specimen contains mixed organisms of questionable  pathogenicity which indicates contamination with commensal harrison.  Further identification is unlikely to provide clinically useful information.  Suggest recollection.    COVID PRE-OP / PRE-PROCEDURE SCREENING ORDER (NO ISOLATION) - Swab, Nasopharynx [[id number]]  (Normal) Collected: 12/13/21 1954    Lab Status: Final result Specimen: Swab from Nasopharynx Updated: 12/13/21 2031    Narrative:      The following orders were created for panel order COVID PRE-OP / PRE-PROCEDURE SCREENING ORDER (NO ISOLATION) - Swab, Nasopharynx.  Procedure                               Abnormality         Status                     ---------                               -----------         ------                     COVID-19 and FLU A/B PCR...[[id number]]  Normal              Final result                 Please view results for these tests on the individual orders.    COVID-19 and FLU A/B PCR - Swab, Nasopharynx [[id number]]  (Normal) Collected: 12/13/21 1954    Lab Status: Final result Specimen: Swab from Nasopharynx Updated: 12/13/21 2031     COVID19 Not Detected     Influenza A PCR Not Detected     Influenza B PCR Not Detected    Narrative:      Fact sheet for providers: https://www.fda.gov/media/577041/download    Fact sheet for patients: https://www.fda.gov/media/961301/download    Test performed by PCR.          Radiology:  Imaging Results (Last 72 Hours)     Procedure Component Value Units Date/Time    MRI Foot Left Without Contrast [[id number]] Collected: 12/13/21 2312     Updated: 12/13/21 2314    Narrative:      MRI Foot LT WO    INDICATION:    Diabetes. Cellulitis of the lower extremity. Left foot wound infection for one month.    TECHNIQUE:   MRI of the left foot without IV contrast.    COMPARISON:    None available.    FINDINGS:  There is no evidence of acute fracture. There is no bone marrow edema identified to suggest acute osteomyelitis. No definite erosive changes are seen. There is generalized soft tissue edema about the  foot, particularly on the dorsal side. No loculated  abscess is seen allowing for the lack of contrast. There is also some edema in the plantar musculature, and myositis is not excluded. There is no clear evidence for septic arthritis. There is a mild degree of degenerative disease in the toes and mid  foot, as well as in the hindfoot. The sinus tarsus is within normal limits. Achilles tendon is normal.      Impression:        1. No clear evidence of osteomyelitis or septic arthritis. No fracture is identified.  2. Soft tissue edema in the foot, particularly on the dorsal side which may indicate cellulitis. Also, there is fairly diffuse muscle edema on the plantar side of the foot which may reflect some myositis as well. No evidence of a drainable abscess  allowing for the lack of IV contrast.    Signer Name: Hi Gonzales MD   Signed: 12/13/2021 11:12 PM   Workstation Name: [email or web address]    [facility]    XR Chest 1 View [[id number]] Collected: 12/13/21 2024     Updated: 12/13/21 2026    Narrative:      CR Chest 1 Vw    INDICATION:   Chest pain.     COMPARISON:    None available.    FINDINGS:  Portable AP view(s) of the chest.  The heart and mediastinal contours are normal. The lungs are clear. Distended bowel is noted underneath the diaphragms. No pneumothorax or pleural effusion.      Impression:      No acute cardiopulmonary findings. Distended bowel is noted underneath the diaphragm.    Signer Name: Shira Garcia MD   Signed: 12/13/2021 8:24 PM   Workstation Name: [id number]    [facility]          IMPRESSION:     1.  Left foot cellulitis with gangrene, with dry gangrene to left first through fourth toes, without MRI evidence of underlying osteomyelitis done on 12/13.  Usual organisms are mixed harrison including Staphylococcus, potentially Pseudomonas and anaerobes.  Lactic acid elevated on admission may be related to early sepsis related to this.  2.  Atrial  fibrillation with complex regarding his anticoagulation between patient and his providers.  3.  End-stage renal disease on hemodialysis Monday, Wednesday, Friday, with some noncompliance.  4.  Diabetes mellitus type 2 with increased risk for infection and likely associated with his initial left foot problem.  Hemoglobin A1c 5.50 on 12/14.  5.  Stage II coccyx decubitus present on admission.  Right heel deep tissue injury.  Lower extremities with venous ulcerations right leg 2 x 3 cm, left leg with ulcer over it.  6.  Anemia, chronic disease and related to his renal failure.  7.  Hypoalbuminemia, 2.80.  8.  Elevated alkaline phosphatase 138.    RECOMMENDATIONS:    1.  Diagnostically, continue to follow patient's physical exam, CBC, CMP, CRP, culture of left foot, cultures which have been obtained on 12/13 including blood, and radiographic studies.  Vascular studies.  2.  Therapeutically, consider using renal dose adjusted and post hemodialysis vancomycin, ceftazidime, and oral metronidazole.  Duration to be determined.  This regimen would be easier to administer as an outpatient after dialysis.  3.  Supportive care.  Surgery is following.  At risk for loss of limb.    I discussed the patient's findings and my recommendations with patient and nursing staff    Thank you for asking me to see Chase Micah.  Our group would be pleased to follow this patient over the course of their hospitalization and assist with outpatient antimicrobial therapy, as indicated.  Further recommendations depend on the results of the cultures and clinical course.    Brian Collado MD  12/15/2021    Electronically signed by Brian Collado MD at 12/15/21 1221     AbdiazizLandon MD at 12/14/21 1217      Consult Orders    1. Inpatient Nephrology Consult [[id number]] ordered by Saima Schuler APRN at 12/14/21 0003                 Referring Provider: Saima Schuler  Reason for Consultation: ESRD     Subjective     Chief  complaint Gangrene left foot/toes    History of present illness:  This is 75 year old man with past medical hx of ESRD on Dialysis MWF schedule at [facility] under care of Dr Amaral, CAD s/p CABG, CHF, GI bleed, Afib, acute cystitis with indwelling pace catheter, bladder and prostate cancer -recently diagnosed with cystoscopy as a workup for hematuria and hypothyroidism who presented with gangrene of left foot/toe. He had dialysis on   Denies any fever,chills, rigors, rash, CP, SOA, cough, abdominal pain, N/V or diarrhea. Nephrology service has been consulted for ESRD management.     History  Past Medical History:   Diagnosis Date   • Cellulitis    • CHF (congestive heart failure) (HCC)    • Chronic infection    • Degenerative disc disease, lumbar    • Diabetes mellitus (HCC)    • Hyperlipidemia    • Hypertension    • Myocardial infarction (HCC)    • Renal disorder    • Rotator cuff tear    ,   Past Surgical History:   Procedure Laterality Date   • BRONCHOSCOPY N/A 4/12/2020    Procedure: BRONCHOSCOPY;  Surgeon: Fox Cervantes MD;  Location:  [facility];  Service: Pulmonary;  Laterality: N/A;   • CORONARY ARTERY BYPASS GRAFT     • ENDOSCOPY N/A 4/2/2020    Procedure: ESOPHAGOGASTRODUODENOSCOPY AT BEDSIDE;  Surgeon: Brunner, Mark I, MD;  Location:  [facility];  Service: Gastroenterology;  Laterality: N/A;   • ENDOSCOPY N/A 4/8/2020    Procedure: ESOPHAGOGASTRODUODENOSCOPY AT BEDSIDE;  Surgeon: Zhang Martinez MD;  Location:  [facility];  Service: Gastroenterology;  Laterality: N/A;  with gastric lavage using Edlich tube   • ENDOSCOPY N/A 4/8/2020    Procedure: ESOPHAGOGASTRODUODENOSCOPY AT BEDSIDE;  Surgeon: Zhang Martinez MD;  Location:  [facility];  Service: Gastroenterology;  Laterality: N/A;   • ENDOSCOPY N/A 4/9/2020    Procedure: ESOPHAGOGASTRODUODENOSCOPY AT BEDSIDE;  Surgeon: Zhang Martinez MD;  Location:  [facility];  Service: Gastroenterology;  Laterality: N/A;   •  ENDOSCOPY N/A 4/9/2020    Procedure: ESOPHAGOGASTRODUODENOSCOPY AT BEDSIDE;  Surgeon: Zhang Martinez MD;  Location: [facility];  Service: Gastroenterology;  Laterality: N/A;   ,   Family History   Family history unknown: Yes   ,   Social History     Socioeconomic History   • Marital status: Single   Tobacco Use   • Smoking status: Former Smoker   • Tobacco comment: used to smoke 2-3 ppd but quit shortly after heart bypass surgery   Substance and Sexual Activity   • Alcohol use: Not Currently   • Drug use: Defer   • Sexual activity: Defer     E-cigarette/Vaping     E-cigarette/Vaping Substances     E-cigarette/Vaping Devices       , (Not in a hospital admission)  , Scheduled Meds:  aspirin, 81 mg, Oral, Daily  atorvastatin, 20 mg, Oral, Nightly  cefTRIAXone, 1 g, Intravenous, Q24H  diatrizoate meglumine-sodium, 15 mL, Oral, Once  docusate sodium, 100 mg, Oral, BID  insulin lispro, 0-7 Units, Subcutaneous, TID AC  levothyroxine, 200 mcg, Oral, Q AM  midodrine, 10 mg, Oral, BID AC  sodium chloride, 10 mL, Intravenous, Q12H  vancomycin (dosing per levels), , Does not apply, Daily    , Continuous Infusions:  Pharmacy to dose vancomycin,     , PRN Meds:  •  acetaminophen  •  bisacodyl  •  dextrose  •  dextrose  •  glucagon (human recombinant)  •  melatonin  •  Morphine **AND** naloxone  •  Pharmacy to dose vancomycin  •  [COMPLETED] Insert peripheral IV **AND** sodium chloride  •  sodium chloride and Allergies:  Penicillins    Review of Systems  Pertinent items are noted in HPI    Objective     Vital Signs  Temp:  [98.7 °F (37.1 °C)] 98.7 °F (37.1 °C)  Heart Rate:  [85] 85  Resp:  [18] 18  BP: ()/(40-74) 118/61    No intake/output data recorded.  I/O last 3 completed shifts:  In: 600 [IV Piggyback:600]  Out: -     Physical Exam:  General Appearance:    Alert, cooperative, in no acute distress   Head:    Normocephalic, without obvious abnormality, atraumatic   Eyes:            Conjunctivae and sclerae normal,  no   icterus, no pallor, corneas clear, PERRLA           Neck:   Supple, trachea midline, no thyromegaly,  no JVD       Lungs:     Clear to auscultation,respirations regular, even and               unlabored    Heart:    Regular rhythm and normal rate, normal S1 and S2, no       murmur, no gallop, no rub, no click       Abdomen:     Normal bowel sounds,soft, non-tender, non-distended, no guarding, no rebound tenderness       Extremities:   Moves all extremities well, no edema, no cyanosis, no         redness   Pulses:   Pulses palpable and equal bilaterally           Neurologic:   Cranial nerves 2 - 12 grossly intact, no focal deficit         Results Review:   I reviewed the patient's new clinical results.    WBC WBC   Date Value Ref Range Status   12/14/2021 4.40 3.40 - 10.80 10*3/mm3 Final   12/13/2021 6.42 3.40 - 10.80 10*3/mm3 Final      HGB Hemoglobin   Date Value Ref Range Status   12/14/2021 8.0 (L) 13.0 - 17.7 g/dL Final   12/13/2021 8.8 (L) 13.0 - 17.7 g/dL Final      HCT Hematocrit   Date Value Ref Range Status   12/14/2021 26.7 (L) 37.5 - 51.0 % Final   12/13/2021 28.7 (L) 37.5 - 51.0 % Final      Platlets No results found for: LABPLAT   MCV MCV   Date Value Ref Range Status   12/14/2021 100.0 (H) 79.0 - 97.0 fL Final   12/13/2021 97.3 (H) 79.0 - 97.0 fL Final          Sodium Sodium   Date Value Ref Range Status   12/14/2021 139 136 - 145 mmol/L Final   12/13/2021 138 136 - 145 mmol/L Final      Potassium Potassium   Date Value Ref Range Status   12/14/2021 3.4 (L) 3.5 - 5.2 mmol/L Final   12/13/2021 4.0 3.5 - 5.2 mmol/L Final      Chloride Chloride   Date Value Ref Range Status   12/14/2021 102 98 - 107 mmol/L Final   12/13/2021 99 98 - 107 mmol/L Final      CO2 CO2   Date Value Ref Range Status   12/14/2021 26.0 22.0 - 29.0 mmol/L Final   12/13/2021 28.0 22.0 - 29.0 mmol/L Final      BUN BUN   Date Value Ref Range Status   12/14/2021 22 8 - 23 mg/dL Final   12/13/2021 21 8 - 23 mg/dL Final       Creatinine Creatinine   Date Value Ref Range Status   12/14/2021 4.20 (H) 0.76 - 1.27 mg/dL Final   12/13/2021 4.18 (H) 0.76 - 1.27 mg/dL Final      Calcium Calcium   Date Value Ref Range Status   12/14/2021 7.9 (L) 8.6 - 10.5 mg/dL Final   12/13/2021 8.3 (L) 8.6 - 10.5 mg/dL Final      PO4 No results found for: CAPO4   Albumin Albumin   Date Value Ref Range Status   12/14/2021 2.30 (L) 3.50 - 5.20 g/dL Final   12/13/2021 2.80 (L) 3.50 - 5.20 g/dL Final      Magnesium Magnesium   Date Value Ref Range Status   12/14/2021 1.9 1.6 - 2.4 mg/dL Final      Uric Acid No results found for: URICACID         aspirin, 81 mg, Oral, Daily  atorvastatin, 20 mg, Oral, Nightly  cefTRIAXone, 1 g, Intravenous, Q24H  diatrizoate meglumine-sodium, 15 mL, Oral, Once  docusate sodium, 100 mg, Oral, BID  insulin lispro, 0-7 Units, Subcutaneous, TID AC  levothyroxine, 200 mcg, Oral, Q AM  midodrine, 10 mg, Oral, BID AC  sodium chloride, 10 mL, Intravenous, Q12H  vancomycin (dosing per levels), , Does not apply, Daily      Pharmacy to dose vancomycin,         Assessment/Plan       Atrial fibrillation (HCC)    Type 2 diabetes mellitus (HCC)    Hypothyroid    WINSTON (obstructive sleep apnea), noncompliant w/ NIPPV    CAD (coronary artery disease)    CHF (congestive heart failure) (HCC)    Anemia    Dyslipidemia    ESRD (end stage renal disease) (HCC)    Hematuria    Acute UTI (urinary tract infection)    Lactic acidosis    Gangrene (HCC)    Cellulitis of left foot         1- ESRD - MWF   2- Hypotension   3- Anemia of chronic disease.   4- Hx CAD s/p CABG  5- Gangrene left foot/toes  6- Affib     Plan:-   - HD tomorrow, UF as tolerated.   - renal diet  - Epo with HD.       I discussed the patients findings and my recommendations with patient and nursing staff    Landon Freed MD  12/14/21          Electronically signed by Landon Freed MD at 12/14/21 1303     Luís Bradshaw MD at 12/14/21 0815      Consult Orders    1. Inpatient  Cardiothoracic Surgery Consult [[id number]] ordered by Saima Schuler APRN at 12/14/21 0003                        CTS consult     Patient Care Team:  Radha Purcell as PCP - General (Family Medicine)  Referring MD:    Chief Complaint:  gangrene left foot/toes    HPI  Patient is a 75 y.o. male with a history of diabetes, dyslipidemia, hypertension, history of congestive heart failure history of myocardial infarction, end-stage renal disease, coronary disease with a history of coronary bypass grafting and atrial fibrillation.  Over the past couple weeks patient has noted changes in his left foot with discoloration and pain.  He has had a history of acute GI bleed with an extensive work-up with GI and urology.  He has been diagnosed with active prostate and bladder cancer.  He has been treated with Coumadin for his atrial fibrillation but recently stopped secondary to significant bleeding.  Patient was upset that they stopped his Coumadin.  States that he is clotted off his blood at his last 2 dialysis appointments.  Nurse notes it tried to switch him to Eliquis however he remains on Coumadin.  Nevertheless, patient states for approximately a month now he has had pain in his left foot.  Is progressed and now is red and has drainage.  He was evaluated by wound care yesterday because of changes noted in the left foot is recommended he present to the emergency department.  He presented last night and was evaluated.  Had MRI of his foot which showed no clear evidence of osteomyelitis or fracture but had significant soft tissue swelling.  Noted to have gangrenous left toes.  Dr. Bradshaw's been asked to evaluate.  Infectious disease also been consulted and seen.      Review of Systems  Pertinent items are noted in HPI.        History  Past Medical History:   Diagnosis Date   • Cellulitis    • CHF (congestive heart failure) (HCC)    • Chronic infection    • Degenerative disc disease, lumbar    • Diabetes mellitus (HCC)     • Hyperlipidemia    • Hypertension    • Myocardial infarction (HCC)    • Renal disorder    • Rotator cuff tear      Past Surgical History:   Procedure Laterality Date   • BRONCHOSCOPY N/A 4/12/2020    Procedure: BRONCHOSCOPY;  Surgeon: Fox Cervantes MD;  Location:  [facility];  Service: Pulmonary;  Laterality: N/A;   • CORONARY ARTERY BYPASS GRAFT     • ENDOSCOPY N/A 4/2/2020    Procedure: ESOPHAGOGASTRODUODENOSCOPY AT BEDSIDE;  Surgeon: Brunner, Mark I, MD;  Location:  [facility];  Service: Gastroenterology;  Laterality: N/A;   • ENDOSCOPY N/A 4/8/2020    Procedure: ESOPHAGOGASTRODUODENOSCOPY AT BEDSIDE;  Surgeon: Zhang Martinez MD;  Location:  [facility];  Service: Gastroenterology;  Laterality: N/A;  with gastric lavage using Edlich tube   • ENDOSCOPY N/A 4/8/2020    Procedure: ESOPHAGOGASTRODUODENOSCOPY AT BEDSIDE;  Surgeon: Zhang Martinez MD;  Location:  [facility];  Service: Gastroenterology;  Laterality: N/A;   • ENDOSCOPY N/A 4/9/2020    Procedure: ESOPHAGOGASTRODUODENOSCOPY AT BEDSIDE;  Surgeon: Zhang Martinez MD;  Location:  [facility];  Service: Gastroenterology;  Laterality: N/A;   • ENDOSCOPY N/A 4/9/2020    Procedure: ESOPHAGOGASTRODUODENOSCOPY AT BEDSIDE;  Surgeon: Zhang Martinez MD;  Location:  [facility];  Service: Gastroenterology;  Laterality: N/A;     Family History   Family history unknown: Yes     Social History     Tobacco Use   • Smoking status: Former Smoker   • Smokeless tobacco: Not on file   • Tobacco comment: used to smoke 2-3 ppd but quit shortly after heart bypass surgery   Substance Use Topics   • Alcohol use: Not Currently   • Drug use: Defer     (Not in a hospital admission)    Allergies:  Penicillins    Objective    Vital Signs  Temp:  [98.7 °F (37.1 °C)] 98.7 °F (37.1 °C)  Heart Rate:  [85] 85  Resp:  [18] 18  BP: ()/(40-74) 85/40    Physical Exam:  General Appearance: Well-developed obese no acute distress  Head: Atraumatic  normocephalic  Neck: Supple without mass or bruit noted  Lungs: Few scattered rales/rhonchi cleared largely with cough.  Distant.  Unlabored on room air  Heart: Regular rate and rhythm with a systolic murmur noted most prominent at the apex radiating to axilla  Abdomen: Distended obese, nontender positive high-pitched bowel sounds  Extremities: Significant peripheral edema bilateral lower extremity with scaly skin and erythema noted at the left foot with gangrenous toes 1 through 3 and distal aspect of the fourth toe and even at the base of the fifth toe.  Pulses: No palpable pulses but difficult to obtain secondary to swelling thickened skin and large pannus in the abdomen.  Skin: Scaly dry with superficial wound right anterior tibial ulcer  Neurologic: Moves all extremities.  Alert and oriented x3.  No focal deficit          Data Review:  Results from last 7 days   Lab Units 12/14/21  0612   WBC 10*3/mm3 4.40   HEMOGLOBIN g/dL 8.0*   HEMATOCRIT % 26.7*   PLATELETS 10*3/mm3 161     Results from last 7 days   Lab Units 12/14/21  0612   SODIUM mmol/L 139   POTASSIUM mmol/L 3.4*   CHLORIDE mmol/L 102   CO2 mmol/L 26.0   BUN mg/dL 22   CREATININE mg/dL 4.20*   GLUCOSE mg/dL 186*   CALCIUM mg/dL 7.9*     Coagulation:   INR   Date Value Ref Range Status   12/14/2021 1.39 (H) 0.85 - 1.16 Final               Imaging Results (Last 72 Hours)     Procedure Component Value Units Date/Time    MRI Foot Left Without Contrast [[id number]] Collected: 12/13/21 2312     Updated: 12/13/21 2314    Narrative:      MRI Foot LT WO    INDICATION:    Diabetes. Cellulitis of the lower extremity. Left foot wound infection for one month.    TECHNIQUE:   MRI of the left foot without IV contrast.    COMPARISON:    None available.    FINDINGS:  There is no evidence of acute fracture. There is no bone marrow edema identified to suggest acute osteomyelitis. No definite erosive changes are seen. There is generalized soft tissue edema about the foot,  particularly on the dorsal side. No loculated  abscess is seen allowing for the lack of contrast. There is also some edema in the plantar musculature, and myositis is not excluded. There is no clear evidence for septic arthritis. There is a mild degree of degenerative disease in the toes and mid  foot, as well as in the hindfoot. The sinus tarsus is within normal limits. Achilles tendon is normal.      Impression:        1. No clear evidence of osteomyelitis or septic arthritis. No fracture is identified.  2. Soft tissue edema in the foot, particularly on the dorsal side which may indicate cellulitis. Also, there is fairly diffuse muscle edema on the plantar side of the foot which may reflect some myositis as well. No evidence of a drainable abscess  allowing for the lack of IV contrast.    Signer Name: Hi Gonzales MD   Signed: 12/13/2021 11:12 PM   Workstation Name: [id number]    [facility]    XR Chest 1 View [[id number]] Collected: 12/13/21 2024     Updated: 12/13/21 2026    Narrative:      CR Chest 1 Vw    INDICATION:   Chest pain.     COMPARISON:    None available.    FINDINGS:  Portable AP view(s) of the chest.  The heart and mediastinal contours are normal. The lungs are clear. Distended bowel is noted underneath the diaphragms. No pneumothorax or pleural effusion.      Impression:      No acute cardiopulmonary findings. Distended bowel is noted underneath the diaphragm.    Signer Name: Shira Garcia MD   Signed: 12/13/2021 8:24 PM   Workstation Name: [id number]    [facility]            Assessment:    Gangrene of left foot and toes    Atrial fibrillation (HCC)    Type 2 diabetes mellitus (HCC)    Hypothyroid    WINSTON (obstructive sleep apnea), noncompliant w/ NIPPV    CAD (coronary artery disease)    CHF (congestive heart failure) (HCC)    Anemia    Dyslipidemia    ESRD (end stage renal disease) ([facility])    Hematuria    Acute UTI (urinary tract infection)    Lactic  acidosis    Gangrene (HCC)    Cellulitis of left foot        Plan:  Dr. Bradshaw aware of the consultation and saw the patient earlier this morning.  Patient will need IV antibiotics and wound care.  CT scan of his abdomen and pelvis without contrast were ordered for evaluation of ascites and distended abdomen.  With his decreased peripheral pulses we will do a noninvasive arterial duplex to evaluate vascular of the lower extremities.  If abnormal will need to consider CTA for further evaluation.  Addendum I have reviewed this case and agree with the above dictation.  I saw the patient briefly in the emergency department yesterday morning before Edward MANDEL did a complete consultation valuation.  Patient has gangrene of the great toe second toe third toe and beginning on the fourth toe with most likely severe peripheral vascular disease.  He is on dialysis end-stage renal disease.  He did not appear to be very cooperative to do what the physicians asked of him.  The case management involvement of this patient.  Very complicated situation with many comorbidities.  MINISTERIO Downs  12/14/21  08:25 EST          Electronically signed by Luís Bradshaw MD at 12/15/21 0687

## 2022-01-03 NOTE — PROGRESS NOTES
[facility]    Acute pain service Inpatient Progress Note    Patient Name: Chaes Obrien  :  1945  MRN:  [id number]        Acute Pain  Service Inpatient Progress Note:    Comments: Pt off the floor for dialysis. Spoke with pt's RN and states pt has been doing well with pain control and that nerve catheter has been running at the rate of 10mL/hr.

## 2022-01-03 NOTE — PROGRESS NOTES
"   LOS: 20 days    Patient Care Team:  Radha Purcell as PCP - General (Family Medicine)    Reason For Visit:  F/U ESRD  Subjective           Review of Systems:    Pulm: No soa   CV:  No CP      Objective     albumin human, , ,   albumin human, , ,   albumin human, , ,   aspirin, 81 mg, Oral, Daily  atorvastatin, 20 mg, Oral, Nightly  cefTAZidime (FORTAZ) 1 g in lidocaine PF 1% (XYLOCAINE), 1 g, Intramuscular, Once per day on Mon Wed Fri  Eluxadoline, 100 mg, Oral, BID With Meals  gabapentin, 300 mg, Oral, Nightly  heparin (porcine), 5,000 Units, Subcutaneous, Q12H  insulin detemir, 10 Units, Subcutaneous, Nightly  insulin lispro, 0-7 Units, Subcutaneous, TID AC  levothyroxine, 200 mcg, Oral, Q AM  linezolid, 600 mg, Oral, Q12H  methylnaltrexone, 6 mg, Subcutaneous, Every Other Day  metroNIDAZOLE, 500 mg, Oral, Q12H  sodium chloride, 10 mL, Intravenous, Q12H  warfarin, 5 mg, Oral, Daily      Pharmacy to dose warfarin,   ropivacaine (NAROPIN) 0.2% peripheral nerve cath (moog), 10 mL/hr, Last Rate: 10 mL/hr (01/02/22 2133)  sodium chloride, 5 mL/hr, Last Rate: Stopped (12/30/21 1135)          Vital Signs:  Blood pressure 95/58, pulse 75, temperature 98.1 °F (36.7 °C), temperature source Oral, resp. rate 16, height 193 cm (76\"), weight 104 kg (229 lb 14.4 oz), SpO2 95 %.    Flowsheet Rows      First Filed Value   Admission Height 193 cm (76\") Documented at 12/13/2021 1937   Admission Weight 129 kg (285 lb) Documented at 12/13/2021 1937 01/02 0701 - 01/03 0700  In: 830 [P.O.:830]  Out: 200 [Urine:200]    Physical Exam:    General Appearance: NAD, alert and cooperative, Ox3  Eyes: PER, conjunctivae and sclerae normal, no icterus  Lungs: respirations regular and unlabored, no crepitus, clear to auscultation  Heart/CV: regular rhythm & normal rate, no murmur, no gallop, no rub and TR edema  Abdomen: not distended, soft, non-tender, no masses,  bowel sounds present  Skin: No rash, Warm and dry. " AVF    Radiology:            Labs:  Results from last 7 days   Lab Units 12/31/21  1032 12/29/21  0946   WBC 10*3/mm3 10.84* 8.03   HEMOGLOBIN g/dL 7.3* 8.5*   HEMATOCRIT % 24.2* 28.4*   PLATELETS 10*3/mm3 203 245     Results from last 7 days   Lab Units 01/01/22  0742 12/31/21  1617 12/31/21  1032 12/29/21  0946 12/28/21  0831   SODIUM mmol/L 135*  --  134* 137 135*   POTASSIUM mmol/L 4.1  --  4.5 4.5 4.3   CHLORIDE mmol/L 98  --  97* 100 101   CO2 mmol/L 20.0*  --  20.0* 23.0 20.0*   BUN mg/dL 21  --  30* 33* 27*   CREATININE mg/dL 3.53*  --  4.49* 5.95* 4.57*   CALCIUM mg/dL 8.2*  --  8.1* 8.1* 8.1*   PHOSPHORUS mg/dL 2.6 2.8  --   --  3.7   ALBUMIN g/dL 3.60  --  3.40*  --  2.80*     Results from last 7 days   Lab Units 01/01/22  0742   GLUCOSE mg/dL 160*       Results from last 7 days   Lab Units 12/31/21  1032   ALK PHOS U/L 85   BILIRUBIN mg/dL 0.3   ALT (SGPT) U/L <5   AST (SGOT) U/L 10                 Estimated Creatinine Clearance: 26.6 mL/min (A) (by C-G formula based on SCr of 3.53 mg/dL (H)).      Assessment       Gangrene of left foot (HCC)    Atrial fibrillation (HCC)    Type 2 diabetes mellitus (HCC)    Hypothyroid    WINSTON (obstructive sleep apnea), noncompliant w/ NIPPV    CAD (coronary artery disease)    Anemia    Dyslipidemia    ESRD (end stage renal disease) (HCC)    Chronic diastolic CHF (congestive heart failure) (HCC)            Impression: ESRD. ANEMIA.             Recommendations: HD TODAY.      Xavi Lay MD  01/03/22  08:56 EST

## 2022-01-03 NOTE — PROGRESS NOTES
INFECTIOUS DISEASE Progress Note  Chase Obrien  1945  [id number]    Consult: 12/15/21  Admit date: 12/13/2021    Requesting Provider: Rosemarie Prescott DO  Evaluating physician: Brain Collado MD  Reason for Consultation: Left foot gangrene, MRSA  Chief Complaint: Above      Subjective   History of present illness:  Patient is a  76 y.o.  Yr old male with diabetes mellitus type 2, hypertension, hyperlipidemia, peripheral vascular disease, myocardial infarction, congestive heart failure, end-stage renal disease on hemodialysis Monday, Wednesday, Friday, atrial fibrillation (with recent disagreement on stopping his Coumadin), recent diagnosis of prostate and bladder cancer (with follow-up at  urology), with worsening left foot gangrenous changes (first through fourth toe) over the past 1 to 2 weeks since 12/1/2021.  The patient was admitted to [facility] on 12/13/2021.  MRI of the left foot without evidence of osteomyelitis or abscess.  I was consulted on 12/15/2021 for further evaluation and treatment.  Patient has no other localizing signs or symptoms of infection.    12/16/2021 history reviewed.  Occasional pain left foot.  No high fevers or chills.  On vancomycin, Fortaz, metronidazole renal dose adjusted.  On hemodialysis.    12/17/2021 history reviewed.  Left foot pain controlled.  On vancomycin, Fortaz, Flagyl awaiting further disposition by surgery.  Duration anticipated to be 2 weeks until 12/30 but depends on clinical course.  No fever.    12/18/2021 history reviewed.  Tolerated antibiotics renal dose adjusted awaiting his disposition by surgery.  Duration anticipated until 12/30.  Likely to need amputation of part of his lower extremity.  This is under discussion.  No fever.    12/20/2021 history reviewed.  Tolerating vancomycin, Fortaz, and Flagyl awaiting his surgical disposition of the treatment of gangrene of his left foot.  No fevers.  Pain controlled.    12/21/2021  history reviewed.  Patient has been holding off on surgical intervention.  Negotiating with general surgery.  Continues on antibiotics.  No high fever.    12/22/2021 history reviewed. The patient wants to wait on a BKA until after Luan. Receiving treatment with vancomycin, Fortaz, and Flagyl renal dose adjusted to continue until 3 to 5 days after his BKA. No high fevers.    12/23/2021 history reviewed. Tolerating antibiotics renal dose adjusted for left foot gangrene. Surgical disposition has been difficult since patient has refused.     12/24/2021 history reviewed.  On vancomycin, Fortaz, and metronidazole for left foot gangrene, awaiting a surgical disposition.  Antibiotics were to continue for 3 to 5 days after amputation.  Patient has been refusing amputation dates.  No fever.    1/3/2022 history reviewed.  Status post left AKA on 12/30/2021.  Antibiotics to stop.  No high fever or chills.    Past Medical History:   Diagnosis Date   • Cellulitis    • CHF (congestive heart failure) (HCC)    • Chronic infection    • Degenerative disc disease, lumbar    • Diabetes mellitus (HCC)    • Hyperlipidemia    • Hypertension    • Myocardial infarction (HCC)    • Renal disorder    • Rotator cuff tear        Past Surgical History:   Procedure Laterality Date   • ABOVE KNEE AMPUTATION Left 12/30/2021    Procedure: AMPUTATION ABOVE KNEE LEFT;  Surgeon: Luís Bradshaw MD;  Location: [facility] OR;  Service: General;  Laterality: Left;   • BRONCHOSCOPY N/A 4/12/2020    Procedure: BRONCHOSCOPY;  Surgeon: Fox Cervantes MD;  Location: [facility] ENDOSCOPY;  Service: Pulmonary;  Laterality: N/A;   • CORONARY ARTERY BYPASS GRAFT     • ENDOSCOPY N/A 4/2/2020    Procedure: ESOPHAGOGASTRODUODENOSCOPY AT BEDSIDE;  Surgeon: Brunner, Mark I, MD;  Location:  [facility]COPY;  Service: Gastroenterology;  Laterality: N/A;   • ENDOSCOPY N/A 4/8/2020    Procedure: ESOPHAGOGASTRODUODENOSCOPY AT BEDSIDE;  Surgeon: Zhang Martinez MD;  Location:   [facility];  Service: Gastroenterology;  Laterality: N/A;  with gastric lavage using Edlich tube   • ENDOSCOPY N/A 4/8/2020    Procedure: ESOPHAGOGASTRODUODENOSCOPY AT BEDSIDE;  Surgeon: Zhang Martinez MD;  Location:  [facility];  Service: Gastroenterology;  Laterality: N/A;   • ENDOSCOPY N/A 4/9/2020    Procedure: ESOPHAGOGASTRODUODENOSCOPY AT BEDSIDE;  Surgeon: Zhang Martinez MD;  Location:  [facility];  Service: Gastroenterology;  Laterality: N/A;   • ENDOSCOPY N/A 4/9/2020    Procedure: ESOPHAGOGASTRODUODENOSCOPY AT BEDSIDE;  Surgeon: Zhang Martinez MD;  Location:  [facility];  Service: Gastroenterology;  Laterality: N/A;       Pediatric History   Patient Parents   • Not on file     Other Topics Concern   • Not on file   Social History Narrative    Lives with girl friend        Family history is unknown by patient.    Allergies   Allergen Reactions   • Penicillins Other (See Comments) and Unknown - Low Severity     Received ceftriaxone 2016         There is no immunization history on file for this patient.    Medication:    Current Facility-Administered Medications:   •  acetaminophen (TYLENOL) tablet 650 mg, 650 mg, Oral, Q4H PRN **OR** acetaminophen (TYLENOL) suppository 650 mg, 650 mg, Rectal, Q4H PRN, Natalie Son PA-C  •  albumin human 25 % IV SOLN  - ADS Override Pull, , , ,   •  albumin human 25 % IV SOLN  - ADS Override Pull, , , ,   •  albumin human 25 % IV SOLN  - ADS Override Pull, , , ,   •  albumin human 25 % IV SOLN  - ADS Override Pull, , , ,   •  aspirin chewable tablet 81 mg, 81 mg, Oral, Daily, Natalie Son PA-C, 81 mg at 01/03/22 0803  •  atorvastatin (LIPITOR) tablet 20 mg, 20 mg, Oral, Nightly, Natalie Son PA-C, 20 mg at 01/02/22 2009  •  bisacodyl (DULCOLAX) suppository 10 mg, 10 mg, Rectal, Daily PRN, Natalie Son PA-C  •  cefTAZidime (FORTAZ) 1 g in lidocaine PF 1% (XYLOCAINE), 1 g, Intramuscular, Once per day on Mon Wed Fri, [place],  Kehinde June MD, 1 g at 12/31/21 1620  •  dextrose (D50W) (25 g/50 mL) IV injection 25 g, 25 g, Intravenous, Q15 Min PRN, Natalie Son PA-C  •  dextrose (GLUTOSE) oral gel 15 g, 15 g, Oral, Q15 Min PRN, Natalie Son PA-C  •  docusate sodium (COLACE) capsule 100 mg, 100 mg, Oral, BID PRN, Natalie Son PA-C, 100 mg at 12/31/21 0900  •  Eluxadoline (Viberzi) tablet 100 mg **PATIENT SUPPLIED**, 100 mg, Oral, BID With Meals, Luli Dunlap DO, 100 mg at 01/03/22 0803  •  gabapentin (NEURONTIN) capsule 300 mg, 300 mg, Oral, Nightly, Luli Dunlap DO, 300 mg at 01/02/22 2009  •  glucagon (human recombinant) (GLUCAGEN DIAGNOSTIC) injection 1 mg, 1 mg, Subcutaneous, Q15 Min PRN, Natalie Son PA-C  •  heparin (porcine) 5000 UNIT/ML injection 5,000 Units, 5,000 Units, Subcutaneous, Q12H, Natalie Son PA-C, 5,000 Units at 01/03/22 0802  •  HYDROcodone-acetaminophen (NORCO)  MG per tablet 1 tablet, 1 tablet, Oral, Q4H PRN, Natalie Son PA-C, 1 tablet at 01/03/22 0852  •  insulin detemir (LEVEMIR) injection 10 Units, 10 Units, Subcutaneous, Nightly, Luli Dunlap, DO, 10 Units at 01/02/22 2010  •  insulin lispro (humaLOG) injection 0-7 Units, 0-7 Units, Subcutaneous, TID AC, Natalie Son PA-C, 2 Units at 01/01/22 1214  •  levothyroxine (SYNTHROID, LEVOTHROID) tablet 200 mcg, 200 mcg, Oral, Q AM, Natalie Son PA-C, 200 mcg at 01/03/22 0550  •  lidocaine (LMX) 4 % cream 1 application, 1 application, Topical, PRN, Natalie Son PA-C, 1 application at 01/03/22 0852  •  linezolid (ZYVOX) tablet 600 mg, 600 mg, Oral, Q12H, Kehinde Kemp MD, 600 mg at 01/03/22 0803  •  melatonin tablet 5 mg, 5 mg, Oral, Nightly PRN, Natalie Son PA-C, 5 mg at 12/26/21 2010  •  methylnaltrexone (RELISTOR) injection 6 mg, 6 mg, Subcutaneous, Every Other Day, Luli Dunlap DO, 6 mg at 01/02/22 1435  •  metroNIDAZOLE (FLAGYL) tablet 500 mg, 500 mg, Oral,  Q12H, Kehinde Kemp MD, 500 mg at 22 0803  •  [] morphine injection 1 mg, 1 mg, Intravenous, Q4H PRN, 1 mg at 12/15/21 1125 **AND** naloxone (NARCAN) injection 0.4 mg, 0.4 mg, Intravenous, Q5 Min PRN, Natalie Son PA-FRANCK  •  ondansetron (ZOFRAN) tablet 4 mg, 4 mg, Oral, Q6H PRN, 4 mg at 21 0900 **OR** ondansetron (ZOFRAN) injection 4 mg, 4 mg, Intravenous, Q6H PRN, Natalie oSn PA-FRANCK  •  Pharmacy to dose warfarin, , Does not apply, Continuous PRN, Xavi Lay MD  •  ropivacaine (NAROPIN) 0.2% peripheral nerve cath (moog), 10 mL/hr, Peripheral Nerve, Continuous, Natalie Son PA-FRANCK, Last Rate: 10 mL/hr at 22, 10 mL/hr at 22  •  sennosides-docusate (PERICOLACE) 8.6-50 MG per tablet 2 tablet, 2 tablet, Oral, BID PRN, Natalie Son PA-C, 2 tablet at 21 0900  •  [COMPLETED] Insert peripheral IV, , , Once **AND** sodium chloride 0.9 % flush 10 mL, 10 mL, Intravenous, PRN, Natalie Son PA-C  •  sodium chloride 0.9 % flush 10 mL, 10 mL, Intravenous, Q12H, Natalie Son PA-C, 10 mL at 22 0804  •  sodium chloride 0.9 % flush 10 mL, 10 mL, Intravenous, PRN, Natalie Son PA-C  •  sodium chloride 0.9 % infusion, 5 mL/hr, Intravenous, Continuous, Natalie Son PA-C, Stopped at 21 1135  •  warfarin (COUMADIN) tablet 5 mg, 5 mg, Oral, Daily, Xavi Lay MD, 5 mg at 22 0547    Please refer to the medical record for a full medication list    Review of Systems:    Constitutional--low-grade fever, no chills or sweats.  Appetite good, and no malaise. No fatigue.  HEENT-- No new vision, hearing or throat complaints.  No epistaxis or oral sores.  Denies odynophagia or dysphagia.  No odynophagia or dysphagia. No headache, photophobia or neck stiffness.  CV-- No chest pain, palpitation or syncope  Resp-- No SOB/cough/Hemoptysis  GI- No nausea, vomiting, or diarrhea.  No hematochezia, melena, or  "hematemesis. Denies jaundice or chronic liver disease.  Lymph- no swollen lymph nodes in neck/axilla or groin.   Heme- No active bruising or bleeding; no Hx of DVT or PE.  MS-- no swelling or pain in the bones or joints of arms/legs.  No new back pain.  Neuro-- No acute focal weakness or numbness in the arms or legs.  No seizures.  Skin--No rashes or lesions, except as per HPI.  No nodules.    Physical Exam:   Vital Signs   Temp:  [97.8 °F (36.6 °C)-98.3 °F (36.8 °C)] 98.1 °F (36.7 °C)  Heart Rate:  [73-78] 75  Resp:  [16] 16  BP: ()/(57-72) 95/58    Temp  Min: 97.8 °F (36.6 °C)  Max: 98.3 °F (36.8 °C)  BP  Min: 95/58  Max: 112/72  Pulse  Min: 73  Max: 78  Resp  Min: 16  Max: 16  SpO2  Min: 93 %  Max: 100 %    Blood pressure 95/58, pulse 75, temperature 98.1 °F (36.7 °C), temperature source Oral, resp. rate 16, height 193 cm (76\"), weight 104 kg (229 lb 14.4 oz), SpO2 95 %.  GENERAL: Awake and alert, in no acute distress. Appears older than stated age.    HEENT:  Normocephalic, atraumatic.  Oropharynx without thrush.  No cervical adenopathy. No neck masses.  Ears externally normal, Nose externally normal.  EYES: No conjunctival injection. No icterus. EOM full.  LYMPHATICS: No lymphadenopathy of the neck or axillary or inguinal regions.   HEART: No murmur, gallop, or pericardial friction rub. Reg rate rhythm.  LUNGS: Clear to auscultation and percussion. No respiratory distress, no use of accessory muscles.  No wheezes.  ABDOMEN: Soft, nontender, nondistended. No appreciable HSM.  Bowel sounds normal.  Obese.  SKIN: Warm and dry without cutaneous eruptions.  AKA left is healing.  PSYCHIATRIC: Mental status lucid. No confusion.  EXT:  No cellulitic change.  NEURO: Oriented to name, nonfocal    Results Review:   I reviewed the patient's new clinical results.  I reviewed the patient's new imaging results and agree with the interpretation.  I reviewed the patient's other test results and agree with the " interpretation    Results from last 7 days   Lab Units 12/31/21  1032 12/29/21  0946   WBC 10*3/mm3 10.84* 8.03   HEMOGLOBIN g/dL 7.3* 8.5*   HEMATOCRIT % 24.2* 28.4*   PLATELETS 10*3/mm3 203 245     Results from last 7 days   Lab Units 01/01/22  0742   SODIUM mmol/L 135*   POTASSIUM mmol/L 4.1   CHLORIDE mmol/L 98   CO2 mmol/L 20.0*   BUN mg/dL 21   CREATININE mg/dL 3.53*   GLUCOSE mg/dL 160*   CALCIUM mg/dL 8.2*     Results from last 7 days   Lab Units 12/31/21  1032   ALK PHOS U/L 85   BILIRUBIN mg/dL 0.3   ALT (SGPT) U/L <5   AST (SGOT) U/L 10                     Estimated Creatinine Clearance: 26.6 mL/min (A) (by C-G formula based on SCr of 3.53 mg/dL (H)).    Microbiology:  Microbiology Results Abnormal     Procedure Component Value - Date/Time    Blood Culture - Blood, Arm, Right [[id number]]  (Normal) Collected: 12/13/21 2010    Lab Status: Final result Specimen: Blood from Arm, Right Updated: 12/18/21 2045     Blood Culture No growth at 5 days    Blood Culture - Blood, Arm, Right [[id number]]  (Normal) Collected: 12/13/21 2000    Lab Status: Final result Specimen: Blood from Arm, Right Updated: 12/18/21 2045     Blood Culture No growth at 5 days    Urine Culture - Urine, Urine, Catheter [[id number]] Collected: 12/13/21 2006    Lab Status: Final result Specimen: Urine, Catheter Updated: 12/14/21 1710     Urine Culture >100,000 CFU/mL Mixed Harrison Isolated    Narrative:      Specimen contains mixed organisms of questionable pathogenicity which indicates contamination with commensal harrison.  Further identification is unlikely to provide clinically useful information.  Suggest recollection.    COVID PRE-OP / PRE-PROCEDURE SCREENING ORDER (NO ISOLATION) - Swab, Nasopharynx [[id number]]  (Normal) Collected: 12/13/21 1954    Lab Status: Final result Specimen: Swab from Nasopharynx Updated: 12/13/21 2031    Narrative:      The following orders were created for panel order COVID PRE-OP / PRE-PROCEDURE SCREENING ORDER (NO  ISOLATION) - Swab, Nasopharynx.  Procedure                               Abnormality         Status                     ---------                               -----------         ------                     COVID-19 and FLU A/B PCR...[[id number]]  Normal              Final result                 Please view results for these tests on the individual orders.    COVID-19 and FLU A/B PCR - Swab, Nasopharynx [[id number]]  (Normal) Collected: 12/13/21 1954    Lab Status: Final result Specimen: Swab from Nasopharynx Updated: 12/13/21 2031     COVID19 Not Detected     Influenza A PCR Not Detected     Influenza B PCR Not Detected    Narrative:      Fact sheet for providers: https://www.fda.gov/media/962263/download    Fact sheet for patients: https://www.fda.gov/media/461133/download    Test performed by PCR.          Radiology:  Imaging Results (Last 72 Hours)     ** No results found for the last 72 hours. **          IMPRESSION:     1.  Left foot cellulitis with gangrene, with dry gangrene to left first through fourth toes, without MRI evidence of underlying osteomyelitis done on 12/13.  Cultures MRSA from left foot from 12/16.  Status post left AKA 12/30/2021, resolved.  2.  Atrial fibrillation with complex regarding his anticoagulation between patient and his providers.  3.  End-stage renal disease on hemodialysis Monday, Wednesday, Friday, with some noncompliance.  4.  Diabetes mellitus type 2 with increased risk for infection and likely associated with his initial left foot problem.  Hemoglobin A1c 5.50 on 12/14.  5.  Stage II coccyx decubitus present on admission.  Right heel deep tissue injury.  Lower extremities with venous ulcerations right leg 2 x 3 cm, left leg with ulcer over it.  6.  Anemia, chronic disease and related to his renal failure.  Slightly worse.  7.  Hypoalbuminemia, 2.80.  8.  Elevated alkaline phosphatase 135.  9.  Hypocalcemia 8.2.    Improved on current regimen.    Plan:    1.  Diagnostically,  continue to follow patient's physical exam, CBC, CMP, CRP.  The patient refusing vascular studies.  2.  Therapeutically, discontinue linezolid, ceftazidime, and Flagyl on 1/3/2022, status post his left AKA.  3.  Supportive care.  Surgery is following.  Left AKA on 12/30/2021.    I discussed the patient's findings and my recommendations with patient and nursing staff    Our group would be pleased to follow this patient over the course of their hospitalization and assist with outpatient antimicrobial therapy, as indicated.  Further recommendations depend on the results of the cultures and clinical course.     Brian Collado MD  1/3/2022

## 2022-01-03 NOTE — PLAN OF CARE
Goal Outcome Evaluation:            Pain well controlled with ropivicaine, pt asleep in chair most of the day, attempted standing up to assess  coccyx area, very limited mobility, pt agreed to get back in the bed this evening and reposition off sacral area, noncompliant with diet, 150ml bloody urine output in f/c. NO BM today, started on relistor, VSS, remains on RA, cont to monitor

## 2022-01-03 NOTE — THERAPY TREATMENT NOTE
Patient Name: Chase Obrien  : 1945    MRN: [id number]                              Today's Date: 1/3/2022       Admit Date: 2021    Visit Dx:     ICD-10-CM ICD-9-CM   1. Cellulitis of left foot  L03.116 682.7   2. Gangrene of toe of left foot ([facility])  I96 785.4   3. Elevated lactic acid level  R79.89 276.2   4. Recurrent UTI  N39.0 599.0   5. History of bladder cancer  Z85.51 V10.51   6. History of prostate cancer  Z85.46 V10.46   7. ESRD on hemodialysis ([facility])  N18.6 585.6    Z99.2 V45.11   8. History of GI bleed  Z87.19 V12.79   9. PAF (paroxysmal atrial fibrillation) ([facility])  I48.0 427.31   10. History of CHF (congestive heart failure)  Z86.79 V12.59   11. History of diabetes mellitus  Z86.39 V12.29   12. Anemia of chronic renal failure, stage 5 ([facility])  N18.5 285.21    D63.1 585.5   13. Gangrene of left foot ([facility])  I96 785.4     Patient Active Problem List   Diagnosis   • Cellulitis   • BALDO (acute kidney injury) ([facility])   • Atrial fibrillation ([facility])   • Type 2 diabetes mellitus ([facility])   • Hypothyroid   • Chronic kidney disease, stage IV (severe) ([facility])   • WINSTON (obstructive sleep apnea), noncompliant w/ NIPPV   • CAD (coronary artery disease)   • Anemia   • Dyslipidemia   • Tobacco abuse disorder   • ESRD (end stage renal disease) ([facility])   • Elaine's syndrome/ pseudocolonic obstruction   • Chronic diastolic CHF (congestive heart failure) ([facility])   • Acute blood loss anemia   • Noncompliance with recommended medical treatment   • Gangrene of left foot ([facility])     Past Medical History:   Diagnosis Date   • Cellulitis    • CHF (congestive heart failure) ([facility])    • Chronic infection    • Degenerative disc disease, lumbar    • Diabetes mellitus ([facility])    • Hyperlipidemia    • Hypertension    • Myocardial infarction ([facility])    • Renal disorder    • Rotator cuff tear      Past Surgical History:   Procedure Laterality Date   • ABOVE KNEE AMPUTATION Left 2021    Procedure: AMPUTATION ABOVE KNEE LEFT;  Surgeon: Augustine  Luís BATISTA MD;  Location:  [facility];  Service: General;  Laterality: Left;   • BRONCHOSCOPY N/A 4/12/2020    Procedure: BRONCHOSCOPY;  Surgeon: Fox Cervantes MD;  Location:  [facility];  Service: Pulmonary;  Laterality: N/A;   • CORONARY ARTERY BYPASS GRAFT     • ENDOSCOPY N/A 4/2/2020    Procedure: ESOPHAGOGASTRODUODENOSCOPY AT BEDSIDE;  Surgeon: Brunner, Mark I, MD;  Location:  [facility];  Service: Gastroenterology;  Laterality: N/A;   • ENDOSCOPY N/A 4/8/2020    Procedure: ESOPHAGOGASTRODUODENOSCOPY AT BEDSIDE;  Surgeon: Zhang Martinez MD;  Location:  [facility];  Service: Gastroenterology;  Laterality: N/A;  with gastric lavage using Edlich tube   • ENDOSCOPY N/A 4/8/2020    Procedure: ESOPHAGOGASTRODUODENOSCOPY AT BEDSIDE;  Surgeon: Zhang Martinez MD;  Location:  [facility];  Service: Gastroenterology;  Laterality: N/A;   • ENDOSCOPY N/A 4/9/2020    Procedure: ESOPHAGOGASTRODUODENOSCOPY AT BEDSIDE;  Surgeon: Zhang Martinez MD;  Location:  [facility];  Service: Gastroenterology;  Laterality: N/A;   • ENDOSCOPY N/A 4/9/2020    Procedure: ESOPHAGOGASTRODUODENOSCOPY AT BEDSIDE;  Surgeon: Zhang Martinez MD;  Location:  [facility];  Service: Gastroenterology;  Laterality: N/A;      General Information     Row Name 01/03/22 1430          Physical Therapy Time and Intention    Document Type therapy note (daily note)  -     Mode of Treatment physical therapy  -     Row Name 01/03/22 1430          General Information    Existing Precautions/Restrictions fall; non-weight bearing; left  L AKA 12/30, femoral nerve catheter  -     Row Name 01/03/22 1430          Cognition    Orientation Status (Cognition) oriented x 3  -     Row Name 01/03/22 1430          Safety Issues, Functional Mobility    Safety Issues Affecting Function (Mobility) awareness of need for assistance; safety precaution awareness; safety precautions follow-through/compliance; insight into  deficits/self-awareness; sequencing abilities  -     Impairments Affecting Function (Mobility) balance; endurance/activity tolerance; pain; strength  -           User Key  (r) = Recorded By, (t) = Taken By, (c) = Cosigned By    Initials Name Provider Type    Asad Sullivan, PT Physical Therapist               Mobility     Row Name 01/03/22 1430          Bed Mobility    Bed Mobility rolling left; rolling right  -ELLIOTT     Rolling Left [place] (Bed Mobility) verbal cues; minimum assist (75% patient effort)  -ELLIOTT     Rolling Right [place] (Bed Mobility) verbal cues; contact guard  -     Assistive Device (Bed Mobility) bed rails  -ELLIOTT     Comment (Bed Mobility) Rolling R/L for sling placement under pt  -     Row Name 01/03/22 1430          Transfers    Comment (Transfers) Mechanical lift used to transfer pt from bed to chair with dep A; pt just done with dialysis, deferring STS due to feeling fatigued and weak  -     Row Name 01/03/22 1430          Bed-Chair Transfer    Bed-Chair [place] (Transfers) dependent (less than 25% patient effort)  -     Assistive Device (Bed-Chair Transfers) lift device  -     Row Name 01/03/22 1430          Sit-Stand Transfer    Sit-Stand [place] (Transfers) unable to assess  -     Row Name 01/03/22 1430          Gait/Stairs (Locomotion)    [place] Level (Gait) unable to assess  -     [place] Level (Stairs) not tested  -     Comment (Gait/Stairs) Not appropriate at this time  -           User Key  (r) = Recorded By, (t) = Taken By, (c) = Cosigned By    Initials Name Provider Type    Asad Sullivan PT Physical Therapist               Obj/Interventions     Row Name 01/03/22 1430          Motor Skills    Therapeutic Exercise hip; knee; ankle  -     Row Name 01/03/22 1430          Hip (Therapeutic Exercise)    Hip (Therapeutic Exercise) isometric exercises; strengthening exercise  -     Hip AROM (Therapeutic Exercise) aBduction; aDduction; 10  repetitions; bilateral  -     Hip Isometrics (Therapeutic Exercise) gluteal sets; 10 repetitions  -[facility] Name 01/03/22 1430          Knee (Therapeutic Exercise)    Knee (Therapeutic Exercise) isometric exercises; strengthening exercise  -     Knee Isometrics (Therapeutic Exercise) right; quad sets; 10 repetitions  -     Knee Strengthening (Therapeutic Exercise) right; heel slides; SLR (straight leg raise); 10 repetitions  -[facility] Name 01/03/22 1430          Ankle (Therapeutic Exercise)    Ankle (Therapeutic Exercise) AAROM (active assistive range of motion)  -     Ankle AAROM (Therapeutic Exercise) right; dorsiflexion; plantarflexion; 10 repetitions  -           User Key  (r) = Recorded By, (t) = Taken By, (c) = Cosigned By    Initials Name Provider Type    Asad Sullivan, PT Physical Therapist               Goals/Plan    No documentation.                Clinical Impression     [facility] Name 01/03/22 1430          Pain    Additional Documentation Pain Scale: Numbers Pre/Post-Treatment (Group)  -ELLIOTT     Row Name 01/03/22 1430          Pain Scale: Numbers Pre/Post-Treatment    Pretreatment Pain Rating 8/10  -     Posttreatment Pain Rating 8/10  -     Pain Location - Side Left  -     Pain Location residual limb  -     Pain Intervention(s) Repositioned  -[facility] Name 01/03/22 1430          Therapy Assessment/Plan (PT)    Rehab Potential (PT) fair, will monitor progress closely  -     Criteria for Skilled Interventions Met (PT) yes; meets criteria; skilled treatment is necessary  -[facility] Name 01/03/22 1430          Positioning and Restraints    Pre-Treatment Position in bed  -     Post Treatment Position chair  -     In Chair notified nsg; reclined; call light within reach; encouraged to call for assist; exit alarm on; legs elevated; on mechanical lift sling; waffle cushion  -           User Key  (r) = Recorded By, (t) = Taken By, (c) = Cosigned By    Initials Name Provider Type    ELLIOTT  Asad Marx, PT Physical Therapist               Outcome Measures     Row Name 01/03/22 1430 01/03/22 0800       How much help from another person do you currently need...    Turning from your back to your side while in flat bed without using bedrails? 2  -ELLIOTT 2  -TB    Moving from lying on back to sitting on the side of a flat bed without bedrails? 2  -ELLIOTT 2  -TB    Moving to and from a bed to a chair (including a wheelchair)? 1  -ELLIOTT 1  -TB    Standing up from a chair using your arms (e.g., wheelchair, bedside chair)? 1  -ELLIOTT 1  -TB    Climbing 3-5 steps with a railing? 1  -ELLIOTT 1  -TB    To walk in hospital room? 1  -ELLIOTT 1  -TB    AM-PAC 6 Clicks Score (PT) 8  -ELLIOTT 8  -TB    Row Name 01/03/22 1430          Functional Assessment    Outcome Measure Options AM-PAC 6 Clicks Basic Mobility (PT)  -ELLIOTT           User Key  (r) = Recorded By, (t) = Taken By, (c) = Cosigned By    Initials Name Provider Type    TB Tor Yung, RN Registered Nurse    Asad Sullivan, PT Physical Therapist                             Physical Therapy Education                 Title: PT OT SLP Therapies (Done)     Topic: Physical Therapy (Done)     Point: Mobility training (Done)     Learning Progress Summary           Patient Acceptance, E,D, VU by ELLIOTT at 1/3/2022 1430    Comment: Educated on safe sequencing with bed mobility. Reviewed HEP and NWB precautions.      Show all documentation for this point (5)                 Point: Home exercise program (Done)     Learning Progress Summary           Patient Acceptance, E,D, VU by ELLIOTT at 1/3/2022 1430    Comment: Educated on safe sequencing with bed mobility. Reviewed HEP and NWB precautions.      Show all documentation for this point (4)                 Point: Body mechanics (Done)     Learning Progress Summary           Patient Acceptance, E,D, VU by ELLIOTT at 1/3/2022 1430    Comment: Educated on safe sequencing with bed mobility. Reviewed HEP and NWB precautions.      Show all documentation for this point  (5)                 Point: Precautions (Done)     Learning Progress Summary           Patient Acceptance, E,D, VU by ELLIOTT at 1/3/2022 1430    Comment: Educated on safe sequencing with bed mobility. Reviewed HEP and NWB precautions.      Show all documentation for this point (5)                             User Key     Initials Effective Dates Name Provider Type Discipline    ELLIOTT 06/16/21 -  Asad Marx, PT Physical Therapist PT              PT Recommendation and Plan     Plan of Care Reviewed With: patient  Progress: no change  Outcome Summary: Pt requiring CGA-min A for rolling R/L for sling placement. Dep A transfer using mechanical lift from bed to chair. Pt deferring STS attempt due to fatigue and weakness from dialysis treatment. Reviewed HEP and NWB precautions. Will continue to progresss strength and mobility as able.     Time Calculation:    PT Charges     Row Name 01/03/22 1430             Time Calculation    Start Time 1430  -      PT Received On 01/03/22  -      PT Goal Re-Cert Due Date 01/11/22  -              Time Calculation- PT    Total Timed Code Minutes- PT 14 minute(s)  -              Timed Charges    29388 - PT Therapeutic Exercise Minutes 11  -ELLIOTT      32599 - PT Therapeutic Activity Minutes 3  -ELLIOTT              Total Minutes    Timed Charges Total Minutes 14  -ELLIOTT       Total Minutes 14  -ELLIOTT            User Key  (r) = Recorded By, (t) = Taken By, (c) = Cosigned By    Initials Name Provider Type    ELLIOTT Asad Marx, PT Physical Therapist              Therapy Charges for Today     Code Description Service Date Service Provider Modifiers Qty    00782175210 HC PT THER PROC EA 15 MIN 1/3/2022 Asad Marx, PT GP 1    56249605543 HC PT THER SUPP EA 15 MIN 1/3/2022 Asad Marx, PT GP 3          PT G-Codes  Outcome Measure Options: AM-PAC 6 Clicks Basic Mobility (PT)  AM-PAC 6 Clicks Score (PT): 8  AM-PAC 6 Clicks Score (OT): 15    Asad Marx PT  1/3/2022

## 2022-01-04 NOTE — CASE MANAGEMENT/SOCIAL WORK
Continued Stay Note  [facility]     Patient Name: Chase Obrien  MRN: [id number]  Today's Date: 1/4/2022    Admit Date: 12/13/2021     Discharge Plan     Row Name 01/04/22 1536       Plan    Plan TBD    Plan Comments Case mgt f/u. I spoke with admissions coordinator at [facility] ( Kayce), she informs me they will not accept Mr Obrien back. I have refered to [facility] ( the only other SNF in [place]). I spoke with Zen[facility] who accepted referral.Await call back. Discussed with Mr Obrien and his girlfriend and POA ( Naida)               Discharge Codes    No documentation.               Expected Discharge Date and Time     Expected Discharge Date Expected Discharge Time    Jan 4, 2022             Sonja C Kellerman, RN

## 2022-01-04 NOTE — PROGRESS NOTES
[facility]    Acute pain service Inpatient Progress Note    Patient Name: Chase Obrien  :  1945  MRN:  [id number]        Acute Pain  Service Inpatient Progress Note:    Analgesia:Good  Pain Score:2/10  LOC: alert and awake  Resp Status: room air  Cardiac: VS stable  Side Effects:None  Catheter Site:clean, dressing intact and dry  Cath type: peripheral nerve cath(MOOG pump)  Infusion rate: 10ml/hr  Dosing/Volume: ropivacaine 0.2%  Catheter Plan:Catheter to remain Insitu and Continue catheter infusion rate unchanged  Comments: Upon assessment, patient was asleep. Continue MOOg pump at 10 ml/hr

## 2022-01-04 NOTE — PLAN OF CARE
Problem: Adult Inpatient Plan of Care  Goal: Plan of Care Review  Flowsheets (Taken 1/4/2022 1640)  Progress: improving  Plan of Care Reviewed With: patient  Outcome Summary: Pt attempted STS 2 times today, requiring mod Ax2 and bilateral UE support to acheive 75% stand. Unable to fully extend hips for stand despite tactile/verbal cueing. Required ~3 min rest between attempts. Not appropriate for gait at this time. Reviewed HEP and NWB precautions. Will continue to progress strength and mobility as able.   Goal Outcome Evaluation:  Plan of Care Reviewed With: patient        Progress: improving  Outcome Summary: Pt attempted STS 2 times today, requiring mod Ax2 and bilateral UE support to acheive 75% stand. Unable to fully extend hips for stand despite tactile/verbal cueing. Required ~3 min rest between attempts. Not appropriate for gait at this time. Reviewed HEP and NWB precautions. Will continue to progress strength and mobility as able.

## 2022-01-04 NOTE — PROGRESS NOTES
"    [facility] Medicine Services  PROGRESS NOTE    Patient Name: Chase Obrien  : 1945  MRN: [id number]    Date of Admission: 2021  Primary Care Physician: Radha Purcell    Subjective   Subjective     CC:  Sepsis, left AKA    HPI:  Distended abdomen, \"gassy\".  No BM with Relistor first dose yesterday.     ROS:  Gen- No fevers, chills  CV- No chest pain, palpitations  Resp- No cough, dyspnea  GI- No N/V/D. (+)distention and constipation    Objective   Objective     Vital Signs:   Temp:  [97.8 °F (36.6 °C)-98.5 °F (36.9 °C)] 98.5 °F (36.9 °C)  Heart Rate:  [73-88] 81  Resp:  [16-20] 18  BP: ()/(37-77) 84/51     Physical Exam:  Constitutional: No acute distress, awake, alert  Respiratory: Respiratory effort normal on RA  Cardiovascular: NSR tele  Gastrointestinal: Obese, nontender, tympanic and softly distended  Musculoskeletal: No bilateral ankle edema, left AKA  Psychiatric: Appropriate affect, cooperative    Results Reviewed:  LAB RESULTS:      Lab 22  0703 22  0935 22  0742 21  1617 21  1032 21  0946   WBC  --   --   --   --  10.84* 8.03   HEMOGLOBIN  --   --   --   --  7.3* 8.5*   HEMATOCRIT  --   --   --   --  24.2* 28.4*   PLATELETS  --   --   --   --  203 245   NEUTROS ABS  --   --   --   --  9.55* 5.54   IMMATURE GRANS (ABS)  --   --   --   --  0.10* 0.08*   LYMPHS ABS  --   --   --   --  0.56* 1.47   MONOS ABS  --   --   --   --  0.63 0.77   EOS ABS  --   --   --   --  0.00 0.13   MCV  --   --   --   --  99.6* 97.6*   PROTIME 16.5* 15.7* 16.5* 16.7*  --  18.4*         Lab 22  0742 21  1617 21  1032 21  0946 21  0831   SODIUM 135*  --  134* 137 135*   POTASSIUM 4.1  --  4.5 4.5 4.3   CHLORIDE 98  --  97* 100 101   CO2 20.0*  --  20.0* 23.0 20.0*   ANION GAP 17.0*  --  17.0* 14.0 14.0   BUN 21  --  30* 33* 27*   CREATININE 3.53*  --  4.49* 5.95* 4.57*   GLUCOSE 160*  --  286* 113* 129*   CALCIUM 8.2*  " --  8.1* 8.1* 8.1*   PHOSPHORUS 2.6 2.8  --   --  3.7         Lab 01/01/22  0742 12/31/21  1032 12/28/21  0831   TOTAL PROTEIN  --  6.8  --    ALBUMIN 3.60 3.40* 2.80*   GLOBULIN  --  3.4  --    ALT (SGPT)  --  <5  --    AST (SGOT)  --  10  --    BILIRUBIN  --  0.3  --    ALK PHOS  --  85  --          Lab 01/03/22  0703 01/02/22  0935 01/01/22  0742 12/31/21  1617 12/29/21  0946   PROTIME 16.5* 15.7* 16.5* 16.7* 18.4*   INR 1.38* 1.29* 1.38* 1.40* 1.59*             Lab 12/28/21  0833   ABO TYPING B   RH TYPING Positive   ANTIBODY SCREEN Negative         Brief Urine Lab Results  (Last result in the past 365 days)      Color   Clarity   Blood   Leuk Est   Nitrite   Protein   CREAT   Urine HCG        12/13/21 2006 Yellow   Turbid   Large (3+)   Large (3+)   Negative   >=300 mg/dL (3+)                 Microbiology Results Abnormal     Procedure Component Value - Date/Time    Blood Culture - Blood, Arm, Right [[id number]]  (Normal) Collected: 12/13/21 2010    Lab Status: Final result Specimen: Blood from Arm, Right Updated: 12/18/21 2045     Blood Culture No growth at 5 days    Blood Culture - Blood, Arm, Right [[id number]]  (Normal) Collected: 12/13/21 2000    Lab Status: Final result Specimen: Blood from Arm, Right Updated: 12/18/21 2045     Blood Culture No growth at 5 days    Urine Culture - Urine, Urine, Catheter [[id number]] Collected: 12/13/21 2006    Lab Status: Final result Specimen: Urine, Catheter Updated: 12/14/21 1710     Urine Culture >100,000 CFU/mL Mixed Harrison Isolated    Narrative:      Specimen contains mixed organisms of questionable pathogenicity which indicates contamination with commensal harrison.  Further identification is unlikely to provide clinically useful information.  Suggest recollection.    COVID PRE-OP / PRE-PROCEDURE SCREENING ORDER (NO ISOLATION) - Swab, Nasopharynx [[id number]]  (Normal) Collected: 12/13/21 1954    Lab Status: Final result Specimen: Swab from Nasopharynx Updated: 12/13/21  2031    Narrative:      The following orders were created for panel order COVID PRE-OP / PRE-PROCEDURE SCREENING ORDER (NO ISOLATION) - Swab, Nasopharynx.  Procedure                               Abnormality         Status                     ---------                               -----------         ------                     COVID-19 and FLU A/B PCR...[[id number]]  Normal              Final result                 Please view results for these tests on the individual orders.    COVID-19 and FLU A/B PCR - Swab, Nasopharynx [[id number]]  (Normal) Collected: 12/13/21 1954    Lab Status: Final result Specimen: Swab from Nasopharynx Updated: 12/13/21 2031     COVID19 Not Detected     Influenza A PCR Not Detected     Influenza B PCR Not Detected    Narrative:      Fact sheet for providers: https://www.fda.gov/media/868221/download    Fact sheet for patients: https://www.fda.gov/media/881268/download    Test performed by PCR.          No radiology results from the last 24 hrs    Results for orders placed during the hospital encounter of 02/19/20    Transthoracic Echo Complete With Contrast if Necessary Per Protocol    Interpretation Summary  · Estimated EF = 55%.  · Mild mitral valve regurgitation is present      I have reviewed the medications:  Scheduled Meds:albumin human, , ,   albumin human, , ,   albumin human, , ,   albumin human, , ,   aspirin, 81 mg, Oral, Daily  atorvastatin, 20 mg, Oral, Nightly  Eluxadoline, 100 mg, Oral, BID With Meals  gabapentin, 300 mg, Oral, Nightly  heparin (porcine), 5,000 Units, Subcutaneous, Q12H  insulin detemir, 10 Units, Subcutaneous, Nightly  insulin lispro, 0-7 Units, Subcutaneous, TID AC  levothyroxine, 200 mcg, Oral, Q AM  methylnaltrexone, 6 mg, Subcutaneous, Every Other Day  metoclopramide, 10 mg, Intravenous, Q6H  sodium chloride, 10 mL, Intravenous, Q12H  warfarin, 5 mg, Oral, Daily      Continuous Infusions:Pharmacy to dose warfarin,   ropivacaine (NAROPIN) 0.2% peripheral  nerve cath (moog), 10 mL/hr, Last Rate: 10 mL/hr (22 1653)  sodium chloride, 5 mL/hr, Last Rate: Stopped (21 1135)      PRN Meds:.•  acetaminophen **OR** acetaminophen  •  bisacodyl  •  dextrose  •  dextrose  •  docusate sodium  •  glucagon (human recombinant)  •  HYDROcodone-acetaminophen  •  lidocaine  •  melatonin  •  [] Morphine **AND** naloxone  •  ondansetron **OR** ondansetron  •  Pharmacy to dose warfarin  •  senna-docusate sodium  •  [COMPLETED] Insert peripheral IV **AND** sodium chloride  •  sodium chloride    Assessment/Plan   Assessment & Plan     Active Hospital Problems    Diagnosis  POA   • **Gangrene of left foot (HCC) [I96]  Yes   • Chronic diastolic CHF (congestive heart failure) (HCC) [I50.32]  Yes   • ESRD (end stage renal disease) (HCC) [N18.6]  Yes   • Atrial fibrillation (HCC) [I48.91]  Yes   • Type 2 diabetes mellitus (HCC) [E11.9]  Yes   • Hypothyroid [E03.9]  Yes   • WINSTON (obstructive sleep apnea), noncompliant w/ NIPPV [G47.33]  Yes   • CAD (coronary artery disease) [I25.10]  Yes   • Anemia [D64.9]  Yes   • Dyslipidemia [E78.5]  Yes      Resolved Hospital Problems    Diagnosis Date Resolved POA   • Hematuria [R31.9] 2021 Yes   • Lactic acidosis [E87.2] 2021 Yes        Brief Hospital Course to date:  Chase Obrien is a highly noncompliant 76 y.o. male  with CHF, DM, HL, HTN, ESRD on HD, history of GIB (gastric ulcers and embolization of L gastric artery ) and atrial fibrillation. More recent diagnosis of hematuria with subsequent cystoscopy with diagnosis of bladder/prostate cancer.     L toe gangrene second, third, and fourth toes  Cellulitis L foot  -continue zyvox, ceftazidime, flagyl per ID  -Dr. Augustine yu, underwent left AKA on    -Continue gabapentin 300 mg nightly secondary to ESRD     PAD  Hx of atrial fibrillation  -Initially, pt would not continue HD or treatment without resuming coumadin even after educated on bleeding risk; however  on 12/23, pt developed hematuria,so coumadin was placed on hold. Restarted on 12/28 per pt's request, despite risk of recurrent bleeding, which he has been made aware of by multiple providers  -Pharmacy dosing Coumadin     Hx of GIB  -gastric ulcer  -L gastric artery embolization here in 2020  -hgb stable     Hx of recent gross hematuria  Recent diagnosis of bladder/prostate cancer s/p cystoscopy at [place], data deficient   -Hematuria returned 12/23, likely due to ongoing coumadin use. Held on 12/23 until 12/27  -Monitor CBC  -multiple discussions with pt, by myself and partners, and, despite contrary advice and other options, Coumadin was resumed on 12/28     No evidence of UTI  -urine culture negative     ESRD   -HD MWF if pt agreeable (has refused multiple times due to his INR not being therapeutic)     DM  -Continue SSI  -Continue Levemir 10 units at night     WINSTON  -noncompliant with CPAP     Hypothyroidism  -continue synthroid    Constipation  -Refusing all bowel regimens  -continue subcu Relistor  -Reglan q6h x 4 dose  - may need to try neostigmine if no BM in next 24h     Anemia  -no epo due to recent bladder cancer, monitor  -transfuse as needed     Decubitus wound  -WOC following, patient has been refusing dressing changes.      CHF by history  CAD       DVT prophylaxis:  Medical and mechanical DVT prophylaxis orders are present.       AM-PAC 6 Clicks Score (PT): 8 (01/1945)    Disposition: I expect the patient to be discharged to SNF rehab once arranged    CODE STATUS:   Code Status and Medical Interventions:   Ordered at: 12/14/21 0003     Level Of Support Discussed With:    Patient     Code Status (Patient has no pulse and is not breathing):    CPR (Attempt to Resuscitate)     Medical Interventions (Patient has pulse or is breathing):    Full Support       Marguerite Mohr MD  01/03/22

## 2022-01-04 NOTE — THERAPY TREATMENT NOTE
Patient Name: Chase Obrien  : 1945    MRN: [id number]                              Today's Date: 2022       Admit Date: 2021    Visit Dx:     ICD-10-CM ICD-9-CM   1. Cellulitis of left foot  L03.116 682.7   2. Gangrene of toe of left foot ([facility])  I96 785.4   3. Elevated lactic acid level  R79.89 276.2   4. Recurrent UTI  N39.0 599.0   5. History of bladder cancer  Z85.51 V10.51   6. History of prostate cancer  Z85.46 V10.46   7. ESRD on hemodialysis ([facility])  N18.6 585.6    Z99.2 V45.11   8. History of GI bleed  Z87.19 V12.79   9. PAF (paroxysmal atrial fibrillation) ([facility])  I48.0 427.31   10. History of CHF (congestive heart failure)  Z86.79 V12.59   11. History of diabetes mellitus  Z86.39 V12.29   12. Anemia of chronic renal failure, stage 5 ([facility])  N18.5 285.21    D63.1 585.5   13. Gangrene of left foot ([facility])  I96 785.4     Patient Active Problem List   Diagnosis   • Cellulitis   • BALDO (acute kidney injury) ([facility])   • Atrial fibrillation ([facility])   • Type 2 diabetes mellitus ([facility])   • Hypothyroid   • Chronic kidney disease, stage IV (severe) ([facility])   • WINSTON (obstructive sleep apnea), noncompliant w/ NIPPV   • CAD (coronary artery disease)   • Anemia   • Dyslipidemia   • Tobacco abuse disorder   • ESRD (end stage renal disease) ([facility])   • Elaine's syndrome/ pseudocolonic obstruction   • Chronic diastolic CHF (congestive heart failure) ([facility])   • Acute blood loss anemia   • Noncompliance with recommended medical treatment   • Gangrene of left foot ([facility])     Past Medical History:   Diagnosis Date   • Cellulitis    • CHF (congestive heart failure) ([facility])    • Chronic infection    • Degenerative disc disease, lumbar    • Diabetes mellitus ([facility])    • Hyperlipidemia    • Hypertension    • Myocardial infarction ([facility])    • Renal disorder    • Rotator cuff tear      Past Surgical History:   Procedure Laterality Date   • ABOVE KNEE AMPUTATION Left 2021    Procedure: AMPUTATION ABOVE KNEE LEFT;  Surgeon: Augustine  Luís BATISTA MD;  Location:  [facility];  Service: General;  Laterality: Left;   • BRONCHOSCOPY N/A 4/12/2020    Procedure: BRONCHOSCOPY;  Surgeon: Fox Cervantes MD;  Location:  [facility];  Service: Pulmonary;  Laterality: N/A;   • CORONARY ARTERY BYPASS GRAFT     • ENDOSCOPY N/A 4/2/2020    Procedure: ESOPHAGOGASTRODUODENOSCOPY AT BEDSIDE;  Surgeon: Brunner, Mark I, MD;  Location:  [facility];  Service: Gastroenterology;  Laterality: N/A;   • ENDOSCOPY N/A 4/8/2020    Procedure: ESOPHAGOGASTRODUODENOSCOPY AT BEDSIDE;  Surgeon: Zhang Martinez MD;  Location:  [facility];  Service: Gastroenterology;  Laterality: N/A;  with gastric lavage using Edlich tube   • ENDOSCOPY N/A 4/8/2020    Procedure: ESOPHAGOGASTRODUODENOSCOPY AT BEDSIDE;  Surgeon: Zhang Martinez MD;  Location:  [facility];  Service: Gastroenterology;  Laterality: N/A;   • ENDOSCOPY N/A 4/9/2020    Procedure: ESOPHAGOGASTRODUODENOSCOPY AT BEDSIDE;  Surgeon: Zhang Martinez MD;  Location:  [facility];  Service: Gastroenterology;  Laterality: N/A;   • ENDOSCOPY N/A 4/9/2020    Procedure: ESOPHAGOGASTRODUODENOSCOPY AT BEDSIDE;  Surgeon: Zhang Martinez MD;  Location:  [facility];  Service: Gastroenterology;  Laterality: N/A;      General Information     Row Name 01/04/22 1640          Physical Therapy Time and Intention    Document Type therapy note (daily note)  -     Mode of Treatment physical therapy  -     Row Name 01/04/22 1640          General Information    Existing Precautions/Restrictions fall; non-weight bearing; left  L AKA 12/30, femoral nerve catheter  -     Row Name 01/04/22 1640          Cognition    Orientation Status (Cognition) oriented x 3  -     Row Name 01/04/22 1640          Safety Issues, Functional Mobility    Safety Issues Affecting Function (Mobility) safety precaution awareness; safety precautions follow-through/compliance; awareness of need for assistance; insight into  deficits/self-awareness; sequencing abilities  -     Impairments Affecting Function (Mobility) balance; endurance/activity tolerance; pain; strength  -           User Key  (r) = Recorded By, (t) = Taken By, (c) = Cosigned By    Initials Name Provider Type    Asad Sullivan, PT Physical Therapist               Mobility     Row Name 01/04/22 1640          Bed Mobility    Comment (Bed Mobility) Received UIC  -     Row Name 01/04/22 1640          Transfers    Comment (Transfers) Pt attempted STS 2 times today, requiring mod Ax2 and bilateral UE support to acheive 75% stand. Unable to fully extend hips for stand despite tactile/verbal cueing. Required ~3 min rest between attempts.  -     Row Name 01/04/22 1640          Sit-Stand Transfer    Sit-Stand [place] (Transfers) verbal cues; moderate assist (50% patient effort); 2 person assist  -     Assistive Device (Sit-Stand Transfers) other (see comments)  bilateral UE support  -     Row Name 01/04/22 1640          Gait/Stairs (Locomotion)    [place] Level (Gait) unable to assess  -     Comment (Gait/Stairs) not appropriate  -           User Key  (r) = Recorded By, (t) = Taken By, (c) = Cosigned By    Initials Name Provider Type    Asad Sullivan PT Physical Therapist               Obj/Interventions     Row Name 01/04/22 1640          Motor Skills    Therapeutic Exercise hip; knee; ankle  -     Row Name 01/04/22 1640          Hip (Therapeutic Exercise)    Hip (Therapeutic Exercise) isometric exercises; strengthening exercise  -     Hip Isometrics (Therapeutic Exercise) gluteal sets; 10 repetitions  -     Hip Strengthening (Therapeutic Exercise) aBduction; aDduction; 10 repetitions; bilateral  -     Row Name 01/04/22 1640          Knee (Therapeutic Exercise)    Knee (Therapeutic Exercise) strengthening exercise; isometric exercises  -     Knee Isometrics (Therapeutic Exercise) right; quad sets; 10 repetitions  -     Knee Strengthening  (Therapeutic Exercise) right; heel slides; 10 repetitions  -     Row Name 01/04/22 1640          Balance    Balance Assessment sitting static balance; sitting dynamic balance; standing static balance; standing dynamic balance  -ELLIOTT     Static Sitting Balance WFL; unsupported; sitting in chair  -ELLIOTT     Dynamic Sitting Balance WFL; unsupported; sitting in chair  -ELLIOTT     Static Standing Balance moderate impairment; supported; standing  -           User Key  (r) = Recorded By, (t) = Taken By, (c) = Cosigned By    Initials Name Provider Type    Asad Sullivan, PT Physical Therapist               Goals/Plan    No documentation.                Clinical Impression     Row Name 01/04/22 1640          Pain    Additional Documentation Pain Scale: Numbers Pre/Post-Treatment (Group)  -     Row Name 01/04/22 1640          Pain Scale: Numbers Pre/Post-Treatment    Pretreatment Pain Rating 4/10  -ELLIOTT     Posttreatment Pain Rating 7/10  -ELLIOTT     Pain Location - Side Left  -     Pain Location residual limb  -ELLIOTT     Pain Intervention(s) Repositioned; Ambulation/increased activity  -     Row Name 01/04/22 1640          Therapy Assessment/Plan (PT)    Rehab Potential (PT) fair, will monitor progress closely  -     Criteria for Skilled Interventions Met (PT) yes; meets criteria; skilled treatment is necessary  -     Row Name 01/04/22 1640          Positioning and Restraints    Pre-Treatment Position sitting in chair/recliner  -     Post Treatment Position chair  -ELLIOTT     In Chair notified nsg; reclined; call light within reach; encouraged to call for assist; exit alarm on; with family/caregiver; waffle cushion; on mechanical lift sling; legs elevated  -           User Key  (r) = Recorded By, (t) = Taken By, (c) = Cosigned By    Initials Name Provider Type    Asad Sullivan, STEPHANIE Physical Therapist               Outcome Measures     Row Name 01/04/22 1640 01/04/22 0800       How much help from another person do you currently  need...    Turning from your back to your side while in flat bed without using bedrails? 2  -ELLIOTT 2  -BC    Moving from lying on back to sitting on the side of a flat bed without bedrails? 2  -ELLIOTT 2  -BC    Moving to and from a bed to a chair (including a wheelchair)? 1  -ELLIOTT 1  -BC    Standing up from a chair using your arms (e.g., wheelchair, bedside chair)? 1  -ELLIOTT 1  -BC    Climbing 3-5 steps with a railing? 1  -ELLIOTT 1  -BC    To walk in hospital room? 1  -ELLIOTT 1  -BC    AM-PAC 6 Clicks Score (PT) 8  -ELLIOTT 8  -BC          User Key  (r) = Recorded By, (t) = Taken By, (c) = Cosigned By    Initials Name Provider Type    Latoya Mcguire, RN Registered Nurse    Asad Sullivan, PT Physical Therapist                             Physical Therapy Education                 Title: PT OT SLP Therapies (Done)     Topic: Physical Therapy (Done)     Point: Mobility training (Done)     Learning Progress Summary           Patient Acceptance, E,D, VU by ELLIOTT at 1/4/2022 1640    Comment: Educated on safe sequencing with ambulatory transfers. Reviewed HEP and NWB precautions.      Show all documentation for this point (6)                 Point: Home exercise program (Done)     Learning Progress Summary           Patient Acceptance, E,D, VU by ELLIOTT at 1/4/2022 1640    Comment: Educated on safe sequencing with ambulatory transfers. Reviewed HEP and NWB precautions.      Show all documentation for this point (5)                 Point: Body mechanics (Done)     Learning Progress Summary           Patient Acceptance, E,D, VU by ELLIOTT at 1/4/2022 1640    Comment: Educated on safe sequencing with ambulatory transfers. Reviewed HEP and NWB precautions.      Show all documentation for this point (6)                 Point: Precautions (Done)     Learning Progress Summary           Patient Acceptance, E,D, VU by ELLIOTT at 1/4/2022 1640    Comment: Educated on safe sequencing with ambulatory transfers. Reviewed HEP and NWB precautions.      Show all documentation  for this point (6)                             User Key     Initials Effective Dates Name Provider Type Discipline    ELLIOTT 06/16/21 -  Asad Marx PT Physical Therapist PT              PT Recommendation and Plan     Plan of Care Reviewed With: patient  Progress: improving  Outcome Summary: Pt attempted STS 2 times today, requiring mod Ax2 and bilateral UE support to acheive 75% stand. Unable to fully extend hips for stand despite tactile/verbal cueing. Required ~3 min rest between attempts. Not appropriate for gait at this time. Reviewed HEP and NWB precautions. Will continue to progress strength and mobility as able.     Time Calculation:    PT Charges     Row Name 01/04/22 1640             Time Calculation    Start Time 1640  -ELLIOTT      PT Received On 01/04/22  -ELLIOTT      PT Goal Re-Cert Due Date 01/11/22  -ELLIOTT              Time Calculation- PT    Total Timed Code Minutes- PT 15 minute(s)  -ELLIOTT              Timed Charges    91724 - PT Therapeutic Exercise Minutes 5  -ELLIOTT      15353 - PT Therapeutic Activity Minutes 10  -ELLIOTT              Total Minutes    Timed Charges Total Minutes 15  -ELLIOTT       Total Minutes 15  -ELLIOTT            User Key  (r) = Recorded By, (t) = Taken By, (c) = Cosigned By    Initials Name Provider Type    ELLIOTT Asad Marx, STEPHANIE Physical Therapist              Therapy Charges for Today     Code Description Service Date Service Provider Modifiers Qty    32510060652 HC PT THER PROC EA 15 MIN 1/3/2022 Asad Marx, PT GP 1    89180209327 HC PT THER SUPP EA 15 MIN 1/3/2022 Asad Marx, PT GP 3    34507720624 HC PT THERAPEUTIC ACT EA 15 MIN 1/4/2022 Asad Marx, PT GP 1    86792671374 HC PT THER SUPP EA 15 MIN 1/4/2022 Asad Marx, PT GP 2          PT G-Codes  Outcome Measure Options: AM-PAC 6 Clicks Basic Mobility (PT)  AM-PAC 6 Clicks Score (PT): 8  AM-PAC 6 Clicks Score (OT): 15    Asad Marx PT  1/4/2022

## 2022-01-04 NOTE — PLAN OF CARE
Goal Outcome Evaluation:  Plan of Care Reviewed With: patient Hypotensive and drowsy this shift. Refusing to transfer from chair to speciality bed. Explained to pt risk of not getting off bottom and further skin damage, pt insisted on remaining in chair.FC patent with dark red urine. BS without stool passage.

## 2022-01-04 NOTE — PROGRESS NOTES
"   LOS: 21 days    Patient Care Team:  Radha Purcell as PCP - General (Family Medicine)    Reason For Visit:  F/U ESRD  Subjective           Review of Systems:    Pulm: No soa   CV:  No CP      Objective     albumin human, , ,   albumin human, , ,   albumin human, , ,   albumin human, , ,   aspirin, 81 mg, Oral, Daily  atorvastatin, 20 mg, Oral, Nightly  Eluxadoline, 100 mg, Oral, BID With Meals  gabapentin, 300 mg, Oral, Nightly  heparin (porcine), 5,000 Units, Subcutaneous, Q12H  insulin detemir, 10 Units, Subcutaneous, Nightly  insulin lispro, 0-7 Units, Subcutaneous, TID AC  levothyroxine, 200 mcg, Oral, Q AM  methylnaltrexone, 6 mg, Subcutaneous, Every Other Day  sodium chloride, 10 mL, Intravenous, Q12H  warfarin, 7.5 mg, Oral, Daily      neostigmine 5 mg in 50 mL NS IVPB, 0.4 mg/hr  Pharmacy to dose warfarin,   sodium chloride, 5 mL/hr, Last Rate: Stopped (12/30/21 1135)          Vital Signs:  Blood pressure 102/65, pulse 76, temperature 97.8 °F (36.6 °C), temperature source Oral, resp. rate 20, height 193 cm (76\"), weight 104 kg (229 lb 14.4 oz), SpO2 94 %.    Flowsheet Rows      First Filed Value   Admission Height 193 cm (76\") Documented at 12/13/2021 1937   Admission Weight 129 kg (285 lb) Documented at 12/13/2021 1937          01/03 0701 - 01/04 0700  In: 770 [P.O.:770]  Out: 3540 [Urine:150]    Physical Exam:    General Appearance: NAD, alert and cooperative, Ox3  Eyes: PER, conjunctivae and sclerae normal, no icterus  Lungs: respirations regular and unlabored, no crepitus, clear to auscultation  Heart/CV: regular rhythm & normal rate, no murmur, no gallop, no rub and TR edema  Abdomen: not distended, soft, non-tender, no masses,  bowel sounds present  Skin: No rash, Warm and dry. AVF    Radiology:            Labs:  Results from last 7 days   Lab Units 01/04/22  0845 12/31/21  1032 12/29/21  0946   WBC 10*3/mm3 7.44 10.84* 8.03   HEMOGLOBIN g/dL 7.4* 7.3* 8.5*   HEMATOCRIT % 24.9* 24.2* 28.4* "   PLATELETS 10*3/mm3 188 203 245     Results from last 7 days   Lab Units 01/04/22  0845 01/01/22  0742 12/31/21  1617 12/31/21  1032 12/29/21  0946   SODIUM mmol/L 136 135*  --  134* 137   POTASSIUM mmol/L 3.6 4.1  --  4.5 4.5   CHLORIDE mmol/L 98 98  --  97* 100   CO2 mmol/L 25.0 20.0*  --  20.0* 23.0   BUN mg/dL 22 21  --  30* 33*   CREATININE mg/dL 3.59* 3.53*  --  4.49* 5.95*   CALCIUM mg/dL 8.5* 8.2*  --  8.1* 8.1*   PHOSPHORUS mg/dL 3.9 2.6 2.8  --   --    ALBUMIN g/dL 3.70 3.60  --  3.40*  --      Results from last 7 days   Lab Units 01/04/22  0845   GLUCOSE mg/dL 111*       Results from last 7 days   Lab Units 12/31/21  1032   ALK PHOS U/L 85   BILIRUBIN mg/dL 0.3   ALT (SGPT) U/L <5   AST (SGOT) U/L 10                 Estimated Creatinine Clearance: 26.2 mL/min (A) (by C-G formula based on SCr of 3.59 mg/dL (H)).      Assessment       Gangrene of left foot (HCC)    Atrial fibrillation (HCC)    Type 2 diabetes mellitus (HCC)    Hypothyroid    WINSTON (obstructive sleep apnea), noncompliant w/ NIPPV    CAD (coronary artery disease)    Anemia    Dyslipidemia    ESRD (end stage renal disease) (HCC)    Chronic diastolic CHF (congestive heart failure) (HCC)            Impression: ESRD. ANEMIA.             Recommendations: HD 1/5/22. EPO.      Xavi Lay MD  01/04/22  18:05 EST

## 2022-01-04 NOTE — PROGRESS NOTES
INFECTIOUS DISEASE Progress Note  Chase Obrien  1945  [id number]    Consult: 12/15/21  Admit date: 12/13/2021    Requesting Provider: Rosemarie Prescott DO  Evaluating physician: Brian Collado MD  Reason for Consultation: Left foot gangrene, MRSA  Chief Complaint: Above      Subjective   History of present illness:  Patient is a  76 y.o.  Yr old male with diabetes mellitus type 2, hypertension, hyperlipidemia, peripheral vascular disease, myocardial infarction, congestive heart failure, end-stage renal disease on hemodialysis Monday, Wednesday, Friday, atrial fibrillation (with recent disagreement on stopping his Coumadin), recent diagnosis of prostate and bladder cancer (with follow-up at  urology), with worsening left foot gangrenous changes (first through fourth toe) over the past 1 to 2 weeks since 12/1/2021.  The patient was admitted to [facility] on 12/13/2021.  MRI of the left foot without evidence of osteomyelitis or abscess.  I was consulted on 12/15/2021 for further evaluation and treatment.  Patient has no other localizing signs or symptoms of infection.    12/16/2021 history reviewed.  Occasional pain left foot.  No high fevers or chills.  On vancomycin, Fortaz, metronidazole renal dose adjusted.  On hemodialysis.    12/17/2021 history reviewed.  Left foot pain controlled.  On vancomycin, Fortaz, Flagyl awaiting further disposition by surgery.  Duration anticipated to be 2 weeks until 12/30 but depends on clinical course.  No fever.    12/18/2021 history reviewed.  Tolerated antibiotics renal dose adjusted awaiting his disposition by surgery.  Duration anticipated until 12/30.  Likely to need amputation of part of his lower extremity.  This is under discussion.  No fever.    12/20/2021 history reviewed.  Tolerating vancomycin, Fortaz, and Flagyl awaiting his surgical disposition of the treatment of gangrene of his left foot.  No fevers.  Pain controlled.    12/21/2021  history reviewed.  Patient has been holding off on surgical intervention.  Negotiating with general surgery.  Continues on antibiotics.  No high fever.    12/22/2021 history reviewed. The patient wants to wait on a BKA until after Luan. Receiving treatment with vancomycin, Fortaz, and Flagyl renal dose adjusted to continue until 3 to 5 days after his BKA. No high fevers.    12/23/2021 history reviewed. Tolerating antibiotics renal dose adjusted for left foot gangrene. Surgical disposition has been difficult since patient has refused.     12/24/2021 history reviewed.  On vancomycin, Fortaz, and metronidazole for left foot gangrene, awaiting a surgical disposition.  Antibiotics were to continue for 3 to 5 days after amputation.  Patient has been refusing amputation dates.  No fever.    1/3/2022 history reviewed.  Status post left AKA on 12/30/2021.  Antibiotics to stop.  No high fever or chills.    1/4/2022 history reviewed.  Left AKA healing, status post done on 12/30/2021.  Antibiotics stopped 1/3.  No fever.    Past Medical History:   Diagnosis Date   • Cellulitis    • CHF (congestive heart failure) (HCC)    • Chronic infection    • Degenerative disc disease, lumbar    • Diabetes mellitus (HCC)    • Hyperlipidemia    • Hypertension    • Myocardial infarction (HCC)    • Renal disorder    • Rotator cuff tear        Past Surgical History:   Procedure Laterality Date   • ABOVE KNEE AMPUTATION Left 12/30/2021    Procedure: AMPUTATION ABOVE KNEE LEFT;  Surgeon: Luís Bradshaw MD;  Location: [facility] OR;  Service: General;  Laterality: Left;   • BRONCHOSCOPY N/A 4/12/2020    Procedure: BRONCHOSCOPY;  Surgeon: Fox Cervantes MD;  Location:  [facility];  Service: Pulmonary;  Laterality: N/A;   • CORONARY ARTERY BYPASS GRAFT     • ENDOSCOPY N/A 4/2/2020    Procedure: ESOPHAGOGASTRODUODENOSCOPY AT BEDSIDE;  Surgeon: Brunner, Mark I, MD;  Location:  [facility];  Service: Gastroenterology;  Laterality: N/A;   •  ENDOSCOPY N/A 4/8/2020    Procedure: ESOPHAGOGASTRODUODENOSCOPY AT BEDSIDE;  Surgeon: Zhang Martinez MD;  Location:  [facility];  Service: Gastroenterology;  Laterality: N/A;  with gastric lavage using Edlich tube   • ENDOSCOPY N/A 4/8/2020    Procedure: ESOPHAGOGASTRODUODENOSCOPY AT BEDSIDE;  Surgeon: Zhang Martinez MD;  Location:  [facility];  Service: Gastroenterology;  Laterality: N/A;   • ENDOSCOPY N/A 4/9/2020    Procedure: ESOPHAGOGASTRODUODENOSCOPY AT BEDSIDE;  Surgeon: Zhang Martinez MD;  Location:  [facility];  Service: Gastroenterology;  Laterality: N/A;   • ENDOSCOPY N/A 4/9/2020    Procedure: ESOPHAGOGASTRODUODENOSCOPY AT BEDSIDE;  Surgeon: Zhang Martinez MD;  Location:  [facility];  Service: Gastroenterology;  Laterality: N/A;       Pediatric History   Patient Parents   • Not on file     Other Topics Concern   • Not on file   Social History Narrative    Lives with girl friend        Family history is unknown by patient.    Allergies   Allergen Reactions   • Penicillins Other (See Comments) and Unknown - Low Severity     Received ceftriaxone 2016         There is no immunization history on file for this patient.    Medication:    Current Facility-Administered Medications:   •  acetaminophen (TYLENOL) tablet 650 mg, 650 mg, Oral, Q4H PRN **OR** acetaminophen (TYLENOL) suppository 650 mg, 650 mg, Rectal, Q4H PRN, Natalie Son PA-C  •  albumin human 25 % IV SOLN  - ADS Override Pull, , , ,   •  albumin human 25 % IV SOLN  - ADS Override Pull, , , ,   •  albumin human 25 % IV SOLN  - ADS Override Pull, , , ,   •  albumin human 25 % IV SOLN  - ADS Override Pull, , , ,   •  aspirin chewable tablet 81 mg, 81 mg, Oral, Daily, Natalie Son PA-C, 81 mg at 01/04/22 0914  •  atorvastatin (LIPITOR) tablet 20 mg, 20 mg, Oral, Nightly, Natalie Son PA-C, 20 mg at 01/03/22 2150  •  bisacodyl (DULCOLAX) suppository 10 mg, 10 mg, Rectal, Daily PRN, Orange, Natalie,  AN  •  dextrose (D50W) (25 g/50 mL) IV injection 25 g, 25 g, Intravenous, Q15 Min PRN, Natalie Son PA-C  •  dextrose (GLUTOSE) oral gel 15 g, 15 g, Oral, Q15 Min PRN, Natalie Son PA-C  •  docusate sodium (COLACE) capsule 100 mg, 100 mg, Oral, BID PRN, Natalie Son PA-C, 100 mg at 22 1356  •  Eluxadoline (Viberzi) tablet 100 mg **PATIENT SUPPLIED**, 100 mg, Oral, BID With Meals, Luli Dunlap, DO, 100 mg at 22 0934  •  gabapentin (NEURONTIN) capsule 300 mg, 300 mg, Oral, Nightly, Luli Dunlap, DO, 300 mg at 22 2150  •  glucagon (human recombinant) (GLUCAGEN DIAGNOSTIC) injection 1 mg, 1 mg, Subcutaneous, Q15 Min PRN, Natalie Son PA-C  •  heparin (porcine) 5000 UNIT/ML injection 5,000 Units, 5,000 Units, Subcutaneous, Q12H, Natalie Son PA-C, 5,000 Units at 22 0914  •  HYDROcodone-acetaminophen (NORCO)  MG per tablet 1 tablet, 1 tablet, Oral, Q4H PRN, Natalie Son PA-C, 1 tablet at 22 0852  •  insulin detemir (LEVEMIR) injection 10 Units, 10 Units, Subcutaneous, Nightly, Luli Dunlap, DO, 10 Units at 22 2141  •  insulin lispro (humaLOG) injection 0-7 Units, 0-7 Units, Subcutaneous, TID AC, Natalie Son PA-C, 2 Units at 22 1214  •  levothyroxine (SYNTHROID, LEVOTHROID) tablet 200 mcg, 200 mcg, Oral, Q AM, Natalie Son PA-C, 200 mcg at 22 0558  •  lidocaine (LMX) 4 % cream 1 application, 1 application, Topical, PRN, Natalie Son PA-C, 1 application at 22 0852  •  melatonin tablet 5 mg, 5 mg, Oral, Nightly PRN, Natalie Son PA-C, 5 mg at 21  •  methylnaltrexone (RELISTOR) injection 6 mg, 6 mg, Subcutaneous, Every Other Day, Luli Dunlap, DO, 6 mg at 22 0918  •  metoclopramide (REGLAN) injection 10 mg, 10 mg, Intravenous, Q6H, Marguerite Mohr MD, 10 mg at 22 0558  •  [] morphine injection 1 mg, 1 mg, Intravenous, Q4H PRN, 1 mg at 12/15/21 1122  **AND** naloxone (NARCAN) injection 0.4 mg, 0.4 mg, Intravenous, Q5 Min PRN, Natalie Son PA-C  •  ondansetron (ZOFRAN) tablet 4 mg, 4 mg, Oral, Q6H PRN, 4 mg at 12/31/21 0900 **OR** ondansetron (ZOFRAN) injection 4 mg, 4 mg, Intravenous, Q6H PRN, Natalie Son PA-C  •  Pharmacy to dose warfarin, , Does not apply, Continuous PRN, Xavi Lay MD  •  sennosides-docusate (PERICOLACE) 8.6-50 MG per tablet 2 tablet, 2 tablet, Oral, BID PRN, Natalie Son PA-C, 2 tablet at 01/03/22 1356  •  [COMPLETED] Insert peripheral IV, , , Once **AND** sodium chloride 0.9 % flush 10 mL, 10 mL, Intravenous, PRN, Natalie Son PA-C, 10 mL at 01/04/22 0558  •  sodium chloride 0.9 % flush 10 mL, 10 mL, Intravenous, Q12H, Natalie Son PA-C, 10 mL at 01/04/22 0920  •  sodium chloride 0.9 % flush 10 mL, 10 mL, Intravenous, PRN, Natalie Son PA-C, 10 mL at 01/03/22 2150  •  sodium chloride 0.9 % infusion, 5 mL/hr, Intravenous, Continuous, Natalie Son PA-C, Stopped at 12/30/21 1135  •  warfarin (COUMADIN) tablet 7.5 mg, 7.5 mg, Oral, Daily, Jessica Perdue, PharmD    Please refer to the medical record for a full medication list    Review of Systems:    Constitutional--low-grade fever, no chills or sweats.  Appetite good, and no malaise. No fatigue.  HEENT-- No new vision, hearing or throat complaints.  No epistaxis or oral sores.  Denies odynophagia or dysphagia.  No odynophagia or dysphagia. No headache, photophobia or neck stiffness.  CV-- No chest pain, palpitation or syncope  Resp-- No SOB/cough/Hemoptysis  GI- No nausea, vomiting, or diarrhea.  No hematochezia, melena, or hematemesis. Denies jaundice or chronic liver disease.  Lymph- no swollen lymph nodes in neck/axilla or groin.   Heme- No active bruising or bleeding; no Hx of DVT or PE.  MS-- no swelling or pain in the bones or joints of arms/legs.  No new back pain.  Neuro-- No acute focal weakness or numbness in the arms  "or legs.  No seizures.  Skin--No rashes or lesions, except as per HPI.      Physical Exam:   Vital Signs   Temp:  [97.5 °F (36.4 °C)-98.5 °F (36.9 °C)] 97.8 °F (36.6 °C)  Heart Rate:  [76-87] 76  Resp:  [16-20] 18  BP: ()/(37-78) 114/69    Temp  Min: 97.5 °F (36.4 °C)  Max: 98.5 °F (36.9 °C)  BP  Min: 67/37  Max: 123/78  Pulse  Min: 76  Max: 87  Resp  Min: 16  Max: 20  SpO2  Min: 90 %  Max: 96 %    Blood pressure 114/69, pulse 76, temperature 97.8 °F (36.6 °C), temperature source Axillary, resp. rate 18, height 193 cm (76\"), weight 104 kg (229 lb 14.4 oz), SpO2 95 %.  GENERAL: Awake and alert, in minimal distress. Appears older than stated age.    HEENT:  Normocephalic, atraumatic.  Oropharynx without thrush.  No cervical adenopathy. No neck masses.  Ears externally normal, Nose externally normal.  EYES: No conjunctival injection. No icterus. EOM full.  LYMPHATICS: No lymphadenopathy of the neck or axillary or inguinal regions.   HEART: No murmur, gallop, or pericardial friction rub. Reg rate rhythm.  LUNGS: Clear to auscultation and percussion. No respiratory distress, no use of accessory muscles.  No wheezes.  ABDOMEN: Soft, nontender, nondistended. No appreciable HSM.  Bowel sounds normal.  Obese.  SKIN: Warm and dry without cutaneous eruptions.  AKA left is healing.  PSYCHIATRIC: Mental status lucid. No confusion.  EXT:  No cellulitic change.  NEURO: Oriented to name, nonfocal    Results Review:   I reviewed the patient's new clinical results.  I reviewed the patient's new imaging results and agree with the interpretation.  I reviewed the patient's other test results and agree with the interpretation    Results from last 7 days   Lab Units 01/04/22  0845 12/31/21  1032 12/29/21  0946   WBC 10*3/mm3 7.44 10.84* 8.03   HEMOGLOBIN g/dL 7.4* 7.3* 8.5*   HEMATOCRIT % 24.9* 24.2* 28.4*   PLATELETS 10*3/mm3 188 203 245     Results from last 7 days   Lab Units 01/04/22  0845   SODIUM mmol/L 136   POTASSIUM mmol/L " 3.6   CHLORIDE mmol/L 98   CO2 mmol/L 25.0   BUN mg/dL 22   CREATININE mg/dL 3.59*   GLUCOSE mg/dL 111*   CALCIUM mg/dL 8.5*     Results from last 7 days   Lab Units 12/31/21  1032   ALK PHOS U/L 85   BILIRUBIN mg/dL 0.3   ALT (SGPT) U/L <5   AST (SGOT) U/L 10                     Estimated Creatinine Clearance: 26.2 mL/min (A) (by C-G formula based on SCr of 3.59 mg/dL (H)).    Microbiology:  Microbiology Results Abnormal     Procedure Component Value - Date/Time    Blood Culture - Blood, Arm, Right [[id number]]  (Normal) Collected: 12/13/21 2010    Lab Status: Final result Specimen: Blood from Arm, Right Updated: 12/18/21 2045     Blood Culture No growth at 5 days    Blood Culture - Blood, Arm, Right [[id number]]  (Normal) Collected: 12/13/21 2000    Lab Status: Final result Specimen: Blood from Arm, Right Updated: 12/18/21 2045     Blood Culture No growth at 5 days    Urine Culture - Urine, Urine, Catheter [[id number]] Collected: 12/13/21 2006    Lab Status: Final result Specimen: Urine, Catheter Updated: 12/14/21 1710     Urine Culture >100,000 CFU/mL Mixed Harrison Isolated    Narrative:      Specimen contains mixed organisms of questionable pathogenicity which indicates contamination with commensal harrison.  Further identification is unlikely to provide clinically useful information.  Suggest recollection.    COVID PRE-OP / PRE-PROCEDURE SCREENING ORDER (NO ISOLATION) - Swab, Nasopharynx [[id number]]  (Normal) Collected: 12/13/21 1954    Lab Status: Final result Specimen: Swab from Nasopharynx Updated: 12/13/21 2031    Narrative:      The following orders were created for panel order COVID PRE-OP / PRE-PROCEDURE SCREENING ORDER (NO ISOLATION) - Swab, Nasopharynx.  Procedure                               Abnormality         Status                     ---------                               -----------         ------                     COVID-19 and FLU A/B PCR...[[id number]]  Normal              Final result                  Please view results for these tests on the individual orders.    COVID-19 and FLU A/B PCR - Swab, Nasopharynx [[id number]]  (Normal) Collected: 12/13/21 1954    Lab Status: Final result Specimen: Swab from Nasopharynx Updated: 12/13/21 2031     COVID19 Not Detected     Influenza A PCR Not Detected     Influenza B PCR Not Detected    Narrative:      Fact sheet for providers: https://www.fda.gov/media/657046/download    Fact sheet for patients: https://www.fda.gov/media/205191/download    Test performed by PCR.          Radiology:  Imaging Results (Last 72 Hours)     ** No results found for the last 72 hours. **          IMPRESSION:     1.  Left foot cellulitis with gangrene, with dry gangrene to left first through fourth toes, without MRI evidence of underlying osteomyelitis done on 12/13.  Cultures MRSA from left foot from 12/16.  Status post left AKA 12/30/2021, resolved.  2.  Atrial fibrillation with complex regarding his anticoagulation between patient and his providers.  3.  End-stage renal disease on hemodialysis Monday, Wednesday, Friday, with some noncompliance.  4.  Diabetes mellitus type 2 with increased risk for infection and likely associated with his initial left foot problem.  Hemoglobin A1c 5.50 on 12/14.  5.  Stage II coccyx decubitus present on admission.  Right heel deep tissue injury.  Lower extremities with venous ulcerations right leg 2 x 3 cm, left leg with ulcer over it.  6.  Anemia, chronic disease and related to his renal failure.  Ongoing.  7.  Hypoalbuminemia, 2.80.  8.  Elevated alkaline phosphatase 135.  9.  Hypocalcemia 8.5.    Improved on current regimen.    Plan:    1.  Diagnostically, continue to follow patient's physical exam, CBC, CMP, CRP.  The patient refusing vascular studies.  2.  Therapeutically, discontinued linezolid, ceftazidime, and Flagyl on 1/3/2022, status post his left AKA.  Follow off antibiotics.  3.  Supportive care.  Surgery is following.  Left AKA on  12/30/2021.    I discussed the patient's findings and my recommendations with patient and nursing staff    Our group would be pleased to follow this patient over the course of their hospitalization and assist with outpatient antimicrobial therapy, as indicated.  Further recommendations depend on the results of the cultures and clinical course.     Brian Collado MD  1/4/2022

## 2022-01-04 NOTE — PROGRESS NOTES
"Pharmacy Consult - Warfarin  Chase Obrien is a 76 y.o. male on warfarin therapy.    Height - 193 cm (76\")  Weight - 104 kg (229 lb 14.4 oz)    Consulting Provider: Xavi Lay MD  Indication: Atrial fibrillation  Goal INR: 2-3  Home Regimen:   - Warfarin 5mg Monday, Wednesday, Friday   - Warfarin 2.5mg Tuesday, Thursday, Saturday, Sunday     Bridge Therapy: No, but patient remains on SQH until INR >2    Drug-Drug Interactions with current regimen:  Metronidazole - increase in INR, increased risk of bleeding - last dose on 1/3  Aspirin - increased risk of bleeding  Levothyroxine - increased risk of bleeding    Warfarin Dosing During Admission:  Date  12/15 12/16 12/17 12/18 12/19 12/20 12/21 12/22 12/23   INR  1.37 1.37 1.33 1.51 1.54 1.81 2.1 2.51 2.49   Dose  5 mg  5 mg 5 mg  5 mg 7.5 mg  5 mg 5 mg 2.5 mg D/C due to hematuria     Date  12/24 12/25 12/26 12/27 12/28 12/29 12/30 12/31 1/1   INR  2.17 1.87 1.71 1.53 1.44 1.59 -- 1.40 1.38   Dose  0 0 0 0 5 mg 0 0 0 0     Date  1/2 1/3 1/4         INR  1.29 1.38 1.47         Dose  5 mg 5 mg (7.5 mg)           Education Provided: Warfarin education provided on 12/22/21 verbally and in writing. Discussed effects of warfarin, importance of checking INR, drug-drug and drug-food interactions, and signs/symptoms of bleeding and clotting. Patient verbalized understanding through teach back. All pertinent questions were answered.      Discharge Follow up:   Following Provider - [facility]   Follow up time range or appointment -  2-3 days following discharge     Labs:  Results from last 7 days   Lab Units 01/04/22  0845 01/03/22  0703 01/02/22  0935 01/01/22  0742 12/31/21  1617 12/31/21  1032 12/29/21  0946 12/28/21  1626   INR  1.47* 1.38* 1.29* 1.38* 1.40*  --  1.59* 1.43*   HEMOGLOBIN g/dL 7.4*  --   --   --   --  7.3* 8.5*  --    HEMATOCRIT % 24.9*  --   --   --   --  24.2* 28.4*  --      Results from last 7 days   Lab Units 01/04/22  0845 " 01/01/22  0742 12/31/21  1032   SODIUM mmol/L 136 135* 134*   POTASSIUM mmol/L 3.6 4.1 4.5   CHLORIDE mmol/L 98 98 97*   CO2 mmol/L 25.0 20.0* 20.0*   BUN mg/dL 22 21 30*   CREATININE mg/dL 3.59* 3.53* 4.49*   CALCIUM mg/dL 8.5* 8.2* 8.1*   BILIRUBIN mg/dL  --   --  0.3   ALK PHOS U/L  --   --  85   ALT (SGPT) U/L  --   --  <5   AST (SGOT) U/L  --   --  10   GLUCOSE mg/dL 111* 160* 286*     Current dietary intake: No meals documented on 1/3.  Diet Order   Procedures    Diet Regular; Renal     Assessment/Plan:  Pharmacy to dose warfarin for atrial fibrillation.  Goal INR: 2-3  Patient's INR is SUBtherapeutic today at 1.47, slightly increased from 1.38 on 1/3.   Of note, patients warfarin has been held for Left AKA performed on 12/30.  Due to the patient receiving last dose of Metronidazole on 1/3, give boosted dose of Warfarin 7.5 mg today.  Patient receiving SQH Q12H until INR >2.   Daily PT/INR ordered.  Monitor for signs/symptoms of bleeding, dietary intake, and drug-drug interactions.   Pharmacy will continue to follow and make dose adjustments as necessary.    Thank you,    Jessica Perdue, PharmD  Pharmacy Resident   1/4/2022  10:40 EST

## 2022-01-04 NOTE — PROGRESS NOTES
Cardiothoracic Surgery Progress Note      POD #: 5-left above-knee amputation     LOS: 21 days      Subjective: Awake and alert    Objective:  Vital Signs vital signs below noted T-max past 2490.5 °F  Temp:  [97.5 °F (36.4 °C)-98.5 °F (36.9 °C)] 97.5 °F (36.4 °C)  Heart Rate:  [74-88] 77  Resp:  [16-20] 16  BP: ()/(37-78) 123/78    Physical Exam:   General Appearance: Oriented x3   Lungs:   Heart:   Skin:   Incision: Amputation site dressing change.  Suture intact skin margin viable skin flaps are viable.     Results:  Results from last 7 days   Lab Units 12/31/21  1032   WBC 10*3/mm3 10.84*   HEMOGLOBIN g/dL 7.3*   HEMATOCRIT % 24.2*   PLATELETS 10*3/mm3 203     Results from last 7 days   Lab Units 01/01/22  0742   SODIUM mmol/L 135*   POTASSIUM mmol/L 4.1   CHLORIDE mmol/L 98   CO2 mmol/L 20.0*   BUN mg/dL 21   CREATININE mg/dL 3.53*   GLUCOSE mg/dL 160*   CALCIUM mg/dL 8.2*         Assessment: #1.  Postop day 5 left above-knee amputation with ischemic gangrene of the left lower extremity and left foot area.  Healing well at this time #2 renal failure on hemodialysis for 2 years  3.  Bladder cancer and prostate cancer with hematuria      Plan: Continue dialysis per renal medicine.  Medical manage per hospitalist.  We will continue daily dressing changes amputation stump.      Luís Bradshaw MD - 01/04/22 - 06:30 EST

## 2022-01-05 NOTE — PROGRESS NOTES
"   LOS: 22 days    Patient Care Team:  Radha Purcell as PCP - General (Family Medicine)    Reason For Visit:  F/U ESRD. SEEN IN DIALYSIS  Subjective           Review of Systems:    Pulm: No soa   CV:  No CP      Objective     albumin human, , ,   albumin human, , ,   albumin human, , ,   albumin human, , ,   aspirin, 81 mg, Oral, Daily  atorvastatin, 20 mg, Oral, Nightly  Eluxadoline, 100 mg, Oral, BID With Meals  epoetin radha/radha-epbx, 20,000 Units, Subcutaneous, Once per day on Mon Wed Fri  gabapentin, 300 mg, Oral, Nightly  heparin (porcine), 5,000 Units, Subcutaneous, Q12H  insulin detemir, 10 Units, Subcutaneous, Nightly  insulin lispro, 0-7 Units, Subcutaneous, TID AC  levothyroxine, 200 mcg, Oral, Q AM  mannitol, 25 g, Intravenous, Once  methylnaltrexone, 6 mg, Subcutaneous, Every Other Day  sodium chloride, 10 mL, Intravenous, Q12H  warfarin, 7.5 mg, Oral, Daily      neostigmine 5 mg in 50 mL NS IVPB, 0.4 mg/hr, Last Rate: 0.4 mg/hr (01/04/22 2108)  Pharmacy to dose warfarin,   sodium chloride, 5 mL/hr, Last Rate: Stopped (12/30/21 1135)          Vital Signs:  Blood pressure 102/66, pulse 80, temperature 98.4 °F (36.9 °C), temperature source Oral, resp. rate 18, height 193 cm (76\"), weight 104 kg (229 lb 14.4 oz), SpO2 94 %.    Flowsheet Rows      First Filed Value   Admission Height 193 cm (76\") Documented at 12/13/2021 1937   Admission Weight 129 kg (285 lb) Documented at 12/13/2021 1937 01/04 0701 - 01/05 0700  In: 1156.8 [P.O.:1125; I.V.:31.8]  Out: 100 [Urine:100]    Physical Exam:    General Appearance: NAD, alert and cooperative, Ox3  Eyes: PER, conjunctivae and sclerae normal, no icterus  Lungs: respirations regular and unlabored, no crepitus, clear to auscultation  Heart/CV: regular rhythm & normal rate, no murmur, no gallop, no rub and + edema  Abdomen:  distended, soft, non-tender, no masses,  bowel sounds present  Skin: No rash, Warm and dry. AVF    Radiology:            Labs:  Results " from last 7 days   Lab Units 01/04/22  0845 12/31/21  1032 12/29/21  0946   WBC 10*3/mm3 7.44 10.84* 8.03   HEMOGLOBIN g/dL 7.4* 7.3* 8.5*   HEMATOCRIT % 24.9* 24.2* 28.4*   PLATELETS 10*3/mm3 188 203 245     Results from last 7 days   Lab Units 01/04/22  0845 01/01/22  0742 12/31/21  1617 12/31/21  1032 12/29/21  0946   SODIUM mmol/L 136 135*  --  134* 137   POTASSIUM mmol/L 3.6 4.1  --  4.5 4.5   CHLORIDE mmol/L 98 98  --  97* 100   CO2 mmol/L 25.0 20.0*  --  20.0* 23.0   BUN mg/dL 22 21  --  30* 33*   CREATININE mg/dL 3.59* 3.53*  --  4.49* 5.95*   CALCIUM mg/dL 8.5* 8.2*  --  8.1* 8.1*   PHOSPHORUS mg/dL 3.9 2.6 2.8  --   --    ALBUMIN g/dL 3.70 3.60  --  3.40*  --      Results from last 7 days   Lab Units 01/04/22  0845   GLUCOSE mg/dL 111*       Results from last 7 days   Lab Units 12/31/21  1032   ALK PHOS U/L 85   BILIRUBIN mg/dL 0.3   ALT (SGPT) U/L <5   AST (SGOT) U/L 10                 Estimated Creatinine Clearance: 26.2 mL/min (A) (by C-G formula based on SCr of 3.59 mg/dL (H)).      Assessment       Gangrene of left foot (HCC)    Atrial fibrillation (HCC)    Type 2 diabetes mellitus (HCC)    Hypothyroid    WINSTON (obstructive sleep apnea), noncompliant w/ NIPPV    CAD (coronary artery disease)    Anemia    Dyslipidemia    ESRD (end stage renal disease) (HCC)    Chronic diastolic CHF (congestive heart failure) (HCC)            Impression: ESRD. HYPOTENSION. ANEMIA.             Recommendations: HD TODAY. TRY MANNITOL AND MIDODRINE.      Xavi Lay MD  01/05/22  08:56 EST

## 2022-01-05 NOTE — PROGRESS NOTES
INFECTIOUS DISEASE Progress Note  Chase Obrien  1945  [id number]    Consult: 12/15/21  Admit date: 12/13/2021    Requesting Provider: Rosemarie Prescott DO  Evaluating physician: Brian Collado MD  Reason for Consultation: Left foot gangrene, MRSA  Chief Complaint: Above      Subjective   History of present illness:  Patient is a  76 y.o.  Yr old male with diabetes mellitus type 2, hypertension, hyperlipidemia, peripheral vascular disease, myocardial infarction, congestive heart failure, end-stage renal disease on hemodialysis Monday, Wednesday, Friday, atrial fibrillation (with recent disagreement on stopping his Coumadin), recent diagnosis of prostate and bladder cancer (with follow-up at  urology), with worsening left foot gangrenous changes (first through fourth toe) over the past 1 to 2 weeks since 12/1/2021.  The patient was admitted to [facility] on 12/13/2021.  MRI of the left foot without evidence of osteomyelitis or abscess.  I was consulted on 12/15/2021 for further evaluation and treatment.  Patient has no other localizing signs or symptoms of infection.    12/16/2021 history reviewed.  Occasional pain left foot.  No high fevers or chills.  On vancomycin, Fortaz, metronidazole renal dose adjusted.  On hemodialysis.    12/17/2021 history reviewed.  Left foot pain controlled.  On vancomycin, Fortaz, Flagyl awaiting further disposition by surgery.  Duration anticipated to be 2 weeks until 12/30 but depends on clinical course.  No fever.    12/18/2021 history reviewed.  Tolerated antibiotics renal dose adjusted awaiting his disposition by surgery.  Duration anticipated until 12/30.  Likely to need amputation of part of his lower extremity.  This is under discussion.  No fever.    12/20/2021 history reviewed.  Tolerating vancomycin, Fortaz, and Flagyl awaiting his surgical disposition of the treatment of gangrene of his left foot.  No fevers.  Pain controlled.    12/21/2021  history reviewed.  Patient has been holding off on surgical intervention.  Negotiating with general surgery.  Continues on antibiotics.  No high fever.    12/22/2021 history reviewed. The patient wants to wait on a BKA until after Luan. Receiving treatment with vancomycin, Fortaz, and Flagyl renal dose adjusted to continue until 3 to 5 days after his BKA. No high fevers.    12/23/2021 history reviewed. Tolerating antibiotics renal dose adjusted for left foot gangrene. Surgical disposition has been difficult since patient has refused.     12/24/2021 history reviewed.  On vancomycin, Fortaz, and metronidazole for left foot gangrene, awaiting a surgical disposition.  Antibiotics were to continue for 3 to 5 days after amputation.  Patient has been refusing amputation dates.  No fever.    1/3/2022 history reviewed.  Status post left AKA on 12/30/2021.  Antibiotics to stop.  No high fever or chills.    1/4/2022 history reviewed.  Left AKA healing, status post done on 12/30/2021.  Antibiotics stopped 1/3.  No fever.    1/5/2022 history reviewed.  Status post left AKA on 12/30/2021.  Seems okay off antibiotics.  No fever.    Past Medical History:   Diagnosis Date   • Cellulitis    • CHF (congestive heart failure) (HCC)    • Chronic infection    • Degenerative disc disease, lumbar    • Diabetes mellitus (HCC)    • Hyperlipidemia    • Hypertension    • Myocardial infarction (HCC)    • Renal disorder    • Rotator cuff tear        Past Surgical History:   Procedure Laterality Date   • ABOVE KNEE AMPUTATION Left 12/30/2021    Procedure: AMPUTATION ABOVE KNEE LEFT;  Surgeon: Luís Bradshaw MD;  Location: [facility] OR;  Service: General;  Laterality: Left;   • BRONCHOSCOPY N/A 4/12/2020    Procedure: BRONCHOSCOPY;  Surgeon: Fox Cervantes MD;  Location: [facility] ENDOSCOPY;  Service: Pulmonary;  Laterality: N/A;   • CORONARY ARTERY BYPASS GRAFT     • ENDOSCOPY N/A 4/2/2020    Procedure: ESOPHAGOGASTRODUODENOSCOPY AT BEDSIDE;   Surgeon: Brunner, Mark I, MD;  Location:  [facility];  Service: Gastroenterology;  Laterality: N/A;   • ENDOSCOPY N/A 4/8/2020    Procedure: ESOPHAGOGASTRODUODENOSCOPY AT BEDSIDE;  Surgeon: Zhang Martinez MD;  Location:  [facility];  Service: Gastroenterology;  Laterality: N/A;  with gastric lavage using Edlich tube   • ENDOSCOPY N/A 4/8/2020    Procedure: ESOPHAGOGASTRODUODENOSCOPY AT BEDSIDE;  Surgeon: Zhang Martinez MD;  Location:  [facility];  Service: Gastroenterology;  Laterality: N/A;   • ENDOSCOPY N/A 4/9/2020    Procedure: ESOPHAGOGASTRODUODENOSCOPY AT BEDSIDE;  Surgeon: Zhang Martinez MD;  Location:  [facility];  Service: Gastroenterology;  Laterality: N/A;   • ENDOSCOPY N/A 4/9/2020    Procedure: ESOPHAGOGASTRODUODENOSCOPY AT BEDSIDE;  Surgeon: Zhang Martinez MD;  Location:  [facility];  Service: Gastroenterology;  Laterality: N/A;       Pediatric History   Patient Parents   • Not on file     Other Topics Concern   • Not on file   Social History Narrative    Lives with girl friend        Family history is unknown by patient.    Allergies   Allergen Reactions   • Penicillins Other (See Comments) and Unknown - Low Severity     Received ceftriaxone 2016         There is no immunization history on file for this patient.    Medication:    Current Facility-Administered Medications:   •  acetaminophen (TYLENOL) tablet 650 mg, 650 mg, Oral, Q4H PRN **OR** acetaminophen (TYLENOL) suppository 650 mg, 650 mg, Rectal, Q4H PRN, Natalie Son PA-C  •  albumin human 25 % IV SOLN  - ADS Override Pull, , , ,   •  albumin human 25 % IV SOLN  - ADS Override Pull, , , ,   •  albumin human 25 % IV SOLN  - ADS Override Pull, , , ,   •  albumin human 25 % IV SOLN  - ADS Override Pull, , , ,   •  aspirin chewable tablet 81 mg, 81 mg, Oral, Daily, Natalie Son PA-C, 81 mg at 01/04/22 0914  •  atorvastatin (LIPITOR) tablet 20 mg, 20 mg, Oral, Nightly, Natalie Son PA-C, 20 mg at  01/04/22 2109  •  atropine sulfate injection 1 mg, 1 mg, Intravenous, Once PRN, Marguerite Mohr MD  •  bisacodyl (DULCOLAX) suppository 10 mg, 10 mg, Rectal, Daily PRN, Natalie Son PA-C  •  dextrose (D50W) (25 g/50 mL) IV injection 25 g, 25 g, Intravenous, Q15 Min PRN, Natalie Son PA-C  •  dextrose (GLUTOSE) oral gel 15 g, 15 g, Oral, Q15 Min PRN, Natalie Son PA-C  •  docusate sodium (COLACE) capsule 100 mg, 100 mg, Oral, BID PRN, Natalie Son PA-C, 100 mg at 01/03/22 1356  •  Eluxadoline (Viberzi) tablet 100 mg **PATIENT SUPPLIED**, 100 mg, Oral, BID With Meals, Luli Dunlap, DO, 100 mg at 01/04/22 1824  •  epoetin radha-epbx (RETACRIT) injection 20,000 Units, 20,000 Units, Subcutaneous, Once per day on Mon Wed Fri, Xavi Lay MD  •  gabapentin (NEURONTIN) capsule 300 mg, 300 mg, Oral, Nightly, Luli Dunlap, DO, 300 mg at 01/04/22 2109  •  glucagon (human recombinant) (GLUCAGEN DIAGNOSTIC) injection 1 mg, 1 mg, Subcutaneous, Q15 Min PRN, Natalie Son PA-C  •  heparin (porcine) 5000 UNIT/ML injection 5,000 Units, 5,000 Units, Subcutaneous, Q12H, Natalie Son PA-C, 5,000 Units at 01/04/22 2108  •  HYDROcodone-acetaminophen (NORCO)  MG per tablet 1 tablet, 1 tablet, Oral, Q4H PRN, Natalie Son PA-C, 1 tablet at 01/05/22 0515  •  insulin detemir (LEVEMIR) injection 10 Units, 10 Units, Subcutaneous, Nightly, Luli Dunlap, DO, 10 Units at 01/04/22 2141  •  insulin lispro (humaLOG) injection 0-7 Units, 0-7 Units, Subcutaneous, TID AC, Natalie Son PA-C, 2 Units at 01/01/22 1214  •  levothyroxine (SYNTHROID, LEVOTHROID) tablet 200 mcg, 200 mcg, Oral, Q AM, Natalie Son PA-C, 200 mcg at 01/05/22 0509  •  lidocaine (LMX) 4 % cream 1 application, 1 application, Topical, PRN, Natalie Son PA-C, 1 application at 01/05/22 0820  •  mannitol 20 % infusion 25 g, 25 g, Intravenous, Once, Xavi Lay MD, Last Rate: 125  mL/hr at 22 1023, 25 g at 22 1023  •  melatonin tablet 5 mg, 5 mg, Oral, Nightly PRN, Natalie Son PA-C, 5 mg at 21  •  methylnaltrexone (RELISTOR) injection 6 mg, 6 mg, Subcutaneous, Every Other Day, Luli Dunlap DO, 6 mg at 22 0918  •  midodrine (PROAMATINE) tablet 10 mg, 10 mg, Oral, Once, Xavi Lay MD  •  [START ON 2022] midodrine (PROAMATINE) tablet 10 mg, 10 mg, Oral, Daily PRN, Xavi Lay MD  •  [] morphine injection 1 mg, 1 mg, Intravenous, Q4H PRN, 1 mg at 12/15/21 1125 **AND** naloxone (NARCAN) injection 0.4 mg, 0.4 mg, Intravenous, Q5 Min PRN, Natalie Son PA-C  •  neostigmine 5 mg in sodium chloride 0.9 % 50 mL IVPB, 0.4 mg/hr, Intravenous, Continuous, Marguerite Mohr MD, Last Rate: 4 mL/hr at 22, 0.4 mg/hr at 22  •  ondansetron (ZOFRAN) tablet 4 mg, 4 mg, Oral, Q6H PRN, 4 mg at 21 0900 **OR** ondansetron (ZOFRAN) injection 4 mg, 4 mg, Intravenous, Q6H PRN, Natalie Son PA-C  •  Pharmacy to dose warfarin, , Does not apply, Continuous PRN, Xavi Lay MD  •  sennosides-docusate (PERICOLACE) 8.6-50 MG per tablet 2 tablet, 2 tablet, Oral, BID PRN, Natalie Son PA-C, 2 tablet at 22 1356  •  [COMPLETED] Insert peripheral IV, , , Once **AND** sodium chloride 0.9 % flush 10 mL, 10 mL, Intravenous, PRN, Natalie Son PA-C, 10 mL at 22 0558  •  sodium chloride 0.9 % flush 10 mL, 10 mL, Intravenous, Q12H, Natalie Son PA-FRANCK, 10 mL at 22 2108  •  sodium chloride 0.9 % flush 10 mL, 10 mL, Intravenous, PRN, Natalie Son PA-C, 10 mL at 22 2150  •  sodium chloride 0.9 % infusion, 5 mL/hr, Intravenous, Continuous, Natalie Son PA-C, Stopped at 21 1135  •  warfarin (COUMADIN) tablet 5 mg, 5 mg, Oral, Daily, Jessica Perdue, PharmD    Please refer to the medical record for a full medication list    Review of  "Systems:    Constitutional--low-grade fever, no chills or sweats.  Appetite good, and no malaise. No fatigue.  HEENT-- No new vision, hearing or throat complaints.  No epistaxis or oral sores.  Denies odynophagia or dysphagia.  No odynophagia or dysphagia. No headache, photophobia or neck stiffness.  CV-- No chest pain, palpitation or syncope  Resp-- No SOB/cough/Hemoptysis  GI- No nausea, vomiting, or diarrhea.  No hematochezia, melena, or hematemesis. Denies jaundice or chronic liver disease.  Lymph- no swollen lymph nodes in neck/axilla or groin.   Heme- No active bruising or bleeding; no Hx of DVT or PE.  MS-- no swelling or pain in the bones or joints of arms/legs.  No new back pain.  Neuro-- No acute focal weakness or numbness in the arms or legs.  No seizures.  Skin--No rashes or lesions, except as per HPI.  No nodules.    Physical Exam:   Vital Signs   Temp:  [97.8 °F (36.6 °C)-98.4 °F (36.9 °C)] 98.4 °F (36.9 °C)  Heart Rate:  [56-80] 78  Resp:  [18-20] 18  BP: ()/(53-66) 101/62    Temp  Min: 97.8 °F (36.6 °C)  Max: 98.4 °F (36.9 °C)  BP  Min: 94/59  Max: 102/66  Pulse  Min: 56  Max: 80  Resp  Min: 18  Max: 20  SpO2  Min: 94 %  Max: 98 %    Blood pressure 101/62, pulse 78, temperature 98.4 °F (36.9 °C), temperature source Oral, resp. rate 18, height 193 cm (76\"), weight 104 kg (229 lb 14.4 oz), SpO2 98 %.  GENERAL: Awake and alert, in minimal distress. Appears older than stated age.    HEENT:  Normocephalic, atraumatic.  Oropharynx without thrush.  No cervical adenopathy. No neck masses.  Ears externally normal, Nose externally normal.  EYES: No conjunctival injection. No icterus. EOM full.  LYMPHATICS: No lymphadenopathy of the neck or axillary or inguinal regions.   HEART: No murmur, gallop, or pericardial friction rub. Reg rate rhythm.  LUNGS: Clear to auscultation and percussion. No respiratory distress, no use of accessory muscles.  No wheezes.  ABDOMEN: Soft, nontender, nondistended. No " appreciable HSM.  Bowel sounds normal.  Obese.  SKIN: Warm and dry without cutaneous eruptions.  AKA left is healing.  No purulent drainage.  PSYCHIATRIC: Mental status lucid. No confusion.  EXT:  No cellulitic change.  NEURO: Oriented to name, nonfocal    Results Review:   I reviewed the patient's new clinical results.  I reviewed the patient's new imaging results and agree with the interpretation.  I reviewed the patient's other test results and agree with the interpretation    Results from last 7 days   Lab Units 01/04/22  0845 12/31/21  1032   WBC 10*3/mm3 7.44 10.84*   HEMOGLOBIN g/dL 7.4* 7.3*   HEMATOCRIT % 24.9* 24.2*   PLATELETS 10*3/mm3 188 203     Results from last 7 days   Lab Units 01/05/22  0859   SODIUM mmol/L 139   POTASSIUM mmol/L 3.6   CHLORIDE mmol/L 101   CO2 mmol/L 25.0   BUN mg/dL 33*   CREATININE mg/dL 4.86*   GLUCOSE mg/dL 134*   CALCIUM mg/dL 8.5*     Results from last 7 days   Lab Units 12/31/21  1032   ALK PHOS U/L 85   BILIRUBIN mg/dL 0.3   ALT (SGPT) U/L <5   AST (SGOT) U/L 10                     Estimated Creatinine Clearance: 19.3 mL/min (A) (by C-G formula based on SCr of 4.86 mg/dL (H)).    Microbiology:  Microbiology Results Abnormal     Procedure Component Value - Date/Time    Blood Culture - Blood, Arm, Right [[id number]]  (Normal) Collected: 12/13/21 2010    Lab Status: Final result Specimen: Blood from Arm, Right Updated: 12/18/21 2045     Blood Culture No growth at 5 days    Blood Culture - Blood, Arm, Right [[id number]]  (Normal) Collected: 12/13/21 2000    Lab Status: Final result Specimen: Blood from Arm, Right Updated: 12/18/21 2045     Blood Culture No growth at 5 days    Urine Culture - Urine, Urine, Catheter [[id number]] Collected: 12/13/21 2006    Lab Status: Final result Specimen: Urine, Catheter Updated: 12/14/21 1710     Urine Culture >100,000 CFU/mL Mixed Radha Isolated    Narrative:      Specimen contains mixed organisms of questionable pathogenicity which indicates  contamination with commensal harrison.  Further identification is unlikely to provide clinically useful information.  Suggest recollection.    COVID PRE-OP / PRE-PROCEDURE SCREENING ORDER (NO ISOLATION) - Swab, Nasopharynx [[id number]]  (Normal) Collected: 12/13/21 1954    Lab Status: Final result Specimen: Swab from Nasopharynx Updated: 12/13/21 2031    Narrative:      The following orders were created for panel order COVID PRE-OP / PRE-PROCEDURE SCREENING ORDER (NO ISOLATION) - Swab, Nasopharynx.  Procedure                               Abnormality         Status                     ---------                               -----------         ------                     COVID-19 and FLU A/B PCR...[[id number]]  Normal              Final result                 Please view results for these tests on the individual orders.    COVID-19 and FLU A/B PCR - Swab, Nasopharynx [[id number]]  (Normal) Collected: 12/13/21 1954    Lab Status: Final result Specimen: Swab from Nasopharynx Updated: 12/13/21 2031     COVID19 Not Detected     Influenza A PCR Not Detected     Influenza B PCR Not Detected    Narrative:      Fact sheet for providers: https://www.fda.gov/media/191583/download    Fact sheet for patients: https://www.fda.gov/media/927666/download    Test performed by PCR.          Radiology:  Imaging Results (Last 72 Hours)     ** No results found for the last 72 hours. **          IMPRESSION:     1.  Left foot cellulitis with gangrene, with dry gangrene to left first through fourth toes, without MRI evidence of underlying osteomyelitis done on 12/13.  Cultures MRSA from left foot from 12/16.  Status post left AKA 12/30/2021, resolved.  2.  Atrial fibrillation with complex regarding his anticoagulation between patient and his providers.  3.  End-stage renal disease on hemodialysis Monday, Wednesday, Friday, with some noncompliance.  4.  Diabetes mellitus type 2 with increased risk for infection and likely associated with his  initial left foot problem.  Hemoglobin A1c 5.50 on 12/14.  5.  Stage II coccyx decubitus present on admission.  Right heel deep tissue injury.  Lower extremities with venous ulcerations right leg 2 x 3 cm, left leg with ulcer over it.  6.  Anemia, chronic disease and related to his renal failure.  Continues.  7.  Hypoalbuminemia, 2.80.  8.  Elevated alkaline phosphatase 135.  9.  Hypocalcemia 8.5.    Resolved infection on current regimen.    Plan:    1.  Diagnostically, continue to follow patient's physical exam, CBC, CMP, CRP.  The patient refusing vascular studies.  2.  Therapeutically, discontinued linezolid, ceftazidime, and Flagyl on 1/3/2022, status post his left AKA.  Follow off antibiotics.  3.  Supportive care.  Surgery is following.  Left AKA on 12/30/2021.    I discussed the patient's findings and my recommendations with patient and nursing staff    Our group would be pleased to follow this patient over the course of their hospitalization and assist with outpatient antimicrobial therapy, as indicated.  Further recommendations depend on the results of the cultures and clinical course.    Continue local wound care.  I will sign off.  Call if needed.     Brian Collado MD  1/5/2022

## 2022-01-05 NOTE — PROGRESS NOTES
[facility] Medicine Services  PROGRESS NOTE    Patient Name: Chase Obrien  : 1945  MRN: [id number]    Date of Admission: 2021  Primary Care Physician: Radha Purcell    Subjective   Subjective     CC:  Sepsis, left AKA    HPI:  Abdomen remains distended.  Patient asleep up to chair.  No BM.    ROS:  Gen- No fevers, chills  CV- No chest pain, palpitations  Resp- No cough, dyspnea  GI- No N/V/D. (+)distention and constipation  Unchanged    Objective   Objective     Vital Signs:   Temp:  [97.5 °F (36.4 °C)-97.8 °F (36.6 °C)] 97.8 °F (36.6 °C)  Heart Rate:  [76-81] 76  Resp:  [16-20] 20  BP: ()/(55-78) 102/65     Physical Exam:  Constitutional: No acute distress, nontoxic  Respiratory: Respiratory effort normal on RA  Cardiovascular: NSR tele  Gastrointestinal: Obese, nontender, tympanic and softly distended  Musculoskeletal: No bilateral ankle edema, left AKA      Results Reviewed:  LAB RESULTS:      Lab 22  0845 22  0703 22  0935 22  0742 21  1617 21  1032 21  0946 21  0946   WBC 7.44  --   --   --   --  10.84*  --  8.03   HEMOGLOBIN 7.4*  --   --   --   --  7.3*  --  8.5*   HEMATOCRIT 24.9*  --   --   --   --  24.2*  --  28.4*   PLATELETS 188  --   --   --   --  203  --  245   NEUTROS ABS 5.54  --   --   --   --  9.55*  --  5.54   IMMATURE GRANS (ABS) 0.05  --   --   --   --  0.10*  --  0.08*   LYMPHS ABS 1.07  --   --   --   --  0.56*  --  1.47   MONOS ABS 0.58  --   --   --   --  0.63  --  0.77   EOS ABS 0.17  --   --   --   --  0.00  --  0.13   .6*  --   --   --   --  99.6*  --  97.6*   PROTIME 17.3* 16.5* 15.7* 16.5* 16.7*  --    < > 18.4*    < > = values in this interval not displayed.         Lab 22  0845 22  0742 21  1617 21  1032 21  0946   SODIUM 136 135*  --  134* 137   POTASSIUM 3.6 4.1  --  4.5 4.5   CHLORIDE 98 98  --  97* 100   CO2 25.0 20.0*  --  20.0* 23.0   ANION GAP  13.0 17.0*  --  17.0* 14.0   BUN 22 21  --  30* 33*   CREATININE 3.59* 3.53*  --  4.49* 5.95*   GLUCOSE 111* 160*  --  286* 113*   CALCIUM 8.5* 8.2*  --  8.1* 8.1*   PHOSPHORUS 3.9 2.6 2.8  --   --          Lab 01/04/22  0845 01/01/22  0742 12/31/21  1032   TOTAL PROTEIN  --   --  6.8   ALBUMIN 3.70 3.60 3.40*   GLOBULIN  --   --  3.4   ALT (SGPT)  --   --  <5   AST (SGOT)  --   --  10   BILIRUBIN  --   --  0.3   ALK PHOS  --   --  85         Lab 01/04/22  0845 01/03/22  0703 01/02/22  0935 01/01/22  0742 12/31/21  1617   PROTIME 17.3* 16.5* 15.7* 16.5* 16.7*   INR 1.47* 1.38* 1.29* 1.38* 1.40*                 Brief Urine Lab Results  (Last result in the past 365 days)      Color   Clarity   Blood   Leuk Est   Nitrite   Protein   CREAT   Urine HCG        12/13/21 2006 Yellow   Turbid   Large (3+)   Large (3+)   Negative   >=300 mg/dL (3+)                 Microbiology Results Abnormal     Procedure Component Value - Date/Time    Blood Culture - Blood, Arm, Right [[id number]]  (Normal) Collected: 12/13/21 2010    Lab Status: Final result Specimen: Blood from Arm, Right Updated: 12/18/21 2045     Blood Culture No growth at 5 days    Blood Culture - Blood, Arm, Right [[id number]]  (Normal) Collected: 12/13/21 2000    Lab Status: Final result Specimen: Blood from Arm, Right Updated: 12/18/21 2045     Blood Culture No growth at 5 days    Urine Culture - Urine, Urine, Catheter [[id number]] Collected: 12/13/21 2006    Lab Status: Final result Specimen: Urine, Catheter Updated: 12/14/21 1710     Urine Culture >100,000 CFU/mL Mixed Harrison Isolated    Narrative:      Specimen contains mixed organisms of questionable pathogenicity which indicates contamination with commensal harrison.  Further identification is unlikely to provide clinically useful information.  Suggest recollection.    COVID PRE-OP / PRE-PROCEDURE SCREENING ORDER (NO ISOLATION) - Swab, Nasopharynx [[id number]]  (Normal) Collected: 12/13/21 1954    Lab Status: Final  result Specimen: Swab from Nasopharynx Updated: 12/13/21 2031    Narrative:      The following orders were created for panel order COVID PRE-OP / PRE-PROCEDURE SCREENING ORDER (NO ISOLATION) - Swab, Nasopharynx.  Procedure                               Abnormality         Status                     ---------                               -----------         ------                     COVID-19 and FLU A/B PCR...[[id number]]  Normal              Final result                 Please view results for these tests on the individual orders.    COVID-19 and FLU A/B PCR - Swab, Nasopharynx [[id number]]  (Normal) Collected: 12/13/21 1954    Lab Status: Final result Specimen: Swab from Nasopharynx Updated: 12/13/21 2031     COVID19 Not Detected     Influenza A PCR Not Detected     Influenza B PCR Not Detected    Narrative:      Fact sheet for providers: https://www.fda.gov/media/201604/download    Fact sheet for patients: https://www.fda.gov/media/015511/download    Test performed by PCR.          No radiology results from the last 24 hrs    Results for orders placed during the hospital encounter of 02/19/20    Transthoracic Echo Complete With Contrast if Necessary Per Protocol    Interpretation Summary  · Estimated EF = 55%.  · Mild mitral valve regurgitation is present      I have reviewed the medications:  Scheduled Meds:albumin human, , ,   albumin human, , ,   albumin human, , ,   albumin human, , ,   aspirin, 81 mg, Oral, Daily  atorvastatin, 20 mg, Oral, Nightly  Eluxadoline, 100 mg, Oral, BID With Meals  [START ON 1/5/2022] epoetin radha/radha-epbx, 20,000 Units, Subcutaneous, Once per day on Mon Wed Fri  gabapentin, 300 mg, Oral, Nightly  heparin (porcine), 5,000 Units, Subcutaneous, Q12H  insulin detemir, 10 Units, Subcutaneous, Nightly  insulin lispro, 0-7 Units, Subcutaneous, TID AC  levothyroxine, 200 mcg, Oral, Q AM  methylnaltrexone, 6 mg, Subcutaneous, Every Other Day  sodium chloride, 10 mL, Intravenous,  Q12H  warfarin, 7.5 mg, Oral, Daily      Continuous Infusions:neostigmine 5 mg in 50 mL NS IVPB, 0.4 mg/hr  Pharmacy to dose warfarin,   sodium chloride, 5 mL/hr, Last Rate: Stopped (21 1135)      PRN Meds:.•  acetaminophen **OR** acetaminophen  •  atropine  •  bisacodyl  •  dextrose  •  dextrose  •  docusate sodium  •  glucagon (human recombinant)  •  HYDROcodone-acetaminophen  •  lidocaine  •  melatonin  •  [] Morphine **AND** naloxone  •  ondansetron **OR** ondansetron  •  Pharmacy to dose warfarin  •  senna-docusate sodium  •  [COMPLETED] Insert peripheral IV **AND** sodium chloride  •  sodium chloride    Assessment/Plan   Assessment & Plan     Active Hospital Problems    Diagnosis  POA   • **Gangrene of left foot (HCC) [I96]  Yes   • Chronic diastolic CHF (congestive heart failure) (HCC) [I50.32]  Yes   • ESRD (end stage renal disease) (HCC) [N18.6]  Yes   • Atrial fibrillation (HCC) [I48.91]  Yes   • Type 2 diabetes mellitus (HCC) [E11.9]  Yes   • Hypothyroid [E03.9]  Yes   • WINSTON (obstructive sleep apnea), noncompliant w/ NIPPV [G47.33]  Yes   • CAD (coronary artery disease) [I25.10]  Yes   • Anemia [D64.9]  Yes   • Dyslipidemia [E78.5]  Yes      Resolved Hospital Problems    Diagnosis Date Resolved POA   • Hematuria [R31.9] 2021 Yes   • Lactic acidosis [E87.2] 2021 Yes        Brief Hospital Course to date:  Chase Obrien is a highly noncompliant 76 y.o. male  with CHF, DM, HL, HTN, ESRD on HD, history of GIB (gastric ulcers and embolization of L gastric artery ) and atrial fibrillation. More recent diagnosis of hematuria with subsequent cystoscopy with diagnosis of bladder/prostate cancer.     L toe gangrene second, third, and fourth toes  Cellulitis L foot  -continue zyvox, ceftazidime, flagyl per ID  -Dr. Augustine yu, underwent left AKA on    -Continue gabapentin 300 mg nightly secondary to ESRD     PAD  Hx of atrial fibrillation  -Initially, pt would not continue HD  or treatment without resuming coumadin even after educated on bleeding risk; however on 12/23, pt developed hematuria,so coumadin was placed on hold. Restarted on 12/28 per pt's request, despite risk of recurrent bleeding, which he has been made aware of by multiple providers  -Pharmacy dosing Coumadin     Hx of GIB  -gastric ulcer  -L gastric artery embolization here in 2020  -hgb stable     Hx of recent gross hematuria  Recent diagnosis of bladder/prostate cancer s/p cystoscopy at [place], data deficient   -Hematuria returned 12/23, likely due to ongoing coumadin use. Held on 12/23 until 12/27  -Monitor CBC  -multiple discussions with pt, by myself and partners, and, despite contrary advice and other options, Coumadin was resumed on 12/28     No evidence of UTI  -urine culture negative     ESRD   -HD MWF if pt agreeable (has refused multiple times due to his INR not being therapeutic)     DM  -Continue SSI  -Continue Levemir 10 units at night     WINSTON  -noncompliant with CPAP     Hypothyroidism  -continue synthroid    Constipation  -Refusing all bowel regimens  -continue subcu Relistor  -s/p Reglan q6h x 4 dose  - trial neostigmine drip today if patient will allow     Anemia  -no epo due to recent bladder cancer, monitor  -transfuse as needed     Decubitus wound  -WOC following, patient has been refusing dressing changes.      CHF by history  CAD       DVT prophylaxis:  Medical and mechanical DVT prophylaxis orders are present.       AM-PAC 6 Clicks Score (PT): 8 (01/04/22 1640)    Disposition: I expect the patient to be discharged to SNF rehab once arranged    CODE STATUS:   Code Status and Medical Interventions:   Ordered at: 12/14/21 0003     Level Of Support Discussed With:    Patient     Code Status (Patient has no pulse and is not breathing):    CPR (Attempt to Resuscitate)     Medical Interventions (Patient has pulse or is breathing):    Full Support       Marguerite Mohr MD  01/04/22

## 2022-01-05 NOTE — PROGRESS NOTES
"Pharmacy Consult - Warfarin  Chase Obrien is a 76 y.o. male on warfarin therapy.    Height - 193 cm (76\")  Weight - 104 kg (229 lb 14.4 oz)    Consulting Provider: Xavi Lay MD  Indication: Atrial fibrillation  Goal INR: 2-3  Home Regimen:   - Warfarin 5mg Monday, Wednesday, Friday   - Warfarin 2.5mg Tuesday, Thursday, Saturday, Sunday     Bridge Therapy: No, but patient remains on SQH until INR >2    Drug-Drug Interactions with current regimen:  Metronidazole - increase in INR, increased risk of bleeding - last dose on 1/3  Aspirin - increased risk of bleeding  Levothyroxine - increased risk of bleeding  Heparin - increased risk of bleeding     Warfarin Dosing During Admission:  Date  12/15 12/16 12/17 12/18 12/19 12/20 12/21 12/22 12/23   INR  1.37 1.37 1.33 1.51 1.54 1.81 2.1 2.51 2.49   Dose  5 mg  5 mg 5 mg  5 mg 7.5 mg  5 mg 5 mg 2.5 mg D/C due to hematuria     Date  12/24 12/25 12/26 12/27 12/28 12/29 12/30 12/31 1/1   INR  2.17 1.87 1.71 1.53 1.44 1.59 -- 1.40 1.38   Dose  0 0 0 0 5 mg 0 0 0 0     Date  1/2 1/3 1/4 1/5        INR  1.29 1.38 1.47 1.5        Dose  5 mg 5 mg 7.5 mg (5 mg)          Education Provided: Warfarin education provided on 12/22/21 verbally and in writing. Discussed effects of warfarin, importance of checking INR, drug-drug and drug-food interactions, and signs/symptoms of bleeding and clotting. Patient verbalized understanding through teach back. All pertinent questions were answered.      Discharge Follow up:   Following Provider - [facility]   Follow up time range or appointment -  2-3 days following discharge     Labs:  Results from last 7 days   Lab Units 01/05/22  0725 01/04/22  0845 01/03/22  0703 01/02/22  0935 01/01/22  0742 12/31/21  1617 12/31/21  1032 12/29/21  0946   INR  1.50* 1.47* 1.38* 1.29* 1.38* 1.40*  --  1.59*   HEMOGLOBIN g/dL  --  7.4*  --   --   --   --  7.3* 8.5*   HEMATOCRIT %  --  24.9*  --   --   --   --  24.2* 28.4* "     Results from last 7 days   Lab Units 01/04/22  0845 01/01/22  0742 12/31/21  1032   SODIUM mmol/L 136 135* 134*   POTASSIUM mmol/L 3.6 4.1 4.5   CHLORIDE mmol/L 98 98 97*   CO2 mmol/L 25.0 20.0* 20.0*   BUN mg/dL 22 21 30*   CREATININE mg/dL 3.59* 3.53* 4.49*   CALCIUM mg/dL 8.5* 8.2* 8.1*   BILIRUBIN mg/dL  --   --  0.3   ALK PHOS U/L  --   --  85   ALT (SGPT) U/L  --   --  <5   AST (SGOT) U/L  --   --  10   GLUCOSE mg/dL 111* 160* 286*     Current dietary intake: % of meals documented on 1/4.  Diet Order   Procedures   • Diet Regular; Renal     Assessment/Plan:  1. Pharmacy to dose warfarin for atrial fibrillation.  2. Goal INR: 2-3  3. Patient's INR remains SUBtherapeutic today at 1.5, slightly increased from 1.47 on 1/4.   4. Of note, patients warfarin had been held for left AKA performed on 12/30.  5. Considering INR remains SUBtherapeutic and last dose of Metronidazole on 1/3, give Warfarin 5 mg today.  6. Patient to receive SQH Q12H until INR >2.   7. Daily PT/INR ordered.  8. Monitor for signs/symptoms of bleeding, dietary intake, and drug-drug interactions.   9. Pharmacy will continue to follow and make dose adjustments as necessary.    Thank you,    Jessica Perdue, PharmD  Pharmacy Resident   1/5/2022  09:24 EST

## 2022-01-05 NOTE — PROGRESS NOTES
Cardiothoracic Surgery Progress Note      POD #: 6-left above-knee amputation     LOS: 22 days      Subjective: Awake and alert    Objective:  Vital Signs vital signs below noted T-max past 24 hours 98.4 °F  Temp:  [97.8 °F (36.6 °C)-98.4 °F (36.9 °C)] 98.4 °F (36.9 °C)  Heart Rate:  [76-80] 80  Resp:  [18-20] 18  BP: (102-114)/(65-69) 102/66    Physical Exam:   General Appearance: Oriented awake alert   Lungs:   Heart:   Skin:   Incision: Amputation site dressing change.  Sutures intact skin margin viable skin flaps are viable.     Results:  Results from last 7 days   Lab Units 01/04/22  0845   WBC 10*3/mm3 7.44   HEMOGLOBIN g/dL 7.4*   HEMATOCRIT % 24.9*   PLATELETS 10*3/mm3 188     Results from last 7 days   Lab Units 01/04/22  0845   SODIUM mmol/L 136   POTASSIUM mmol/L 3.6   CHLORIDE mmol/L 98   CO2 mmol/L 25.0   BUN mg/dL 22   CREATININE mg/dL 3.59*   GLUCOSE mg/dL 111*   CALCIUM mg/dL 8.5*         Assessment: #1.  Postop day 6 left above-knee amputation for ischemic gangrene of the left foot and left lower extremity.  Healing well  2.  Renal failure on hemodialysis for the past 2 years  3.  Bladder cancer and prostate cancer with hematuria      Plan: Continue dialysis per renal medicine.  Medical manage per hospitalist.  Daily dressing changes amputation site continues.      Luís Bradshaw MD - 01/05/22 - 06:25 EST

## 2022-01-05 NOTE — PROGRESS NOTES
[facility] Medicine Services  PROGRESS NOTE    Patient Name: Chase Obrien  : 1945  MRN: [id number]    Date of Admission: 2021  Primary Care Physician: Radha Purcell    Subjective   Subjective     CC:  Sepsis, left AKA    HPI:  Patient seen resting up in bed awake alert.  Significant other at bedside.  Patient frail and chronically ill-appearing.  Has Guillaume cath to bedside drain with bloody output.  Abdomen still distended.  Rates left stump pain 8/10 scale.  Drinking okay but has poor appetite.  Underwent HD today with no difficulty.  Awaiting placement.      ROS:  Gen- No fevers, chills  CV- No chest pain, palpitations  Resp- No cough, dyspnea  GI- No N/V/D. (+)distention and constipation (some improvement when large BM this morning)      Objective   Objective     Vital Signs:   Temp:  [97.6 °F (36.4 °C)-98.4 °F (36.9 °C)] 97.7 °F (36.5 °C)  Heart Rate:  [56-80] 78  Resp:  [18-20] 18  BP: ()/(53-69) 132/69     Physical Exam:  Constitutional: No acute distress, awake, alert.  Resting up in bed with significant other bedside.  Chronically ill and frail appearing male.  HENT: NCAT, mucous membranes moist  Respiratory: decreased at bases, respiratory effort normal room air with sats 98%.  Cardiovascular: RRR, no murmurs, rubs, or gallops  Gastrointestinal: Positive bowel sounds, soft, nontender, moderately distended with tympanic sounds.  : Guillaume cath back to bedside drain with moderate to large amount of ric red/brown blood.  Musculoskeletal: No R LE edema.  Left AKA.  Dressing dry and intact.  Psychiatric: Appropriate affect, cooperative  Neurologic: Oriented x 3, strength symmetric in all extremities, Cranial Nerves grossly intact to confrontation, speech clear appropriate.  Follows commands.  Skin: No rashes      Results Reviewed:  LAB RESULTS:      Lab 22  0725 22  0845 22  0703 22  0935 22  0742 21  1617 21  1032    WBC  --  7.44  --   --   --   --  10.84*   HEMOGLOBIN  --  7.4*  --   --   --   --  7.3*   HEMATOCRIT  --  24.9*  --   --   --   --  24.2*   PLATELETS  --  188  --   --   --   --  203   NEUTROS ABS  --  5.54  --   --   --   --  9.55*   IMMATURE GRANS (ABS)  --  0.05  --   --   --   --  0.10*   LYMPHS ABS  --  1.07  --   --   --   --  0.56*   MONOS ABS  --  0.58  --   --   --   --  0.63   EOS ABS  --  0.17  --   --   --   --  0.00   MCV  --  101.6*  --   --   --   --  99.6*   PROTIME 17.6* 17.3* 16.5* 15.7* 16.5*   < >  --     < > = values in this interval not displayed.         Lab 01/05/22  0859 01/04/22  0845 01/01/22  [facility] 12/31/21  1617 12/31/21  1032   SODIUM 139 136 135*  --  134*   POTASSIUM 3.6 3.6 4.1  --  4.5   CHLORIDE 101 98 98  --  97*   CO2 25.0 25.0 20.0*  --  20.0*   ANION GAP 13.0 13.0 17.0*  --  17.0*   BUN 33* 22 21  --  30*   CREATININE 4.86* 3.59* 3.53*  --  4.49*   GLUCOSE 134* 111* 160*  --  286*   CALCIUM 8.5* 8.5* 8.2*  --  8.1*   PHOSPHORUS 4.2 3.9 2.6 2.8  --          Lab 01/05/22  0859 01/04/22  0845 01/01/22  0742 12/31/21  1032   TOTAL PROTEIN  --   --   --  6.8   ALBUMIN 3.60 3.70 3.60 3.40*   GLOBULIN  --   --   --  3.4   ALT (SGPT)  --   --   --  <5   AST (SGOT)  --   --   --  10   BILIRUBIN  --   --   --  0.3   ALK PHOS  --   --   --  85         Lab 01/05/22  0725 01/04/22  0845 01/03/22  0703 01/02/22  0935 01/01/22  0742   PROTIME 17.6* 17.3* 16.5* 15.7* 16.5*   INR 1.50* 1.47* 1.38* 1.29* 1.38*                 Brief Urine Lab Results  (Last result in the past 365 days)      Color   Clarity   Blood   Leuk Est   Nitrite   Protein   CREAT   Urine HCG        12/13/21 2006 Yellow   Turbid   Large (3+)   Large (3+)   Negative   >=300 mg/dL (3+)                 Microbiology Results Abnormal     Procedure Component Value - Date/Time    Blood Culture - Blood, Arm, Right [[id number]]  (Normal) Collected: 12/13/21 2010    Lab Status: Final result Specimen: Blood from Arm, Right Updated:  12/18/21 2045     Blood Culture No growth at 5 days    Blood Culture - Blood, Arm, Right [[id number]]  (Normal) Collected: 12/13/21 2000    Lab Status: Final result Specimen: Blood from Arm, Right Updated: 12/18/21 2045     Blood Culture No growth at 5 days    Urine Culture - Urine, Urine, Catheter [[id number]] Collected: 12/13/21 2006    Lab Status: Final result Specimen: Urine, Catheter Updated: 12/14/21 1710     Urine Culture >100,000 CFU/mL Mixed Harrison Isolated    Narrative:      Specimen contains mixed organisms of questionable pathogenicity which indicates contamination with commensal harrison.  Further identification is unlikely to provide clinically useful information.  Suggest recollection.    COVID PRE-OP / PRE-PROCEDURE SCREENING ORDER (NO ISOLATION) - Swab, Nasopharynx [[id number]]  (Normal) Collected: 12/13/21 1954    Lab Status: Final result Specimen: Swab from Nasopharynx Updated: 12/13/21 2031    Narrative:      The following orders were created for panel order COVID PRE-OP / PRE-PROCEDURE SCREENING ORDER (NO ISOLATION) - Swab, Nasopharynx.  Procedure                               Abnormality         Status                     ---------                               -----------         ------                     COVID-19 and FLU A/B PCR...[[id number]]  Normal              Final result                 Please view results for these tests on the individual orders.    COVID-19 and FLU A/B PCR - Swab, Nasopharynx [[id number]]  (Normal) Collected: 12/13/21 1954    Lab Status: Final result Specimen: Swab from Nasopharynx Updated: 12/13/21 2031     COVID19 Not Detected     Influenza A PCR Not Detected     Influenza B PCR Not Detected    Narrative:      Fact sheet for providers: https://www.fda.gov/media/324784/download    Fact sheet for patients: https://www.fda.gov/media/065494/download    Test performed by PCR.          No radiology results from the last 24 hrs    Results for orders placed during the hospital  encounter of 20    Transthoracic Echo Complete With Contrast if Necessary Per Protocol    Interpretation Summary  · Estimated EF = 55%.  · Mild mitral valve regurgitation is present      I have reviewed the medications:  Scheduled Meds:albumin human, , ,   albumin human, , ,   albumin human, , ,   albumin human, , ,   aspirin, 81 mg, Oral, Daily  atorvastatin, 20 mg, Oral, Nightly  Eluxadoline, 100 mg, Oral, BID With Meals  epoetin radha/radha-epbx, 20,000 Units, Subcutaneous, Once per day on   gabapentin, 300 mg, Oral, Nightly  heparin (porcine), 5,000 Units, Subcutaneous, Q12H  insulin detemir, 10 Units, Subcutaneous, Nightly  insulin lispro, 0-7 Units, Subcutaneous, TID AC  levothyroxine, 200 mcg, Oral, Q AM  methylnaltrexone, 6 mg, Subcutaneous, Every Other Day  midodrine, 10 mg, Oral, Once  sodium chloride, 10 mL, Intravenous, Q12H  warfarin, 5 mg, Oral, Daily      Continuous Infusions:neostigmine 5 mg in 50 mL NS IVPB, 0.4 mg/hr, Last Rate: 0.4 mg/hr (22)  Pharmacy to dose warfarin,   sodium chloride, 5 mL/hr, Last Rate: Stopped (21 1135)      PRN Meds:.•  acetaminophen **OR** acetaminophen  •  atropine  •  bisacodyl  •  dextrose  •  dextrose  •  docusate sodium  •  glucagon (human recombinant)  •  HYDROcodone-acetaminophen  •  lidocaine  •  melatonin  •  [START ON 2022] midodrine  •  [] Morphine **AND** naloxone  •  ondansetron **OR** ondansetron  •  Pharmacy to dose warfarin  •  senna-docusate sodium  •  [COMPLETED] Insert peripheral IV **AND** sodium chloride  •  sodium chloride    Assessment/Plan   Assessment & Plan     Active Hospital Problems    Diagnosis  POA   • **Gangrene of left foot (HCC) [I96]  Yes   • Chronic diastolic CHF (congestive heart failure) (HCC) [I50.32]  Yes   • ESRD (end stage renal disease) (HCC) [N18.6]  Yes   • Atrial fibrillation (HCC) [I48.91]  Yes   • Type 2 diabetes mellitus (HCC) [E11.9]  Yes   • Hypothyroid [E03.9]  Yes   • WINSTON  (obstructive sleep apnea), noncompliant w/ NIPPV [G47.33]  Yes   • CAD (coronary artery disease) [I25.10]  Yes   • Anemia [D64.9]  Yes   • Dyslipidemia [E78.5]  Yes      Resolved Hospital Problems    Diagnosis Date Resolved POA   • Hematuria [R31.9] 12/24/2021 Yes   • Lactic acidosis [E87.2] 12/24/2021 Yes        Brief Hospital Course to date:  Chase Obrien is a highly noncompliant 76 y.o. male  with CHF, DM, HL, HTN, ESRD on HD, history of GIB (gastric ulcers and embolization of L gastric artery 2020) and atrial fibrillation. More recent diagnosis of hematuria with subsequent cystoscopy with diagnosis of bladder/prostate cancer.    This patient's problems and plans were partially entered by my partner and updated as appropriate by me 01/05/22.    Assessment/Plan:  Pt is new to me today      Lt toe gangrene second, third, and fourth toes  Cellulitis L foot  -continue zyvox, ceftazidime, flagyl per ID  -Dr. Bradshaw following, underwent left AKA on 12/30   -Continue gabapentin 300 mg nightly secondary to ESRD     PAD  Hx of atrial fibrillation  -Initially, pt would not continue HD or treatment without resuming coumadin even after educated on bleeding risk; however on 12/23, pt developed hematuria, so coumadin was placed on hold. Restarted on 12/28 per pt's request, despite risk of recurrent bleeding, which he has been made aware of by multiple providers  -Pharmacy dosing Coumadin  --today pt with large amount of gross hematuria in Guillaume bag on my exam.  Will recheck H&H now.  Serial H&H overnight.  Spoke with Kye in pharmacy.  Will hold heparin subcu and Coumadin for now.  See below.     Hx of GIB  -gastric ulcer  -L gastric artery embolization here in 2020  -hgb stable     Hx of recent gross hematuria/current due to gross hematuria  Recent diagnosis of bladder/prostate cancer s/p cystoscopy at [place], data deficient   -Hematuria returned 12/23, likely due to ongoing coumadin use. Held on 12/23 until  12/27  -Monitor CBC.  Hemoglobin yesterday 1/4 stable at 7.4.  -multiple discussions with pt, by myself and partners, and, despite contrary advice and other options, Coumadin was resumed on 12/28  --Due to recurrent/persistent hematuria and issues with anticoagulation and cancer, will consult urology in a.m.  Note the patient was scheduled to see  urology for work-up of missed appointment while he was here in the hospital this admit.  --will windy h/h now and q6 overnight     No evidence of UTI  -urine culture negative     ESRD   -HD MWF if pt agreeable (has refused multiple times due to his INR not being therapeutic)     DM (A1c 5.5)  -Continue SSI  -Continue Levemir 10 units at night  --Stable, continue current regimen     WINSTON  -noncompliant with CPAP  --Oxygen as needed     Hypothyroidism  -continue synthroid  --Last TSH checked 4/2020.  --Recheck in a.m.    Constipation  -Refusing all bowel regimens  -continue subcu Relistor  -s/p Reglan q6h x 4 dose  - trial neostigmine drip today if patient will allow     Anemia  -no epo due to recent bladder cancer, monitor  -transfuse as needed.  Hemoglobin stable yesterday at last check 7.4.  Still with gross hematuria.  On Coumadin with pharmacy dosing as well as VTE prophylaxis Heparin every 12.  --Nephrology following due to ESRD.  Consulting urology in a.m.  --see above.     Decubitus wound  -WOC following, patient has been refusing dressing changes.      CHF by history  CAD   --Generally stable.      DVT prophylaxis:  Medical and mechanical DVT prophylaxis orders are present.       AM-PAC 6 Clicks Score (PT): 8 (01/05/22 1306)    Disposition: I expect the patient to be discharged to SNF rehab once arranged    CODE STATUS:   Code Status and Medical Interventions:   Ordered at: 12/14/21 0003     Level Of Support Discussed With:    Patient     Code Status (Patient has no pulse and is not breathing):    CPR (Attempt to Resuscitate)     Medical Interventions (Patient has  pulse or is breathing):    Full Support       Erica Matos, APRN  01/05/22

## 2022-01-05 NOTE — PLAN OF CARE
Goal Outcome Evaluation:  Plan of Care Reviewed With: patient A&Ox4. VSS. Placed in speciality bed via lift and 2 staff members. Attempted to reposition pt q 2 hrs but pt would refuse or gently rock weight offone hip to the other, refusing staff assist for turns.Dressing to front of right leg changed by staff. Dressing to left stump changed by Dr Lawrence. Pain controlled with ropivacaine pump @10 ml/hr and a single Norco. FC to BSD with daniel red drainage. BS tinkling with no stool. Neostigmine @ 4 ml/hr infusing.

## 2022-01-06 NOTE — PROGRESS NOTES
"    [facility] Medicine Services  PROGRESS NOTE    Patient Name: Chase Obrien  : 1945  MRN: [id number]    Date of Admission: 2021  Primary Care Physician: Radha Purcell    Subjective   Subjective     CC:  Sepsis, left AKA    HPI:  Patient seen resting up in bed dozing.  Awakens to voice.  Irritated but he states he has been well woke up every 10 minutes and support this morning\".  Otherwise cooperative.  Reports good bowel movement yesterday.  Guillaume cath still with bloody output.  No new issues.    ROS:  Gen- No fevers, chills  CV- No chest pain, palpitations  Resp- No cough, dyspnea  GI- No N/V/D. (+)distention and constipation (some improvement when large BM yesterday and stable today)      Objective   Objective     Vital Signs:   Temp:  [98 °F (36.7 °C)-98.4 °F (36.9 °C)] 98 °F (36.7 °C)  Heart Rate:  [73-77] 76  Resp:  [18] 18  BP: ()/(49-64) 124/64     Physical Exam:  Constitutional: No acute distress, drowsy but awakens to voice.  No visitors at bedside.  Chronically ill and frail appearing male.  HENT: NCAT, mucous membranes moist  Respiratory: decreased at bases, respiratory effort normal room air with sats 97%.  Cardiovascular: RRR, no murmurs, rubs, or gallops  Gastrointestinal: Positive bowel sounds, soft, nontender, moderately distended with tympanic sounds.  : Guillaume cath bag to bedside drain with small amount of red/brown blood.  Musculoskeletal: No RLE edema.  Left AKA.  Dressing dry and intact.  Psychiatric: Appropriate affect, cooperative  Neurologic: Oriented x 3, strength symmetric in all extremities, Cranial Nerves grossly intact to confrontation, speech clear appropriate.  Follows commands.  Skin: No rashes.  Left upper extremity AV fistula with positive thrill and bruit.      Results Reviewed:  LAB RESULTS:      Lab 22  0717 22  2358 22  1916 22  0725 22  0845 22  0703 22  0935 21  1617 " 12/31/21  1032   WBC  --  7.98  --   --  7.44  --   --   --  10.84*   HEMOGLOBIN 7.1* 7.0*  7.0* 7.2*  --  7.4*  --   --   --  7.3*   HEMATOCRIT 23.5* 22.9*  22.9* 23.3*  --  24.9*  --   --   --  24.2*   PLATELETS  --  160  --   --  188  --   --   --  203   NEUTROS ABS  --  5.78  --   --  5.54  --   --   --  9.55*   IMMATURE GRANS (ABS)  --  0.07*  --   --  0.05  --   --   --  0.10*   LYMPHS ABS  --  1.08  --   --  1.07  --   --   --  0.56*   MONOS ABS  --  0.82  --   --  0.58  --   --   --  0.63   EOS ABS  --  0.20  --   --  0.17  --   --   --  0.00   MCV  --  102.7*  --   --  101.6*  --   --   --  99.6*   PROTIME 18.2*  --   --  17.6* 17.3* 16.5* 15.7*   < >  --     < > = values in this interval not displayed.         Lab 01/06/22  0717 01/05/22  0859 01/04/22  0845 01/01/22  0742 12/31/21  1617 12/31/21  1032   SODIUM 134* 139 136 135*  --  134*   POTASSIUM 3.7 3.6 3.6 4.1  --  4.5   CHLORIDE 100 101 98 98  --  97*   CO2 24.0 25.0 25.0 20.0*  --  20.0*   ANION GAP 10.0 13.0 13.0 17.0*  --  17.0*   BUN 18 33* 22 21  --  30*   CREATININE 3.73* 4.86* 3.59* 3.53*  --  4.49*   GLUCOSE 112* 134* 111* 160*  --  286*   CALCIUM 8.7 8.5* 8.5* 8.2*  --  8.1*   PHOSPHORUS  --  4.2 3.9 2.6 2.8  --    TSH 7.050*  --   --   --   --   --          Lab 01/06/22  0717 01/05/22  0859 01/04/22  0845 01/01/22  0742 12/31/21  1032   TOTAL PROTEIN 6.8  --   --   --  6.8   ALBUMIN 4.00 3.60 3.70 3.60 3.40*   GLOBULIN 2.8  --   --   --  3.4   ALT (SGPT) <5  --   --   --  <5   AST (SGOT) 11  --   --   --  10   BILIRUBIN 0.4  --   --   --  0.3   ALK PHOS 86  --   --   --  85         Lab 01/06/22  0717 01/05/22  0725 01/04/22  0845 01/03/22  0703 01/02/22  0935   PROTIME 18.2* 17.6* 17.3* 16.5* 15.7*   INR 1.56* 1.50* 1.47* 1.38* 1.29*                 Brief Urine Lab Results  (Last result in the past 365 days)      Color   Clarity   Blood   Leuk Est   Nitrite   Protein   CREAT   Urine HCG        12/13/21 2006 Yellow   Turbid   Large (3+)    Large (3+)   Negative   >=300 mg/dL (3+)                 Microbiology Results Abnormal     Procedure Component Value - Date/Time    Blood Culture - Blood, Arm, Right [[id number]]  (Normal) Collected: 12/13/21 2010    Lab Status: Final result Specimen: Blood from Arm, Right Updated: 12/18/21 2045     Blood Culture No growth at 5 days    Blood Culture - Blood, Arm, Right [[id number]]  (Normal) Collected: 12/13/21 2000    Lab Status: Final result Specimen: Blood from Arm, Right Updated: 12/18/21 2045     Blood Culture No growth at 5 days    Urine Culture - Urine, Urine, Catheter [[id number]] Collected: 12/13/21 2006    Lab Status: Final result Specimen: Urine, Catheter Updated: 12/14/21 1710     Urine Culture >100,000 CFU/mL Mixed Harrison Isolated    Narrative:      Specimen contains mixed organisms of questionable pathogenicity which indicates contamination with commensal harrison.  Further identification is unlikely to provide clinically useful information.  Suggest recollection.    COVID PRE-OP / PRE-PROCEDURE SCREENING ORDER (NO ISOLATION) - Swab, Nasopharynx [[id number]]  (Normal) Collected: 12/13/21 1954    Lab Status: Final result Specimen: Swab from Nasopharynx Updated: 12/13/21 2031    Narrative:      The following orders were created for panel order COVID PRE-OP / PRE-PROCEDURE SCREENING ORDER (NO ISOLATION) - Swab, Nasopharynx.  Procedure                               Abnormality         Status                     ---------                               -----------         ------                     COVID-19 and FLU A/B PCR...[[id number]]  Normal              Final result                 Please view results for these tests on the individual orders.    COVID-19 and FLU A/B PCR - Swab, Nasopharynx [[id number]]  (Normal) Collected: 12/13/21 1954    Lab Status: Final result Specimen: Swab from Nasopharynx Updated: 12/13/21 2031     COVID19 Not Detected     Influenza A PCR Not Detected     Influenza B PCR Not Detected     Narrative:      Fact sheet for providers: https://www.fda.gov/media/949587/download    Fact sheet for patients: https://www.fda.gov/media/759269/download    Test performed by PCR.          No radiology results from the last 24 hrs    Results for orders placed during the hospital encounter of 20    Transthoracic Echo Complete With Contrast if Necessary Per Protocol    Interpretation Summary  · Estimated EF = 55%.  · Mild mitral valve regurgitation is present      I have reviewed the medications:  Scheduled Meds:albumin human, , ,   albumin human, , ,   albumin human, , ,   albumin human, , ,   aspirin, 81 mg, Oral, Daily  atorvastatin, 20 mg, Oral, Nightly  Eluxadoline, 100 mg, Oral, BID With Meals  epoetin radha/radha-epbx, 20,000 Units, Subcutaneous, Once per day on   gabapentin, 300 mg, Oral, Nightly  insulin detemir, 10 Units, Subcutaneous, Nightly  insulin lispro, 0-7 Units, Subcutaneous, TID AC  levothyroxine, 200 mcg, Oral, Q AM  methylnaltrexone, 6 mg, Subcutaneous, Every Other Day  midodrine, 10 mg, Oral, Once  sodium chloride, 10 mL, Intravenous, Q12H      Continuous Infusions:Pharmacy to dose warfarin,   sodium chloride, 5 mL/hr, Last Rate: Stopped (21 1135)      PRN Meds:.•  acetaminophen **OR** acetaminophen  •  atropine  •  bisacodyl  •  dextrose  •  dextrose  •  docusate sodium  •  glucagon (human recombinant)  •  HYDROcodone-acetaminophen  •  lidocaine  •  melatonin  •  [START ON 2022] midodrine  •  [] Morphine **AND** naloxone  •  ondansetron **OR** ondansetron  •  Pharmacy to dose warfarin  •  senna-docusate sodium  •  [COMPLETED] Insert peripheral IV **AND** sodium chloride  •  sodium chloride    Assessment/Plan   Assessment & Plan     Active Hospital Problems    Diagnosis  POA   • **Gangrene of left foot (HCC) [I96]  Yes   • Chronic diastolic CHF (congestive heart failure) ([facility]) [I50.32]  Yes   • ESRD (end stage renal disease) ([facility]) [N18.6]  Yes   • Atrial  fibrillation (HCC) [I48.91]  Yes   • Type 2 diabetes mellitus (HCC) [E11.9]  Yes   • Hypothyroid [E03.9]  Yes   • WINSTON (obstructive sleep apnea), noncompliant w/ NIPPV [G47.33]  Yes   • CAD (coronary artery disease) [I25.10]  Yes   • Anemia [D64.9]  Yes   • Dyslipidemia [E78.5]  Yes      Resolved Hospital Problems    Diagnosis Date Resolved POA   • Hematuria [R31.9] 12/24/2021 Yes   • Lactic acidosis [E87.2] 12/24/2021 Yes        Brief Hospital Course to date:  Chase Obrien is a highly noncompliant 76 y.o. male  with CHF, DM, HL, HTN, ESRD on HD, history of GIB (gastric ulcers and embolization of L gastric artery 2020) and atrial fibrillation. More recent diagnosis of hematuria with subsequent cystoscopy with diagnosis of bladder/prostate cancer.    This patient's problems and plans were partially entered by my partner and updated as appropriate by me 01/06/22.    Assessment/Plan:     Lt toe gangrene second, third, and fourth toes  Cellulitis L foot  -continue zyvox, ceftazidime, flagyl per ID  -Dr. Bradshaw following, underwent left AKA on 12/30   -Continue gabapentin 300 mg nightly secondary to ESRD     PAD  Hx of atrial fibrillation  -Initially, pt would not continue HD or treatment without resuming coumadin even after educated on bleeding risk; however on 12/23, pt developed hematuria, so coumadin was placed on hold. Restarted on 12/28 per pt's request, despite risk of recurrent bleeding, which he has been made aware of by multiple providers  -Pharmacy dosing Coumadin  --today pt with large amount of gross hematuria in Guillaume bag on my exam yesterday 1/5.  Spoke with yKe in pharmacy.  Held heparin subcu and Coumadin for now.    --H/H monitored overnight.  Hemoglobin 7.1 this morning.  --Awaiting urology consult for further plans for anticoagulation.  See below     Hx of GIB  -gastric ulcer  -L gastric artery embolization here in 2020     Hx of recent gross hematuria/current due to gross hematuria  Recent diagnosis  of bladder/prostate cancer s/p cystoscopy at [facility], data deficient   -Hematuria returned 12/23, likely due to ongoing coumadin use. Held on 12/23 until 12/27  -Monitor CBC.  Hemoglobin today 7.1.  -multiple discussions with pt, by myself and partners, and, despite contrary advice and other options, Coumadin was resumed on 12/28  --Due to recurrent/persistent hematuria and issues with anticoagulation and cancer, consulted urology for today.  Note the patient was scheduled to see  urology for work-up of missed appointment while he was here in the hospital this admit.  --Hemoglobin 7.4 on 1/4.  At 7.1 this morning.  --windy h/h at 1800 tonight.  And in am.      No evidence of UTI  -urine culture negative     ESRD   -HD MWF if pt agreeable (has refused multiple times due to his INR not being therapeutic)     DM (A1c 5.5)  -Continue SSI  -Continue Levemir 10 units at night  --Stable, continue current regimen     WINSTON  -noncompliant with CPAP  --Oxygen as needed     Hypothyroidism  -continue synthroid  --Last TSH checked 4/2020.  --Recheck in a.m.    Constipation  -Refusing all bowel regimens  -continue subcu Relistor  -s/p Reglan q6h x 4 dose  - trial neostigmine drip today if patient will allow     Anemia  -no epo due to recent bladder cancer, monitor  -transfuse as needed.  Hemoglobin stable yesterday at last check 7.4.  Still with gross hematuria.  On Coumadin with pharmacy dosing as well as VTE prophylaxis Heparin every 12.  --Nephrology following due to ESRD.  Consulting urology today.  --see above.   --Would likely benefit from a unit of blood.  Per MD discussion, will plan to defer to nephrology to transfuse with HD tomorrow.    Decubitus wound  -WOC following, patient has been refusing dressing changes.      CHF by history  CAD   --Generally stable.      DVT prophylaxis:  Medical and mechanical DVT prophylaxis orders are present.       AM-PAC 6 Clicks Score (PT): 8 (01/06/22 0800)    Disposition: I expect  the patient to be discharged to SNF rehab once arranged    CODE STATUS:   Code Status and Medical Interventions:   Ordered at: 12/14/21 0003     Level Of Support Discussed With:    Patient     Code Status (Patient has no pulse and is not breathing):    CPR (Attempt to Resuscitate)     Medical Interventions (Patient has pulse or is breathing):    Full Support       Erica Matos, APRN  01/06/22

## 2022-01-06 NOTE — PLAN OF CARE
Goal Outcome Evaluation:  Plan of Care Reviewed With: patient        Progress: improving  Outcome Summary: Pt completed supine level grroming with supervision and DOMINIC roper HEP. He declined OOB acitivity d/t fatigue. Recommend SNF-level rehab.

## 2022-01-06 NOTE — THERAPY TREATMENT NOTE
Patient Name: Chase Obrien  : 1945    MRN: [id number]                              Today's Date: 2022       Admit Date: 2021    Visit Dx:     ICD-10-CM ICD-9-CM   1. Cellulitis of left foot  L03.116 682.7   2. Gangrene of toe of left foot ([facility])  I96 785.4   3. Elevated lactic acid level  R79.89 276.2   4. Recurrent UTI  N39.0 599.0   5. History of bladder cancer  Z85.51 V10.51   6. History of prostate cancer  Z85.46 V10.46   7. ESRD on hemodialysis ([facility])  N18.6 585.6    Z99.2 V45.11   8. History of GI bleed  Z87.19 V12.79   9. PAF (paroxysmal atrial fibrillation) ([facility])  I48.0 427.31   10. History of CHF (congestive heart failure)  Z86.79 V12.59   11. History of diabetes mellitus  Z86.39 V12.29   12. Anemia of chronic renal failure, stage 5 ([facility])  N18.5 285.21    D63.1 585.5   13. Gangrene of left foot ([facility])  I96 785.4     Patient Active Problem List   Diagnosis   • Cellulitis   • BALDO (acute kidney injury) ([facility])   • Atrial fibrillation ([facility])   • Type 2 diabetes mellitus ([facility])   • Hypothyroid   • Chronic kidney disease, stage IV (severe) ([facility])   • WINSTON (obstructive sleep apnea), noncompliant w/ NIPPV   • CAD (coronary artery disease)   • Anemia   • Dyslipidemia   • Tobacco abuse disorder   • ESRD (end stage renal disease) ([facility])   • Elaine's syndrome/ pseudocolonic obstruction   • Chronic diastolic CHF (congestive heart failure) ([facility])   • Acute blood loss anemia   • Noncompliance with recommended medical treatment   • Gangrene of left foot ([facility])     Past Medical History:   Diagnosis Date   • Cellulitis    • CHF (congestive heart failure) ([facility])    • Chronic infection    • Degenerative disc disease, lumbar    • Diabetes mellitus ([facility])    • Hyperlipidemia    • Hypertension    • Myocardial infarction ([facility])    • Renal disorder    • Rotator cuff tear      Past Surgical History:   Procedure Laterality Date   • ABOVE KNEE AMPUTATION Left 2021    Procedure: AMPUTATION ABOVE KNEE LEFT;  Surgeon: Augustine  Luís BATISTA MD;  Location:  [facility];  Service: General;  Laterality: Left;   • BRONCHOSCOPY N/A 4/12/2020    Procedure: BRONCHOSCOPY;  Surgeon: Fox Cervantes MD;  Location:  [facility];  Service: Pulmonary;  Laterality: N/A;   • CORONARY ARTERY BYPASS GRAFT     • ENDOSCOPY N/A 4/2/2020    Procedure: ESOPHAGOGASTRODUODENOSCOPY AT BEDSIDE;  Surgeon: Brunner, Mark I, MD;  Location:  [facility];  Service: Gastroenterology;  Laterality: N/A;   • ENDOSCOPY N/A 4/8/2020    Procedure: ESOPHAGOGASTRODUODENOSCOPY AT BEDSIDE;  Surgeon: Zhang Martinez MD;  Location:  [facility];  Service: Gastroenterology;  Laterality: N/A;  with gastric lavage using Edlich tube   • ENDOSCOPY N/A 4/8/2020    Procedure: ESOPHAGOGASTRODUODENOSCOPY AT BEDSIDE;  Surgeon: Zhang Martinez MD;  Location:  [facility];  Service: Gastroenterology;  Laterality: N/A;   • ENDOSCOPY N/A 4/9/2020    Procedure: ESOPHAGOGASTRODUODENOSCOPY AT BEDSIDE;  Surgeon: Zhang Martinez MD;  Location:  [facility];  Service: Gastroenterology;  Laterality: N/A;   • ENDOSCOPY N/A 4/9/2020    Procedure: ESOPHAGOGASTRODUODENOSCOPY AT BEDSIDE;  Surgeon: Zhang Martinez MD;  Location:  [facility];  Service: Gastroenterology;  Laterality: N/A;      General Information     Row Name 01/06/22 1546          OT Time and Intention    Document Type therapy note (daily note)  -AR     Mode of Treatment occupational therapy; individual therapy  -AR     Row Name 01/06/22 1546          General Information    Existing Precautions/Restrictions fall; non-weight bearing; left  POD#7 left AKA  -AR     Row Name 01/06/22 1546          Cognition    Orientation Status (Cognition) oriented x 4  -AR     Row Name 01/06/22 1546          Safety Issues, Functional Mobility    Impairments Affecting Function (Mobility) endurance/activity tolerance; pain; strength  -AR           User Key  (r) = Recorded By, (t) = Taken By, (c) = Cosigned By    Initials Name Provider Type     Kristy Mcrae OT Occupational Therapist                 Mobility/ADL's     Row Name 01/06/22 1602          Bed Mobility    Bed Mobility scooting/bridging  -AR     Scooting/Bridging [place] (Bed Mobility) dependent (less than 25% patient effort); 2 person assist  -AR     Row Name 01/06/22 1602          Transfers    Comment (Transfers) Pt declined c/o fatigue  -AR     Row Name 01/06/22 1602          Activities of Daily Living    BADL Assessment/Intervention feeding; grooming  -AR     Row Name 01/06/22 1602          Self-Feeding Assessment/Training    [place] Level (Feeding) supervision; liquids to mouth  -AR     Position (Self-Feeding) supine  -AR     Row Name 01/06/22 1602          Grooming Assessment/Training    [place] Level (Grooming) wash face, hands; supervision  -AR     Position (Grooming) supine  -AR           User Key  (r) = Recorded By, (t) = Taken By, (c) = Cosigned By    Initials Name Provider Type    Kristy Mcrae OT Occupational Therapist               Obj/Interventions     Row Name 01/06/22 1603          Shoulder (Therapeutic Exercise)    Shoulder Strengthening (Therapeutic Exercise) bilateral; flexion; extension; aBduction; aDduction; horizontal aBduction/aDduction; scapular stabilization; resistance tubing; green; 10 repetitions; 5 repetitions  -AR     Row Name 01/06/22 1603          Elbow/Forearm (Therapeutic Exercise)    Elbow/Forearm Strengthening (Therapeutic Exercise) bilateral; flexion; extension; resistance band; green; 10 repetitions; 5 repetitions  -AR     Row Name 01/06/22 1603          Therapeutic Exercise    Therapeutic Exercise shoulder; elbow/forearm  -AR           User Key  (r) = Recorded By, (t) = Taken By, (c) = Cosigned By    Initials Name Provider Type    Kristy Mcrae OT Occupational Therapist               Goals/Plan    No documentation.                Clinical Impression     Row Name 01/06/22 1604          Pain Scale: FACES  Pre/Post-Treatment    Pain: FACES Scale, Pretreatment 2-->hurts little bit  -AR     Posttreatment Pain Rating 4-->hurts little more  -AR     Pain Location - Side Left  -AR     Pain Location - Orientation lower  -AR     Pain Location residual limb  -AR     Row Name 01/06/22 1604          Plan of Care Review    Plan of Care Reviewed With patient  -AR     Progress improving  -AR     Outcome Summary Pt completed supine level grroming with supervision and DOMINIC roper HEP. He declined OOB acitivity d/t fatigue. Recommend SNF-level rehab.  -AR     Row Name 01/06/22 1604          Therapy Plan Review/Discharge Plan (OT)    Anticipated Discharge Disposition (OT) skilled nursing facility  -AR     Row Name 01/06/22 1604          Vital Signs    Pre Patient Position Supine  -AR     Intra Patient Position Supine  -AR     Post Patient Position Supine  -AR     Row Name 01/06/22 1604          Positioning and Restraints    Pre-Treatment Position in bed  -AR     Post Treatment Position bed  -AR     In Bed supine; call light within reach; encouraged to call for assist; exit alarm on  -AR           User Key  (r) = Recorded By, (t) = Taken By, (c) = Cosigned By    Initials Name Provider Type    Kristy Mcrae, OT Occupational Therapist               Outcome Measures     Row Name 01/06/22 1606          How much help from another is currently needed...    Putting on and taking off regular lower body clothing? 2  -AR     Bathing (including washing, rinsing, and drying) 2  -AR     Toileting (which includes using toilet bed pan or urinal) 2  -AR     Putting on and taking off regular upper body clothing 2  -AR     Taking care of personal grooming (such as brushing teeth) 3  -AR     Eating meals 3  -AR     AM-PAC 6 Clicks Score (OT) 14  -AR     Row Name 01/06/22 0800          How much help from another person do you currently need...    Turning from your back to your side while in flat bed without using bedrails? 2  -BC     Moving from  lying on back to sitting on the side of a flat bed without bedrails? 2  -BC     Moving to and from a bed to a chair (including a wheelchair)? 1  -BC     Standing up from a chair using your arms (e.g., wheelchair, bedside chair)? 1  -BC     Climbing 3-5 steps with a railing? 1  -BC     To walk in hospital room? 1  -BC     AM-PAC 6 Clicks Score (PT) 8  -BC     Row Name 01/06/22 1606          Functional Assessment    Outcome Measure Options AM-PAC 6 Clicks Daily Activity (OT)  -AR           User Key  (r) = Recorded By, (t) = Taken By, (c) = Cosigned By    Initials Name Provider Type    Kristy Mcrae OT Occupational Therapist    Latoya Mcguire RN Registered Nurse                Occupational Therapy Education                 Title: PT OT SLP Therapies (Done)     Topic: Occupational Therapy (Done)     Point: ADL training (Done)     Description:   Instruct learner(s) on proper safety adaptation and remediation techniques during self care or transfers.   Instruct in proper use of assistive devices.              Learning Progress Summary           Patient SYLVIE Francisco D, VU, DU by AR at 1/6/2022 1606      Show all documentation for this point (6)                 Point: Home exercise program (Done)     Description:   Instruct learner(s) on appropriate technique for monitoring, assisting and/or progressing therapeutic exercises/activities.              Learning Progress Summary           Patient SYLVIE Francisco D, VU, DU by AR at 1/6/2022 1606      Show all documentation for this point (3)                 Point: Precautions (Done)     Description:   Instruct learner(s) on prescribed precautions during self-care and functional transfers.              Learning Progress Summary           Patient SYLVIE Francisco D, VU, DU by AR at 1/6/2022 1606      Show all documentation for this point (6)                 Point: Body mechanics (Done)     Description:   Instruct learner(s) on proper positioning and spine alignment during self-care,  functional mobility activities and/or exercises.              Learning Progress Summary           Patient Eager, E,D, VU,DU by AR at 1/6/2022 1606      Show all documentation for this point (6)                             User Key     Initials Effective Dates Name Provider Type Discipline    AR 06/16/21 -  Kristy Grove OT Occupational Therapist OT              OT Recommendation and Plan     Plan of Care Review  Plan of Care Reviewed With: patient  Progress: improving  Outcome Summary: Pt completed supine level grroming with supervision and BUE theraband HEP. He declined OOB acitivity d/t fatigue. Recommend SNF-level rehab.     Time Calculation:    Time Calculation- OT     Row Name 01/06/22 1546             Time Calculation- OT    OT Received On 01/06/22  -AR      OT Goal Re-Cert Due Date 01/11/22  -AR              Timed Charges    56668 - OT Therapeutic Exercise Minutes 15  -AR      86992 - OT Self Care/Mgmt Minutes 4  -AR              Total Minutes    Timed Charges Total Minutes 19  -AR       Total Minutes 19  -AR            User Key  (r) = Recorded By, (t) = Taken By, (c) = Cosigned By    Initials Name Provider Type    Kristy Mcrae OT Occupational Therapist              Therapy Charges for Today     Code Description Service Date Service Provider Modifiers Qty    17708369085 HC OT THER PROC EA 15 MIN 1/6/2022 Kristy Grove OT GO 1               Kristy Grove OT  1/6/2022

## 2022-01-06 NOTE — PROGRESS NOTES
Cardiothoracic Surgery Progress Note      POD #: 7-left above-knee amputation     LOS: 23 days      Subjective: Patient is awake and alert    Objective:  Vital Signs vital signs below noted T-max past 24 hours 98.4  Temp:  [97.6 °F (36.4 °C)-98.4 °F (36.9 °C)] 98.4 °F (36.9 °C)  Heart Rate:  [56-79] 73  Resp:  [18-19] 18  BP: ()/(49-69) 98/49    Physical Exam:   General Appearance: Oriented times   Lungs:   Heart:   Skin:   Incision: 3 amputation site dressing change.  Suture intact skin margin viable skin flaps are viable.     Results:  Results from last 7 days   Lab Units 01/05/22  2358   WBC 10*3/mm3 7.98   HEMOGLOBIN g/dL 7.0*  7.0*   HEMATOCRIT % 22.9*  22.9*   PLATELETS 10*3/mm3 160     Results from last 7 days   Lab Units 01/05/22  0859   SODIUM mmol/L 139   POTASSIUM mmol/L 3.6   CHLORIDE mmol/L 101   CO2 mmol/L 25.0   BUN mg/dL 33*   CREATININE mg/dL 4.86*   GLUCOSE mg/dL 134*   CALCIUM mg/dL 8.5*         Assessment: #1.  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  Renal failure on hemodialysis for over 2 years  3.  Bladder cancer prostate cancer with hematuria      Plan: Continue daily dressing change amputation site.  Dialysis per renal medicine.  Medical manage per hospitalist.      Luís Bradshaw MD - 01/06/22 - 06:28 EST

## 2022-01-06 NOTE — CONSULTS
Consult    Patient Name: Chase Obrien  Medical Record Number: [id number]  YOB: 1945    Date of consultation: 1/6/2022    Referring Provider:Rosemarie Prescott DO  Reason for Consultation: Hematuria    Patient Care Team:  Radha Purcell as PCP - General (Family Medicine)    Chief complaint   Chief Complaint   Patient presents with   • Wound Infection       Subjective .     History of present illness:    This is a pleasant 76-year-old gentleman who was admitted on this admission secondary to gangrenous left foot and is undergone above-knee amputation.  From urologic perspective he has had intermittent gross hematuria for the last 2 to 3 months.  He does have a history of end-stage renal disease and is typically maintained on Coumadin therapy.  As best I can tell his anticoagulants have been held at this point.    In the recent past he has undergone work-up for his hematuria including cystoscopy and reported biopsy by Dr. Stein in [place].  These records are not available today for review.    The patient states he has had intermittent hematuria over the past several weeks.  He denies pain today.  His catheter seems to be draining freely.    Past Medical History:   Diagnosis Date   • Cellulitis    • CHF (congestive heart failure) ([facility])    • Chronic infection    • Degenerative disc disease, lumbar    • Diabetes mellitus (HCC)    • Hyperlipidemia    • Hypertension    • Myocardial infarction (HCC)    • Renal disorder    • Rotator cuff tear      Past Surgical History:   Procedure Laterality Date   • ABOVE KNEE AMPUTATION Left 12/30/2021    Procedure: AMPUTATION ABOVE KNEE LEFT;  Surgeon: Luís Bradshaw MD;  Location: [facility] OR;  Service: General;  Laterality: Left;   • BRONCHOSCOPY N/A 4/12/2020    Procedure: BRONCHOSCOPY;  Surgeon: Fox Cervantes MD;  Location: [facility] ENDOSCOPY;  Service: Pulmonary;  Laterality: N/A;   • CORONARY ARTERY BYPASS GRAFT     • ENDOSCOPY N/A 4/2/2020    Procedure:  ESOPHAGOGASTRODUODENOSCOPY AT BEDSIDE;  Surgeon: Brunner, Mark I, MD;  Location:  [facility];  Service: Gastroenterology;  Laterality: N/A;   • ENDOSCOPY N/A 4/8/2020    Procedure: ESOPHAGOGASTRODUODENOSCOPY AT BEDSIDE;  Surgeon: Zhang Martinez MD;  Location:  [facility];  Service: Gastroenterology;  Laterality: N/A;  with gastric lavage using Edlich tube   • ENDOSCOPY N/A 4/8/2020    Procedure: ESOPHAGOGASTRODUODENOSCOPY AT BEDSIDE;  Surgeon: Zhang Martinez MD;  Location:  [facility];  Service: Gastroenterology;  Laterality: N/A;   • ENDOSCOPY N/A 4/9/2020    Procedure: ESOPHAGOGASTRODUODENOSCOPY AT BEDSIDE;  Surgeon: Zhang Martinez MD;  Location:  [facility];  Service: Gastroenterology;  Laterality: N/A;   • ENDOSCOPY N/A 4/9/2020    Procedure: ESOPHAGOGASTRODUODENOSCOPY AT BEDSIDE;  Surgeon: Zhang Martinez MD;  Location:  [facility];  Service: Gastroenterology;  Laterality: N/A;     Family History   Family history unknown: Yes     Social History     Tobacco Use   • Smoking status: Former Smoker   • Smokeless tobacco: Current User   • Tobacco comment: used to smoke 2-3 ppd but quit shortly after heart bypass surgery   Substance Use Topics   • Alcohol use: Not Currently   • Drug use: Defer     Medications Prior to Admission   Medication Sig Dispense Refill Last Dose   • acetaminophen (TYLENOL) 325 MG tablet Take 2 tablets by mouth Every 4 (Four) Hours As Needed for Mild Pain .   Past Week at Unknown time   • aspirin 81 MG chewable tablet Chew 81 mg Daily.   12/13/2021 at Unknown time   • atorvastatin (LIPITOR) 10 MG tablet Take 10 mg by mouth Every Night.   12/13/2021 at Unknown time   • calcium acetate (PHOS BINDER,) 667 MG capsule capsule Take 1,334 mg by mouth 3 (Three) Times a Day. With meals   12/13/2021 at Unknown time   • docusate sodium 100 MG capsule Take 100 mg by mouth 2 (Two) Times a Day. (Patient taking differently: Take 240 mg by mouth Every 12 (Twelve) Hours.)    12/13/2021 at Unknown time   • Eluxadoline 100 MG tablet Take 1 tablet by mouth 2 (Two) Times a Day.   12/13/2021 at Unknown time   • gabapentin (NEURONTIN) 800 MG tablet Take 1 tablet by mouth 3 (Three) Times a Day.   12/13/2021 at Unknown time   • HYDROcodone-acetaminophen (NORCO)  MG per tablet Take 1 tablet by mouth Every 6 (Six) Hours.   12/13/2021 at Unknown time   • lactulose (CHRONULAC) 10 GM/15ML solution Take 20 g by mouth Daily.   12/13/2021 at Unknown time   • levothyroxine (SYNTHROID, LEVOTHROID) 175 MCG tablet Take 175 mcg by mouth Daily.      • lidocaine 3 % cream cream Apply 1 application topically 3 (Three) Times a Week. Apply to left upper arm 3 times weekly before dialysis on Mon-Wed-Fri 12/13/2021 at Unknown time   • linagliptin (TRADJENTA) 5 MG tablet tablet Take 5 mg by mouth Daily.   12/13/2021 at Unknown time   • Menthol-Zinc Oxide (Remedy Calazime) 0.4-20.5 % paste Apply 1 application topically 2 (Two) Times a Day. Apply to buttock   12/13/2021 at Unknown time   • ondansetron (ZOFRAN) 4 MG tablet Take 4 mg by mouth Every 6 (Six) Hours As Needed for Nausea or Vomiting.   Past Week at Unknown time   • polyethylene glycol (MIRALAX) 17 g packet Take 17 g by mouth Daily.   12/13/2021 at Unknown time   • potassium chloride (K-DUR,KLOR-CON) 20 MEQ CR tablet Take 20 mEq by mouth Daily.   12/13/2021 at Unknown time   • povidone-iodine (BETADINE) 10 % external solution Apply 1 application topically to the appropriate area as directed Daily. Apply to 1st thru 4th toe   12/13/2021 at Unknown time   • bisacodyl (DULCOLAX) 10 MG suppository Insert 1 suppository into the rectum Daily As Needed for Constipation.   Unknown at Unknown time     Allergies:  Penicillins    Review of Systems  Pertinent items are noted in HPI, all other systems reviewed and negative    Objective     Vital Signs   /64 (BP Location: Right arm, Patient Position: Lying)   Pulse 76   Temp 98 °F (36.7 °C) (Oral)   Resp  "18   Ht 193 cm (76\")   Wt 104 kg (229 lb 14.4 oz)   SpO2 95%   BMI 27.98 kg/m²     Physical Exam:  General Appearance: No apparent distress  Back: No No kyphosis present, no scoliosis present,no tenderness to percussion or Palpation  Lungs: Respirations regular, even and  unlabored  Heart: Regular rhythm and normal rate  Chest Wall: No abnormalities observed  Abdomen: Obese and benign without costovertebral angle tenderness  Genital: Normal with Guillaume catheter in place with dark urine  Rectal: Deferred  Extremities: Moves all extremities well, no edema no cyanosis, no redness  Pulses: Pulses palpable  Skin: No bleeding, bruising or rash  Lymph nodes: No palpable adenopathy  Neurologic: Neurologically grossly intact    Results Review:  Lab Results (last 24 hours)     Procedure Component Value Units Date/Time    POC Glucose Once [[id number]]  (Normal) Collected: 01/06/22 1128    Specimen: Blood Updated: 01/06/22 1136     Glucose 113 mg/dL      Comment: Confirmed by Repeat Meter: [id number] : [id number] Jose Meehan       Comprehensive Metabolic Panel [[id number]]  (Abnormal) Collected: 01/06/22 0717    Specimen: Blood Updated: 01/06/22 0812     Glucose 112 mg/dL      BUN 18 mg/dL      Creatinine 3.73 mg/dL      Sodium 134 mmol/L      Potassium 3.7 mmol/L      Chloride 100 mmol/L      CO2 24.0 mmol/L      Calcium 8.7 mg/dL      Total Protein 6.8 g/dL      Albumin 4.00 g/dL      ALT (SGPT) <5 U/L      AST (SGOT) 11 U/L      Alkaline Phosphatase 86 U/L      Total Bilirubin 0.4 mg/dL      eGFR Non African Amer 16 mL/min/1.73      Globulin 2.8 gm/dL      Comment: Calculated Result        A/G Ratio 1.4 g/dL      BUN/Creatinine Ratio 4.8     Anion Gap 10.0 mmol/L     Narrative:      GFR Normal >60  Chronic Kidney Disease <60  Kidney Failure <15      TSH [[id number]]  (Abnormal) Collected: 01/06/22 0717    Specimen: Blood Updated: 01/06/22 0803     TSH 7.050 uIU/mL     Narrative:      Due to abnormal TSH results, suggest " ordering Free T4.    Protime-INR [[id number]]  (Abnormal) Collected: 01/06/22 0717    Specimen: Blood Updated: 01/06/22 0750     Protime 18.2 Seconds      INR 1.56    Hemoglobin & Hematocrit, Blood [[id number]]  (Abnormal) Collected: 01/06/22 0717    Specimen: Blood Updated: 01/06/22 0738     Hemoglobin 7.1 g/dL      Hematocrit 23.5 %     POC Glucose Once [[id number]]  (Normal) Collected: 01/06/22 0720    Specimen: Blood Updated: 01/06/22 0721     Glucose 119 mg/dL      Comment: Confirmed by Repeat Meter: [id number] : [id number] Meehan       Hemoglobin & Hematocrit, Blood [[id number]]  (Abnormal) Collected: 01/05/22 2358    Specimen: Blood Updated: 01/06/22 0018     Hemoglobin 7.0 g/dL      Hematocrit 22.9 %     CBC & Differential [[id number]]  (Abnormal) Collected: 01/05/22 2358    Specimen: Blood Updated: 01/06/22 0018    Narrative:      The following orders were created for panel order CBC & Differential.  Procedure                               Abnormality         Status                     ---------                               -----------         ------                     CBC Auto Differential[[id number]]        Abnormal            Final result                 Please view results for these tests on the individual orders.    CBC Auto Differential [[id number]]  (Abnormal) Collected: 01/05/22 2358    Specimen: Blood Updated: 01/06/22 0018     WBC 7.98 10*3/mm3      RBC 2.26 10*6/mm3      Hemoglobin 7.0 g/dL      Hematocrit 22.9 %      .7 fL      MCH 30.5 pg      MCHC 29.7 g/dL      RDW 17.2 %      RDW-SD 63.1 fl      MPV 10.7 fL      Platelets 160 10*3/mm3      Neutrophil % 72.4 %      Lymphocyte % 13.5 %      Monocyte % 10.3 %      Eosinophil % 2.5 %      Basophil % 0.4 %      Immature Grans % 0.9 %      Neutrophils, Absolute 5.78 10*3/mm3      Lymphocytes, Absolute 1.08 10*3/mm3      Monocytes, Absolute 0.82 10*3/mm3      Eosinophils, Absolute 0.20 10*3/mm3      Basophils, Absolute 0.03 10*3/mm3       Immature Grans, Absolute 0.07 10*3/mm3      nRBC 0.3 /100 WBC     Hemoglobin & Hematocrit, Blood [[id number]]  (Abnormal) Collected: 01/05/22 1916    Specimen: Blood Updated: 01/05/22 2035     Hemoglobin 7.2 g/dL      Hematocrit 23.3 %     POC Glucose Once [[id number]]  (Abnormal) Collected: 01/05/22 1919    Specimen: Blood Updated: 01/05/22 1920     Glucose 145 mg/dL      Comment: Meter: [id number] : [id number] Jodi Lima       POC Glucose Once [[id number]]  (Abnormal) Collected: 01/05/22 1612    Specimen: Blood Updated: 01/05/22 1700     Glucose 154 mg/dL      Comment: Meter: [id number] : [id number] Rasheed Leonard           Imaging Results (Last 72 Hours)     ** No results found for the last 72 hours. **           I reviewed the patient's new clinical results.      Assessment and Recommendations  Gross hematuria  End-stage renal disease    At this point we will manage his gross hematuria with conservative approach and hand irrigate catheter as needed.  I would like to review his records from [facility] including pathology, operative notes and imaging.  Further recommendations following this.  If his hematuria becomes clinically significant he may require cystoscopy on this admission.  I would hold anticoagulants if possible.      Gangrene of left foot (HCC)    Atrial fibrillation (HCC)    Type 2 diabetes mellitus (HCC)    Hypothyroid    WINSTON (obstructive sleep apnea), noncompliant w/ NIPPV    CAD (coronary artery disease)    Anemia    Dyslipidemia    ESRD (end stage renal disease) (HCC)    Chronic diastolic CHF (congestive heart failure) ([facility])      I discussed the patients findings and my recommendations with patient and nursing staff    Felipe Garcia Jr., MD  01/06/22  14:49 EST

## 2022-01-06 NOTE — PROGRESS NOTES
"   LOS: 23 days    Patient Care Team:  Radha Purcell as PCP - General (Family Medicine)    Reason For Visit:  F/U ESRD.  Subjective     No acute events overnight. No new complaints         Review of Systems:    Pulm: No soa   CV:  No CP      Objective     albumin human, , ,   albumin human, , ,   albumin human, , ,   albumin human, , ,   aspirin, 81 mg, Oral, Daily  atorvastatin, 20 mg, Oral, Nightly  Eluxadoline, 100 mg, Oral, BID With Meals  epoetin radha/radha-epbx, 20,000 Units, Subcutaneous, Once per day on Mon Wed Fri  gabapentin, 300 mg, Oral, Nightly  insulin detemir, 10 Units, Subcutaneous, Nightly  insulin lispro, 0-7 Units, Subcutaneous, TID AC  levothyroxine, 200 mcg, Oral, Q AM  methylnaltrexone, 6 mg, Subcutaneous, Every Other Day  midodrine, 10 mg, Oral, Once  sodium chloride, 10 mL, Intravenous, Q12H      Pharmacy to dose warfarin,   sodium chloride, 5 mL/hr, Last Rate: Stopped (12/30/21 1135)          Vital Signs:  Blood pressure 101/58, pulse 77, temperature 98.3 °F (36.8 °C), temperature source Oral, resp. rate 18, height 193 cm (76\"), weight 104 kg (229 lb 14.4 oz), SpO2 96 %.    Flowsheet Rows      First Filed Value   Admission Height 193 cm (76\") Documented at 12/13/2021 1937   Admission Weight 129 kg (285 lb) Documented at 12/13/2021 1937 01/05 0701 - 01/06 0700  In: 600 [P.O.:600]  Out: 2445 [Urine:125]    Physical Exam:    Gen: Alert, NAD   HENT: NC, AT, EOMI   NECK: Supple, no JVD, Trachea midline   LUNGS: CTA bilaterally, non labored respirtation   CVS: S1/S2 audible, RRR, no murmur   Abd: Soft, NT, ND, BS+   Ext: + pedal edema, no cyanosis   CNS: Alert, No focal deficit noted grossly  Psy: Cooperative  Skin: Warm, dry and intact  AVF    Radiology:            Labs:  Results from last 7 days   Lab Units 01/06/22  0717 01/05/22  2358 01/05/22  1916 01/04/22  0845 01/04/22  0845 12/31/21  1032 12/31/21  1032   WBC 10*3/mm3  --  7.98  --   --  7.44  --  10.84*   HEMOGLOBIN g/dL 7.1* " 7.0*  7.0* 7.2*   < > 7.4*   < > 7.3*   HEMATOCRIT % 23.5* 22.9*  22.9* 23.3*   < > 24.9*   < > 24.2*   PLATELETS 10*3/mm3  --  160  --   --  188  --  203    < > = values in this interval not displayed.     Results from last 7 days   Lab Units 01/06/22  0717 01/05/22  0859 01/04/22  0845 01/01/22  0742 12/31/21  1617 12/31/21  1032   SODIUM mmol/L 134* 139 136 135*  --    < >   POTASSIUM mmol/L 3.7 3.6 3.6 4.1  --    < >   CHLORIDE mmol/L 100 101 98 98  --    < >   CO2 mmol/L 24.0 25.0 25.0 20.0*  --    < >   BUN mg/dL 18 33* 22 21  --    < >   CREATININE mg/dL 3.73* 4.86* 3.59* 3.53*  --    < >   CALCIUM mg/dL 8.7 8.5* 8.5* 8.2*  --    < >   PHOSPHORUS mg/dL  --  4.2 3.9 2.6 2.8  --    ALBUMIN g/dL 4.00 3.60 3.70 3.60  --    < >    < > = values in this interval not displayed.     Results from last 7 days   Lab Units 01/06/22  0717   GLUCOSE mg/dL 112*       Results from last 7 days   Lab Units 01/06/22  0717   ALK PHOS U/L 86   BILIRUBIN mg/dL 0.4   ALT (SGPT) U/L <5   AST (SGOT) U/L 11                 Estimated Creatinine Clearance: 25.2 mL/min (A) (by C-G formula based on SCr of 3.73 mg/dL (H)).      Assessment       Gangrene of left foot (HCC)    Atrial fibrillation (HCC)    Type 2 diabetes mellitus (HCC)    Hypothyroid    WINSTON (obstructive sleep apnea), noncompliant w/ NIPPV    CAD (coronary artery disease)    Anemia    Dyslipidemia    ESRD (end stage renal disease) (HCC)    Chronic diastolic CHF (congestive heart failure) (HCC)            Impression:   1-ESRD.   2-HYPOTENSION.   3-ANEMIA.       Recommendations:   - HD per schedule. UF as tolerated.   - Renal diet   - SUSAN with HD  - Midodrine.       Landon Freed MD  01/06/22  10:00 EST         Quality 137: Melanoma: Continuity Of Care - Recall System: Patient information entered into a recall system that includes: target date for the next exam specified AND a process to follow up with patients regarding missed or unscheduled appointments Quality 111:Pneumonia Vaccination Status For Older Adults: Pneumococcal Vaccination not Administered or Previously Received, Reason not Otherwise Specified Detail Level: Detailed Quality 138: Melanoma: Coordination Of Care: A treatment plan was communicated to the physicians providing continuing care within one month of diagnosis outlining: diagnosis, tumor thickness and a plan for surgery or alternate care. Quality 130: Documentation Of Current Medications In The Medical Record: Current Medications Documented

## 2022-01-06 NOTE — CASE MANAGEMENT/SOCIAL WORK
Continued Stay Note  [facility]     Patient Name: Chase Obrien  MRN: [id number]  Today's Date: 1/6/2022    Admit Date: 12/13/2021     Discharge Plan     Row Name 01/06/22 1447       Plan    Plan [facility] [place] [place] SNF    Plan Comments CM spoke with Urszula Fregoso liaison. They have offered patient a bed at [facility] [place] [place]. Awaiting insurance approval. Updated patient and SO, LESLY [facility] and they are agreeable with plan. CM following.    Final Discharge Disposition Code 03 - skilled nursing facility (SNF)               Discharge Codes    No documentation.               Expected Discharge Date and Time     Expected Discharge Date Expected Discharge Time    Jan 8, 2022             Naida Grigsby RN

## 2022-01-07 NOTE — PLAN OF CARE
Goal Outcome Evaluation:  Plan of Care Reviewed With: patient, family        Progress: no change  Outcome Summary: Pt. declined out of bed mobility today secondary to fatigue from dialysis. He was agreeable to ther-ex and tolerated well. Will continue to progress pt. as tolerated. Recommend SNF upon discharge.

## 2022-01-07 NOTE — PLAN OF CARE
Goal Outcome Evaluation:           Progress: improving  Outcome Summary: VSS. Pain controlled with PRNs. Refused to turn multiple times. Refused pace irrigation multiple times stating it hurt too much. Pace found pulled out in bed @0400, new one placed @0410. Urine is still blood tinged. All questions and concerns answered.

## 2022-01-07 NOTE — THERAPY TREATMENT NOTE
Patient Name: Chase Obrien  : 1945    MRN: [id number]                              Today's Date: 2022       Admit Date: 2021    Visit Dx:     ICD-10-CM ICD-9-CM   1. Cellulitis of left foot  L03.116 682.7   2. Gangrene of toe of left foot ([facility])  I96 785.4   3. Elevated lactic acid level  R79.89 276.2   4. Recurrent UTI  N39.0 599.0   5. History of bladder cancer  Z85.51 V10.51   6. History of prostate cancer  Z85.46 V10.46   7. ESRD on hemodialysis ([facility])  N18.6 585.6    Z99.2 V45.11   8. History of GI bleed  Z87.19 V12.79   9. PAF (paroxysmal atrial fibrillation) ([facility])  I48.0 427.31   10. History of CHF (congestive heart failure)  Z86.79 V12.59   11. History of diabetes mellitus  Z86.39 V12.29   12. Anemia of chronic renal failure, stage 5 ([facility])  N18.5 285.21    D63.1 585.5   13. Gangrene of left foot ([facility])  I96 785.4     Patient Active Problem List   Diagnosis   • Cellulitis   • BALDO (acute kidney injury) ([facility])   • Atrial fibrillation ([facility])   • Type 2 diabetes mellitus ([facility])   • Hypothyroid   • Chronic kidney disease, stage IV (severe) ([facility])   • WINSTON (obstructive sleep apnea), noncompliant w/ NIPPV   • CAD (coronary artery disease)   • Anemia   • Dyslipidemia   • Tobacco abuse disorder   • ESRD (end stage renal disease) ([facility])   • Elaine's syndrome/ pseudocolonic obstruction   • Chronic diastolic CHF (congestive heart failure) ([facility])   • Acute blood loss anemia   • Noncompliance with recommended medical treatment   • Gangrene of left foot ([facility])     Past Medical History:   Diagnosis Date   • Cellulitis    • CHF (congestive heart failure) ([facility])    • Chronic infection    • Degenerative disc disease, lumbar    • Diabetes mellitus ([facility])    • Hyperlipidemia    • Hypertension    • Myocardial infarction ([facility])    • Renal disorder    • Rotator cuff tear      Past Surgical History:   Procedure Laterality Date   • ABOVE KNEE AMPUTATION Left 2021    Procedure: AMPUTATION ABOVE KNEE LEFT;  Surgeon: Augustine  Luís BATISTA MD;  Location:  [facility];  Service: General;  Laterality: Left;   • BRONCHOSCOPY N/A 4/12/2020    Procedure: BRONCHOSCOPY;  Surgeon: Fox Cervantes MD;  Location:  [facility];  Service: Pulmonary;  Laterality: N/A;   • CORONARY ARTERY BYPASS GRAFT     • ENDOSCOPY N/A 4/2/2020    Procedure: ESOPHAGOGASTRODUODENOSCOPY AT BEDSIDE;  Surgeon: Brunner, Mark I, MD;  Location:  [facility];  Service: Gastroenterology;  Laterality: N/A;   • ENDOSCOPY N/A 4/8/2020    Procedure: ESOPHAGOGASTRODUODENOSCOPY AT BEDSIDE;  Surgeon: Zhang Martinez MD;  Location:  [facility];  Service: Gastroenterology;  Laterality: N/A;  with gastric lavage using Edlich tube   • ENDOSCOPY N/A 4/8/2020    Procedure: ESOPHAGOGASTRODUODENOSCOPY AT BEDSIDE;  Surgeon: Zhang Martinez MD;  Location:  [facility];  Service: Gastroenterology;  Laterality: N/A;   • ENDOSCOPY N/A 4/9/2020    Procedure: ESOPHAGOGASTRODUODENOSCOPY AT BEDSIDE;  Surgeon: Zhang Martinez MD;  Location:  [facility];  Service: Gastroenterology;  Laterality: N/A;   • ENDOSCOPY N/A 4/9/2020    Procedure: ESOPHAGOGASTRODUODENOSCOPY AT BEDSIDE;  Surgeon: Zhang Martinez MD;  Location:  [facility];  Service: Gastroenterology;  Laterality: N/A;      General Information     Row Name 01/07/22 1513          Physical Therapy Time and Intention    Document Type therapy note (daily note)  -     Mode of Treatment physical therapy  -     Row Name 01/07/22 1513          General Information    Patient Profile Reviewed yes  -SS     Existing Precautions/Restrictions fall; non-weight bearing; left  L AKA  -SS     Barriers to Rehab previous functional deficit; medically complex  -SS     Row Name 01/07/22 1513          Cognition    Orientation Status (Cognition) oriented x 4  -SS     Row Name 01/07/22 1513          Safety Issues, Functional Mobility    Safety Issues Affecting Function (Mobility) safety precaution awareness; safety precautions  follow-through/compliance; insight into deficits/self-awareness  -     Impairments Affecting Function (Mobility) endurance/activity tolerance; pain; strength  -           User Key  (r) = Recorded By, (t) = Taken By, (c) = Cosigned By    Initials Name Provider Type     Elsa Nowak, STEPHANIE Physical Therapist               Mobility     Row Name 01/07/22 1513          Bed Mobility    Comment (Bed Mobility) pt declined due to fatigue from dialysis  -     Row Name 01/07/22 1513          Transfers    Comment (Transfers) pt declined due to fatigue from dialysis  -           User Key  (r) = Recorded By, (t) = Taken By, (c) = Cosigned By    Initials Name Provider Type     Elsa Nowak PT Physical Therapist               Obj/Interventions     Row Name 01/07/22 1514          Motor Skills    Therapeutic Exercise hip; knee; ankle  -     Row Name 01/07/22 1514          Hip (Therapeutic Exercise)    Hip (Therapeutic Exercise) isometric exercises; strengthening exercise  -     Hip Isometrics (Therapeutic Exercise) bilateral; gluteal sets; 10 repetitions  -     Hip Strengthening (Therapeutic Exercise) bilateral; aBduction; aDduction; heel slides; 10 repetitions  -     Row Name 01/07/22 1514          Knee (Therapeutic Exercise)    Knee (Therapeutic Exercise) isometric exercises; strengthening exercise  -     Knee Isometrics (Therapeutic Exercise) bilateral; quad sets; 10 repetitions  -     Knee Strengthening (Therapeutic Exercise) bilateral; SLR (straight leg raise); 10 repetitions  -     Row Name 01/07/22 1514          Ankle (Therapeutic Exercise)    Ankle (Therapeutic Exercise) AAROM (active assistive range of motion)  -     Ankle AAROM (Therapeutic Exercise) right; dorsiflexion; plantarflexion; 10 repetitions; 5 repetitions  R ankle circles CW/CCW  -           User Key  (r) = Recorded By, (t) = Taken By, (c) = Cosigned By    Initials Name Provider Type    Elsa Muniz PT Physical Therapist     "           Goals/Plan    No documentation.                Clinical Impression     Row Name 01/07/22 1516          Pain    Additional Documentation Pain Scale: Numbers Pre/Post-Treatment (Group)  -     Row Name 01/07/22 1516          Pain Scale: Numbers Pre/Post-Treatment    Pretreatment Pain Rating 8/10  -SS     Posttreatment Pain Rating 8/10  -     Pain Location --  \"belly down to my feet\"  -     Pain Intervention(s) Medication (See MAR); Repositioned; Ambulation/increased activity  notified RN for medication per pt request  -     Row Name 01/07/22 1516          Plan of Care Review    Plan of Care Reviewed With patient; family  -     Progress no change  -     Outcome Summary Pt. declined out of bed mobility today secondary to fatigue from dialysis. He was agreeable to ther-ex and tolerated well. Will continue to progress pt. as tolerated. Recommend SNF upon discharge.  -[facility] Name 01/07/22 1516          Therapy Assessment/Plan (PT)    Rehab Potential (PT) fair, will monitor progress closely  -     Criteria for Skilled Interventions Met (PT) yes; meets criteria; skilled treatment is necessary  -[facility] Name 01/07/22 1516          Vital Signs    Pre Systolic BP Rehab --  VSS, RN cleared for therapy  -[facility] Name 01/07/22 1516          Positioning and Restraints    Pre-Treatment Position in bed  -     Post Treatment Position bed  -SS     In Bed notified nsg; fowlers; call light within reach; encouraged to call for assist; exit alarm on; with family/caregiver; with other staff  -           User Key  (r) = Recorded By, (t) = Taken By, (c) = Cosigned By    Initials Name Provider Type     Elsa Nowak, PT Physical Therapist               Outcome Measures     Row Name 01/07/22 1518          How much help from another person do you currently need...    Turning from your back to your side while in flat bed without using bedrails? 2  -     Moving from lying on back to sitting on the side of " a flat bed without bedrails? 2  -SS     Moving to and from a bed to a chair (including a wheelchair)? 1  -SS     Standing up from a chair using your arms (e.g., wheelchair, bedside chair)? 2  -SS     Climbing 3-5 steps with a railing? 1  -SS     To walk in hospital room? 1  -SS     AM-PAC 6 Clicks Score (PT) 9  -     Row Name 01/07/22 1518          Functional Assessment    Outcome Measure Options AM-PAC 6 Clicks Basic Mobility (PT)  -           User Key  (r) = Recorded By, (t) = Taken By, (c) = Cosigned By    Initials Name Provider Type     Elsa Nowak PT Physical Therapist                             Physical Therapy Education                 Title: PT OT SLP Therapies (Done)     Topic: Physical Therapy (Done)     Point: Mobility training (Done)     Learning Progress Summary           Patient Eager, E, VU,NR by  at 1/7/2022 1519    Comment: Reviewed safety/technique with HEP, PT POC      Show all documentation for this point (7)                 Point: Home exercise program (Done)     Learning Progress Summary           Patient Eager, E, VU,NR by  at 1/7/2022 1519    Comment: Reviewed safety/technique with HEP, PT POC      Show all documentation for this point (6)                 Point: Body mechanics (Done)     Learning Progress Summary           Patient Eager, E, VU,NR by  at 1/7/2022 1519    Comment: Reviewed safety/technique with HEP, PT POC      Show all documentation for this point (7)                 Point: Precautions (Done)     Learning Progress Summary           Patient Eager, E, VU,NR by  at 1/7/2022 1519    Comment: Reviewed safety/technique with HEP, PT POC      Show all documentation for this point (7)                             User Key     Initials Effective Dates Name Provider Type Discipline     06/01/21 -  Elsa Nowak PT Physical Therapist PT              PT Recommendation and Plan     Plan of Care Reviewed With: patient, family  Progress: no change  Outcome Summary: Pt.  declined out of bed mobility today secondary to fatigue from dialysis. He was agreeable to ther-ex and tolerated well. Will continue to progress pt. as tolerated. Recommend SNF upon discharge.     Time Calculation:    PT Charges     Row Name 01/07/22 1519             Time Calculation    Start Time 1504  -SS      Stop Time 1512  -SS      Time Calculation (min) 8 min  -SS      PT Received On 01/07/22  -SS              Time Calculation- PT    Total Timed Code Minutes- PT 8 minute(s)  -SS              Timed Charges    78850 - PT Therapeutic Exercise Minutes 8  -SS              Total Minutes    Timed Charges Total Minutes 8  -SS       Total Minutes 8  -SS            User Key  (r) = Recorded By, (t) = Taken By, (c) = Cosigned By    Initials Name Provider Type    SS Elsa Nowak, STEPHANIE Physical Therapist              Therapy Charges for Today     Code Description Service Date Service Provider Modifiers Qty    57882166028 HC PT THER PROC EA 15 MIN 1/7/2022 Elsa Nowak PT GP 1          PT G-Codes  Outcome Measure Options: AM-PAC 6 Clicks Basic Mobility (PT)  AM-PAC 6 Clicks Score (PT): 9  AM-PAC 6 Clicks Score (OT): 14    Elsa Nowak PT  1/7/2022

## 2022-01-07 NOTE — PROGRESS NOTES
Cardiothoracic Surgery Progress Note      POD #: 8-left above-knee amputation     LOS: 24 days      Subjective: Awake and alert    Objective:  Vital Signs vital signs below noted T-max past 24 hours 98.3 °F  Temp:  [98 °F (36.7 °C)-98.3 °F (36.8 °C)] 98 °F (36.7 °C)  Heart Rate:  [75-82] 75  Resp:  [18-19] 18  BP: (101-124)/(58-85) 103/70    Physical Exam:   General Appearance: Oriented x3   Lungs:   Heart:   Skin:   Incision: Amputation site dressing change.  Sutures intact skin margin viable skin flaps are viable.     Results:  Results from last 7 days   Lab Units 01/06/22  1835 01/06/22  0717 01/05/22  2358   WBC 10*3/mm3  --   --  7.98   HEMOGLOBIN g/dL 7.3*   < > 7.0*  7.0*   HEMATOCRIT % 24.5*   < > 22.9*  22.9*   PLATELETS 10*3/mm3  --   --  160    < > = values in this interval not displayed.     Results from last 7 days   Lab Units 01/06/22  0717   SODIUM mmol/L 134*   POTASSIUM mmol/L 3.7   CHLORIDE mmol/L 100   CO2 mmol/L 24.0   BUN mg/dL 18   CREATININE mg/dL 3.73*   GLUCOSE mg/dL 112*   CALCIUM mg/dL 8.7         Assessment: #1 postop day 8 left above-knee amputation ischemic gangrene left foot and left lower extremity feeling well at this time  2.  Renal failure on hemodialysis for over 2 years  3.  Bladder cancer prostate cancer with    Plan:.  Continue daily dressing change amputation site.  Dialysis per renal medicine.  Medical manage per hospitalist.      Luís Bradshaw MD - 01/07/22 - 06:47 EST

## 2022-01-07 NOTE — PROGRESS NOTES
"    [facility] Medicine Services  PROGRESS NOTE    Patient Name: Chase Obrien  : 1945  MRN: [id number]    Date of Admission: 2021  Primary Care Physician: Radha Purcell    Subjective   Subjective     CC:  Sepsis, left AKA    HPI:  Patient seen resting up in bed dozing.  Awakens to voice.  Irritated but he states he has been well woke up every 10 minutes and support this morning\".  Otherwise cooperative.  Reports good bowel movement yesterday.  Guillaume cath still with bloody output.  No new issues.    ROS:  Gen- No fevers, chills  CV- No chest pain, palpitations  Resp- No cough, dyspnea  GI- No N/V/D. (+)distention and constipation (some improvement when large BM yesterday and stable today)      Objective   Objective     Vital Signs:   Temp:  [97.6 °F (36.4 °C)-98.1 °F (36.7 °C)] 98.1 °F (36.7 °C)  Heart Rate:  [70-83] 83  Resp:  [16-19] 18  BP: ()/(58-86) 123/73     Physical Exam:  Constitutional: No acute distress, awake, alert, nontoxic  Respiratory: Good effort, nonlabored respirations   Cardiovascular: NSR tele  Gastrointestinal: soft, distended but less tense than my prior exam  Musculoskeletal: No peripheral edema, normal muscle tone for age  :  Opaque hematuria in floey bag  Psychiatric: Contrary affect, cooperative when he chooses      Results Reviewed:  LAB RESULTS:      Lab 22  0812 22  1835 22  0717 22  2358 22  1916 22  0725 22  0845 22  0845 22  0703   WBC 12.06*  --   --  7.98  --   --   --  7.44  --    HEMOGLOBIN 7.2* 7.3* 7.1* 7.0*  7.0* 7.2*  --    < > 7.4*  --    HEMATOCRIT 23.9* 24.5* 23.5* 22.9*  22.9* 23.3*  --    < > 24.9*  --    PLATELETS 179  --   --  160  --   --   --  188  --    NEUTROS ABS  --   --   --  5.78  --   --   --  5.54  --    IMMATURE GRANS (ABS)  --   --   --  0.07*  --   --   --  0.05  --    LYMPHS ABS  --   --   --  1.08  --   --   --  1.07  --    MONOS ABS  --   --   --  " 0.82  --   --   --  0.58  --    EOS ABS  --   --   --  0.20  --   --   --  0.17  --    .4*  --   --  102.7*  --   --   --  101.6*  --    PROTIME 17.2*  --  18.2*  --   --  17.6*  --  17.3* 16.5*    < > = values in this interval not displayed.         Lab 01/07/22  0812 01/06/22 0717 01/05/22 0859 01/04/22  0845 01/01/22  0742 12/31/21  1617   SODIUM 138 134* 139 136 135*  --    POTASSIUM 3.6 3.7 3.6 3.6 4.1  --    CHLORIDE 100 100 101 98 98  --    CO2 25.0 24.0 25.0 25.0 20.0*  --    ANION GAP 13.0 10.0 13.0 13.0 17.0*  --    BUN 24* 18 33* 22 21  --    CREATININE 4.11* 3.73* 4.86* 3.59* 3.53*  --    GLUCOSE 122* 112* 134* 111* 160*  --    CALCIUM 8.9 8.7 8.5* 8.5* 8.2*  --    PHOSPHORUS  --   --  4.2 3.9 2.6 2.8   TSH  --  7.050*  --   --   --   --          Lab 01/06/22 0717 01/05/22  0859 01/04/22  0845 01/01/22  0742   TOTAL PROTEIN 6.8  --   --   --    ALBUMIN 4.00 3.60 3.70 3.60   GLOBULIN 2.8  --   --   --    ALT (SGPT) <5  --   --   --    AST (SGOT) 11  --   --   --    BILIRUBIN 0.4  --   --   --    ALK PHOS 86  --   --   --          Lab 01/07/22  0812 01/06/22 0717 01/05/22  0725 01/04/22  0845 01/03/22  0703   PROTIME 17.2* 18.2* 17.6* 17.3* 16.5*   INR 1.46* 1.56* 1.50* 1.47* 1.38*                 Brief Urine Lab Results  (Last result in the past 365 days)      Color   Clarity   Blood   Leuk Est   Nitrite   Protein   CREAT   Urine HCG        12/13/21 2006 Yellow   Turbid   Large (3+)   Large (3+)   Negative   >=300 mg/dL (3+)                 Microbiology Results Abnormal     Procedure Component Value - Date/Time    Blood Culture - Blood, Arm, Right [[id number]]  (Normal) Collected: 12/13/21 2010    Lab Status: Final result Specimen: Blood from Arm, Right Updated: 12/18/21 2045     Blood Culture No growth at 5 days    Blood Culture - Blood, Arm, Right [[id number]]  (Normal) Collected: 12/13/21 2000    Lab Status: Final result Specimen: Blood from Arm, Right Updated: 12/18/21 2045     Blood Culture  No growth at 5 days    Urine Culture - Urine, Urine, Catheter [[id number]] Collected: 12/13/21 2006    Lab Status: Final result Specimen: Urine, Catheter Updated: 12/14/21 1710     Urine Culture >100,000 CFU/mL Mixed Harrison Isolated    Narrative:      Specimen contains mixed organisms of questionable pathogenicity which indicates contamination with commensal harrison.  Further identification is unlikely to provide clinically useful information.  Suggest recollection.    COVID PRE-OP / PRE-PROCEDURE SCREENING ORDER (NO ISOLATION) - Swab, Nasopharynx [[id number]]  (Normal) Collected: 12/13/21 1954    Lab Status: Final result Specimen: Swab from Nasopharynx Updated: 12/13/21 2031    Narrative:      The following orders were created for panel order COVID PRE-OP / PRE-PROCEDURE SCREENING ORDER (NO ISOLATION) - Swab, Nasopharynx.  Procedure                               Abnormality         Status                     ---------                               -----------         ------                     COVID-19 and FLU A/B PCR...[[id number]]  Normal              Final result                 Please view results for these tests on the individual orders.    COVID-19 and FLU A/B PCR - Swab, Nasopharynx [[id number]]  (Normal) Collected: 12/13/21 1954    Lab Status: Final result Specimen: Swab from Nasopharynx Updated: 12/13/21 2031     COVID19 Not Detected     Influenza A PCR Not Detected     Influenza B PCR Not Detected    Narrative:      Fact sheet for providers: https://www.fda.gov/media/164306/download    Fact sheet for patients: https://www.fda.gov/media/426681/download    Test performed by PCR.          No radiology results from the last 24 hrs    Results for orders placed during the hospital encounter of 02/19/20    Transthoracic Echo Complete With Contrast if Necessary Per Protocol    Interpretation Summary  · Estimated EF = 55%.  · Mild mitral valve regurgitation is present      I have reviewed the medications:  Scheduled  Meds:albumin human, , ,   albumin human, , ,   albumin human, , ,   albumin human, , ,   aspirin, 81 mg, Oral, Daily  atorvastatin, 20 mg, Oral, Nightly  docusate sodium, 100 mg, Oral, BID  Eluxadoline, 100 mg, Oral, BID With Meals  epoetin radha/radha-epbx, 20,000 Units, Subcutaneous, Once per day on   gabapentin, 300 mg, Oral, Nightly  insulin detemir, 10 Units, Subcutaneous, Nightly  insulin lispro, 0-7 Units, Subcutaneous, TID AC  levothyroxine, 200 mcg, Oral, Q AM  methylnaltrexone, 6 mg, Subcutaneous, Every Other Day  midodrine, 10 mg, Oral, Once  sodium chloride, 10 mL, Intravenous, Q12H      Continuous Infusions:Pharmacy to dose warfarin,   sodium chloride, 5 mL/hr, Last Rate: Stopped (21 1135)      PRN Meds:.•  acetaminophen **OR** acetaminophen  •  bisacodyl  •  dextrose  •  dextrose  •  glucagon (human recombinant)  •  HYDROcodone-acetaminophen  •  lidocaine  •  melatonin  •  midodrine  •  [] Morphine **AND** naloxone  •  ondansetron **OR** ondansetron  •  Pharmacy to dose warfarin  •  senna-docusate sodium  •  [COMPLETED] Insert peripheral IV **AND** sodium chloride  •  sodium chloride    Assessment/Plan   Assessment & Plan     Active Hospital Problems    Diagnosis  POA   • **Gangrene of left foot (HCC) [I96]  Yes   • Chronic diastolic CHF (congestive heart failure) (HCC) [I50.32]  Yes   • ESRD (end stage renal disease) (HCC) [N18.6]  Yes   • Atrial fibrillation (HCC) [I48.91]  Yes   • Type 2 diabetes mellitus (HCC) [E11.9]  Yes   • Hypothyroid [E03.9]  Yes   • WINSTON (obstructive sleep apnea), noncompliant w/ NIPPV [G47.33]  Yes   • CAD (coronary artery disease) [I25.10]  Yes   • Anemia [D64.9]  Yes   • Dyslipidemia [E78.5]  Yes      Resolved Hospital Problems    Diagnosis Date Resolved POA   • Hematuria [R31.9] 2021 Yes   • Lactic acidosis [E87.2] 2021 Yes        Brief Hospital Course to date:  Chase Obrien is a highly noncompliant 76 y.o. male  with CHF, DM, HL, HTN,  ESRD on HD, history of GIB (gastric ulcers and embolization of L gastric artery 2020) and atrial fibrillation. More recent diagnosis of hematuria with subsequent cystoscopy with diagnosis of bladder/prostate cancer.    This patient's problems and plans were partially entered by my partner and updated as appropriate by me 01/07/22.           Lt toe gangrene second, third, and fourth toes  Cellulitis L foot  -continue zyvox, ceftazidime, flagyl per ID  -Dr. Bradshaw following, underwent left AKA on 12/30   -Continue gabapentin 300 mg nightly secondary to ESRD     PAD  Hx of atrial fibrillation  - holding coumadin (see below)     Anemia  -no epo due to recent bladder cancer, monitor  --Nephrology following due to ESRD.    --Urology consulted 1/6/21 due to return of hematuria, patient supposed to follow with [facility] but was hospitalized here, currently irrigating Pace and holding coumadin  --+/- benefit from a unit of blood with Monday's HD??  Watch trend over weekend, hopefully stablizes once hematuria improves with pace irrigations    Hx gross hematuria / current hematuria  Recent diagnosis of bladder/prostate cancer s/p cystoscopy at [facility] (data deficit) to f/u with [facility]  - holding coumadin  - Urology follows, if hematuria persists despite conservative management with irrigation may need cystoscopy prior to transafer to SNF rehab... will know more once see how urine in Pace looks with irrigations and Hgb trend over the weekend.   - No evidence of UTI, culture negative    ESRD   -HD MWF if pt agreeable (has refused multiple times due to his INR not being therapeutic)     DM (A1c 5.5)  -Continue SSI  -Continue Levemir 10 units at night  --Stable, continue current regimen     WINSTON  -noncompliant with CPAP  --Oxygen as needed     Hypothyroidism  -continue synthroid  --Last TSH checked 4/2020.  --Recheck in a.m.    Constipation / ileus  Narcotic slow transit   -Refusing all bowel regimens  -continue subcu  Relistor  -finally had BM's with neostigmine drip, now d/c'd but plan to continue Relistor qOD until ensure fairly reliable BM's    Hx of GIB  -gastric ulcer  -L gastric artery embolization here in 2020    Decubitus wound  -WOC following, patient has been refusing dressing changes.      CHF by history  CAD   --Generally stable.      DVT prophylaxis:  Medical and mechanical DVT prophylaxis orders are present.       AM-PAC 6 Clicks Score (PT): 8 (01/06/22 0800)      Disposition: I expect the patient to be discharged to SNF rehab once arranged but needs to have stable Hgb with resolved hematuria, need to watch through this weekend to determine if medically ready Monday.      CODE STATUS:   Code Status and Medical Interventions:   Ordered at: 12/14/21 0003     Level Of Support Discussed With:    Patient     Code Status (Patient has no pulse and is not breathing):    CPR (Attempt to Resuscitate)     Medical Interventions (Patient has pulse or is breathing):    Full Support       Marguerite Mohr MD  01/07/22

## 2022-01-07 NOTE — PROGRESS NOTES
"   LOS: 24 days    Patient Care Team:  Radha Purcell as PCP - General (Family Medicine)    Reason For Visit:  F/U ESRD. SEEN ON DIALYSIS.  Subjective           Review of Systems:    Pulm: No soa   CV:  No CP      Objective     albumin human, , ,   albumin human, , ,   albumin human, , ,   albumin human, , ,   aspirin, 81 mg, Oral, Daily  atorvastatin, 20 mg, Oral, Nightly  Eluxadoline, 100 mg, Oral, BID With Meals  epoetin radha/radha-epbx, 20,000 Units, Subcutaneous, Once per day on Mon Wed Fri  gabapentin, 300 mg, Oral, Nightly  insulin detemir, 10 Units, Subcutaneous, Nightly  insulin lispro, 0-7 Units, Subcutaneous, TID AC  levothyroxine, 200 mcg, Oral, Q AM  methylnaltrexone, 6 mg, Subcutaneous, Every Other Day  midodrine, 10 mg, Oral, Once  sodium chloride, 10 mL, Intravenous, Q12H      Pharmacy to dose warfarin,   sodium chloride, 5 mL/hr, Last Rate: Stopped (12/30/21 1135)          Vital Signs:  Blood pressure (!) 75/58, pulse 75, temperature 98.1 °F (36.7 °C), temperature source Oral, resp. rate 18, height 193 cm (76\"), weight 104 kg (229 lb 14.4 oz), SpO2 100 %.    Flowsheet Rows      First Filed Value   Admission Height 193 cm (76\") Documented at 12/13/2021 1937   Admission Weight 129 kg (285 lb) Documented at 12/13/2021 1937 01/06 0701 - 01/07 0700  In: 530 [P.O.:350]  Out: 325 [Urine:325]    Physical Exam:    General Appearance: NAD, alert and cooperative, Ox3  Eyes: PER, conjunctivae and sclerae normal, no icterus  Lungs: respirations regular and unlabored, no crepitus, clear to auscultation  Heart/CV: regular rhythm & normal rate, no murmur, no gallop, no rub and TR DEPENDENT edema  Abdomen: not distended, soft, non-tender, no masses,  bowel sounds present  Skin: No rash, Warm and dry. AVF  Radiology:            Labs:  Results from last 7 days   Lab Units 01/07/22  0812 01/06/22  1835 01/06/22  0717 01/05/22  2358 01/05/22 2358 01/05/22  1916 01/04/22  0845   WBC 10*3/mm3 12.06*  --   --   -- "  7.98  --  7.44   HEMOGLOBIN g/dL 7.2* 7.3* 7.1*   < > 7.0*  7.0*   < > 7.4*   HEMATOCRIT % 23.9* 24.5* 23.5*   < > 22.9*  22.9*   < > 24.9*   PLATELETS 10*3/mm3 179  --   --   --  160  --  188    < > = values in this interval not displayed.     Results from last 7 days   Lab Units 01/07/22  0812 01/06/22  0717 01/05/22  0859 01/04/22  0845 01/01/22  0742 01/01/22  0742 12/31/21  1617 12/31/21  1032   SODIUM mmol/L 138 134* 139 136   < > 135*  --    < >   POTASSIUM mmol/L 3.6 3.7 3.6 3.6   < > 4.1  --    < >   CHLORIDE mmol/L 100 100 101 98   < > 98  --    < >   CO2 mmol/L 25.0 24.0 25.0 25.0   < > 20.0*  --    < >   BUN mg/dL 24* 18 33* 22   < > 21  --    < >   CREATININE mg/dL 4.11* 3.73* 4.86* 3.59*   < > 3.53*  --    < >   CALCIUM mg/dL 8.9 8.7 8.5* 8.5*   < > 8.2*  --    < >   PHOSPHORUS mg/dL  --   --  4.2 3.9  --  2.6 2.8  --    ALBUMIN g/dL  --  4.00 3.60 3.70  --  3.60  --    < >    < > = values in this interval not displayed.     Results from last 7 days   Lab Units 01/07/22  0812   GLUCOSE mg/dL 122*       Results from last 7 days   Lab Units 01/06/22 0717   ALK PHOS U/L 86   BILIRUBIN mg/dL 0.4   ALT (SGPT) U/L <5   AST (SGOT) U/L 11                 Estimated Creatinine Clearance: 22.8 mL/min (A) (by C-G formula based on SCr of 4.11 mg/dL (H)).      Assessment       Gangrene of left foot (HCC)    Atrial fibrillation (HCC)    Type 2 diabetes mellitus (HCC)    Hypothyroid    WINSTON (obstructive sleep apnea), noncompliant w/ NIPPV    CAD (coronary artery disease)    Anemia    Dyslipidemia    ESRD (end stage renal disease) ([facility])    Chronic diastolic CHF (congestive heart failure) ([facility])            Impression: ESRD. ANEMIA.             Recommendations: HD TODAY.      Xavi Lay MD  01/07/22  08:55 EST

## 2022-01-08 NOTE — PROGRESS NOTES
No urologic complaints today    Urine in Guillaume catheter tubing is yellow and clear      If urine remains like this today voiding trial when okay with primary team  Would hold anticoagulants if possible      I had a chance to review records from [facility].  There is a vague report of history of bladder and prostate cancer.  I concur with Dr. Stein that the patient should follow-up given his complicated health status with [facility] urologic oncology.  Unfortunately the patient seems to be noncompliant with several appointments that have been made for him at this clinic after discharge from [facility].    Call with any further concerns

## 2022-01-08 NOTE — PROGRESS NOTES
"   LOS: 25 days    Patient Care Team:  Radha Purcell as PCP - General (Family Medicine)    Reason For Visit:  F/U ESRD.  Subjective     No acute events overnight. No new complaints         Review of Systems:    Pulm: No soa   CV:  No CP      Objective     albumin human, , ,   albumin human, , ,   albumin human, , ,   albumin human, , ,   aspirin, 81 mg, Oral, Daily  atorvastatin, 20 mg, Oral, Nightly  docusate sodium, 100 mg, Oral, BID  Eluxadoline, 100 mg, Oral, BID With Meals  epoetin radha/radha-epbx, 20,000 Units, Subcutaneous, Once per day on Mon Wed Fri  gabapentin, 300 mg, Oral, Nightly  insulin detemir, 10 Units, Subcutaneous, Nightly  insulin lispro, 0-7 Units, Subcutaneous, TID AC  levothyroxine, 200 mcg, Oral, Q AM  methylnaltrexone, 6 mg, Subcutaneous, Every Other Day  midodrine, 10 mg, Oral, Once  sodium chloride, 10 mL, Intravenous, Q12H      Pharmacy to dose warfarin,   sodium chloride, 5 mL/hr, Last Rate: Stopped (12/30/21 1135)          Vital Signs:  Blood pressure 102/58, pulse 75, temperature 98.3 °F (36.8 °C), temperature source Oral, resp. rate 18, height 193 cm (76\"), weight 104 kg (229 lb 14.4 oz), SpO2 92 %.    Flowsheet Rows      First Filed Value   Admission Height 193 cm (76\") Documented at 12/13/2021 1937   Admission Weight 129 kg (285 lb) Documented at 12/13/2021 1937 01/07 0701 - 01/08 0700  In: 650 [P.O.:350]  Out: 2440 [Urine:400]    Physical Exam:    Gen: Sleeping, NAD   HENT: NC, AT, EOMI   NECK: Supple, no JVD, Trachea midline   LUNGS: CTA bilaterally, non labored respirtation   CVS: S1/S2 audible, RRR, no murmur   Abd: Soft, NT, ND, BS+   Ext: + pedal edema, no cyanosis   Skin: Warm, dry and intact  AVF    Radiology:            Labs:  Results from last 7 days   Lab Units 01/08/22  0853 01/07/22  0812 01/06/22  1835 01/06/22  0717 01/05/22  2358 01/05/22  1916 01/04/22  0845   WBC 10*3/mm3  --  12.06*  --   --  7.98  --  7.44   HEMOGLOBIN g/dL 7.9* 7.2* 7.3*   < > 7.0*  " 7.0*   < > 7.4*   HEMATOCRIT % 26.6* 23.9* 24.5*   < > 22.9*  22.9*   < > 24.9*   PLATELETS 10*3/mm3  --  179  --   --  160  --  188    < > = values in this interval not displayed.     Results from last 7 days   Lab Units 01/07/22  0812 01/06/22  0717 01/05/22  0859 01/04/22  0845   SODIUM mmol/L 138 134* 139 136   POTASSIUM mmol/L 3.6 3.7 3.6 3.6   CHLORIDE mmol/L 100 100 101 98   CO2 mmol/L 25.0 24.0 25.0 25.0   BUN mg/dL 24* 18 33* 22   CREATININE mg/dL 4.11* 3.73* 4.86* 3.59*   CALCIUM mg/dL 8.9 8.7 8.5* 8.5*   PHOSPHORUS mg/dL  --   --  4.2 3.9   ALBUMIN g/dL  --  4.00 3.60 3.70     Results from last 7 days   Lab Units 01/07/22  0812   GLUCOSE mg/dL 122*       Results from last 7 days   Lab Units 01/06/22  0717   ALK PHOS U/L 86   BILIRUBIN mg/dL 0.4   ALT (SGPT) U/L <5   AST (SGOT) U/L 11                 Estimated Creatinine Clearance: 22.8 mL/min (A) (by C-G formula based on SCr of 4.11 mg/dL (H)).      Assessment       Gangrene of left foot (HCC)    Atrial fibrillation (HCC)    Type 2 diabetes mellitus (HCC)    Hypothyroid    WINSTON (obstructive sleep apnea), noncompliant w/ NIPPV    CAD (coronary artery disease)    Anemia    Dyslipidemia    ESRD (end stage renal disease) (HCC)    Chronic diastolic CHF (congestive heart failure) (HCC)            Impression:   1-ESRD.   2-HYPOTENSION.   3-ANEMIA.       Recommendations:   - HD per schedule. UF as tolerated.   - Renal diet   - SUSAN with HD  - Midodrine.       Landon Freed MD  01/08/22  10:15 EST

## 2022-01-08 NOTE — PLAN OF CARE
Goal Outcome Evaluation:  Plan of Care Reviewed With: patient        Progress: no change  Outcome Summary: Pt VSS this shift. Pain controlled by PO pain medication. Pt refused bladder irrigation every 2 hours, but did allow it every 4 hours. Guillaume draining well throughout the night, though still having gross hematuria. Will cont to monitor.

## 2022-01-08 NOTE — PROGRESS NOTES
[facility] Medicine Services  PROGRESS NOTE    Patient Name: Chase Obrien  : 1945  MRN: [id number]    Date of Admission: 2021  Primary Care Physician: Radha Purcell    Subjective   Subjective     CC:  Sepsis, left AKA    HPI:  Some limb pain on left.     ROS:  Gen- No fevers, chills  CV- No chest pain, palpitations  Resp- No cough, dyspnea  GI- No N/V/D, abd pain        Objective   Objective     Vital Signs:   Temp:  [97.4 °F (36.3 °C)-99 °F (37.2 °C)] 99 °F (37.2 °C)  Heart Rate:  [75-82] 75  Resp:  [18-19] 18  BP: ()/(48-65) 99/58     Physical Exam:  Constitutional - no acute distress, nontoxic, in bed  HEENT-NCAT, mucous membranes moist  CV-RRR, S1 S2 normal, no m/r/g  Resp-grossly clear bilaterally  Abd-soft, nontender, nondistended, normoactive bowel sounds  Ext-left aka  gu- pace with clear yellow urine  Neuro-alert, speech clear  Psych-normal affect   Skin- No rash on exposed UE or LE bilaterally    Results Reviewed:  LAB RESULTS:      Lab 22  0853 22  0812 22  1835 22  0717 22  2358 22  1916 22  0725 22  0845   WBC  --  12.06*  --   --  7.98  --   --  7.44   HEMOGLOBIN 7.9* 7.2* 7.3* 7.1* 7.0*  7.0*   < >  --  7.4*   HEMATOCRIT 26.6* 23.9* 24.5* 23.5* 22.9*  22.9*   < >  --  24.9*   PLATELETS  --  179  --   --  160  --   --  188   NEUTROS ABS  --   --   --   --  5.78  --   --  5.54   IMMATURE GRANS (ABS)  --   --   --   --  0.07*  --   --  0.05   LYMPHS ABS  --   --   --   --  1.08  --   --  1.07   MONOS ABS  --   --   --   --  0.82  --   --  0.58   EOS ABS  --   --   --   --  0.20  --   --  0.17   MCV  --  100.4*  --   --  102.7*  --   --  101.6*   PROTIME 16.7* 17.2*  --  18.2*  --   --  17.6* 17.3*    < > = values in this interval not displayed.         Lab 22  0812 22  0717 22  0859 22  0845   SODIUM 138 134* 139 136   POTASSIUM 3.6 3.7 3.6 3.6   CHLORIDE 100 100 101 98   CO2 25.0  24.0 25.0 25.0   ANION GAP 13.0 10.0 13.0 13.0   BUN 24* 18 33* 22   CREATININE 4.11* 3.73* 4.86* 3.59*   GLUCOSE 122* 112* 134* 111*   CALCIUM 8.9 8.7 8.5* 8.5*   PHOSPHORUS  --   --  4.2 3.9   TSH  --  7.050*  --   --          Lab 01/06/22  0717 01/05/22  0859 01/04/22  0845   TOTAL PROTEIN 6.8  --   --    ALBUMIN 4.00 3.60 3.70   GLOBULIN 2.8  --   --    ALT (SGPT) <5  --   --    AST (SGOT) 11  --   --    BILIRUBIN 0.4  --   --    ALK PHOS 86  --   --          Lab 01/08/22  0853 01/07/22  0812 01/06/22  0717 01/05/22  0725 01/04/22  0845   PROTIME 16.7* 17.2* 18.2* 17.6* 17.3*   INR 1.40* 1.46* 1.56* 1.50* 1.47*                 Brief Urine Lab Results  (Last result in the past 365 days)      Color   Clarity   Blood   Leuk Est   Nitrite   Protein   CREAT   Urine HCG        12/13/21 2006 Yellow   Turbid   Large (3+)   Large (3+)   Negative   >=300 mg/dL (3+)                 Microbiology Results Abnormal     Procedure Component Value - Date/Time    Blood Culture - Blood, Arm, Right [[id number]]  (Normal) Collected: 12/13/21 2010    Lab Status: Final result Specimen: Blood from Arm, Right Updated: 12/18/21 2045     Blood Culture No growth at 5 days    Blood Culture - Blood, Arm, Right [[id number]]  (Normal) Collected: 12/13/21 2000    Lab Status: Final result Specimen: Blood from Arm, Right Updated: 12/18/21 2045     Blood Culture No growth at 5 days    Urine Culture - Urine, Urine, Catheter [[id number]] Collected: 12/13/21 2006    Lab Status: Final result Specimen: Urine, Catheter Updated: 12/14/21 1710     Urine Culture >100,000 CFU/mL Mixed Harrison Isolated    Narrative:      Specimen contains mixed organisms of questionable pathogenicity which indicates contamination with commensal harrison.  Further identification is unlikely to provide clinically useful information.  Suggest recollection.    COVID PRE-OP / PRE-PROCEDURE SCREENING ORDER (NO ISOLATION) - Swab, Nasopharynx [[id number]]  (Normal) Collected: 12/13/21 1954     Lab Status: Final result Specimen: Swab from Nasopharynx Updated: 12/13/21 2031    Narrative:      The following orders were created for panel order COVID PRE-OP / PRE-PROCEDURE SCREENING ORDER (NO ISOLATION) - Swab, Nasopharynx.  Procedure                               Abnormality         Status                     ---------                               -----------         ------                     COVID-19 and FLU A/B PCR...[[id number]]  Normal              Final result                 Please view results for these tests on the individual orders.    COVID-19 and FLU A/B PCR - Swab, Nasopharynx [[id number]]  (Normal) Collected: 12/13/21 1954    Lab Status: Final result Specimen: Swab from Nasopharynx Updated: 12/13/21 2031     COVID19 Not Detected     Influenza A PCR Not Detected     Influenza B PCR Not Detected    Narrative:      Fact sheet for providers: https://www.fda.gov/media/531271/download    Fact sheet for patients: https://www.fda.gov/media/693608/download    Test performed by PCR.          No radiology results from the last 24 hrs    Results for orders placed during the hospital encounter of 02/19/20    Transthoracic Echo Complete With Contrast if Necessary Per Protocol    Interpretation Summary  · Estimated EF = 55%.  · Mild mitral valve regurgitation is present      I have reviewed the medications:  Scheduled Meds:albumin human, , ,   albumin human, , ,   albumin human, , ,   albumin human, , ,   aspirin, 81 mg, Oral, Daily  atorvastatin, 20 mg, Oral, Nightly  docusate sodium, 100 mg, Oral, BID  Eluxadoline, 100 mg, Oral, BID With Meals  epoetin radha/radha-epbx, 20,000 Units, Subcutaneous, Once per day on Mon Wed Fri  gabapentin, 300 mg, Oral, Nightly  insulin detemir, 10 Units, Subcutaneous, Nightly  insulin lispro, 0-7 Units, Subcutaneous, TID AC  levothyroxine, 200 mcg, Oral, Q AM  methylnaltrexone, 6 mg, Subcutaneous, Every Other Day  midodrine, 10 mg, Oral, Once  sodium chloride, 10 mL,  Intravenous, Q12H      Continuous Infusions:sodium chloride, 5 mL/hr, Last Rate: Stopped (21 1135)      PRN Meds:.•  acetaminophen **OR** acetaminophen  •  bisacodyl  •  dextrose  •  dextrose  •  glucagon (human recombinant)  •  HYDROcodone-acetaminophen  •  lidocaine  •  melatonin  •  midodrine  •  [] Morphine **AND** naloxone  •  ondansetron **OR** ondansetron  •  senna-docusate sodium  •  [COMPLETED] Insert peripheral IV **AND** sodium chloride  •  sodium chloride    Assessment/Plan   Assessment & Plan     Active Hospital Problems    Diagnosis  POA   • **Gangrene of left foot (HCC) [I96]  Yes   • Chronic diastolic CHF (congestive heart failure) (HCC) [I50.32]  Yes   • ESRD (end stage renal disease) (HCC) [N18.6]  Yes   • Atrial fibrillation (HCC) [I48.91]  Yes   • Type 2 diabetes mellitus (HCC) [E11.9]  Yes   • Hypothyroid [E03.9]  Yes   • WINSTON (obstructive sleep apnea), noncompliant w/ NIPPV [G47.33]  Yes   • CAD (coronary artery disease) [I25.10]  Yes   • Anemia [D64.9]  Yes   • Dyslipidemia [E78.5]  Yes      Resolved Hospital Problems    Diagnosis Date Resolved POA   • Hematuria [R31.9] 2021 Yes   • Lactic acidosis [E87.2] 2021 Yes        Brief Hospital Course to date:  Chase Obrien is a highly noncompliant 76 y.o. male  with CHF, DM, HL, HTN, ESRD on HD, history of GIB (gastric ulcers and embolization of L gastric artery ) and atrial fibrillation. More recent diagnosis of hematuria with subsequent cystoscopy with diagnosis of bladder/prostate cancer.    This patient's problems and plans were partially entered by my partner and updated as appropriate by me 22.           Lt toe gangrene second, third, and fourth toes  Cellulitis L foot  -Dr. Augustine yu, underwent left AKA on   - antibiotics discontinued /3   -Continue gabapentin 300 mg nightly secondary to ESRD     PAD  Hx of atrial fibrillation  - holding coumadin due to hematuria     Anemia  - no epo due to  recent bladder cancer, monitor  --Nephrology following due to ESRD.      Hx gross hematuria / current hematuria  Recent diagnosis of bladder/prostate cancer s/p cystoscopy at [place] (data deficit) to f/u with [facility]  - holding coumadin  - Urology follows, recommends followup at , however previous non-compliance noted    ESRD   -HD MWF if pt agreeable (has refused multiple times due to his INR not being therapeutic)     DM (A1c 5.5)  -Continue SSI  -Continue Levemir 10 units at night  --Stable, continue current regimen     WINSTON  -noncompliant with CPAP  --Oxygen as needed     Hypothyroidism  -continue synthroid  --TSH 7.5, check free T4    Constipation / ileus  Narcotic slow transit   -Refusing all bowel regimens  -continue subcu Relistor  -finally had BM's with neostigmine drip, now d/c'd but plan to continue Relistor qOD until ensure fairly reliable BM's    Hx of GIB  -gastric ulcer  -L gastric artery embolization here in 2020    Decubitus wound  -WOC following, patient has been refusing dressing changes.      CHF by history  CAD   --Generally stable.      DVT prophylaxis:  Mechanical DVT prophylaxis orders are present.       AM-PAC 6 Clicks Score (PT): 9 (01/07/22 6931)      Disposition: I expect the patient to be discharged to SNF rehab once arranged but needs to have stable Hgb with resolved hematuria, need to watch through this weekend to determine if medically ready Monday.      CODE STATUS:   Code Status and Medical Interventions:   Ordered at: 12/14/21 0003     Level Of Support Discussed With:    Patient     Code Status (Patient has no pulse and is not breathing):    CPR (Attempt to Resuscitate)     Medical Interventions (Patient has pulse or is breathing):    Full Support       Henok Grant MD  01/08/22

## 2022-01-09 NOTE — PROGRESS NOTES
[facility] Medicine Services  PROGRESS NOTE    Patient Name: Chase Obrien  : 1945  MRN: [id number]    Date of Admission: 2021  Primary Care Physician: Radha Purcell    Subjective   Subjective     CC:  Sepsis, left AKA    HPI:  No complaints of limb pain today. Denies cough or fever. No diarrhea, did have a BM this morning however    ROS:  Gen- No fevers, chills  CV- No chest pain, palpitations  Resp- No cough, dyspnea  GI- No N/V/D, abd pain          Objective   Objective     Vital Signs:   Temp:  [96.2 °F (35.7 °C)-98.9 °F (37.2 °C)] 96.2 °F (35.7 °C)  Heart Rate:  [81] 81  Resp:  [14-20] 14  BP: ()/(56-62) 112/62  Flow (L/min):  [2-3] 3     Physical Exam:  Constitutional - no acute distress, nontoxic, in bed  HEENT-NCAT, mucous membranes moist  CV-RRR, S1 S2 normal, no m/r/g  Resp-CTAB  Abd-soft, nontender, nondistended, normoactive bowel sounds  Ext-left BKA, clean dressing in place  Neuro-alert, speech clear  Psych-normal affect   Skin- small ulceration on right shin.  Some mild erythema over coccyx      Results Reviewed:  LAB RESULTS:      Lab 22  0723 22  0853 22  0812 22  1835 22  0717 22  2358 22  2358 22  1916 22  0725 22  0845   WBC 14.90* 13.62* 12.06*  --   --   --  7.98  --   --  7.44   HEMOGLOBIN 7.7* 8.0*  7.9* 7.2* 7.3* 7.1*   < > 7.0*  7.0*   < >  --  7.4*   HEMATOCRIT 26.4* 27.1*  26.6* 23.9* 24.5* 23.5*   < > 22.9*  22.9*   < >  --  24.9*   PLATELETS 228 216 179  --   --   --  160  --   --  188   NEUTROS ABS 10.95* 9.51*  --   --   --   --  5.78  --   --  5.54   IMMATURE GRANS (ABS) 0.68* 0.77*  --   --   --   --  0.07*  --   --  0.05   LYMPHS ABS 1.61 1.71  --   --   --   --  1.08  --   --  1.07   MONOS ABS 1.41* 1.36*  --   --   --   --  0.82  --   --  0.58   EOS ABS 0.19 0.21  --   --   --   --  0.20  --   --  0.17   .5* 102.3* 100.4*  --   --   --  102.7*  --   --  101.6*    CRP 10.15*  --   --   --   --   --   --   --   --   --    PROCALCITONIN 0.54*  --   --   --   --   --   --   --   --   --    PROTIME  --  16.7* 17.2*  --  18.2*  --   --   --  17.6* 17.3*    < > = values in this interval not displayed.         Lab 01/09/22  0723 01/07/22  0812 01/06/22  0717 01/05/22  0859 01/04/22  0845   SODIUM 138 138 134* 139 136   POTASSIUM 3.8 3.6 3.7 3.6 3.6   CHLORIDE 99 100 100 101 98   CO2 25.0 25.0 24.0 25.0 25.0   ANION GAP 14.0 13.0 10.0 13.0 13.0   BUN 25* 24* 18 33* 22   CREATININE 4.85* 4.11* 3.73* 4.86* 3.59*   GLUCOSE 144* 122* 112* 134* 111*   CALCIUM 9.2 8.9 8.7 8.5* 8.5*   PHOSPHORUS  --   --   --  4.2 3.9   TSH  --   --  7.050*  --   --          Lab 01/06/22  0717 01/05/22  0859 01/04/22  0845   TOTAL PROTEIN 6.8  --   --    ALBUMIN 4.00 3.60 3.70   GLOBULIN 2.8  --   --    ALT (SGPT) <5  --   --    AST (SGOT) 11  --   --    BILIRUBIN 0.4  --   --    ALK PHOS 86  --   --          Lab 01/08/22  0853 01/07/22  0812 01/06/22  0717 01/05/22  0725 01/04/22  0845   PROTIME 16.7* 17.2* 18.2* 17.6* 17.3*   INR 1.40* 1.46* 1.56* 1.50* 1.47*                 Brief Urine Lab Results  (Last result in the past 365 days)      Color   Clarity   Blood   Leuk Est   Nitrite   Protein   CREAT   Urine HCG        12/13/21 2006 Yellow   Turbid   Large (3+)   Large (3+)   Negative   >=300 mg/dL (3+)                 Microbiology Results Abnormal     Procedure Component Value - Date/Time    Blood Culture - Blood, Arm, Right [[id number]]  (Normal) Collected: 12/13/21 2010    Lab Status: Final result Specimen: Blood from Arm, Right Updated: 12/18/21 2045     Blood Culture No growth at 5 days    Blood Culture - Blood, Arm, Right [[id number]]  (Normal) Collected: 12/13/21 2000    Lab Status: Final result Specimen: Blood from Arm, Right Updated: 12/18/21 2045     Blood Culture No growth at 5 days    Urine Culture - Urine, Urine, Catheter [[id number]] Collected: 12/13/21 2006    Lab Status: Final result  Specimen: Urine, Catheter Updated: 12/14/21 1710     Urine Culture >100,000 CFU/mL Mixed Harrison Isolated    Narrative:      Specimen contains mixed organisms of questionable pathogenicity which indicates contamination with commensal harrison.  Further identification is unlikely to provide clinically useful information.  Suggest recollection.    COVID PRE-OP / PRE-PROCEDURE SCREENING ORDER (NO ISOLATION) - Swab, Nasopharynx [[id number]]  (Normal) Collected: 12/13/21 1954    Lab Status: Final result Specimen: Swab from Nasopharynx Updated: 12/13/21 2031    Narrative:      The following orders were created for panel order COVID PRE-OP / PRE-PROCEDURE SCREENING ORDER (NO ISOLATION) - Swab, Nasopharynx.  Procedure                               Abnormality         Status                     ---------                               -----------         ------                     COVID-19 and FLU A/B PCR...[[id number]]  Normal              Final result                 Please view results for these tests on the individual orders.    COVID-19 and FLU A/B PCR - Swab, Nasopharynx [[id number]]  (Normal) Collected: 12/13/21 1954    Lab Status: Final result Specimen: Swab from Nasopharynx Updated: 12/13/21 2031     COVID19 Not Detected     Influenza A PCR Not Detected     Influenza B PCR Not Detected    Narrative:      Fact sheet for providers: https://www.fda.gov/media/029747/download    Fact sheet for patients: https://www.fda.gov/media/975959/download    Test performed by PCR.          XR Chest 1 View    Result Date: 1/9/2022  EXAMINATION: XR CHEST SINGLE VIEW - 01/09/2022  INDICATION: L03.116-Cellulitis of left lower limb; [id number], not elsewhere classified; R79.89-Other specified abnormal findings of blood chemistry; N39.0-Urinary tract infection, site not specified; Z85.51-Personal history of malignant neoplasm of bladder; Z85.46-Personal history of malignant neoplasm of prostate; N18.6-End stage renal disease. Leukocytosis.   COMPARISON: 2021  FINDINGS: Sternotomy wires are noted. Lung volumes remain relatively low but the lungs are practically clear, with perhaps trace bibasilar atelectasis. No pneumothorax or edema is seen. As before, there are are air distended loops of bowel in the upper abdomen. Similar pattern is present over numerous prior studies, and this may reflect chronic ileus.      Impression: 1. Minimal bibasilar atelectasis. No clearly acute chest disease is seen. 2. Persistent diffuse gaseous distention of bowel, apparently chronic.  DICTATED:   2022 EDITED/lfs:   2022          Results for orders placed during the hospital encounter of 20    Transthoracic Echo Complete With Contrast if Necessary Per Protocol    Interpretation Summary  · Estimated EF = 55%.  · Mild mitral valve regurgitation is present      I have reviewed the medications:  Scheduled Meds:albumin human, , ,   albumin human, , ,   albumin human, , ,   albumin human, , ,   aspirin, 81 mg, Oral, Daily  atorvastatin, 20 mg, Oral, Nightly  docusate sodium, 100 mg, Oral, BID  Eluxadoline, 100 mg, Oral, BID With Meals  epoetin radha/radha-epbx, 20,000 Units, Subcutaneous, Once per day on   gabapentin, 300 mg, Oral, Nightly  insulin detemir, 10 Units, Subcutaneous, Nightly  insulin lispro, 0-7 Units, Subcutaneous, TID AC  levothyroxine, 200 mcg, Oral, Q AM  methylnaltrexone, 6 mg, Subcutaneous, Every Other Day  midodrine, 10 mg, Oral, Once  sodium chloride, 10 mL, Intravenous, Q12H      Continuous Infusions:sodium chloride, 5 mL/hr, Last Rate: Stopped (21 1135)      PRN Meds:.•  acetaminophen **OR** acetaminophen  •  bisacodyl  •  dextrose  •  dextrose  •  glucagon (human recombinant)  •  HYDROcodone-acetaminophen  •  lidocaine  •  melatonin  •  midodrine  •  [] Morphine **AND** naloxone  •  ondansetron **OR** ondansetron  •  senna-docusate sodium  •  [COMPLETED] Insert peripheral IV **AND** sodium chloride  •  sodium  chloride    Assessment/Plan   Assessment & Plan     Active Hospital Problems    Diagnosis  POA   • **Gangrene of left foot (HCC) [I96]  Yes   • Chronic diastolic CHF (congestive heart failure) (HCC) [I50.32]  Yes   • ESRD (end stage renal disease) (HCC) [N18.6]  Yes   • Atrial fibrillation (HCC) [I48.91]  Yes   • Type 2 diabetes mellitus (HCC) [E11.9]  Yes   • Hypothyroid [E03.9]  Yes   • WINSTON (obstructive sleep apnea), noncompliant w/ NIPPV [G47.33]  Yes   • CAD (coronary artery disease) [I25.10]  Yes   • Anemia [D64.9]  Yes   • Dyslipidemia [E78.5]  Yes      Resolved Hospital Problems    Diagnosis Date Resolved POA   • Hematuria [R31.9] 12/24/2021 Yes   • Lactic acidosis [E87.2] 12/24/2021 Yes        Brief Hospital Course to date:  Chase Obrien is a highly noncompliant 76 y.o. male  with CHF, DM, HL, HTN, ESRD on HD, history of GIB (gastric ulcers and embolization of L gastric artery 2020) and atrial fibrillation. More recent diagnosis of hematuria with subsequent cystoscopy with diagnosis of bladder/prostate cancer.    This patient's problems and plans were partially entered by my partner and updated as appropriate by me 01/09/22.           Lt toe gangrene second, third, and fourth toes  Cellulitis L foot  -Dr. Bradshaw following, underwent left AKA on 12/30  - antibiotics discontinued 1/3   -Continue gabapentin 300 mg nightly secondary to ESRD    Leukocytosis  - wbc 14.9  - no clear source of infection. Patient remains afebrile and appears non toxic. Discussed with Dr Bradshaw, suture line CDI. Patient denies cough or loose stool.    - CXR without infiltrate  - discussed with ID, continue clinical monitoring     PAD  Hx of atrial fibrillation  - holding coumadin due to hematuria     Anemia  - no epo due to recent bladder cancer, monitor  --Nephrology following due to ESRD.      Hx gross hematuria / current hematuria  Recent diagnosis of bladder/prostate cancer s/p cystoscopy at [facility] (data deficit) to f/u with  [facility]  - holding coumadin  - Urology follows, recommends followup at , however previous non-compliance noted    ESRD   -HD MWF if pt agreeable (has refused multiple times due to his INR not being therapeutic)     DM (A1c 5.5)  -Continue SSI  -Continue Levemir 10 units at night  --Stable, continue current regimen     WINSTON  -noncompliant with CPAP  --Oxygen as needed     Hypothyroidism  -continue synthroid  --TSH 7.5, check free T4    Constipation / ileus  Narcotic slow transit   -Refusing all bowel regimens  -continue subcu Relistor  -finally had BM's with neostigmine drip, now d/c'd but plan to continue Relistor qOD until ensure fairly reliable BM's    Hx of GIB  -gastric ulcer  -L gastric artery embolization here in 2020    Decubitus wound  -WOC following, patient has been refusing dressing changes.      CHF by history  CAD   --Generally stable.      DVT prophylaxis:  Mechanical DVT prophylaxis orders are present.       AM-PAC 6 Clicks Score (PT): 10 (01/09/22 0805)      Disposition: I expect the patient to be discharged to SNF rehab once arranged but needs to have stable Hgb with resolved hematuria, need to watch through this weekend to determine if medically ready Monday.      CODE STATUS:   Code Status and Medical Interventions:   Ordered at: 12/14/21 0003     Level Of Support Discussed With:    Patient     Code Status (Patient has no pulse and is not breathing):    CPR (Attempt to Resuscitate)     Medical Interventions (Patient has pulse or is breathing):    Full Support       Henok Grant MD  01/09/22

## 2022-01-09 NOTE — PROGRESS NOTES
Cardiothoracic Surgery Progress Note      POD #: 10-left above-knee amputation     LOS: 26 days      Subjective: Awake and alert  Objective:  Vital Signs vital signs below noted T-max past 24 hours 99 °F  Temp:  [98.3 °F (36.8 °C)-99 °F (37.2 °C)] 98.9 °F (37.2 °C)  Heart Rate:  [81] 81  Resp:  [18-20] 20  BP: ()/(56-62) 112/62    Physical Exam:   General Appearance: Oriented x3   Lungs:   Heart:   Skin:   Incision: Amputation site dressing change.  Suture intact skin margin viable skin flaps are viable     Results:  Results from last 7 days   Lab Units 01/08/22  0853   WBC 10*3/mm3 13.62*   HEMOGLOBIN g/dL 8.0*  7.9*   HEMATOCRIT % 27.1*  26.6*   PLATELETS 10*3/mm3 216     Results from last 7 days   Lab Units 01/07/22  0812   SODIUM mmol/L 138   POTASSIUM mmol/L 3.6   CHLORIDE mmol/L 100   CO2 mmol/L 25.0   BUN mg/dL 24*   CREATININE mg/dL 4.11*   GLUCOSE mg/dL 122*   CALCIUM mg/dL 8.9         Assessment: #1.  Postop day 10 left above-knee amputation for ischemic gangrene of the left foot and left lower extremity  2.  Renal failure on hemodialysis for over 2 years  3.  Bladder cancer and prostate cancer with hematuria      Plan: Continue daily dressing change amputation site.  Dialysis per renal medicine.  Overall medical manage per hospitalist.  Antibiotics per infectious disease.      Luís Bradshaw MD - 01/09/22 - 05:56 EST

## 2022-01-09 NOTE — PROGRESS NOTES
"   LOS: 26 days    Patient Care Team:  Radha Purcell as PCP - General (Family Medicine)    Reason For Visit:  F/U ESRD.  Subjective     No acute events overnight. No new complaints         Review of Systems:    Pulm: No soa   CV:  No CP      Objective     albumin human, , ,   albumin human, , ,   albumin human, , ,   albumin human, , ,   aspirin, 81 mg, Oral, Daily  atorvastatin, 20 mg, Oral, Nightly  docusate sodium, 100 mg, Oral, BID  Eluxadoline, 100 mg, Oral, BID With Meals  epoetin radha/radha-epbx, 20,000 Units, Subcutaneous, Once per day on Mon Wed Fri  gabapentin, 300 mg, Oral, Nightly  insulin detemir, 10 Units, Subcutaneous, Nightly  insulin lispro, 0-7 Units, Subcutaneous, TID AC  levothyroxine, 200 mcg, Oral, Q AM  methylnaltrexone, 6 mg, Subcutaneous, Every Other Day  midodrine, 10 mg, Oral, Once  sodium chloride, 10 mL, Intravenous, Q12H      sodium chloride, 5 mL/hr, Last Rate: Stopped (12/30/21 1135)          Vital Signs:  Blood pressure 112/62, pulse 81, temperature 98.9 °F (37.2 °C), temperature source Oral, resp. rate 20, height 193 cm (76\"), weight 104 kg (229 lb 14.4 oz), SpO2 93 %.    Flowsheet Rows      First Filed Value   Admission Height 193 cm (76\") Documented at 12/13/2021 1937   Admission Weight 129 kg (285 lb) Documented at 12/13/2021 1937          01/08 0701 - 01/09 0700  In: 960 [P.O.:720]  Out: 460 [Urine:460]    Physical Exam:    Gen: ALert, NAD   HENT: NC, AT, EOMI   NECK: Supple, no JVD, Trachea midline   LUNGS: CTA bilaterally, non labored respirtation   CVS: S1/S2 audible, RRR, no murmur   Abd: Soft, NT, ND, BS+   Ext: + pedal edema, no cyanosis   Skin: Warm, dry and intact  AVF    Radiology:            Labs:  Results from last 7 days   Lab Units 01/09/22  0723 01/08/22  0853 01/07/22  0812   WBC 10*3/mm3 14.90* 13.62* 12.06*   HEMOGLOBIN g/dL 7.7* 8.0*  7.9* 7.2*   HEMATOCRIT % 26.4* 27.1*  26.6* 23.9*   PLATELETS 10*3/mm3 228 216 179     Results from last 7 days   Lab Units " 01/09/22  0723 01/07/22  0812 01/06/22  0717 01/05/22  0859 01/04/22  0845 01/04/22  0845   SODIUM mmol/L 138 138 134* 139   < > 136   POTASSIUM mmol/L 3.8 3.6 3.7 3.6   < > 3.6   CHLORIDE mmol/L 99 100 100 101   < > 98   CO2 mmol/L 25.0 25.0 24.0 25.0   < > 25.0   BUN mg/dL 25* 24* 18 33*   < > 22   CREATININE mg/dL 4.85* 4.11* 3.73* 4.86*   < > 3.59*   CALCIUM mg/dL 9.2 8.9 8.7 8.5*   < > 8.5*   PHOSPHORUS mg/dL  --   --   --  4.2  --  3.9   ALBUMIN g/dL  --   --  4.00 3.60  --  3.70    < > = values in this interval not displayed.     Results from last 7 days   Lab Units 01/09/22  0723   GLUCOSE mg/dL 144*       Results from last 7 days   Lab Units 01/06/22  0717   ALK PHOS U/L 86   BILIRUBIN mg/dL 0.4   ALT (SGPT) U/L <5   AST (SGOT) U/L 11                 Estimated Creatinine Clearance: 19.4 mL/min (A) (by C-G formula based on SCr of 4.85 mg/dL (H)).      Assessment       Gangrene of left foot (HCC)    Atrial fibrillation (HCC)    Type 2 diabetes mellitus (HCC)    Hypothyroid    WINSTON (obstructive sleep apnea), noncompliant w/ NIPPV    CAD (coronary artery disease)    Anemia    Dyslipidemia    ESRD (end stage renal disease) (HCC)    Chronic diastolic CHF (congestive heart failure) (HCC)            Impression:   1-ESRD.   2-HYPOTENSION.   3-ANEMIA.       Recommendations:   - HD per schedule, next HD tomorrow. Orders written. UF as tolerated.   - Renal diet   - SUSAN with HD  - Midodrine.       Liz Mcleod, APRN  01/09/22  08:50 EST

## 2022-01-10 NOTE — NURSING NOTE
WOC team following up on Stage 2 pressure injury to coccyx.  Patient out of room at this time.  Will plan to follow up.

## 2022-01-10 NOTE — THERAPY TREATMENT NOTE
Patient Name: Chase Obrien  : 1945    MRN: [id number]                              Today's Date: 1/10/2022       Admit Date: 2021    Visit Dx:     ICD-10-CM ICD-9-CM   1. Cellulitis of left foot  L03.116 682.7   2. Gangrene of toe of left foot ([facility])  I96 785.4   3. Elevated lactic acid level  R79.89 276.2   4. Recurrent UTI  N39.0 599.0   5. History of bladder cancer  Z85.51 V10.51   6. History of prostate cancer  Z85.46 V10.46   7. ESRD on hemodialysis ([facility])  N18.6 585.6    Z99.2 V45.11   8. History of GI bleed  Z87.19 V12.79   9. PAF (paroxysmal atrial fibrillation) ([facility])  I48.0 427.31   10. History of CHF (congestive heart failure)  Z86.79 V12.59   11. History of diabetes mellitus  Z86.39 V12.29   12. Anemia of chronic renal failure, stage 5 ([facility])  N18.5 285.21    D63.1 585.5   13. Gangrene of left foot ([facility])  I96 785.4     Patient Active Problem List   Diagnosis   • Cellulitis   • BALDO (acute kidney injury) ([facility])   • Atrial fibrillation ([facility])   • Type 2 diabetes mellitus ([facility])   • Hypothyroid   • Chronic kidney disease, stage IV (severe) ([facility])   • WINSTON (obstructive sleep apnea), noncompliant w/ NIPPV   • CAD (coronary artery disease)   • Anemia   • Dyslipidemia   • Tobacco abuse disorder   • ESRD (end stage renal disease) ([facility])   • Elaine's syndrome/ pseudocolonic obstruction   • Chronic diastolic CHF (congestive heart failure) ([facility])   • Acute blood loss anemia   • Noncompliance with recommended medical treatment   • Gangrene of left foot ([facility])     Past Medical History:   Diagnosis Date   • Cellulitis    • CHF (congestive heart failure) ([facility])    • Chronic infection    • Degenerative disc disease, lumbar    • Diabetes mellitus ([facility])    • Hyperlipidemia    • Hypertension    • Myocardial infarction ([facility])    • Renal disorder    • Rotator cuff tear      Past Surgical History:   Procedure Laterality Date   • ABOVE KNEE AMPUTATION Left 2021    Procedure: AMPUTATION ABOVE KNEE LEFT;  Surgeon:  Luís Bradshaw MD;  Location:  [facility];  Service: General;  Laterality: Left;   • BRONCHOSCOPY N/A 4/12/2020    Procedure: BRONCHOSCOPY;  Surgeon: Fox Cervantes MD;  Location:  [facility];  Service: Pulmonary;  Laterality: N/A;   • CORONARY ARTERY BYPASS GRAFT     • ENDOSCOPY N/A 4/2/2020    Procedure: ESOPHAGOGASTRODUODENOSCOPY AT BEDSIDE;  Surgeon: Brunner, Mark I, MD;  Location:  [facility];  Service: Gastroenterology;  Laterality: N/A;   • ENDOSCOPY N/A 4/8/2020    Procedure: ESOPHAGOGASTRODUODENOSCOPY AT BEDSIDE;  Surgeon: Zhang Martinez MD;  Location:  [facility];  Service: Gastroenterology;  Laterality: N/A;  with gastric lavage using Edlich tube   • ENDOSCOPY N/A 4/8/2020    Procedure: ESOPHAGOGASTRODUODENOSCOPY AT BEDSIDE;  Surgeon: Zhang Martinez MD;  Location:  [facility];  Service: Gastroenterology;  Laterality: N/A;   • ENDOSCOPY N/A 4/9/2020    Procedure: ESOPHAGOGASTRODUODENOSCOPY AT BEDSIDE;  Surgeon: Zhang Martinez MD;  Location:  [facility];  Service: Gastroenterology;  Laterality: N/A;   • ENDOSCOPY N/A 4/9/2020    Procedure: ESOPHAGOGASTRODUODENOSCOPY AT BEDSIDE;  Surgeon: Zhang Martinez MD;  Location:  [facility];  Service: Gastroenterology;  Laterality: N/A;      General Information     Row Name 01/10/22 1330          Physical Therapy Time and Intention    Document Type therapy note (daily note)  -     Mode of Treatment physical therapy  -     Row Name 01/10/22 1330          General Information    Existing Precautions/Restrictions fall; non-weight bearing; left  L AKA  -     Row Name 01/10/22 1330          Cognition    Orientation Status (Cognition) oriented x 4  -     Row Name 01/10/22 1330          Safety Issues, Functional Mobility    Safety Issues Affecting Function (Mobility) safety precaution awareness; insight into deficits/self-awareness; safety precautions follow-through/compliance  -     Impairments Affecting Function (Mobility)  endurance/activity tolerance; pain; strength; balance  -           User Key  (r) = Recorded By, (t) = Taken By, (c) = Cosigned By    Initials Name Provider Type    Asad Sullivan PT Physical Therapist               Mobility     Row Name 01/10/22 1330          Bed Mobility    Bed Mobility rolling left; rolling right  -     Rolling Left [place] (Bed Mobility) verbal cues; minimum assist (75% patient effort)  -     Rolling Right [place] (Bed Mobility) verbal cues; contact guard  -     Assistive Device (Bed Mobility) bed rails  -     Comment (Bed Mobility) Rolling R/L for sling placement under pt  -     Row Name 01/10/22 1330          Transfers    Comment (Transfers) Mechanical lift used to transfer pt from bed to chair with dep Ax2. Pt deferring STS attempt due to fatigue from dialysis  -     Row Name 01/10/22 1330          Bed-Chair Transfer    Bed-Chair [place] (Transfers) dependent (less than 25% patient effort)  -     Assistive Device (Bed-Chair Transfers) lift device  -     Row Name 01/10/22 1330          Sit-Stand Transfer    Sit-Stand [place] (Transfers) unable to assess  -     Row Name 01/10/22 1330          Gait/Stairs (Locomotion)    Geyser Level (Gait) unable to assess  -     Geyser Level (Stairs) not tested  -     Comment (Gait/Stairs) non appropriate  -           User Key  (r) = Recorded By, (t) = Taken By, (c) = Cosigned By    Initials Name Provider Type    Asad Sullivan PT Physical Therapist               Obj/Interventions     Row Name 01/10/22 1330          Motor Skills    Therapeutic Exercise hip; ankle; knee  -     Row Name 01/10/22 1330          Hip (Therapeutic Exercise)    Hip (Therapeutic Exercise) isometric exercises; strengthening exercise  -     Hip Isometrics (Therapeutic Exercise) gluteal sets; 10 repetitions  -     Hip Strengthening (Therapeutic Exercise) bilateral; aBduction; aDduction; 10 repetitions  -     Row Name  01/10/22 1330          Knee (Therapeutic Exercise)    Knee (Therapeutic Exercise) isometric exercises; strengthening exercise  -     Knee Isometrics (Therapeutic Exercise) quad sets; 10 repetitions  -     Knee Strengthening (Therapeutic Exercise) bilateral; SLR (straight leg raise); right; heel slides; 10 repetitions  -     Row Name 01/10/22 1330          Ankle (Therapeutic Exercise)    Ankle (Therapeutic Exercise) AROM (active range of motion)  -     Ankle AROM (Therapeutic Exercise) right; dorsiflexion; plantarflexion; 10 repetitions  -           User Key  (r) = Recorded By, (t) = Taken By, (c) = Cosigned By    Initials Name Provider Type    Asad Sullivan, PT Physical Therapist               Goals/Plan    No documentation.                Clinical Impression     Row Name 01/10/22 1330          Pain    Additional Documentation Pain Scale: Numbers Pre/Post-Treatment (Group)  -[facility] Name 01/10/22 1330          Pain Scale: Numbers Pre/Post-Treatment    Pretreatment Pain Rating 6/10  -ELLIOTT     Posttreatment Pain Rating 6/10  -     Pain Location - Side Left  -ELLIOTT     Pain Location - Orientation lower  -     Pain Location residual limb  -     Pain Intervention(s) Repositioned  -     Row Name 01/10/22 1330          Therapy Assessment/Plan (PT)    Rehab Potential (PT) fair, will monitor progress closely  -     Criteria for Skilled Interventions Met (PT) yes; meets criteria; skilled treatment is necessary  -     Row Name 01/10/22 1330          Positioning and Restraints    Pre-Treatment Position in bed  -     Post Treatment Position chair  -     In Chair notified nsg; reclined; call light within reach; encouraged to call for assist; legs elevated; exit alarm on; on mechanical lift sling; waffle cushion  -           User Key  (r) = Recorded By, (t) = Taken By, (c) = Cosigned By    Initials Name Provider Type    Asad Sullivan, PT Physical Therapist               Outcome Measures     Row Name  01/10/22 1330          How much help from another person do you currently need...    Turning from your back to your side while in flat bed without using bedrails? 3  -ELLIOTT     Moving from lying on back to sitting on the side of a flat bed without bedrails? 3  -ELLIOTT     Moving to and from a bed to a chair (including a wheelchair)? 1  -ELLIOTT     Standing up from a chair using your arms (e.g., wheelchair, bedside chair)? 1  -ELLIOTT     Climbing 3-5 steps with a railing? 1  -ELLIOTT     To walk in hospital room? 1  -ELLIOTT     AM-PAC 6 Clicks Score (PT) 10  -ELLIOTT     Row Name 01/10/22 1330          Functional Assessment    Outcome Measure Options AM-PAC 6 Clicks Basic Mobility (PT)  -ELLIOTT           User Key  (r) = Recorded By, (t) = Taken By, (c) = Cosigned By    Initials Name Provider Type    Asad Sullivan, PT Physical Therapist                             Physical Therapy Education                 Title: PT OT SLP Therapies (Done)     Topic: Physical Therapy (Done)     Point: Mobility training (Done)     Learning Progress Summary           Patient Acceptance, E,D, VU by ELLIOTT at 1/10/2022 1330    Comment: Exducated on safe seqeuncing with bed mobility. Reviewed HEP and NWB precautions.      Show all documentation for this point (8)                 Point: Home exercise program (Done)     Learning Progress Summary           Patient Acceptance, E,D, VU by ELLIOTT at 1/10/2022 1330    Comment: Exducated on safe seqeuncing with bed mobility. Reviewed HEP and NWB precautions.      Show all documentation for this point (7)                 Point: Body mechanics (Done)     Learning Progress Summary           Patient Acceptance, E,D, VU by ELLIOTT at 1/10/2022 1330    Comment: Exducated on safe seqeuncing with bed mobility. Reviewed HEP and NWB precautions.      Show all documentation for this point (8)                 Point: Precautions (Done)     Learning Progress Summary           Patient Acceptance, E,D, VU by ELLIOTT at 1/10/2022 1330    Comment: Exducated on safe  seqeuncing with bed mobility. Reviewed HEP and NWB precautions.      Show all documentation for this point (8)                             User Key     Initials Effective Dates Name Provider Type Discipline    ELLIOTT 06/16/21 -  Asad Marx PT Physical Therapist PT              PT Recommendation and Plan     Plan of Care Reviewed With: patient  Progress: no change  Outcome Summary: Pt requiring CGA-min A for rolling R/L. Mechanical lift used to transfer pt from bed to chair with dep Ax2. Pt deferring STS attempt due to fatigue from dialysis. Reviewed HEP and NWB precautions. Will continue to progress strength and mobility as able.     Time Calculation:    PT Charges     Row Name 01/10/22 1330             Time Calculation    Start Time 1330  -ELLIOTT      PT Received On 01/10/22  -      PT Goal Re-Cert Due Date 01/11/22  -              Time Calculation- PT    Total Timed Code Minutes- PT 16 minute(s)  -ELLIOTT              Timed Charges    43520 - PT Therapeutic Exercise Minutes 10  -ELLIOTT      84388 - PT Therapeutic Activity Minutes 6  -ELLIOTT              Total Minutes    Timed Charges Total Minutes 16  -ELLIOTT       Total Minutes 16  -ELLIOTT            User Key  (r) = Recorded By, (t) = Taken By, (c) = Cosigned By    Initials Name Provider Type    ELLIOTT Asad Marx, STEPHANIE Physical Therapist              Therapy Charges for Today     Code Description Service Date Service Provider Modifiers Qty    59612994099 HC PT THER PROC EA 15 MIN 1/10/2022 Asad Marx, PT GP 1    64274571360 HC PT THER SUPP EA 15 MIN 1/10/2022 Asad Marx, PT GP 2          PT G-Codes  Outcome Measure Options: AM-PAC 6 Clicks Basic Mobility (PT)  AM-PAC 6 Clicks Score (PT): 10  AM-PAC 6 Clicks Score (OT): 14    Asad Marx PT  1/10/2022

## 2022-01-10 NOTE — PROGRESS NOTES
"   LOS: 27 days    Patient Care Team:  Radha Purcell as PCP - General (Family Medicine)    Reason For Visit:  F/U ESRD.  Subjective     No acute events overnight. No new complaints         Review of Systems:    Pulm: No soa   CV:  No CP      Objective     albumin human, , ,   albumin human, , ,   albumin human, , ,   albumin human, , ,   aspirin, 81 mg, Oral, Daily  atorvastatin, 20 mg, Oral, Nightly  docusate sodium, 100 mg, Oral, BID  Eluxadoline, 100 mg, Oral, BID With Meals  epoetin radha/radha-epbx, 20,000 Units, Subcutaneous, Once per day on Mon Wed Fri  gabapentin, 300 mg, Oral, Nightly  insulin detemir, 10 Units, Subcutaneous, Nightly  insulin lispro, 0-7 Units, Subcutaneous, TID AC  levothyroxine, 200 mcg, Oral, Q AM  methylnaltrexone, 6 mg, Subcutaneous, Every Other Day  midodrine, 10 mg, Oral, Once  sodium chloride, 10 mL, Intravenous, Q12H      sodium chloride, 5 mL/hr, Last Rate: Stopped (12/30/21 1135)          Vital Signs:  Blood pressure 105/64, pulse 76, temperature 97.8 °F (36.6 °C), temperature source Axillary, resp. rate 18, height 193 cm (76\"), weight 103 kg (227 lb 9.6 oz), SpO2 95 %.    Flowsheet Rows      First Filed Value   Admission Height 193 cm (76\") Documented at 12/13/2021 1937   Admission Weight 129 kg (285 lb) Documented at 12/13/2021 1937          01/09 0701 - 01/10 0700  In: 540 [P.O.:480]  Out: 150 [Urine:150]    Physical Exam:    Gen: ALert, NAD   HENT: NC, AT, EOMI   NECK: Supple, no JVD, Trachea midline   LUNGS: CTA bilaterally, non labored respirtation   CVS: S1/S2 audible, RRR, no murmur   Abd: Soft, NT, ND, BS+   Ext: + pedal edema, no cyanosis   Skin: Warm, dry and intact  AVF    Radiology:            Labs:  Results from last 7 days   Lab Units 01/10/22  0818 01/09/22  0723 01/08/22  0853   WBC 10*3/mm3 12.64* 14.90* 13.62*   HEMOGLOBIN g/dL 7.7* 7.7* 8.0*  7.9*   HEMATOCRIT % 26.3* 26.4* 27.1*  26.6*   PLATELETS 10*3/mm3 217 228 216     Results from last 7 days   Lab Units " 01/10/22  0818 01/09/22  0723 01/07/22  0812 01/06/22  0717 01/05/22  0859 01/05/22  0859 01/04/22  0845 01/04/22  0845   SODIUM mmol/L 138 138 138 134*   < > 139   < > 136   POTASSIUM mmol/L 4.3 3.8 3.6 3.7   < > 3.6   < > 3.6   CHLORIDE mmol/L 99 99 100 100   < > 101   < > 98   CO2 mmol/L 22.0 25.0 25.0 24.0   < > 25.0   < > 25.0   BUN mg/dL 39* 25* 24* 18   < > 33*   < > 22   CREATININE mg/dL 6.02* 4.85* 4.11* 3.73*   < > 4.86*   < > 3.59*   CALCIUM mg/dL 9.0 9.2 8.9 8.7   < > 8.5*   < > 8.5*   PHOSPHORUS mg/dL 4.1  --   --   --   --  4.2  --  3.9   ALBUMIN g/dL 4.00  --   --  4.00  --  3.60  --  3.70    < > = values in this interval not displayed.     Results from last 7 days   Lab Units 01/10/22  0818   GLUCOSE mg/dL 141*       Results from last 7 days   Lab Units 01/06/22  0717   ALK PHOS U/L 86   BILIRUBIN mg/dL 0.4   ALT (SGPT) U/L <5   AST (SGOT) U/L 11                 Estimated Creatinine Clearance: 15.4 mL/min (A) (by C-G formula based on SCr of 6.02 mg/dL (H)).      Assessment       Gangrene of left foot (HCC)    Atrial fibrillation (H[facility])    Type 2 diabetes mellitus ([facility])    Hypothyroid    WINSTON (obstructive sleep apnea), noncompliant w/ NIPPV    CAD (coronary artery disease)    Anemia    Dyslipidemia    ESRD (end stage renal disease) ([facility])    Chronic diastolic CHF (congestive heart failure) ([facility])            Impression:   1-ESRD.   2-HYPOTENSION.   3-ANEMIA.       Recommendations:   - Patient seen on dialysis. UF as tolerated.   - Renal diet   - SUSAN with HD  - Midodrine.   - Monitor H/H and transfuse for Hgb less than 7.0       Landon Freed MD  01/10/22  10:02 EST

## 2022-01-10 NOTE — PROGRESS NOTES
Cardiothoracic Surgery Progress Note      POD #: 11-left above-knee amputation     LOS: 27 days      Subjective: Awake and alert    Objective:  Vital Signs vital signs below noted T-max past 24-hour 90.6 °F  Temp:  [96.2 °F (35.7 °C)-98.6 °F (37 °C)] 98.6 °F (37 °C)  Heart Rate:  [74-77] 77  Resp:  [14-18] 16  BP: ()/(50-60) 106/60    Physical Exam:   General Appearance: Oriented x3   Lungs:   Heart:   Skin:   Incision: Amputation site dressing change. Suture intact skin margin viable skin flaps are viable.     Results:  Results from last 7 days   Lab Units 01/09/22  0723   WBC 10*3/mm3 14.90*   HEMOGLOBIN g/dL 7.7*   HEMATOCRIT % 26.4*   PLATELETS 10*3/mm3 228     Results from last 7 days   Lab Units 01/09/22  0723   SODIUM mmol/L 138   POTASSIUM mmol/L 3.8   CHLORIDE mmol/L 99   CO2 mmol/L 25.0   BUN mg/dL 25*   CREATININE mg/dL 4.85*   GLUCOSE mg/dL 144*   CALCIUM mg/dL 9.2         Assessment: #1. Postop day 11 left above-knee amputation for ischemic gangrene left foot and left lower extremity  2. Renal failure on hemodialysis for over 2 years  #3 bladder cancer prostate cancer with hematuria    Plan: Continue daily dressing change amputation site. Dialysis per renal medicine. Overall medical manage per hospitalist. Antibiotics per infectious disease.      Luís Bradshaw MD - 01/10/22 - 05:24 EST

## 2022-01-10 NOTE — CASE MANAGEMENT/SOCIAL WORK
Case Management Discharge Note      Final Note: Per [facility] at [facility] [place]), they have received insurance approval for transfr to a skilled bed tomorrow 1/11. he and his POA are in agreement with plan. [facility] ambulance will transport at 1030. Call report to [phone]. He will need a negative Covid prior to transfer   I have notified [facility] of [facility] [phone] of his transfer. He is scheduled to have dialysis there on Wed 1/11 at 12N, [facility] is aware         Selected Continued Care - Admitted Since 12/13/2021     Destination Coordination complete.    Service Provider Selected Services Address Phone Fax Patient Preferred    [facility] [address] [address] [phone] --          Durable Medical Equipment    No services have been selected for the patient.              Dialysis/Infusion    No services have been selected for the patient.              Home Medical Care    No services have been selected for the patient.              Therapy    No services have been selected for the patient.              Community Resources    No services have been selected for the patient.              Community & DME    No services have been selected for the patient.                  Transportation Services  Ambulance: [facility]    Final Discharge Disposition Code: 03 - skilled nursing facility (SNF)

## 2022-01-10 NOTE — PLAN OF CARE
Problem: Adult Inpatient Plan of Care  Goal: Plan of Care Review  Flowsheets (Taken 1/10/2022 1330)  Progress: no change  Outcome Summary: Pt requiring CGA-min A for rolling R/L. Mechanical lift used to transfer pt from bed to chair with dep Ax2. Pt deferring STS attempt due to fatigue from dialysis. Reviewed HEP and NWB precautions. Will continue to progress strength and mobility as able.   Goal Outcome Evaluation:  Plan of Care Reviewed With: patient        Progress: no change  Outcome Summary: Pt requiring CGA-min A for rolling R/L. Mechanical lift used to transfer pt from bed to chair with dep Ax2. Pt deferring STS attempt due to fatigue from dialysis. Reviewed HEP and NWB precautions. Will continue to progress strength and mobility as able.

## 2022-01-11 NOTE — PROGRESS NOTES
Cardiothoracic Surgery Progress Note      POD #: 12-left above-knee amputation     LOS: 28 days      Subjective: Awake and alert    Objective:  Vital Signs vital signs below noted T-max past 2490.63 Fahrenheit  Temp:  [97.6 °F (36.4 °C)-98.6 °F (37 °C)] 98.1 °F (36.7 °C)  Heart Rate:  [71-82] 74  Resp:  [16-18] 16  BP: ()/(38-76) 108/76    Physical Exam:   General Appearance: Oriented x3 lungs:   Heart:   Skin:   Incision: Amputation site dressing change.  Sutures intact skin margin viable skin flaps are viable.     Results: Laboratory results below noted white blood cell count 12,600 hematocrit 26% reddening 6 BUN 39  Results from last 7 days   Lab Units 01/10/22  0818   WBC 10*3/mm3 12.64*   HEMOGLOBIN g/dL 7.7*   HEMATOCRIT % 26.3*   PLATELETS 10*3/mm3 217     Results from last 7 days   Lab Units 01/10/22  0818   SODIUM mmol/L 138   POTASSIUM mmol/L 4.3   CHLORIDE mmol/L 99   CO2 mmol/L 22.0   BUN mg/dL 39*   CREATININE mg/dL 6.02*   GLUCOSE mg/dL 141*   CALCIUM mg/dL 9.0         Assessment: #1.  Postop day 12 left above-knee imitation for ischemic gangrene left foot and left lower extremity healing well at this time  2.  Renal failure on hemodialysis for 2 years  3 bladder cancer prostate cancer with hematuria      Plan:  Continue daily dressing change amputation site.  Dialysis per renal medicine.  Overall medical management hospitalist.  Antibiotics infectious disease.  Okay with me to go to rehab at any time.    Luís Bradshaw MD - 01/11/22 - 05:39 EST

## 2022-01-11 NOTE — PROGRESS NOTES
[facility] Medicine Services  PROGRESS NOTE    Patient Name: Chase Obrien  : 1945  MRN: [id number]    Date of Admission: 2021  Primary Care Physician: Radha Purcell    Subjective   Subjective     CC:  Sepsis, left AKA    HPI:  Denies limb pain today. Just finished dialysis.  Looking forward to transfer tomorrow    ROS:  Gen- No fevers, chills  CV- No chest pain, palpitations  Resp- No cough, dyspnea  GI- No N/V/D, abd pain    Objective   Objective     Vital Signs:   Temp:  [97.6 °F (36.4 °C)-98.6 °F (37 °C)] 98.3 °F (36.8 °C)  Heart Rate:  [71-82] 80  Resp:  [16-18] 16  BP: ()/(38-70) 82/54     Physical Exam:  Constitutional - no acute distress, in bed  HEENT-NCAT, mucous membranes moist  CV-RRR, S1 S2 normal, no m/r/g  Resp-CTAB  Abd-soft, nontender, nondistended, normoactive bowel sounds  Ext-left AKA  Neuro-alert and oriented, speech clear, moves all extremities   Psych-normal affect   Skin- No rash on exposed UE or LE bilaterally        Results Reviewed:  LAB RESULTS:      Lab 01/10/22  0818 22  0723 22  0853 22  0812 22  1835 22  0717 22  0717 22  2358 22  2358 22  1916 22  0725 22  0845   0000   WBC 12.64* 14.90* 13.62* 12.06*  --   --   --   --  7.98  --   --  7.44   < >   HEMOGLOBIN 7.7* 7.7* 8.0*  7.9* 7.2* 7.3*   < > 7.1*   < > 7.0*  7.0*   < >  --  7.4*  --    HEMATOCRIT 26.3* 26.4* 27.1*  26.6* 23.9* 24.5*   < > 23.5*   < > 22.9*  22.9*   < >  --  24.9*  --    PLATELETS 217 228 216 179  --   --   --   --  160  --   --  188   < >   NEUTROS ABS 9.68* 10.95* 9.51*  --   --   --   --   --  5.78  --   --  5.54  --    IMMATURE GRANS (ABS) 0.33* 0.68* 0.77*  --   --   --   --   --  0.07*  --   --  0.05  --    LYMPHS ABS 1.31 1.61 1.71  --   --   --   --   --  1.08  --   --  1.07  --    MONOS ABS 1.03* 1.41* 1.36*  --   --   --   --   --  0.82  --   --  0.58  --    EOS ABS 0.24 0.19 0.21  --    --   --   --   --  0.20  --   --  0.17  --    .5* 101.5* 102.3* 100.4*  --   --   --   --  102.7*  --   --  101.6*   < >   CRP  --  10.15*  --   --   --   --   --   --   --   --   --   --   --    PROCALCITONIN  --  0.54*  --   --   --   --   --   --   --   --   --   --   --    PROTIME  --   --  16.7* 17.2*  --   --  18.2*  --   --   --  17.6* 17.3*  --     < > = values in this interval not displayed.         Lab 01/10/22  0818 01/09/22  0723 01/07/22  0812 01/06/22  0717 01/05/22  0859 01/04/22  0845 01/04/22  0845   SODIUM 138 138 138 134* 139   < > 136   POTASSIUM 4.3 3.8 3.6 3.7 3.6   < > 3.6   CHLORIDE 99 99 100 100 101   < > 98   CO2 22.0 25.0 25.0 24.0 25.0   < > 25.0   ANION GAP 17.0* 14.0 13.0 10.0 13.0   < > 13.0   BUN 39* 25* 24* 18 33*   < > 22   CREATININE 6.02* 4.85* 4.11* 3.73* 4.86*   < > 3.59*   GLUCOSE 141* 144* 122* 112* 134*   < > 111*   CALCIUM 9.0 9.2 8.9 8.7 8.5*   < > 8.5*   PHOSPHORUS 4.1  --   --   --  4.2  --  3.9   TSH  --   --   --  7.050*  --   --   --     < > = values in this interval not displayed.         Lab 01/10/22  0818 01/06/22 0717 01/05/22  0859 01/04/22  0845   TOTAL PROTEIN  --  6.8  --   --    ALBUMIN 4.00 4.00 3.60 3.70   GLOBULIN  --  2.8  --   --    ALT (SGPT)  --  <5  --   --    AST (SGOT)  --  11  --   --    BILIRUBIN  --  0.4  --   --    ALK PHOS  --  86  --   --          Lab 01/08/22  0853 01/07/22  0812 01/06/22  0717 01/05/22  0725 01/04/22  0845   PROTIME 16.7* 17.2* 18.2* 17.6* 17.3*   INR 1.40* 1.46* 1.56* 1.50* 1.47*                 Brief Urine Lab Results  (Last result in the past 365 days)      Color   Clarity   Blood   Leuk Est   Nitrite   Protein   CREAT   Urine HCG        12/13/21 2006 Yellow   Turbid   Large (3+)   Large (3+)   Negative   >=300 mg/dL (3+)                 Microbiology Results Abnormal     Procedure Component Value - Date/Time    Blood Culture - Blood, Arm, Right [[id number]]  (Normal) Collected: 12/13/21 2010    Lab Status: Final  result Specimen: Blood from Arm, Right Updated: 12/18/21 2045     Blood Culture No growth at 5 days    Blood Culture - Blood, Arm, Right [[id number]]  (Normal) Collected: 12/13/21 2000    Lab Status: Final result Specimen: Blood from Arm, Right Updated: 12/18/21 2045     Blood Culture No growth at 5 days    Urine Culture - Urine, Urine, Catheter [[id number]] Collected: 12/13/21 2006    Lab Status: Final result Specimen: Urine, Catheter Updated: 12/14/21 1710     Urine Culture >100,000 CFU/mL Mixed Harrison Isolated    Narrative:      Specimen contains mixed organisms of questionable pathogenicity which indicates contamination with commensal harrison.  Further identification is unlikely to provide clinically useful information.  Suggest recollection.    COVID PRE-OP / PRE-PROCEDURE SCREENING ORDER (NO ISOLATION) - Swab, Nasopharynx [[id number]]  (Normal) Collected: 12/13/21 1954    Lab Status: Final result Specimen: Swab from Nasopharynx Updated: 12/13/21 2031    Narrative:      The following orders were created for panel order COVID PRE-OP / PRE-PROCEDURE SCREENING ORDER (NO ISOLATION) - Swab, Nasopharynx.  Procedure                               Abnormality         Status                     ---------                               -----------         ------                     COVID-19 and FLU A/B PCR...[[id number]]  Normal              Final result                 Please view results for these tests on the individual orders.    COVID-19 and FLU A/B PCR - Swab, Nasopharynx [[id number]]  (Normal) Collected: 12/13/21 1954    Lab Status: Final result Specimen: Swab from Nasopharynx Updated: 12/13/21 2031     COVID19 Not Detected     Influenza A PCR Not Detected     Influenza B PCR Not Detected    Narrative:      Fact sheet for providers: https://www.fda.gov/media/623740/download    Fact sheet for patients: https://www.fda.gov/media/566326/download    Test performed by PCR.          XR Chest 1 View    Result Date:  1/10/2022  EXAMINATION: XR CHEST SINGLE VIEW - 01/09/2022  INDICATION: L03.116-Cellulitis of left lower limb; B33-Iwgupwrg, not elsewhere classified; R79.89-Other specified abnormal findings of blood chemistry; N39.0-Urinary tract infection, site not specified; Z85.51-Personal history of malignant neoplasm of bladder; Z85.46-Personal history of malignant neoplasm of prostate; N18.6-End stage renal disease. Leukocytosis.  COMPARISON: 12/13/2021  FINDINGS: Sternotomy wires are noted. Lung volumes remain relatively low but the lungs are practically clear, with perhaps trace bibasilar atelectasis. No pneumothorax or edema is seen. As before, there are are air distended loops of bowel in the upper abdomen. Similar pattern is present over numerous prior studies, and this may reflect chronic ileus.      Impression: 1. Minimal bibasilar atelectasis. No clearly acute chest disease is seen. 2. Persistent diffuse gaseous distention of bowel, apparently chronic.  DICTATED:   01/09/2022 EDITED/lfs:   01/09/2022    This report was finalized on 1/10/2022 8:54 AM by Dr. Ozzie Gibson MD.        Results for orders placed during the hospital encounter of 02/19/20    Transthoracic Echo Complete With Contrast if Necessary Per Protocol    Interpretation Summary  · Estimated EF = 55%.  · Mild mitral valve regurgitation is present      I have reviewed the medications:  Scheduled Meds:albumin human, , ,   albumin human, , ,   albumin human, , ,   albumin human, , ,   aspirin, 81 mg, Oral, Daily  atorvastatin, 20 mg, Oral, Nightly  docusate sodium, 100 mg, Oral, BID  Eluxadoline, 100 mg, Oral, BID With Meals  epoetin radha/radha-epbx, 20,000 Units, Subcutaneous, Once per day on Mon Wed Fri  gabapentin, 300 mg, Oral, Nightly  insulin detemir, 10 Units, Subcutaneous, Nightly  insulin lispro, 0-7 Units, Subcutaneous, TID AC  levothyroxine, 200 mcg, Oral, Q AM  methylnaltrexone, 6 mg, Subcutaneous, Every Other Day  midodrine, 10 mg, Oral,  Once  sodium chloride, 10 mL, Intravenous, Q12H      Continuous Infusions:sodium chloride, 5 mL/hr, Last Rate: Stopped (21 1135)      PRN Meds:.•  acetaminophen **OR** acetaminophen  •  bisacodyl  •  dextrose  •  dextrose  •  glucagon (human recombinant)  •  HYDROcodone-acetaminophen  •  lidocaine  •  melatonin  •  midodrine  •  [] Morphine **AND** naloxone  •  ondansetron **OR** ondansetron  •  senna-docusate sodium  •  [COMPLETED] Insert peripheral IV **AND** sodium chloride  •  sodium chloride    Assessment/Plan   Assessment & Plan     Active Hospital Problems    Diagnosis  POA   • **Gangrene of left foot (HCC) [I96]  Yes   • Chronic diastolic CHF (congestive heart failure) (HCC) [I50.32]  Yes   • ESRD (end stage renal disease) (HCC) [N18.6]  Yes   • Atrial fibrillation (HCC) [I48.91]  Yes   • Type 2 diabetes mellitus (HCC) [E11.9]  Yes   • Hypothyroid [E03.9]  Yes   • WINSTON (obstructive sleep apnea), noncompliant w/ NIPPV [G47.33]  Yes   • CAD (coronary artery disease) [I25.10]  Yes   • Anemia [D64.9]  Yes   • Dyslipidemia [E78.5]  Yes      Resolved Hospital Problems    Diagnosis Date Resolved POA   • Hematuria [R31.9] 2021 Yes   • Lactic acidosis [E87.2] 2021 Yes        Brief Hospital Course to date:  Chase Obrien is a highly noncompliant 76 y.o. male  with CHF, DM, HL, HTN, ESRD on HD, history of GIB (gastric ulcers and embolization of L gastric artery ) and atrial fibrillation. More recent diagnosis of hematuria with subsequent cystoscopy with diagnosis of bladder/prostate cancer.    This patient's problems and plans were partially entered by my partner and updated as appropriate by me 01/10/22.           Lt toe gangrene second, third, and fourth toes  Cellulitis L foot  -Dr. Augustine yu, underwent left AKA on   - antibiotics discontinued /3   -Continue gabapentin 300 mg nightly secondary to ESRD    Leukocytosis  - wbc improved today to 12.6  - no clear source of  infection. Patient remains afebrile and appears non toxic. Discussed with Dr Bradshaw, suture line CDI. Patient denies cough or loose stool.    - CXR without infiltrate  - discussed with ID, continue clinical monitoring     PAD  Hx of atrial fibrillation  - holding coumadin due to hematuria     Anemia  - no epo due to recent bladder cancer, monitor  --Nephrology following due to ESRD.      Hx gross hematuria / current hematuria  Recent diagnosis of bladder/prostate cancer s/p cystoscopy at [facility] (data deficit) to f/u with [facility]  - holding coumadin  - Urology follows, recommends followup at , however previous non-compliance noted    ESRD   -HD MWF if pt agreeable (has refused multiple times due to his INR not being therapeutic)     DM (A1c 5.5)  -Continue SSI  -Continue Levemir 10 units at night  --Stable, continue current regimen     WINSTON  -noncompliant with CPAP  --Oxygen as needed     Hypothyroidism  -continue synthroid  --TSH 7.5, check free T4    Constipation / ileus  Narcotic slow transit   -continue subcu Relistor    Hx of GIB  -gastric ulcer  -L gastric artery embolization here in 2020    Decubitus wound  -WOC following, patient has been refusing dressing changes.      CHF by history  CAD   --Generally stable.      DVT prophylaxis:  Mechanical DVT prophylaxis orders are present.       AM-PAC 6 Clicks Score (PT): 10 (01/10/22 1330)      Disposition: I expect the patient to be discharged to SNF rehab once arranged but needs to have stable Hgb with resolved hematuria, need to watch through this weekend to determine if medically ready Monday.      CODE STATUS:   Code Status and Medical Interventions:   Ordered at: 12/14/21 0003     Level Of Support Discussed With:    Patient     Code Status (Patient has no pulse and is not breathing):    CPR (Attempt to Resuscitate)     Medical Interventions (Patient has pulse or is breathing):    Full Support       Henok Grant MD  01/10/22

## 2022-01-11 NOTE — NURSING NOTE
[facility] team following up on right heel and sacrum pressure injuries and bilateral lower extremity venous ulcers.  Patient refused assessment, states being discharged at 10 a.m. today, confirmed with bedside RN that discharge is scheduled at that time and that he had refused assessment.

## 2022-01-11 NOTE — PLAN OF CARE
Goal Outcome Evaluation:  Plan of Care Reviewed With: patient         Pt going to rehab today via EMS

## 2022-01-11 NOTE — PLAN OF CARE
Goal Outcome Evaluation:  Plan of Care Reviewed With: patient        Progress: improving     Patient is alert and oriented with complaints of intermittent pain in left stump. Patient repositioned as tolerated. Patient refuses to wear oxygen when saturation is in upper 80s. Will continue to monitor.

## 2022-01-11 NOTE — DISCHARGE SUMMARY
[facility] Medicine Services  DISCHARGE SUMMARY    Patient Name: Chase Obrien  : 1945  MRN: [id number]    Date of Admission: 2021  7:20 PM  Date of Discharge:  2022  Primary Care Physician: Radha Purcell    Consults     Date and Time Order Name Status Description    2022 12:31 AM Inpatient Urology Consult Completed     12/15/2021  7:55 AM Inpatient Infectious Diseases Consult Completed     12/15/2021  6:42 AM Inpatient Infectious Diseases Consult Completed     2021  4:21 AM Inpatient Nephrology Consult Completed     2021  4:21 AM Inpatient Cardiothoracic Surgery Consult Completed           Hospital Course     Presenting Problem:   Cellulitis of left foot [L03.116]    Active Hospital Problems    Diagnosis  POA   • **Gangrene of left foot (HCC) [I96]  Yes   • Chronic diastolic CHF (congestive heart failure) (HCC) [I50.32]  Yes   • ESRD (end stage renal disease) (HCC) [N18.6]  Yes   • Atrial fibrillation (HCC) [I48.91]  Yes   • Type 2 diabetes mellitus (HCC) [E11.9]  Yes   • Hypothyroid [E03.9]  Yes   • WINSTON (obstructive sleep apnea), noncompliant w/ NIPPV [G47.33]  Yes   • CAD (coronary artery disease) [I25.10]  Yes   • Anemia [D64.9]  Yes   • Dyslipidemia [E78.5]  Yes      Resolved Hospital Problems    Diagnosis Date Resolved POA   • Hematuria [R31.9] 2021 Yes   • Lactic acidosis [E87.2] 2021 Yes          Hospital Course:  Chase Obrien is a  is a highly noncompliant 76 y.o. male  with CHF, DM, HL, HTN, ESRD on HD, history of GIB (gastric ulcers and embolization of L gastric artery ) and atrial fibrillation. More recent diagnosis of hematuria with subsequent cystoscopy with diagnosis of bladder/prostate cancer.     Lt toe gangrene second, third, and fourth toes  Cellulitis L foot  -Dr. Bradshaw following, underwent left AKA on   - antibiotics discontinued 1/3. Stable off of abx.    -Continue lower dose gabapentin 300 mg nightly  secondary to ESRD.  -Daily dressing changes to left AKA stump.   -Daily dressing changes to lower extremity venous ulcers.      Leukocytosis  - wbc improved to 12.6  - no clear source of infection. Patient remains afebrile and appears non toxic. My partner discussed with Dr Bradshaw- suture line CDI. Patient denies cough or loose stool.    - CXR without infiltrate  - My partner discussed with ID, continue clinical monitoring. No further need for antibiotics.      PAD  Hx of atrial fibrillation  - continuing to hold coumadin due to hematuria     Anemia  - no epo due to recent bladder cancer, monitor  --Nephrology following due to ESRD.   --Hgb stable at 7.7      Hx gross hematuria / current hematuria  Recent diagnosis of bladder/prostate cancer s/p cystoscopy at [place] (data deficit) to f/u with [facility]  - holding coumadin  - Urology follows, recommends followup at  urologic oncology, however previous non-compliance noted. Will attempt to have patient follow up again.      ESRD   -HD MWF if pt agreeable (has refused multiple times due to his INR not being therapeutic)     DM (A1c 5.5)  --Stable, continue current regimen     WINSTON  -noncompliant with CPAP  --Oxygen as needed     Hypothyroidism  -continue synthroid  --TSH 7.5, check free T4     Constipation / ileus  Narcotic slow transit   -improved. Continue bowel regimen.  -previously required SQ relistor.      Hx of GIB  -gastric ulcer  -L gastric artery embolization here in 2020     Decubitus wound  -WOC following, patient has been refusing dressing changes.      CHF by history  CAD   --Generally stable.            Discharge Follow Up Recommendations for outpatient labs/diagnostics:   Follow up with PCP, first available hospital follow up appt.   Follow up with Nephrology per their recommendations.  Follow up with Dr. Bradshaw in 2 weeks.   Follow up with [facility] urologic oncology.     Day of Discharge     HPI:   Resting comfortably in bed this morning.  No overnight issues.  Ready for discharge to rehab today.    Review of Systems  Gen- No fevers, chills  CV- No chest pain, palpitations  Resp- No cough, dyspnea  GI- No N/V/D, abd pain        Vital Signs:   Temp:  [97.6 °F (36.4 °C)-98.6 °F (37 °C)] 98.2 °F (36.8 °C)  Heart Rate:  [74-82] 78  Resp:  [16-18] 18  BP: ()/(54-76) 110/74      Physical Exam:  Constitutional: No acute distress, awake, alert, upright in bed, no family at bedside.   HENT: NCAT, mucous membranes moist  Respiratory: Clear to auscultation bilaterally, decreased in bases, respiratory effort normal on room air  Cardiovascular: RRR, no murmurs, rubs, or gallops  Gastrointestinal: Positive bowel sounds, soft, nontender, nondistended  Musculoskeletal:left AKA  Psychiatric: Appropriate affect, cooperative  Neurologic: Oriented x 3, strength symmetric in all extremities, Cranial Nerves grossly intact to confrontation, speech clear  Skin: No rashes noted to exposed skin      Pertinent  and/or Most Recent Results     LAB RESULTS:      Lab 01/10/22  0818 01/09/22  0723 01/08/22  0853 01/07/22  0812 01/06/22  1835 01/06/22  0717 01/06/22  0717 01/05/22  2358 01/05/22  2358 01/05/22  1916 01/05/22  0725   WBC 12.64* 14.90* 13.62* 12.06*  --   --   --   --  7.98  --   --    HEMOGLOBIN 7.7* 7.7* 8.0*  7.9* 7.2* 7.3*   < > 7.1*   < > 7.0*  7.0*   < >  --    HEMATOCRIT 26.3* 26.4* 27.1*  26.6* 23.9* 24.5*   < > 23.5*   < > 22.9*  22.9*   < >  --    PLATELETS 217 228 216 179  --   --   --   --  160  --   --    NEUTROS ABS 9.68* 10.95* 9.51*  --   --   --   --   --  5.78  --   --    IMMATURE GRANS (ABS) 0.33* 0.68* 0.77*  --   --   --   --   --  0.07*  --   --    LYMPHS ABS 1.31 1.61 1.71  --   --   --   --   --  1.08  --   --    MONOS ABS 1.03* 1.41* 1.36*  --   --   --   --   --  0.82  --   --    EOS ABS 0.24 0.19 0.21  --   --   --   --   --  0.20  --   --    .5* 101.5* 102.3* 100.4*  --   --   --   --  102.7*  --   --    CRP  --  10.15*  --   --   --   --   --    --   --   --   --    PROCALCITONIN  --  0.54*  --   --   --   --   --   --   --   --   --    PROTIME  --   --  16.7* 17.2*  --   --  18.2*  --   --   --  17.6*    < > = values in this interval not displayed.         Lab 01/10/22  0818 01/09/22  0723 01/07/22  0812 01/06/22  0717 01/05/22  0859   SODIUM 138 138 138 134* 139   POTASSIUM 4.3 3.8 3.6 3.7 3.6   CHLORIDE 99 99 100 100 101   CO2 22.0 25.0 25.0 24.0 25.0   ANION GAP 17.0* 14.0 13.0 10.0 13.0   BUN 39* 25* 24* 18 33*   CREATININE 6.02* 4.85* 4.11* 3.73* 4.86*   GLUCOSE 141* 144* 122* 112* 134*   CALCIUM 9.0 9.2 8.9 8.7 8.5*   PHOSPHORUS 4.1  --   --   --  4.2   TSH  --   --   --  7.050*  --          Lab 01/10/22  0818 01/06/22  0717 01/05/22  0859   TOTAL PROTEIN  --  6.8  --    ALBUMIN 4.00 4.00 3.60   GLOBULIN  --  2.8  --    ALT (SGPT)  --  <5  --    AST (SGOT)  --  11  --    BILIRUBIN  --  0.4  --    ALK PHOS  --  86  --          Lab 01/08/22  0853 01/07/22  0812 01/06/22  0717 01/05/22  0725   PROTIME 16.7* 17.2* 18.2* 17.6*   INR 1.40* 1.46* 1.56* 1.50*                 Brief Urine Lab Results  (Last result in the past 365 days)      Color   Clarity   Blood   Leuk Est   Nitrite   Protein   CREAT   Urine HCG        12/13/21 2006 Yellow   Turbid   Large (3+)   Large (3+)   Negative   >=300 mg/dL (3+)               Microbiology Results (last 10 days)     Procedure Component Value - Date/Time    COVID PRE-OP / PRE-PROCEDURE SCREENING ORDER (NO ISOLATION) - Swab, Nasopharynx [[id number]]  (Normal) Collected: 01/10/22 2035    Lab Status: Final result Specimen: Swab from Nasopharynx Updated: 01/10/22 2116    Narrative:      The following orders were created for panel order COVID PRE-OP / PRE-PROCEDURE SCREENING ORDER (NO ISOLATION) - Swab, Nasopharynx.  Procedure                               Abnormality         Status                     ---------                               -----------         ------                     COVID-19, ABBOTT  IN-HOUS...[[id number]]  Normal              Final result                 Please view results for these tests on the individual orders.    COVID-19, ABBOTT IN-HOUSE,NASAL Swab (NO TRANSPORT MEDIA) 2 HR TAT - Swab, Nasopharynx [[id number]]  (Normal) Collected: 01/10/22 2035    Lab Status: Final result Specimen: Swab from Nasopharynx Updated: 01/10/22 2116     COVID19 Presumptive Negative    Narrative:      Fact sheet for providers: https://www.fda.gov/media/204961/download     Fact sheet for patients: https://www.fda.gov/media/741491/download    Test performed by PCR.  Fact sheet for providers: https://www.fda.gov/media/522535/download     Fact sheet for patients: https://www.[place].gov/media/287170/download    Test performed by PCR.  If inconsistent with clinical signs and symptoms patient should be tested with different authorized molecular test.          XR Chest 1 View    Result Date: 1/10/2022  EXAMINATION: XR CHEST SINGLE VIEW - 01/09/2022  INDICATION: L03.116-Cellulitis of left lower limb; W63-[place]anyczmo, not elsewhere classified; R79.89-Other specified abnormal findings of blood chemistry; N39.0-Urinary tract infection, site not specified; Z85.51-Personal history of malignant neoplasm of bladder; Z85.46-Personal history of malignant neoplasm of prostate; N18.6-End stage renal disease. Leukocytosis.  COMPARISON: 12/13/2021  FINDINGS: Sternotomy wires are noted. Lung volumes remain relatively low but the lungs are practically clear, with perhaps trace bibasilar atelectasis. No pneumothorax or edema is seen. As before, there are are air distended loops of bowel in the upper abdomen. Similar pattern is present over numerous prior studies, and this may reflect chronic ileus.      1. Minimal bibasilar atelectasis. No clearly acute chest disease is seen. 2. Persistent diffuse gaseous distention of bowel, apparently chronic.  DICTATED:   01/09/2022 EDITED/lfs:   01/09/2022    This report was finalized on 1/10/2022 8:54 AM by  Dr. Ozzie Gibson MD.        Results for orders placed during the hospital encounter of 12/13/21    Doppler Arterial Multi Level Lower Extremity - Bilateral CAR    Interpretation Summary  · Technically difficult study  · Ankle pressures were unable to be obtained.  · The right toe: Brachial index is severely reduced at 0.11.  · Unable to obtain a left toe pressure.  · Abnormal monophasic waveforms throughout both lower extremities.  · Findings suggest hemodynamically significant peripheral arterial disease bilaterally.      Results for orders placed during the hospital encounter of 12/13/21    Doppler Arterial Multi Level Lower Extremity - Bilateral CAR    Interpretation Summary  · Technically difficult study  · Ankle pressures were unable to be obtained.  · The right toe: Brachial index is severely reduced at 0.11.  · Unable to obtain a left toe pressure.  · Abnormal monophasic waveforms throughout both lower extremities.  · Findings suggest hemodynamically significant peripheral arterial disease bilaterally.      Results for orders placed during the hospital encounter of 02/19/20    Transthoracic Echo Complete With Contrast if Necessary Per Protocol    Interpretation Summary  · Estimated EF = 55%.  · Mild mitral valve regurgitation is present      Plan for Follow-up of Pending Labs/Results:     Discharge Details        Discharge Medications      New Medications      Instructions Start Date   gabapentin 300 MG capsule  Commonly known as: NEURONTIN  Replaces: gabapentin 800 MG tablet   300 mg, Oral, Nightly      insulin detemir 100 UNIT/ML injection  Commonly known as: LEVEMIR   10 Units, Subcutaneous, Nightly      insulin lispro 100 UNIT/ML injection  Commonly known as: humaLOG   0-7 Units, Subcutaneous, 3 Times Daily Before Meals      lidocaine 4 % cream  Commonly known as: LMX   1 application, Topical, As Needed      midodrine 10 MG tablet  Commonly known as: PROAMATINE   10 mg, Oral, Daily PRN         Changes to  Medications      Instructions Start Date   atorvastatin 20 MG tablet  Commonly known as: LIPITOR  What changed:   medication strength  how much to take   20 mg, Oral, Nightly      docusate sodium 100 MG capsule  What changed:   how much to take  when to take this   100 mg, Oral, 2 Times Daily      HYDROcodone-acetaminophen  MG per tablet  Commonly known as: NORCO  What changed:   when to take this  reasons to take this   1 tablet, Oral, Every 4 Hours PRN      levothyroxine 200 MCG tablet  Commonly known as: SYNTHROID, LEVOTHROID  What changed:   when to take this  Another medication with the same name was removed. Continue taking this medication, and follow the directions you see here.   200 mcg, Oral, Every Early Morning   Start Date: January 12, 2022        Continue These Medications      Instructions Start Date   acetaminophen 325 MG tablet  Commonly known as: TYLENOL   650 mg, Oral, Every 4 Hours PRN      aspirin 81 MG chewable tablet   81 mg, Oral, Daily      bisacodyl 10 MG suppository  Commonly known as: DULCOLAX   10 mg, Rectal, Daily PRN      Eluxadoline 100 MG tablet   1 tablet, Oral, 2 Times Daily      linagliptin 5 MG tablet tablet  Commonly known as: TRADJENTA   5 mg, Oral, Daily      ondansetron 4 MG tablet  Commonly known as: ZOFRAN   4 mg, Oral, Every 6 Hours PRN         Stop These Medications    calcium acetate 667 MG capsule capsule  Commonly known as: PHOS BINDER)     gabapentin 800 MG tablet  Commonly known as: NEURONTIN  Replaced by: gabapentin 300 MG capsule     lactulose 10 GM/15ML solution  Commonly known as: CHRONULAC     lidocaine 3 % cream cream     polyethylene glycol 17 g packet  Commonly known as: MIRALAX     potassium chloride 20 MEQ CR tablet  Commonly known as: K-DUR,KLOR-CON     povidone-iodine 10 % external solution  Commonly known as: BETADINE     Remedy Calazime 0.4-20.5 % paste  Generic drug: Menthol-Zinc Oxide            Allergies   Allergen Reactions   • Penicillins  Other (See Comments) and Unknown - Low Severity     Received ceftriaxone 2016         Discharge Disposition:  Skilled Nursing Facility (DC - External)    Diet:  Hospital:  Diet Order   Procedures   • Diet Regular; Renal       Activity:  Activity Instructions     Activity as Tolerated                 CODE STATUS:    Code Status and Medical Interventions:   Ordered at: 12/14/21 0003     Level Of Support Discussed With:    Patient     Code Status (Patient has no pulse and is not breathing):    CPR (Attempt to Resuscitate)     Medical Interventions (Patient has pulse or is breathing):    Full Support       No future appointments.    Additional Instructions for the Follow-ups that You Need to Schedule     Discharge Follow-up with PCP   As directed       Currently Documented PCP:    Radha Purcell    PCP Phone Number:    [phone]     Follow Up Details: first available hospital follow up appt.         Discharge Follow-up with Specified Provider: Dr. Bradshaw; 2 Weeks   As directed      To: Dr. Bradshaw    Follow Up: 2 Weeks         Discharge Follow-up with Specified Provider:  urologic oncology- first available.   As directed      To:  urologic oncology- first available.         Discharge Follow-up with Specified Provider: nephrology per their recommendations/   As directed      To: nephrology per their recommendations/                     MARYURI Barbosa  01/11/22      Time Spent on Discharge:  I spent  40  minutes on this discharge activity which included: face-to-face encounter with the patient, reviewing the data in the system, coordination of the care with the nursing staff as well as consultants, documentation, and entering orders.

## 2024-02-04 NOTE — OUTREACH NOTE
COPD/PN Week 1 Survey      Responses   [facility] patient discharged from?  [place]   COVID-19 Test Status  Negative   Does the patient have one of the following disease processes/diagnoses(primary or secondary)?  COPD/Pneumonia   Is there a successful TCM telephone encounter documented?  No   Was the primary reason for admission:  Pneumonia   Week 1 attempt successful?  No   Unsuccessful attempts  Attempt 1          Christine Sosa, RICHN   5 (moderate pain)

## (undated) DEVICE — SPNG ENDO BEDSIDE TUB ENZYM

## (undated) DEVICE — SST TWIST DRILL, STANDARD, 2.4MM DIA. X 127MM: Brand: MICROAIRE®

## (undated) DEVICE — THE BITE BLOCK MAXI, LATEX FREE STRAP IS USED TO PROTECT THE ENDOSCOPE INSERTION TUBE FROM BEING BITTEN BY THE PATIENT.

## (undated) DEVICE — JACKSON-PRATT 100CC BULB RESERVOIR: Brand: CARDINAL HEALTH

## (undated) DEVICE — SUT ETHLN 2/0 PS 18IN 585H

## (undated) DEVICE — PATIENT RETURN ELECTRODE, SINGLE-USE, CONTACT QUALITY MONITORING, ADULT, WITH 9FT CORD, FOR PATIENTS WEIGING OVER 33LBS. (15KG): Brand: MEGADYNE

## (undated) DEVICE — TBG PENCL TELESCP MEGADYNE SMOKE EVAC 10FT

## (undated) DEVICE — KT BIOGUARD SXN VLV AIR/H20 4PC DISP

## (undated) DEVICE — SUT SILK 2/0 TIES 18IN A185H

## (undated) DEVICE — SIDESTREAM™ HIGH-EFFICIENCY NEBULIZER: Brand: AIRLIFE™

## (undated) DEVICE — APPL CHLORAPREP TINTED 26ML TEAL

## (undated) DEVICE — SENSR O2 OXIMAX FNGR A/ 18IN NONSTR

## (undated) DEVICE — SINGLE USE BIOPSY VALVE MAJ-210: Brand: SINGLE USE BIOPSY VALVE (STERILE)

## (undated) DEVICE — KT ORCA ORCAPOD DISP STRL

## (undated) DEVICE — LUBE GEL ENDOGLIDE 1.1OZ

## (undated) DEVICE — ELECTRD BLD EZ CLN MOD XLNG 2.75IN

## (undated) DEVICE — ANTIBACTERIAL UNDYED BRAIDED (POLYGLACTIN 910), SYNTHETIC ABSORBABLE SUTURE: Brand: COATED VICRYL

## (undated) DEVICE — SPNG VERSALON 4X4 4PLY NONSTRL LF BG/200

## (undated) DEVICE — PETROLATUM DRESSING. FINE MESH GAUZE IMPREGNATED WITH 3% BISMUTH TRIBROMOPHENATE  IN A PETROLATUM BLEND.: Brand: XEROFORM PETROLATUM

## (undated) DEVICE — SPNG LAP PREWSH SFTPK 18X18IN STRL PK/5

## (undated) DEVICE — 450 ML BOTTLE OF 0.05% CHLORHEXIDINE GLUCONATE IN 99.95% STERILE WATER FOR IRRIGATION, USP AND APPLICATOR.: Brand: IRRISEPT ANTIMICROBIAL WOUND LAVAGE

## (undated) DEVICE — BANDAGE,GAUZE,BULKEE II,4.5"X4.1YD,STRL: Brand: MEDLINE

## (undated) DEVICE — DRAPE,TOP,102X53,STERILE: Brand: MEDLINE

## (undated) DEVICE — THE DISPOSABLE ROTH NET PLATINUM FOOD BOLUS RETRIEVAL DEVICE IS USED IN THE ENDOSCOPIC RETRIEVAL FOOD BOLUS.: Brand: ROTH NET PLATINUM

## (undated) DEVICE — ERBE NESSY®PLATE 170 SPLIT; 168CM²; CABLE 3M: Brand: ERBE

## (undated) DEVICE — SUT SILK 0/0 CT2 18IN C027D

## (undated) DEVICE — GAUZE FLUFF 1 PLY: Brand: DEROYAL

## (undated) DEVICE — TRAP FLD MINIVAC MEGADYNE 100ML

## (undated) DEVICE — APPL COTN TP PLSTC 6IN STRL LF PK/2

## (undated) DEVICE — SUT VIC 2/0 TIES 18IN J111T

## (undated) DEVICE — THE DISPOSABLE ROTH NET FOREIGN BODY STANDARD RETRIEVAL DEVICE IS USED IN THE ENDOSCOPIC RETRIEVAL OF FOREIGN BODY, FOOD BOLUS AND EXCISED TISSUE SUCH AS POLYPS.: Brand: ROTH NET

## (undated) DEVICE — ELECTRODE, ECG, FOAM, 3PK: Brand: MEDLINE

## (undated) DEVICE — TUBING, SUCTION, 1/4" X 10', STRAIGHT: Brand: MEDLINE

## (undated) DEVICE — TRUE CONTENT TO BE POPULATED AS PART OF REBRANDING: Brand: ARGYLE

## (undated) DEVICE — SYR SLPTP 20CC

## (undated) DEVICE — PENCL ROCKRSWCH MEGADYNE W/HOLSTR 10FT SS

## (undated) DEVICE — SUT SILK 2/0 PS 18IN 1588H

## (undated) DEVICE — PK MINOR SPLT 10

## (undated) DEVICE — SUT GUT CHRM 0 STD TIES 54IN S114H

## (undated) DEVICE — SOL IRR H2O BTL 1000ML STRL

## (undated) DEVICE — SUT SILK 0 TIES 30IN A306H

## (undated) DEVICE — DRAIN JACKSON PRATT ROUND 15FR: Brand: CARDINAL HEALTH

## (undated) DEVICE — MSK ENDO PORT O2 POM ELITE CURAPLEX A/

## (undated) DEVICE — STRYKER PERFORMANCE SERIES SAGITTAL BLADE: Brand: STRYKER PERFORMANCE SERIES

## (undated) DEVICE — INTRO ACCSR BLNT TP

## (undated) DEVICE — DRSNG ELAS NET STRCH SZ8 31IN 25YD LF

## (undated) DEVICE — SUT SILK 2/0 SH CR8 18IN CR8 C012D

## (undated) DEVICE — CONTN GRAD MEAS TRIANG 32OZ BLK

## (undated) DEVICE — HYBRID CO2 TUBING/CAP SET FOR OLYMPUS® SCOPES & CO2 SOURCE: Brand: ERBE

## (undated) DEVICE — PCH DRN BRST RECONSTRUCT BRA LXF

## (undated) DEVICE — SYR SLP TP 10ML DISP

## (undated) DEVICE — 3M™ MEDIPORE™ H SOFT CLOTH SURGICAL TAPE, 2863, 3 IN X 10 YD, 12/CASE: Brand: 3M™ MEDIPORE™

## (undated) DEVICE — INTENDED FOR TISSUE SEPARATION, AND OTHER PROCEDURES THAT REQUIRE A SHARP SURGICAL BLADE TO PUNCTURE OR CUT.: Brand: BARD-PARKER ® STAINLESS STEEL BLADES

## (undated) DEVICE — SINGLE USE SUCTION VALVE MAJ-209: Brand: SINGLE USE SUCTION VALVE (STERILE)

## (undated) DEVICE — TRAP,MUCUS SPECIMEN,40CC: Brand: MEDLINE

## (undated) DEVICE — SUT VIC 0 CT1 CR8 18IN J840D

## (undated) DEVICE — SUT VICYL PLS CTD ANTIB BR 1 27IN VIL

## (undated) DEVICE — BNDG ELAS CO-FLEX SLF ADHR 4IN5YD LF STRL

## (undated) DEVICE — SUT VIC 2/0 CT1 CR8 18IN J839D

## (undated) DEVICE — SYR LUERLOK 50ML

## (undated) DEVICE — CONN STD FOR/O2 TBG

## (undated) DEVICE — STCKNT IMPERV 12IN STRL

## (undated) DEVICE — GAUZE,SPONGE,2X2,4PLY,NS,NW,LF: Brand: MEDLINE

## (undated) DEVICE — SUT VIC 0 TIES 18IN J912G

## (undated) DEVICE — SOL LR 1000ML

## (undated) DEVICE — SOL IRR NACL 0.9PCT BT 1000ML

## (undated) DEVICE — SPEC CONT WIDE MOUTH 3OZ 400/CS 20 CS/SK: Brand: MEDEGEN MEDICAL PRODUCTS, LLC

## (undated) DEVICE — SUCTION CANISTER, 1000CC,SAFELINER: Brand: DEROYAL

## (undated) DEVICE — TUBING,OXYGEN,CRUSH RES,7',CLEAR,UC: Brand: MEDLINE INDUSTRIES, INC.

## (undated) DEVICE — GOWN,NON-REINFORCED,SIRUS,SET IN SLV,XL: Brand: MEDLINE

## (undated) DEVICE — GLV SURG PREMIERPRO MIC LTX PF SZ7 BRN

## (undated) DEVICE — CANNULA,ADULT,SOFT-TOUCH,7'TUBE,UC: Brand: PENDING

## (undated) DEVICE — BOWL UTIL STRL 32OZ